# Patient Record
Sex: FEMALE | Race: WHITE | NOT HISPANIC OR LATINO | Employment: FULL TIME | URBAN - METROPOLITAN AREA
[De-identification: names, ages, dates, MRNs, and addresses within clinical notes are randomized per-mention and may not be internally consistent; named-entity substitution may affect disease eponyms.]

---

## 2017-10-10 ENCOUNTER — GENERIC CONVERSION - ENCOUNTER (OUTPATIENT)
Dept: OTHER | Facility: OTHER | Age: 37
End: 2017-10-10

## 2017-10-10 DIAGNOSIS — T83.32XA DISPLACEMENT OF INTRAUTERINE CONTRACEPTIVE DEVICE: ICD-10-CM

## 2017-10-10 DIAGNOSIS — E66.9 OBESITY: ICD-10-CM

## 2017-10-10 DIAGNOSIS — G47.00 INSOMNIA: ICD-10-CM

## 2017-10-10 DIAGNOSIS — Z80.3 FAMILY HISTORY OF MALIGNANT NEOPLASM OF BREAST: ICD-10-CM

## 2017-10-10 DIAGNOSIS — L30.9 DERMATITIS: ICD-10-CM

## 2017-10-10 DIAGNOSIS — G40.909 EPILEPSY WITHOUT STATUS EPILEPTICUS, NOT INTRACTABLE (HCC): ICD-10-CM

## 2017-10-18 ENCOUNTER — HOSPITAL ENCOUNTER (OUTPATIENT)
Dept: RADIOLOGY | Age: 37
Discharge: HOME | End: 2017-10-18
Payer: COMMERCIAL

## 2017-10-18 DIAGNOSIS — T83.32XA DISPLACEMENT OF INTRAUTERINE CONTRACEPTIVE DEVICE: ICD-10-CM

## 2017-10-18 DIAGNOSIS — Z80.3 FAMILY HISTORY OF MALIGNANT NEOPLASM OF BREAST: ICD-10-CM

## 2017-10-18 PROCEDURE — G0202 SCR MAMMO BI INCL CAD: HCPCS

## 2017-10-18 PROCEDURE — 76856 US EXAM PELVIC COMPLETE: CPT

## 2017-10-18 PROCEDURE — 76830 TRANSVAGINAL US NON-OB: CPT

## 2017-11-02 ENCOUNTER — ALLSCRIPTS OFFICE VISIT (OUTPATIENT)
Dept: OTHER | Facility: OTHER | Age: 37
End: 2017-11-02

## 2017-11-07 ENCOUNTER — APPOINTMENT (OUTPATIENT)
Dept: LAB | Facility: CLINIC | Age: 37
End: 2017-11-07
Payer: COMMERCIAL

## 2017-11-07 DIAGNOSIS — E66.9 OBESITY: ICD-10-CM

## 2017-11-07 DIAGNOSIS — G47.00 INSOMNIA: ICD-10-CM

## 2017-11-07 DIAGNOSIS — L30.9 DERMATITIS: ICD-10-CM

## 2017-11-07 DIAGNOSIS — G40.909 EPILEPSY WITHOUT STATUS EPILEPTICUS, NOT INTRACTABLE (HCC): ICD-10-CM

## 2017-11-07 LAB
ALBUMIN SERPL BCP-MCNC: 4 G/DL (ref 3.5–5)
ALP SERPL-CCNC: 70 U/L (ref 46–116)
ALT SERPL W P-5'-P-CCNC: 31 U/L (ref 12–78)
ANION GAP SERPL CALCULATED.3IONS-SCNC: 4 MMOL/L (ref 4–13)
AST SERPL W P-5'-P-CCNC: 15 U/L (ref 5–45)
BASOPHILS # BLD AUTO: 0.05 THOUSANDS/ΜL (ref 0–0.1)
BASOPHILS NFR BLD AUTO: 1 % (ref 0–1)
BILIRUB SERPL-MCNC: 0.97 MG/DL (ref 0.2–1)
BUN SERPL-MCNC: 13 MG/DL (ref 5–25)
CALCIUM SERPL-MCNC: 8.7 MG/DL (ref 8.3–10.1)
CHLORIDE SERPL-SCNC: 102 MMOL/L (ref 100–108)
CHOLEST SERPL-MCNC: 209 MG/DL (ref 50–200)
CO2 SERPL-SCNC: 31 MMOL/L (ref 21–32)
CREAT SERPL-MCNC: 0.71 MG/DL (ref 0.6–1.3)
EOSINOPHIL # BLD AUTO: 0.31 THOUSAND/ΜL (ref 0–0.61)
EOSINOPHIL NFR BLD AUTO: 4 % (ref 0–6)
ERYTHROCYTE [DISTWIDTH] IN BLOOD BY AUTOMATED COUNT: 12.9 % (ref 11.6–15.1)
GFR SERPL CREATININE-BSD FRML MDRD: 108 ML/MIN/1.73SQ M
GLUCOSE P FAST SERPL-MCNC: 92 MG/DL (ref 65–99)
HCT VFR BLD AUTO: 44.2 % (ref 34.8–46.1)
HDLC SERPL-MCNC: 33 MG/DL (ref 40–60)
HGB BLD-MCNC: 15 G/DL (ref 11.5–15.4)
LDLC SERPL CALC-MCNC: 112 MG/DL (ref 0–100)
LYMPHOCYTES # BLD AUTO: 1.93 THOUSANDS/ΜL (ref 0.6–4.47)
LYMPHOCYTES NFR BLD AUTO: 26 % (ref 14–44)
MCH RBC QN AUTO: 30.7 PG (ref 26.8–34.3)
MCHC RBC AUTO-ENTMCNC: 33.9 G/DL (ref 31.4–37.4)
MCV RBC AUTO: 91 FL (ref 82–98)
MONOCYTES # BLD AUTO: 0.44 THOUSAND/ΜL (ref 0.17–1.22)
MONOCYTES NFR BLD AUTO: 6 % (ref 4–12)
NEUTROPHILS # BLD AUTO: 4.58 THOUSANDS/ΜL (ref 1.85–7.62)
NEUTS SEG NFR BLD AUTO: 63 % (ref 43–75)
NRBC BLD AUTO-RTO: 0 /100 WBCS
PLATELET # BLD AUTO: 293 THOUSANDS/UL (ref 149–390)
PMV BLD AUTO: 9.8 FL (ref 8.9–12.7)
POTASSIUM SERPL-SCNC: 3.8 MMOL/L (ref 3.5–5.3)
PROT SERPL-MCNC: 7.8 G/DL (ref 6.4–8.2)
RBC # BLD AUTO: 4.88 MILLION/UL (ref 3.81–5.12)
SODIUM SERPL-SCNC: 137 MMOL/L (ref 136–145)
TRIGL SERPL-MCNC: 321 MG/DL
TSH SERPL DL<=0.05 MIU/L-ACNC: 1.39 UIU/ML (ref 0.36–3.74)
WBC # BLD AUTO: 7.33 THOUSAND/UL (ref 4.31–10.16)

## 2017-11-07 PROCEDURE — 85025 COMPLETE CBC W/AUTO DIFF WBC: CPT

## 2017-11-07 PROCEDURE — 80053 COMPREHEN METABOLIC PANEL: CPT

## 2017-11-07 PROCEDURE — 36415 COLL VENOUS BLD VENIPUNCTURE: CPT

## 2017-11-07 PROCEDURE — 84443 ASSAY THYROID STIM HORMONE: CPT

## 2017-11-07 PROCEDURE — 80061 LIPID PANEL: CPT

## 2017-11-08 ENCOUNTER — GENERIC CONVERSION - ENCOUNTER (OUTPATIENT)
Dept: OTHER | Facility: OTHER | Age: 37
End: 2017-11-08

## 2017-11-24 ENCOUNTER — ALLSCRIPTS OFFICE VISIT (OUTPATIENT)
Dept: OTHER | Facility: OTHER | Age: 37
End: 2017-11-24

## 2017-11-30 ENCOUNTER — ALLSCRIPTS OFFICE VISIT (OUTPATIENT)
Dept: OTHER | Facility: OTHER | Age: 37
End: 2017-11-30

## 2017-11-30 LAB — HCG, QUALITATIVE (HISTORICAL): NEGATIVE

## 2017-12-06 ENCOUNTER — HOSPITAL ENCOUNTER (OUTPATIENT)
Facility: HOSPITAL | Age: 37
Setting detail: OUTPATIENT SURGERY
Discharge: HOME | End: 2017-12-06
Attending: OBSTETRICS & GYNECOLOGY | Admitting: OBSTETRICS & GYNECOLOGY
Payer: COMMERCIAL

## 2017-12-06 ENCOUNTER — ANESTHESIA (OUTPATIENT)
Dept: PERIOP | Facility: HOSPITAL | Age: 37
End: 2017-12-06
Payer: COMMERCIAL

## 2017-12-06 ENCOUNTER — ANESTHESIA EVENT (OUTPATIENT)
Dept: PERIOP | Facility: HOSPITAL | Age: 37
End: 2017-12-06
Payer: COMMERCIAL

## 2017-12-06 VITALS
HEIGHT: 67 IN | WEIGHT: 210 LBS | BODY MASS INDEX: 32.96 KG/M2 | RESPIRATION RATE: 17 BRPM | HEART RATE: 69 BPM | SYSTOLIC BLOOD PRESSURE: 131 MMHG | TEMPERATURE: 97.7 F | OXYGEN SATURATION: 100 % | DIASTOLIC BLOOD PRESSURE: 89 MMHG

## 2017-12-06 DIAGNOSIS — Z30.432 ENCOUNTER FOR IUD REMOVAL: ICD-10-CM

## 2017-12-06 LAB — EXT PREGNANCY TEST URINE: NEGATIVE

## 2017-12-06 PROCEDURE — 88305 TISSUE EXAM BY PATHOLOGIST: CPT | Performed by: OBSTETRICS & GYNECOLOGY

## 2017-12-06 PROCEDURE — 81025 URINE PREGNANCY TEST: CPT | Performed by: OBSTETRICS & GYNECOLOGY

## 2017-12-06 RX ORDER — METOCLOPRAMIDE HYDROCHLORIDE 5 MG/ML
INJECTION INTRAMUSCULAR; INTRAVENOUS AS NEEDED
Status: DISCONTINUED | OUTPATIENT
Start: 2017-12-06 | End: 2017-12-06 | Stop reason: SURG

## 2017-12-06 RX ORDER — MULTIVITAMIN
1 TABLET ORAL DAILY
COMMUNITY
End: 2018-05-04 | Stop reason: ALTCHOICE

## 2017-12-06 RX ORDER — ONDANSETRON 2 MG/ML
4 INJECTION INTRAMUSCULAR; INTRAVENOUS ONCE AS NEEDED
Status: DISCONTINUED | OUTPATIENT
Start: 2017-12-06 | End: 2017-12-06 | Stop reason: HOSPADM

## 2017-12-06 RX ORDER — LIDOCAINE HYDROCHLORIDE 10 MG/ML
INJECTION, SOLUTION INFILTRATION; PERINEURAL AS NEEDED
Status: DISCONTINUED | OUTPATIENT
Start: 2017-12-06 | End: 2017-12-06 | Stop reason: SURG

## 2017-12-06 RX ORDER — ONDANSETRON 2 MG/ML
INJECTION INTRAMUSCULAR; INTRAVENOUS AS NEEDED
Status: DISCONTINUED | OUTPATIENT
Start: 2017-12-06 | End: 2017-12-06 | Stop reason: SURG

## 2017-12-06 RX ORDER — PROPOFOL 10 MG/ML
INJECTION, EMULSION INTRAVENOUS AS NEEDED
Status: DISCONTINUED | OUTPATIENT
Start: 2017-12-06 | End: 2017-12-06 | Stop reason: SURG

## 2017-12-06 RX ORDER — PROPOFOL 10 MG/ML
INJECTION, EMULSION INTRAVENOUS CONTINUOUS PRN
Status: DISCONTINUED | OUTPATIENT
Start: 2017-12-06 | End: 2017-12-06 | Stop reason: SURG

## 2017-12-06 RX ORDER — OXYCODONE HYDROCHLORIDE AND ACETAMINOPHEN 5; 325 MG/1; MG/1
1 TABLET ORAL EVERY 4 HOURS PRN
Status: DISCONTINUED | OUTPATIENT
Start: 2017-12-06 | End: 2017-12-06 | Stop reason: HOSPADM

## 2017-12-06 RX ORDER — KETOROLAC TROMETHAMINE 30 MG/ML
INJECTION, SOLUTION INTRAMUSCULAR; INTRAVENOUS AS NEEDED
Status: DISCONTINUED | OUTPATIENT
Start: 2017-12-06 | End: 2017-12-06 | Stop reason: SURG

## 2017-12-06 RX ORDER — OXYCODONE HYDROCHLORIDE 10 MG/1
10 TABLET ORAL EVERY 4 HOURS PRN
Status: DISCONTINUED | OUTPATIENT
Start: 2017-12-06 | End: 2017-12-06 | Stop reason: HOSPADM

## 2017-12-06 RX ORDER — MAGNESIUM HYDROXIDE 1200 MG/15ML
LIQUID ORAL AS NEEDED
Status: DISCONTINUED | OUTPATIENT
Start: 2017-12-06 | End: 2017-12-06 | Stop reason: HOSPADM

## 2017-12-06 RX ORDER — MIDAZOLAM HYDROCHLORIDE 1 MG/ML
INJECTION INTRAMUSCULAR; INTRAVENOUS AS NEEDED
Status: DISCONTINUED | OUTPATIENT
Start: 2017-12-06 | End: 2017-12-06 | Stop reason: SURG

## 2017-12-06 RX ORDER — ONDANSETRON 2 MG/ML
4 INJECTION INTRAMUSCULAR; INTRAVENOUS EVERY 6 HOURS PRN
Status: DISCONTINUED | OUTPATIENT
Start: 2017-12-06 | End: 2017-12-06 | Stop reason: HOSPADM

## 2017-12-06 RX ORDER — SODIUM CHLORIDE, SODIUM LACTATE, POTASSIUM CHLORIDE, CALCIUM CHLORIDE 600; 310; 30; 20 MG/100ML; MG/100ML; MG/100ML; MG/100ML
125 INJECTION, SOLUTION INTRAVENOUS CONTINUOUS
Status: DISCONTINUED | OUTPATIENT
Start: 2017-12-06 | End: 2017-12-06 | Stop reason: HOSPADM

## 2017-12-06 RX ORDER — FENTANYL CITRATE/PF 50 MCG/ML
25 SYRINGE (ML) INJECTION
Status: DISCONTINUED | OUTPATIENT
Start: 2017-12-06 | End: 2017-12-06 | Stop reason: HOSPADM

## 2017-12-06 RX ORDER — IBUPROFEN 600 MG/1
600 TABLET ORAL EVERY 6 HOURS PRN
Status: DISCONTINUED | OUTPATIENT
Start: 2017-12-06 | End: 2017-12-06 | Stop reason: HOSPADM

## 2017-12-06 RX ORDER — SODIUM CHLORIDE, SODIUM LACTATE, POTASSIUM CHLORIDE, CALCIUM CHLORIDE 600; 310; 30; 20 MG/100ML; MG/100ML; MG/100ML; MG/100ML
50 INJECTION, SOLUTION INTRAVENOUS CONTINUOUS
Status: DISCONTINUED | OUTPATIENT
Start: 2017-12-06 | End: 2017-12-06 | Stop reason: HOSPADM

## 2017-12-06 RX ADMIN — ONDANSETRON 4 MG: 2 INJECTION INTRAMUSCULAR; INTRAVENOUS at 11:16

## 2017-12-06 RX ADMIN — SODIUM CHLORIDE, SODIUM LACTATE, POTASSIUM CHLORIDE, AND CALCIUM CHLORIDE: .6; .31; .03; .02 INJECTION, SOLUTION INTRAVENOUS at 09:52

## 2017-12-06 RX ADMIN — METOCLOPRAMIDE HYDROCHLORIDE 5 MG: 5 INJECTION INTRAMUSCULAR; INTRAVENOUS at 11:16

## 2017-12-06 RX ADMIN — CEFAZOLIN SODIUM 2000 MG: 2 SOLUTION INTRAVENOUS at 11:15

## 2017-12-06 RX ADMIN — LIDOCAINE HYDROCHLORIDE 75 MG: 10 INJECTION, SOLUTION INFILTRATION; PERINEURAL at 11:13

## 2017-12-06 RX ADMIN — KETOROLAC TROMETHAMINE 30 MG: 30 INJECTION, SOLUTION INTRAMUSCULAR at 11:19

## 2017-12-06 RX ADMIN — REMIFENTANIL HYDROCHLORIDE 0.2 MCG/KG/MIN: 1 INJECTION, POWDER, LYOPHILIZED, FOR SOLUTION INTRAVENOUS at 11:14

## 2017-12-06 RX ADMIN — PROPOFOL 120 MCG/KG/MIN: 10 INJECTION, EMULSION INTRAVENOUS at 11:14

## 2017-12-06 RX ADMIN — MIDAZOLAM HYDROCHLORIDE 2 MG: 1 INJECTION, SOLUTION INTRAMUSCULAR; INTRAVENOUS at 11:07

## 2017-12-06 RX ADMIN — PROPOFOL 200 MG: 10 INJECTION, EMULSION INTRAVENOUS at 11:13

## 2017-12-06 RX ADMIN — DEXAMETHASONE SODIUM PHOSPHATE 10 MG: 10 INJECTION INTRAMUSCULAR; INTRAVENOUS at 11:16

## 2017-12-06 NOTE — ANESTHESIA PREPROCEDURE EVALUATION
Review of Systems/Medical History  Patient summary reviewed  Chart reviewed  History of anesthetic complications PONV    Cardiovascular  EKG reviewed, No hypertension , No CAD, ,    Pulmonary  No asthma: , No recent URI , Sleep apnea CPAP, ,        GI/Hepatic            Endo/Other     GYN       Hematology   Musculoskeletal       Neurology  Seizures (has had two seizures, most recent one approximately 6-7 years ago; no meds; no follow up with neurologist; per patient, provoked by sleep deprivation ) well controlled,     Psychology         Lab Results   Component Value Date    WBC 7 33 11/07/2017    HGB 15 0 11/07/2017     11/07/2017     Lab Results   Component Value Date     11/07/2017    K 3 8 11/07/2017    BUN 13 11/07/2017    CREATININE 0 71 11/07/2017     No results found for: PTT   No results found for: INR    No results found for: HGBA1C    Physical Exam    Airway    Mallampati score: II  TM Distance: >3 FB       Dental   No notable dental hx     Cardiovascular      Pulmonary      Other Findings        Anesthesia Plan  ASA Score- 2       Anesthesia Type- general with ASA Monitors  Additional Monitors:   Airway Plan: LMA  Comment:  NGUYEN Avendano , have personally seen and evaluated the patient prior to anesthetic care  I have reviewed the pre-anesthetic record, and other medical records if appropriate to the anesthetic care  If a CRNA is involved in the case, I have reviewed the CRNA assessment, if present, and agree  Risks/benefits and alternatives discussed with patient including possible PONV, sore throat, and possibility of rare anesthetic and surgical emergencies          Induction- intravenous  Informed Consent- Anesthetic plan and risks discussed with patient  I personally reviewed this patient with the CRNA  Discussed and agreed on the Anesthesia Plan with the CRNA  Sherre Soulier

## 2017-12-06 NOTE — ANESTHESIA POSTPROCEDURE EVALUATION
Post-Op Assessment Note      CV Status:  Stable    Mental Status:  Alert and awake    Hydration Status:  Euvolemic    PONV Controlled:  Controlled    Airway Patency:  Patent    Post Op Vitals Reviewed: Yes          Staff: CRNA       Comments: vss sv nonobstructed uneventful          BP (P) 118/68 (12/06/17 1146)    Temp (P) 97 7 °F (36 5 °C) (12/06/17 1146)    Pulse (P) 92 (12/06/17 1146)   Resp (P) 18 (12/06/17 1146)    SpO2 (P) 97 % (12/06/17 1146)

## 2017-12-12 NOTE — OP NOTE
OPERATIVE REPORT  PATIENT NAME: Dio Leos    :  1980  MRN: [id number]  Pt Location: AN OR ROOM 02    SURGERY DATE: 2017    Surgeon(s) and Role:     * Wilfredo Ledezma MD - Primary    Preop Diagnosis:  Encounter for IUD removal [Z30 432]    Post-Op Diagnosis Codes:     * Encounter for IUD removal [Z30 432]    Procedure(s) (LRB):  D&C; IUD REMOVAL (N/A)    Specimen(s):  ID Type Source Tests Collected by Time Destination   1 : Endometrial Currettings Tissue Endometrium TISSUE EXAM Wilfredo Ledezma MD 2017 1124        Estimated Blood Loss:   Minimal    Drains:  none       Anesthesia Type:   General    Operative Indications:  Encounter for IUD removal [Z30 432]      Operative Findings:    Exam under anesthesia reveals the uterus to be anterior normal size and mobile, no adnexal masses  Cervix is grossly normal appearance there is no visible IUD strings present  Hysteroscopic evaluation revealed the IUD the top fundus and no other abnormalities    Complications:   None    Procedure and Technique:  The patient was taken to the operating room suite and identified as Dio Leos by name and wristband  She was given GET and placed in the modified dorsal lithotomy position  She was prepped and draped in the usual sterile fashion, a time out was performed  An exam under anesthesia was done and then the weighted speculum was placed in the vaginal vault and the anterior lip of the cervix was grasped and held by an Allis clamp as the uterus was sounded and the cervix gently dilated to 21 [place]  Hysteroscopy was performed and the IUD was noted to be present in the anterior fundal area  Polyp forceps were used to grasp and remove the IUD (Mirena) without difficulty and sharp currettage was performed with all specimen sent to pathology  The IUD was intact and was disposed of in agreement with the patients preop wishes    Hemostasis was excellent and the allis was removed, followed by the weighted speculum and the patient was cleaned and awoken and brought to the recovery room in stable condition         I was present for the entire procedure    Patient Disposition:  PACU     SIGNATURE: Courtney Marsh MD  DATE: December 12, 2017  TIME: 2:22 PM

## 2018-01-05 NOTE — PROCEDURES
Assessment   1  IUD strings lost (996 32) (T83 32XA)    Discussion/Summary   Discussion Summary:    Unable to remove IUD despite attempts with dilation and hook - patient very uncomfortable  proceed with OR removal and would like to use Nexplanon for contraception and to stop menorrhagia  Medication SE Review and Pt Understands Tx: Possible side effects of new medications were reviewed with the patient/guardian today  The treatment plan was reviewed with the patient/guardian  The patient/guardian understands and agrees with the treatment plan      Active Problems    1  Eczema of scalp (692 9) (L30 9)   2  Epilepsy (345 90) (G40 909)   3  Essential hypertriglyceridemia (272 1) (E78 1)   4  Insomnia, unspecified type (780 52) (G47 00)   5  Low back pain (724 2) (M54 5)   6  Obesity (278 00) (E66 9)   7  Obstructive sleep apnea on CPAP (327 23,V46 8) (G47 33,Z99 89)      IUD strings lost (996 32) (T83 32XA)               Current Meds   1  Ibuprofen TABS; Therapy: (Recorded:24Nov2017) to Recorded    Allergies   1  No Known Drug Allergies    Physical Exam        Constitutional      General appearance: No acute distress, well appearing and well nourished  -- Obese white female in no apparent distress  Genitourinary      External genitalia: Normal and no lesions appreciated  Vagina: Normal, no lesions or dryness appreciated  Urethra: Normal        Urethral meatus: Normal        Bladder: Normal, soft, non-tender and no prolapse or masses appreciated  Cervix: Normal, no palpable masses  Uterus: Normal, non-tender, not enlarged, and no palpable masses  Adnexa/parametria: Normal, non-tender and no fullness or masses appreciated  Abdomen      Abdomen: Normal, non-tender, and no organomegaly noted  Psychiatric      Orientation to person, place, and time: Normal        Mood and affect: Normal        Procedure        Procedure: intrauterine device (IUD) placement      Procedure Note:    Post-Procedure:    Procedure: removal of Mirena IUD  Indications for the procedure include  IUD-- and-- menorrhagia  Risks, benefits and alternatives were discussed with the patient  Consent was obtained prior to the procedure and is detailed in the patient's record  Procedure Note:   a speculum was placed in the vagina,-- the IUD strings were not visible-- and-- Attempts to retrieve IUD with hook were unsuccessful and not tolerated well  Post-Procedure: The patient complains of , nausea and vomiting        Future Appointments      Date/Time Provider Specialty Site   2018 10:15 AM Sepideh Wade MD [facility]     Signatures    Electronically signed by : NGUYEN Miller ; 2018  2:04PM EST                       (Author)

## 2018-01-09 NOTE — RESULT NOTES
Verified Results  (1) RAPID INFLUENZA SCREEN with reflex to PCR [id number] 08:18PM Kaleigh Danielson     Test Name Result Flag Reference   INFLUENZA A/MATRIX None Detected  None Detected   INFLUENZA B Detected A None Detected   RESP SYNCYTIAL VIRUS None Detected  None Detected

## 2018-01-10 NOTE — PROGRESS NOTES
Assessment    1  Encounter for preventive health examination (V70 0) (Z00 00)   2  Low back pain (724 2) (M54 5)   3  Obesity (278 00) (E66 9)   4  Epilepsy (345 90) (G40 909)   · well controlled with sleep   5  Obstructive sleep apnea on CPAP (327 23,V46 8) (G47 33,Z99 89)   6  Insomnia, unspecified type (780 52) (G47 00)   7  Eczema of scalp (692 9) (L30 9)    Plan  Eczema of scalp    · Fluocinolone Acetonide Scalp 0 01 % External Oil; apply over scalp leave on over  night or >4 hr before washing  Eczema of scalp, Epilepsy    · (1) CBC/PLT/DIFF; Status:Active; Requested [id number];    · (1) COMPREHENSIVE METABOLIC PANEL; Status:Active; Requested for:02Nov2017;   Eczema of scalp, Epilepsy, Insomnia, unspecified type    · (1) TSH WITH FT4 REFLEX; Status:Active; Requested [id number]; Health Maintenance    · Follow-up visit in 1 year Evaluation and Treatment  Follow-up  Status: Hold For -  Scheduling  Requested for: 56JLL0693   · Begin a limited exercise program ; Status:Complete;   Done: 10XZL4726   · Begin or continue regular aerobic exercise   Gradually work up to at least 3 sessions of 30  minutes of exercise a week ; Status:Complete;   Done: 51APT7623   · Eat a low fat and low cholesterol diet ; Status:Complete;   Done: 42GBP6502   · Eat a normal well-balanced diet ; Status:Complete;   Done: 74UKR6831   · Eat foods that are high in calcium ; Status:Complete;   Done: 22YMB4302   · Some eating tips that can help you lose weight ; Status:Complete;   Done: 42JRS1919   · Use a sun block product with an SPF of 15 or more ; Status:Complete;   Done:  59AQX2528   · Vitamins can help you get daily requirements that your diet may not be giving you ;  Status:Complete;   Done: 55MWV1141   · Call [phone] if: You find a new or different kind of lump in your breast ;  Status:Complete;   Done: 35UPX7422   · Call [phone] if: You have any warning signs of skin cancer ; Status:Complete;    Done: 93ZHL7880  Insomnia, unspecified type    · QUEtiapine Fumarate 25 MG Oral Tablet (SEROquel); TAKE 1 TABLET Bedtime  Low back pain    · Cyclobenzaprine HCl - 10 MG Oral Tablet; TAKE 1 TABLET AT BEDTIME  Obesity    · (1) LIPID PANEL, FASTING; Status:Active; Requested for:02Nov2017;     Discussion/Summary  health maintenance visit Currently, she eats a healthy diet  the risks and benefits of cervical cancer screening were discussed cervical cancer screening is needed every three years Breast cancer screening: the risks and benefits of breast cancer screening were discussed and breast cancer screening is not indicated  Colorectal cancer screening: the risks and benefits of colorectal cancer screening were discussed and colorectal cancer screening is not indicated  Screening lab work includes lipid profile  The patient declines immunizations  She was advised to be evaluated by an ophthalmologist and a dentist  Advice and education were given regarding nutrition, calcium supplements, reproductive health, contraception, self skin examination and helmet use  Epilepsy is well controlled with 7-8 hours of sleep at night      Chief Complaint  Pt here for New PCP and Physical      History of Present Illness  HM, Adult Female: The patient is being seen for a health maintenance evaluation  The last health maintenance visit was few year(s) ago  General Health: The patient's health since the last visit is described as good  She has regular dental visits  She denies vision problems  She denies hearing loss  Immunizations status: not up to date  Lifestyle:  She consumes a diverse and healthy diet  She does not have any weight concerns  She exercises regularly  She does not use tobacco  She denies alcohol use  She denies drug use  Reproductive health:  she reports normal menses  she uses contraception  For contraception, she has an intrauterine device  she is sexually active  pregnancy history: G 5P 2(miscarriages: 3 )  Screening: cancer screening reviewed and updated  metabolic screening reviewed and updated  risk screening reviewed and updated  HPI: c/o feeling like her scalp is burning for the last 3 months   tried different shampoo but nothing works   had sleep deprived seizure when her daughter was 3 months old- seen by a neurologist that time         Review of Systems    Constitutional: No fever, no chills, feels well, no tiredness, no recent weight gain or weight loss  Eyes: No complaints of eye pain, no red eyes, no eyesight problems, no discharge, no dry eyes, no itching of eyes  ENT: no complaints of earache, no loss of hearing, no nose bleeds, no nasal discharge, no sore throat, no hoarseness  Cardiovascular: No complaints of slow heart rate, no fast heart rate, no chest pain, no palpitations, no leg claudication, no lower extremity edema  Respiratory: No complaints of shortness of breath, no wheezing, no cough, no SOB on exertion, no orthopnea, no PND  Gastrointestinal: No complaints of abdominal pain, no constipation, no nausea or vomiting, no diarrhea, no bloody stools  Genitourinary: No complaints of dysuria, no incontinence, no pelvic pain, no dysmenorrhea, no vaginal discharge or bleeding  Musculoskeletal: arthralgias and myalgias  Integumentary: No complaints of skin rash or lesions, no itching, no skin wounds, no breast pain or lump  Neurological: No complaints of headache, no confusion, no convulsions, no numbness, no dizziness or fainting, no tingling, no limb weakness, no difficulty walking  Psychiatric: Not suicidal, no sleep disturbance, no anxiety or depression, no change in personality, no emotional problems  Endocrine: No complaints of proptosis, no hot flashes, no muscle weakness, no deepening of the voice, no feelings of weakness  Hematologic/Lymphatic: No complaints of swollen glands, no swollen glands in the neck, does not bleed easily, does not bruise easily  Active Problems    1  Epilepsy (345 90) (G40 909)   2  IUD strings lost (996 32) (T83 32XA)   3  Obesity (278 00) (E66 9)    Past Medical History    · History of Epilepsy (345 90)   · History of Medial meniscus tear (836 0) (S83 249A)   · History of Previously Pregnant Including 3  Miscarriage(S)    Surgical History    · History of  Section   · History of Corneal LASIK   · History of Hysteroscopy   · History of Laparoscopy (Diagnostic)    Family History  Father    · Family history of Diabetes Mellitus (V18 0)   · Family history of Hyperlipidemia   · Family history of Hypertension (V17 49)  Maternal Grandmother    · Family history of Brain Cancer (V16 8)  Maternal Aunt    · FHx: malignant neoplasm of breast (V16 3) (Z80 3)  Family History    · Family history of Migraine Headache   · Family history of Type 2 Diabetes Mellitus    Social History    · Alcohol Use (History)   · Denied: History of Drug Use   · Denied: History of Exercising Regularly   · Former smoker (V15 82) (Y25 640)   · Marital History - Currently    · Presence of (intrauterine) contraceptive device (V45 51) (Z97 5)    Current Meds   1  Melatonin 5 MG Oral Capsule; Take one tablet at night 45 minutes before going to sleep; Therapy: 38NQZ7851 to (Evaluate:2017); Last Rx:2017 Ordered    Allergies    1  No Known Drug Allergies    Vitals   Recorded: 12JUC5788 01:14PM   Heart Rate 88   Respiration 16   Systolic 638   Diastolic 88   Height 5 ft 7 05 in   Weight 209 lb 4 oz   BMI Calculated 32 72   BSA Calculated 2 06     Physical Exam    Constitutional   General appearance: No acute distress, well appearing and well nourished  Head and Face   Head and face: Normal     Palpation of the face and sinuses: No sinus tenderness  Eyes   Conjunctiva and lids: No swelling, erythema or discharge  Pupils and irises: Equal, round, reactive to light  Ophthalmoscopic examination: Normal fundi and optic discs      Ears, Nose, Mouth, and Throat   External inspection of ears and nose: Normal     Otoscopic examination: Tympanic membranes translucent with normal light reflex  Canals patent without erythema  Hearing: Normal     Nasal mucosa, septum, and turbinates: Normal without edema or erythema  Lips, teeth, and gums: Normal, good dentition  Oropharynx: Normal with no erythema, edema, exudate or lesions  Neck   Neck: Supple, symmetric, trachea midline, no masses  Thyroid: Normal, no thyromegaly  Pulmonary   Respiratory effort: No increased work of breathing or signs of respiratory distress  Auscultation of lungs: Clear to auscultation  Cardiovascular   Palpation of heart: Normal PMI, no thrills  Auscultation of heart: Normal rate and rhythm, normal S1 and S2, no murmurs  Examination of extremities for edema and/or varicosities: Normal     Chest   Chest: Normal     Abdomen   Abdomen: Non-tender, no masses  Liver and spleen: No hepatomegaly or splenomegaly  Lymphatic   Palpation of lymph nodes in neck: No lymphadenopathy  Palpation of lymph nodes in axillae: No lymphadenopathy  Musculoskeletal   Gait and station: Normal     Digits and nails: Normal without clubbing or cyanosis  Joints, bones, and muscles: Normal     Range of motion: Normal     Stability: Normal     Muscle strength/tone: Normal     Skin   Skin and subcutaneous tissue: Abnormal   erythematous scaly rash over scalp  Palpation of skin and subcutaneous tissue: Normal turgor  Neurologic   Cranial nerves: Cranial nerves II-XII intact  Cortical function: Normal mental status  Reflexes: 2+ and symmetric  Sensation: No sensory loss  Coordination: Normal finger to nose and heel to shin  Psychiatric   Judgment and insight: Normal     Orientation to person, place, and time: Normal     Recent and remote memory: Intact      Mood and affect: Normal        Future Appointments    Date/Time Provider Specialty Site   11/24/2017 01:45 PM NGUYEN Brown   Obstetrics/Gynecology [address] OBGYN     Signatures   Electronically signed by : Tracy Salcedo MD; Nov 2 2017  1:56PM EST                       (Author)

## 2018-01-12 VITALS
HEIGHT: 67 IN | DIASTOLIC BLOOD PRESSURE: 84 MMHG | SYSTOLIC BLOOD PRESSURE: 128 MMHG | WEIGHT: 211 LBS | BODY MASS INDEX: 33.12 KG/M2

## 2018-01-13 NOTE — RESULT NOTES
Discussion/Summary     her triglycerides are very high- its 321 ( normal is less than 150)  i want her to start on medication  sent to the pharmacy  normal kidney, blood sugar, liver and kidney function     - Dr Cookie Bosworth     Verified Results  (1) LIPID PANEL, FASTING [id number] 08:20AM [facility] Order Number: [id number]     Test Name Result Flag Reference   CHOLESTEROL 209 mg/dL H    HDL,DIRECT 33 mg/dL L 40-60   Specimen collection should occur prior to Metamizole administration due to the potential for falsley depressed results  LDL CHOLESTEROL CALCULATED 112 mg/dL H 0-100   Triglyceride:        Normal <150 mg/dl   Borderline High 150-199 mg/dl   High 200-499 mg/dl   Very High >499 mg/dl      Cholesterol:       Desirable <200 mg/dl    Borderline High 200-239 mg/dl    High >239 mg/dl      HDL Cholesterol:       High>59 mg/dL    Low <41 mg/dL      This screening LDL is a calculated result  It does not have the accuracy of the Direct Measured LDL in the monitoring of patients with hyperlipidemia and/or statin therapy  Direct Measure LDL (WMI806) must be ordered separately in these patients  TRIGLYCERIDES 321 mg/dL H <=150   Specimen collection should occur prior to N-Acetylcysteine or Metamizole administration due to the potential for falsely depressed results  (1) CBC/PLT/DIFF [id number] 08:20AM [facility] Order Number: [id number]     Test Name Result Flag Reference   WBC COUNT 7 33 Thousand/uL  4 31-10 16   RBC COUNT 4 88 Million/uL  3 81-5 12   HEMOGLOBIN 15 0 g/dL  11 5-15 4   HEMATOCRIT 44 2 %  34 8-46  1   MCV 91 fL  82-98   MCH 30 7 pg  26 8-34 3   MCHC 33 9 g/dL  31 4-37 4   RDW 12 9 %  11 6-15 1   MPV 9 8 fL  8 9-12 7   PLATELET COUNT 822 Thousands/uL  149-390   nRBC AUTOMATED 0 /100 WBCs     NEUTROPHILS RELATIVE PERCENT 63 %  43-75   LYMPHOCYTES RELATIVE PERCENT 26 %  14-44   MONOCYTES RELATIVE PERCENT 6 %  4-12   EOSINOPHILS RELATIVE PERCENT 4 %  0-6 BASOPHILS RELATIVE PERCENT 1 %  0-1   NEUTROPHILS ABSOLUTE COUNT 4 58 Thousands/? ??L  1 85-7 62   LYMPHOCYTES ABSOLUTE COUNT 1 93 Thousands/? ??L  0 60-4 47   MONOCYTES ABSOLUTE COUNT 0 44 Thousand/? ??L  0 17-1 22   EOSINOPHILS ABSOLUTE COUNT 0 31 Thousand/? ??L  0 00-0 61   BASOPHILS ABSOLUTE COUNT 0 05 Thousands/? ??L  0 00-0 10     (1) TSH WITH FT4 REFLEX [id number] 08:20AM Aline Wade [facility] Order Number: [id number]     Test Name Result Flag Reference   TSH 1 390 uIU/mL  0 358-3 740   Patients undergoing fluorescein dye angiography may retain small amounts of fluorescein in the body for 48-72 hours post procedure  Samples containing fluorescein can produce falsely depressed TSH values  If the patient had this procedure,a specimen should be resubmitted post fluorescein clearance  The recommended reference ranges for TSH during pregnancy are as follows:  First trimester 0 1 to 2 5 uIU/mL  Second trimester  0 2 to 3 0 uIU/mL  Third trimester 0 3 to 3 0 uIU/m     (1) COMPREHENSIVE METABOLIC PANEL [id number] 55:05RG Aline Wade [facility] Order Number: [id number]     Test Name Result Flag Reference   SODIUM 137 mmol/L  136-145   POTASSIUM 3 8 mmol/L  3 5-5 3   CHLORIDE 102 mmol/L  100-108   CARBON DIOXIDE 31 mmol/L  21-32   ANION GAP (CALC) 4 mmol/L  4-13   BLOOD UREA NITROGEN 13 mg/dL  5-25   CREATININE 0 71 mg/dL  0 60-1 30   Standardized to IDMS reference method   CALCIUM 8 7 mg/dL  8 3-10 1   BILI, TOTAL 0 97 mg/dL  0 20-1 00   ALK PHOSPHATAS 70 U/L     ALT (SGPT) 31 U/L  12-78   Specimen collection should occur prior to Sulfasalazine and/or Sulfapyridine administration due to the potential for falsely depressed results  AST(SGOT) 15 U/L  5-45   Specimen collection should occur prior to Sulfasalazine administration due to the potential for falsely depressed results     ALBUMIN 4 0 g/dL  3 5-5 0   TOTAL PROTEIN 7 8 g/dL  6 4-8 2   eGFR 108 ml/min/1 73sq m     National Kidney Disease Education Program recommendations are as follows:  GFR calculation is accurate only with a steady state creatinine  Chronic Kidney disease less than 60 ml/min/1 73 sq  meters  Kidney failure less than 15 ml/min/1 73 sq  meters  GLUCOSE FASTING 92 mg/dL  65-99   Specimen collection should occur prior to Sulfasalazine administration due to the potential for falsely depressed results  Specimen collection should occur prior to Sulfapyridine administration due to the potential for falsely elevated results         Plan  Essential hypertriglyceridemia    · Omega-3-acid Ethyl Esters 1 GM Oral Capsule (Lovaza); TAKE 1 CAPSULE BY  MOUTH TWICE DAILY    Signatures   Electronically signed by : Carolyn Gaytan MD; Nov 8 2017  8:00AM EST                       (Author)

## 2018-01-14 VITALS
HEIGHT: 67 IN | BODY MASS INDEX: 32.84 KG/M2 | SYSTOLIC BLOOD PRESSURE: 132 MMHG | HEART RATE: 88 BPM | DIASTOLIC BLOOD PRESSURE: 88 MMHG | RESPIRATION RATE: 16 BRPM | WEIGHT: 209.25 LBS

## 2018-01-14 VITALS
DIASTOLIC BLOOD PRESSURE: 64 MMHG | HEIGHT: 67 IN | WEIGHT: 211 LBS | SYSTOLIC BLOOD PRESSURE: 122 MMHG | BODY MASS INDEX: 33.12 KG/M2

## 2018-01-22 VITALS
BODY MASS INDEX: 33.12 KG/M2 | HEIGHT: 67 IN | WEIGHT: 211 LBS | DIASTOLIC BLOOD PRESSURE: 82 MMHG | SYSTOLIC BLOOD PRESSURE: 120 MMHG

## 2018-04-10 ENCOUNTER — OFFICE VISIT (OUTPATIENT)
Dept: FAMILY MEDICINE CLINIC | Facility: CLINIC | Age: 38
End: 2018-04-10
Payer: COMMERCIAL

## 2018-04-10 VITALS
WEIGHT: 200.8 LBS | BODY MASS INDEX: 31.45 KG/M2 | RESPIRATION RATE: 18 BRPM | HEART RATE: 88 BPM | SYSTOLIC BLOOD PRESSURE: 142 MMHG | DIASTOLIC BLOOD PRESSURE: 82 MMHG

## 2018-04-10 DIAGNOSIS — M25.511 ACUTE PAIN OF RIGHT SHOULDER: Primary | ICD-10-CM

## 2018-04-10 DIAGNOSIS — L20.9 ATOPIC DERMATITIS, UNSPECIFIED TYPE: ICD-10-CM

## 2018-04-10 PROCEDURE — 99214 OFFICE O/P EST MOD 30 MIN: CPT | Performed by: FAMILY MEDICINE

## 2018-04-10 RX ORDER — PREDNISONE 10 MG/1
TABLET ORAL
COMMUNITY
Start: 2018-01-30 | End: 2018-04-10 | Stop reason: ALTCHOICE

## 2018-04-10 RX ORDER — LIDOCAINE 50 MG/G
1 PATCH TOPICAL DAILY
Qty: 30 PATCH | Refills: 0 | Status: SHIPPED | OUTPATIENT
Start: 2018-04-10 | End: 2019-05-08 | Stop reason: ALTCHOICE

## 2018-04-10 RX ORDER — TRAMADOL HYDROCHLORIDE 50 MG/1
50 TABLET ORAL EVERY 6 HOURS PRN
Qty: 30 TABLET | Refills: 0 | Status: SHIPPED | OUTPATIENT
Start: 2018-04-10 | End: 2019-01-24 | Stop reason: ALTCHOICE

## 2018-04-10 NOTE — PROGRESS NOTES
Assessment/Plan:       Diagnoses and all orders for this visit:    Acute pain of right shoulder  -     MRI shoulder right wo contrast; Future  -     Ambulatory referral to Physical Therapy; Future  -     Ambulatory referral to Orthopedic Surgery  -     traMADol (ULTRAM) 50 mg tablet; Take 1 tablet (50 mg total) by mouth every 6 (six) hours as needed for moderate pain  -     lidocaine (LIDODERM) 5 %; Place 1 patch on the skin daily Remove & Discard patch within 12 hours or as directed by MD    Atopic dermatitis, unspecified type  -     CBC and differential  -     Comprehensive metabolic panel  -     Celiac Disease Antibody Profile  -     C-reactive protein  -     TSH, 3rd generation with T4 reflex  -     Sedimentation rate, automated  -     MANE Screen w/ Reflex to Titer/Pattern  -     RF Screen w/ Reflex to Titer  -     Cyclic citrul peptide antibody, IgG  -     Ambulatory referral to Allergy    Other orders  -     Multiple Vitamins-Minerals (MULTIVITAL-M) TABS; Take by mouth  -     Discontinue: predniSONE 10 mg tablet;       she will be going to [facility] for further Evaluation of rash   Will r/o connective tissue disorder   Spent 25 minutes with the patient and more than 50% was spent counseling     Subjective:      Patient ID: Kate Valadez is a 42 y o  female  Seen by Dermatologist Dr Jermaine Barclay for the last few months and tried topical steroids, Injections and forms and oral steroids  Did skin biopsy which r/o psoriasis and Fungal infection  Now its spreading to chest and her ears for the last 3 weeks   Very itchy   Tried OTC counters cream as well  Tea tree oil helps with the itching     Rash   This is a chronic problem  The current episode started more than 1 year ago  The problem has been waxing and waning since onset  The rash is diffuse  The rash is characterized by redness and swelling  She was exposed to nothing  Associated symptoms include fatigue and joint pain   Pertinent negatives include no anorexia, congestion, cough, diarrhea, eye pain, facial edema, fever, nail changes, rhinorrhea, shortness of breath, sore throat or vomiting  Past treatments include antihistamine, antibiotic cream, oral steroids, topical steroids and moisturizer  The treatment provided mild relief  Her past medical history is significant for eczema  There is no history of allergies, asthma or varicella  Shoulder Pain    The pain is present in the right shoulder and neck  This is a recurrent problem  The current episode started 1 to 4 weeks ago  There has been no history of extremity trauma  The problem occurs 2 to 4 times per day  The problem has been waxing and waning  The quality of the pain is described as aching  The pain is mild  Associated symptoms include stiffness  Pertinent negatives include no fever, inability to bear weight, itching, joint locking, joint swelling, limited range of motion, numbness or tingling  The symptoms are aggravated by activity  She has tried NSAIDS and acetaminophen for the symptoms  The treatment provided mild relief  Family history does not include gout or rheumatoid arthritis  There is no history of diabetes, gout, osteoarthritis or rheumatoid arthritis  The following portions of the patient's history were reviewed and updated as appropriate: allergies, current medications, past family history, past medical history, past social history, past surgical history and problem list     Review of Systems   Constitutional: Positive for fatigue  Negative for fever  HENT: Negative for congestion, rhinorrhea and sore throat  Eyes: Negative for pain  Respiratory: Negative for cough and shortness of breath  Gastrointestinal: Negative for anorexia, diarrhea and vomiting  Musculoskeletal: Positive for joint pain and stiffness  Negative for gout  Skin: Positive for rash  Negative for itching and nail changes  Neurological: Negative for tingling and numbness           Past Medical History: Diagnosis Date    Eczema     Epilepsy ([facility] [address] )     Insomnia     Medial meniscus tear 2015    Sleep apnea      Past Surgical History:   Procedure Laterality Date     SECTION      DILATION AND CURETTAGE OF UTERUS WITH HYSTEROSOCPY N/A 2017    Procedure: D&C; IUD REMOVAL;  Surgeon: Mary Edwards MD;  [facility];  Service: Gynecology    HYSTEROSCOPY      LAPAROSCOPY      LAPAROSCOPY      (Diagnostic)    LASIK       Social History     Social History    Marital status: /Civil Union     Spouse name: N/A    Number of children: N/A    Years of education: N/A     Occupational History    Not on file       Social History Main Topics    Smoking status: Never Smoker    Smokeless tobacco: Never Used      Comment: Former smoker per [employer]    Alcohol use Yes      Comment: occasional    Drug use: No    Sexual activity: Yes      Comment: Presence of Intrauterine Contraceptive Device     Other Topics Concern    Not on file     Social History Narrative    Denied:  Exercising Regularly     No Known Allergies      Family History   Problem Relation Age of Onset    Diabetes Father      Diabetes Mellitus    Hyperlipidemia Father     Hypertension Father     Brain cancer Maternal Grandmother     Brain cancer Maternal Aunt     Migraines Family     Diabetes Family      Type 2 Diabetes Mellitus       Current Outpatient Prescriptions:     lidocaine (LIDODERM) 5 %, Place 1 patch on the skin daily Remove & Discard patch within 12 hours or as directed by MD, Disp: 30 patch, Rfl: 0    Multiple Vitamin (MULTIVITAMIN) tablet, Take 1 tablet by mouth daily, Disp: , Rfl:     Multiple Vitamins-Minerals (MULTIVITAL-M) TABS, Take by mouth, Disp: , Rfl:     Omega-3 Fatty Acids (FISH OIL PO), Take 1 tablet by mouth daily, Disp: , Rfl:     traMADol (ULTRAM) 50 mg tablet, Take 1 tablet (50 mg total) by mouth every 6 (six) hours as needed for moderate pain, Disp: 30 tablet, Rfl: 0    Objective:      /82   Pulse 88   Resp 18   Wt 91 1 kg (200 lb 12 8 oz)   BMI 31 45 kg/m²      Physical Exam   Constitutional: She is oriented to person, place, and time  She appears well-developed and well-nourished  HENT:   Head: Normocephalic and atraumatic  Eyes: Pupils are equal, round, and reactive to light  Neck: Normal range of motion  Neck supple  Cardiovascular: Normal rate and regular rhythm  Pulmonary/Chest: Effort normal and breath sounds normal    Musculoskeletal: She exhibits tenderness  She exhibits no deformity  Right shoulder  Reduced ROM all directions  3/5 strength    Neurological: She is alert and oriented to person, place, and time  Skin: Rash noted  There is erythema  Psychiatric: She has a normal mood and affect   Her behavior is normal

## 2018-04-11 ENCOUNTER — APPOINTMENT (OUTPATIENT)
Dept: LAB | Facility: CLINIC | Age: 38
End: 2018-04-11
Payer: COMMERCIAL

## 2018-04-11 LAB
ALBUMIN SERPL BCP-MCNC: 4.5 G/DL (ref 3.5–5)
ALP SERPL-CCNC: 58 U/L (ref 46–116)
ALT SERPL W P-5'-P-CCNC: 28 U/L (ref 12–78)
ANION GAP SERPL CALCULATED.3IONS-SCNC: 6 MMOL/L (ref 4–13)
AST SERPL W P-5'-P-CCNC: 17 U/L (ref 5–45)
BASOPHILS # BLD AUTO: 0.05 THOUSANDS/ΜL (ref 0–0.1)
BASOPHILS NFR BLD AUTO: 1 % (ref 0–1)
BILIRUB SERPL-MCNC: 0.7 MG/DL (ref 0.2–1)
BUN SERPL-MCNC: 19 MG/DL (ref 5–25)
CALCIUM SERPL-MCNC: 9.2 MG/DL
CHLORIDE SERPL-SCNC: 107 MMOL/L (ref 100–108)
CO2 SERPL-SCNC: 26 MMOL/L (ref 21–32)
CREAT SERPL-MCNC: 0.78 MG/DL (ref 0.6–1.3)
CRP SERPL QL: <3 MG/L
EOSINOPHIL # BLD AUTO: 0.15 THOUSAND/ΜL (ref 0–0.61)
EOSINOPHIL NFR BLD AUTO: 3 % (ref 0–6)
ERYTHROCYTE [DISTWIDTH] IN BLOOD BY AUTOMATED COUNT: 13.4 % (ref 11.6–15.1)
ERYTHROCYTE [SEDIMENTATION RATE] IN BLOOD: 7 MM/HOUR (ref 0–20)
GFR SERPL CREATININE-BSD FRML MDRD: 96 ML/MIN/1.73SQ M
GLUCOSE P FAST SERPL-MCNC: 75 MG/DL (ref 65–99)
HCT VFR BLD AUTO: 42.2 % (ref 34.8–46.1)
HGB BLD-MCNC: 13.9 G/DL (ref 11.5–15.4)
LYMPHOCYTES # BLD AUTO: 2.06 THOUSANDS/ΜL (ref 0.6–4.47)
LYMPHOCYTES NFR BLD AUTO: 36 % (ref 14–44)
MCH RBC QN AUTO: 30.3 PG (ref 26.8–34.3)
MCHC RBC AUTO-ENTMCNC: 32.9 G/DL (ref 31.4–37.4)
MCV RBC AUTO: 92 FL (ref 82–98)
MONOCYTES # BLD AUTO: 0.39 THOUSAND/ΜL (ref 0.17–1.22)
MONOCYTES NFR BLD AUTO: 7 % (ref 4–12)
NEUTROPHILS # BLD AUTO: 3.13 THOUSANDS/ΜL (ref 1.85–7.62)
NEUTS SEG NFR BLD AUTO: 53 % (ref 43–75)
NRBC BLD AUTO-RTO: 0 /100 WBCS
PLATELET # BLD AUTO: 308 THOUSANDS/UL (ref 149–390)
PMV BLD AUTO: 10.5 FL (ref 8.9–12.7)
POTASSIUM SERPL-SCNC: 4.1 MMOL/L (ref 3.5–5.3)
PROT SERPL-MCNC: 8 G/DL (ref 6.4–8.2)
RBC # BLD AUTO: 4.59 MILLION/UL (ref 3.81–5.12)
SODIUM SERPL-SCNC: 139 MMOL/L (ref 136–145)
TSH SERPL DL<=0.05 MIU/L-ACNC: 1.65 UIU/ML (ref 0.36–3.74)
WBC # BLD AUTO: 5.79 THOUSAND/UL (ref 4.31–10.16)

## 2018-04-11 PROCEDURE — 85652 RBC SED RATE AUTOMATED: CPT | Performed by: FAMILY MEDICINE

## 2018-04-11 PROCEDURE — 86038 ANTINUCLEAR ANTIBODIES: CPT | Performed by: FAMILY MEDICINE

## 2018-04-11 PROCEDURE — 83516 IMMUNOASSAY NONANTIBODY: CPT | Performed by: FAMILY MEDICINE

## 2018-04-11 PROCEDURE — 80053 COMPREHEN METABOLIC PANEL: CPT | Performed by: FAMILY MEDICINE

## 2018-04-11 PROCEDURE — 86430 RHEUMATOID FACTOR TEST QUAL: CPT | Performed by: FAMILY MEDICINE

## 2018-04-11 PROCEDURE — 85025 COMPLETE CBC W/AUTO DIFF WBC: CPT | Performed by: FAMILY MEDICINE

## 2018-04-11 PROCEDURE — 86255 FLUORESCENT ANTIBODY SCREEN: CPT | Performed by: FAMILY MEDICINE

## 2018-04-11 PROCEDURE — 36415 COLL VENOUS BLD VENIPUNCTURE: CPT | Performed by: FAMILY MEDICINE

## 2018-04-11 PROCEDURE — 84443 ASSAY THYROID STIM HORMONE: CPT | Performed by: FAMILY MEDICINE

## 2018-04-11 PROCEDURE — 82784 ASSAY IGA/IGD/IGG/IGM EACH: CPT | Performed by: FAMILY MEDICINE

## 2018-04-11 PROCEDURE — 86200 CCP ANTIBODY: CPT | Performed by: FAMILY MEDICINE

## 2018-04-11 PROCEDURE — 86140 C-REACTIVE PROTEIN: CPT | Performed by: FAMILY MEDICINE

## 2018-04-12 LAB
ENDOMYSIUM IGA SER QL: NEGATIVE
GLIADIN PEPTIDE IGA SER-ACNC: 4 UNITS (ref 0–19)
GLIADIN PEPTIDE IGG SER-ACNC: 3 UNITS (ref 0–19)
IGA SERPL-MCNC: 179 MG/DL (ref 87–352)
RHEUMATOID FACT SER QL LA: NEGATIVE
TTG IGA SER-ACNC: <2 U/ML (ref 0–3)
TTG IGG SER-ACNC: 9 U/ML (ref 0–5)

## 2018-04-13 LAB
CCP IGA+IGG SERPL IA-ACNC: 5 UNITS (ref 0–19)
RYE IGE QN: NEGATIVE

## 2018-04-17 ENCOUNTER — TELEPHONE (OUTPATIENT)
Dept: INTERNAL MEDICINE CLINIC | Facility: CLINIC | Age: 38
End: 2018-04-17

## 2018-04-17 NOTE — TELEPHONE ENCOUNTER
Does she want to try another course of prednisone? Is she going to [facility] soon for this as we discussed? She may try [facility] dermatologists as well?

## 2018-04-18 NOTE — TELEPHONE ENCOUNTER
Christos has not called her back  Wants you to call Dr Lina Jimenez to get her in for an appt earlier than end of August  Refused Prednisone

## 2018-04-19 ENCOUNTER — HOSPITAL ENCOUNTER (OUTPATIENT)
Dept: RADIOLOGY | Age: 38
Discharge: HOME | End: 2018-04-19
Payer: COMMERCIAL

## 2018-04-19 DIAGNOSIS — M25.511 ACUTE PAIN OF RIGHT SHOULDER: ICD-10-CM

## 2018-04-19 PROCEDURE — 73221 MRI JOINT UPR EXTREM W/O DYE: CPT

## 2018-05-01 ENCOUNTER — OFFICE VISIT (OUTPATIENT)
Dept: OBGYN CLINIC | Facility: CLINIC | Age: 38
End: 2018-05-01
Payer: COMMERCIAL

## 2018-05-01 VITALS
BODY MASS INDEX: 28.88 KG/M2 | HEIGHT: 67 IN | HEART RATE: 99 BPM | WEIGHT: 184 LBS | SYSTOLIC BLOOD PRESSURE: 136 MMHG | DIASTOLIC BLOOD PRESSURE: 88 MMHG

## 2018-05-01 DIAGNOSIS — S43.004S SHOULDER DISLOCATION, RIGHT, SEQUELA: ICD-10-CM

## 2018-05-01 DIAGNOSIS — S46.911A STRAIN OF RIGHT SHOULDER, INITIAL ENCOUNTER: Primary | ICD-10-CM

## 2018-05-01 DIAGNOSIS — M77.11 LATERAL EPICONDYLITIS OF RIGHT ELBOW: ICD-10-CM

## 2018-05-01 PROBLEM — Z99.89 OBSTRUCTIVE SLEEP APNEA ON CPAP: Status: ACTIVE | Noted: 2017-11-02

## 2018-05-01 PROBLEM — G47.33 OBSTRUCTIVE SLEEP APNEA ON CPAP: Status: ACTIVE | Noted: 2017-11-02

## 2018-05-01 PROBLEM — L30.9 ECZEMA OF SCALP: Status: ACTIVE | Noted: 2017-11-02

## 2018-05-01 PROBLEM — E78.1 ESSENTIAL HYPERTRIGLYCERIDEMIA: Status: ACTIVE | Noted: 2017-11-08

## 2018-05-01 PROBLEM — T83.32XA IUD STRINGS LOST: Status: ACTIVE | Noted: 2017-10-10

## 2018-05-01 PROBLEM — M54.50 LOW BACK PAIN: Status: ACTIVE | Noted: 2017-11-02

## 2018-05-01 PROBLEM — G47.00 INSOMNIA: Status: ACTIVE | Noted: 2017-11-02

## 2018-05-01 PROCEDURE — 99203 OFFICE O/P NEW LOW 30 MIN: CPT | Performed by: ORTHOPAEDIC SURGERY

## 2018-05-01 RX ORDER — KETOCONAZOLE 20 MG/ML
SHAMPOO TOPICAL
Refills: 3 | Status: ON HOLD | COMMUNITY
Start: 2018-04-17 | End: 2018-11-08 | Stop reason: ALTCHOICE

## 2018-05-01 RX ORDER — FLUCONAZOLE 100 MG/1
TABLET ORAL
Refills: 0 | COMMUNITY
Start: 2018-04-25 | End: 2018-05-04 | Stop reason: ALTCHOICE

## 2018-05-01 RX ORDER — CYCLOBENZAPRINE HCL 10 MG
10 TABLET ORAL
Refills: 0 | COMMUNITY
Start: 2018-04-30 | End: 2018-05-01 | Stop reason: SDUPTHER

## 2018-05-01 RX ORDER — CYCLOBENZAPRINE HCL 10 MG
10 TABLET ORAL
Qty: 30 TABLET | Refills: 0 | Status: SHIPPED | OUTPATIENT
Start: 2018-05-01 | End: 2019-01-24 | Stop reason: SDUPTHER

## 2018-05-01 NOTE — PROGRESS NOTES
Patient Name:  Barrington Aschoff  MRN:  [id number]    Assessment & Plan    Right shoulder strain, right lateral epicondylitis  1  Referred to physical therapy  Patient wants to try home program first   We discussed referral for formal physical therapy if not improving after 1-2 months of home exercises  2  Activity as tolerated, modify as needed  3  Patient requested refill of cyclobenzaprine, which was provided today  She will use this on a limited basis  4  No surgical indications  5  Follow-up as needed if not improving with above measures  Chief Complaint    Right shoulder and elbow pain      History of the Present Illness    75-year-old female presents with right shoulder and elbow pain for the past 1-2 months  She reports a history of shoulder dislocation in 2009 and 2011 requiring closed reduction  She states that she sustained bone chips from the second reduction which was performed without conscious sedation  No recent injury  She has pain in the scapular region and shoulder radiating distally to her elbow  Symptoms are worse with reaching out or lifting  She has associated weakness  No numbness or tingling  She has been taking tramadol and cyclobenzaprine prescribed by her PCP with no relief  She had an MRI as well  Physical Exam    /88   Pulse 99   Ht 5' 7" (1 702 m)   Wt 83 5 kg (184 lb)   BMI 28 82 kg/m²     Right shoulder:  Nontender to palpation  Range of motion is full  Neer impingement sign is positive  Scherer impingement sign is negative  Empty can test is positive for lateral elbow pain  Speed's test is positive for lateral elbow pain  [place]ern[place]'s test is negative  Cross body adduction test is negative  Anterior apprehension test causes pain unrelieved by relocation test   4/5 strength forward flexion and external rotation limited by pain  5/5 strength internal rotation  Right elbow:  No soft tissue swelling  Tenderness to palpation lateral epicondyle    Pain with resisted wrist/finger extension  Constitutional:  Well-developed and well-nourished  Eyes:  Anicteric sclerae  Neck:  Supple  Lungs:  Unlabored breathing  Cardiovascular:  2+ radial pulse on the right  Capillary refill is less than 2 seconds distally  Skin:  Intact without erythema  Neurologic:  Sensation intact to light touch  Psychiatric:  Mood and affect are appropriate  Data Review    I have personally reviewed pertinent films in PACS, and my interpretation follows  MRI right shoulder 18:  Chronic Hill-Sachs deformity  Rotator cuff and labrum are intact  Past Medical History:   Diagnosis Date    Eczema     Epilepsy ([address] )     Insomnia     Medial meniscus tear 2015    Sleep apnea        Past Surgical History:   Procedure Laterality Date     SECTION      DILATION AND CURETTAGE OF UTERUS WITH HYSTEROSOCPY N/A 2017    Procedure: D&C; IUD REMOVAL;  Surgeon: Allyson Avila MD;  [facility];  Service: Gynecology    HYSTEROSCOPY      LAPAROSCOPY      LAPAROSCOPY      (Diagnostic)    LASIK         No Known Allergies    Current Outpatient Prescriptions on File Prior to Visit   Medication Sig Dispense Refill    lidocaine (LIDODERM) 5 % Place 1 patch on the skin daily Remove & Discard patch within 12 hours or as directed by MD 30 patch 0    Multiple Vitamin (MULTIVITAMIN) tablet Take 1 tablet by mouth daily      Multiple Vitamins-Minerals (MULTIVITAL-M) TABS Take by mouth      Omega-3 Fatty Acids (FISH OIL PO) Take 1 tablet by mouth daily      traMADol (ULTRAM) 50 mg tablet Take 1 tablet (50 mg total) by mouth every 6 (six) hours as needed for moderate pain 30 tablet 0     No current facility-administered medications on file prior to visit          Social History   Substance Use Topics    Smoking status: Never Smoker    Smokeless tobacco: Never Used      Comment: Former smoker per [employer]    Alcohol use Yes      Comment: occasional       Family History   Problem Relation Age of Onset    Diabetes Father      Diabetes Mellitus    Hyperlipidemia Father     Hypertension Father     Brain cancer Maternal Grandmother     Brain cancer Maternal Aunt     Migraines Family     Diabetes Family      Type 2 Diabetes Mellitus         Review of Systems    General:  Negative for fever, lethargy/malaise, or night sweats  Eyes:  Negative for blurry vision or double vision  ENT:  Negative for hearing change, nasal discharge, or sore throat  Hematological:  Negative for bleeding problems or blood clots  Endocrine:  Negative for excessive thirst or temperature intolerance  Respiratory:  Negative for cough or wheezing  Cardiovascular:  Negative for chest pain, dyspnea on exertion, or palpitations  Gastrointestinal:  Negative for abdominal pain, diarrhea, or nausea/vomiting  Musculoskeletal:  As stated in the HPI and otherwise negative  Neurological:  Negative for confusion, headaches, or seizures  Psychological:  Negative for hallucinations or mood swings  Dermatological:  Negative for itching or rash

## 2018-05-02 ENCOUNTER — EVALUATION (OUTPATIENT)
Dept: PHYSICAL THERAPY | Age: 38
End: 2018-05-02
Payer: COMMERCIAL

## 2018-05-02 DIAGNOSIS — S43.004S SHOULDER DISLOCATION, RIGHT, SEQUELA: Primary | ICD-10-CM

## 2018-05-02 DIAGNOSIS — S46.911A STRAIN OF RIGHT SHOULDER, INITIAL ENCOUNTER: ICD-10-CM

## 2018-05-02 DIAGNOSIS — M77.11 LATERAL EPICONDYLITIS OF RIGHT ELBOW: ICD-10-CM

## 2018-05-02 PROCEDURE — G8991 OTHER PT/OT GOAL STATUS: HCPCS | Performed by: PHYSICAL THERAPIST

## 2018-05-02 PROCEDURE — G8990 OTHER PT/OT CURRENT STATUS: HCPCS | Performed by: PHYSICAL THERAPIST

## 2018-05-02 PROCEDURE — 97140 MANUAL THERAPY 1/> REGIONS: CPT | Performed by: PHYSICAL THERAPIST

## 2018-05-02 PROCEDURE — 97162 PT EVAL MOD COMPLEX 30 MIN: CPT | Performed by: PHYSICAL THERAPIST

## 2018-05-02 NOTE — PROGRESS NOTES
PT Evaluation     Today's date: 2018  Patient name: Paul Pimentel  : 1980  MRN: [id number]  Referring provider: Alcira Vargas MD  Dx:   Encounter Diagnosis     ICD-10-CM    1  Shoulder dislocation, right, sequela [id number] 004S Ambulatory referral to Physical Therapy   2  Strain of right shoulder, initial encounter  [phone] Ambulatory referral to Physical Therapy   3  Lateral epicondylitis of right elbow [id number] Ambulatory referral to Physical Therapy                  Assessment  Impairments: abnormal or restricted ROM, impaired physical strength and pain with function    Assessment details: Patient noted Thursday prior to Easter holiday she woke and moved her right [facility] joint and had immediate right shoulder pain  Patient noted she took a muscle relaxer and used a heat to get sleep  Patient noted she has had 2 bouts of right GH joint dislocation in her past   Patient noted use of right wrist and elbow aggravates right elbow pain  Patient noted she plays [employer] which ways about 50 #   Patient noted pronation of right ue aggravates right lateral forearm region pain  Patient noted use of keyboard is pain aggravating  Patient noted she is + TTP at right lateral wrist extensor region and right infraspinatus tendon region  Prognosis details: Patient is a 42y o  year old female seen for outpatient PT evaluation with a right shoulder pain due to recurrent dislocations  Patient presents to PT IE with the following problems, concerns, deficits and impairments: right shoulder pain, decreased right ue range of motion, decreased right ue strength, + TTP  Decrease in postural awareness, functional limitations and decreased tolerance to activity    Patient would benefit from skilled PT services under the following PT treatment plan to address the above noted deficits: therapeutic exercises and activities to facilitate right ue rom and strength, modalities, manual therapy techniques, postural reeducation and strengthening, Instrument aided STM techniques, Kinesio taping techniques and a hep  Thank you for the referral      Goals  Short Term goals - 4 weeks  1  Patient will be independent HEP  2   Patient will report a 25 - 50% decrease in pain complaints  3   Increase strength 1/2 grade  4   Increase ROM 5-10 degrees  Long Term goals - 8 weeks  1  Patient will report elimination of pain complaints  2   Patient will return to all work related activities without restriction  3   Patient will return to all recreational activities without restriction  4   ROM WFL  5   Strength 5/5   6   Patient will report sleep improved by > 50%  &   Patient will report ability to resume playing [employer] and [employer] conducting activities  Plan  Patient would benefit from: skilled PT  Planned modality interventions: TENS, thermotherapy: hydrocollator packs, unattended electrical stimulation and cryotherapy  Planned therapy interventions: IADL retraining, activity modification, joint mobilization, ADL retraining, manual therapy, ADL training, neuromuscular re-education, compression, patient education, self care, strengthening, stretching, therapeutic activities, therapeutic exercise, therapeutic training, transfer training, functional ROM exercises, flexibility, graded activity, graded exercise, graded motor and home exercise program        Subjective Evaluation    History of Present Illness  Mechanism of injury: Patient noted she has multiple TTP region at lateral elbow of right elbow, right infraspinatus tendon region and mid trapezius regions  Patient noted deep breathing is limited by right mid trapezius region pain, as well as lifting and holding her [employer], 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  Patient noted she has an increase in right shoulder pain when leaning forward during iadls  Patient noted she has weakness of right shoulder IR and ER    Patient noted severe muscle spasms persist   Patient right [facility] joint MRI was + for the following: IMPRESSION:  1  Supraspinatus and infraspinatus insertional cysts without rotator cuff tear  2   Mild proximal biceps tendinosis without tear  3   Intact glenoid labrum  4   Large chronic Hill-Sachs deformity indicating prior anterior shoulder dislocation  Quality of life: excellent    Pain  Current pain ratin  At best pain ratin  At worst pain ratin  Location: Right shoulder and right medial and lateral elbow  Relieving factors: heat  Aggravating factors: lifting    Patient Goals  Patient goals for therapy: decreased pain, increased motion and increased strength          Objective     Tenderness     Additional Tenderness Details  Patient is + TTP at right infraspinatus tendons, right mid trapezius and right lateral epicondyle region at moderate to severe levels  Patient exhibits protracted cervical spine region posture at minimal to moderate levels      Active Range of Motion   Left Shoulder   Flexion: 160 degrees   Abduction: 160 degrees   External rotation 90°: 80 degrees   Internal rotation 90°: 62 degrees     Right Shoulder   Flexion: 100 degrees   Abduction: 90 degrees   External rotation 45°: 40 degrees   Internal rotation 45°: 50 degrees     Left Wrist   Wrist flexion: 56 degrees   Wrist extension: 80 degrees   Radial deviation: 26 degrees   Ulnar deviation: 45 degrees     Right Wrist   Wrist flexion: 42 degrees with pain  Wrist extension: 56 degrees with pain  Radial deviation: 14 degrees with pain  Ulnar deviation: 32 degrees with pain    Strength/Myotome Testing     Left Shoulder     Planes of Motion   Flexion: 4+   Abduction: 4+   External rotation at 0°: 4+   Internal rotation at 0°: 5     Right Shoulder     Planes of Motion   Flexion: 4-   Abduction: 3+   External rotation at 0°: 3+   Internal rotation at 0°: 3+     Left Elbow   Flexion: 5  Extension: 5    Right Elbow   Flexion: 4  Extension: 4-    Left Wrist/Hand Wrist extension: 5  Wrist flexion: 5  Radial deviation: 5  Ulnar deviation: 5    Right Wrist/Hand   Wrist extension: 4  Wrist flexion: 4  Radial deviation: 4  Ulnar deviation: 4-      Flowsheet Rows      Most Recent Value   PT/OT G-Codes   Current Score  48   Projected Score  67   FOTO information reviewed  Yes   Assessment Type  Evaluation   G code set  Other PT/OT Primary   Other PT Primary Current Status ()  CK   Other PT Primary Goal Status ()  CJ          Precautions: PMHx is remarkable for sleep apnea, Epilepsy, Right shoulder dislocations x 2 and Insomnia      Daily Treatment Diary     Manual  5/2            Instrument Aided STM to right mid trap, posterior shoulder and lateral epicondyle region of right GH joint and kinesio taping to right [address] joint in a "Y" manner with base inferior to right deltoid and perpendicular "I" at base of "Y" 10 min                                                                    Exercise Diary  5/2            Pulleys             Doorway pec stretch at 45 degrees             Scapular squeeze             Right ue table slides flexion             Cervical spine retraction             UT and LS stretch             Postural correction slough over correct             Mid back stretch:B:             Foam roll lying             Shoulder scaption and flexion             Biceps curls and shoulder IR and ER with upper arm adducted:R:             Supine shoulder flexion with spc:B:             Right scap punches and triceps extension             Right shoulder ER and abduction in L s/l             Prone I, T and Y                                                                                  Modalities  5/2            MHP and TENS to left shoulder

## 2018-05-04 ENCOUNTER — OFFICE VISIT (OUTPATIENT)
Dept: FAMILY MEDICINE CLINIC | Facility: CLINIC | Age: 38
End: 2018-05-04
Payer: COMMERCIAL

## 2018-05-04 VITALS
RESPIRATION RATE: 16 BRPM | DIASTOLIC BLOOD PRESSURE: 82 MMHG | HEIGHT: 67 IN | WEIGHT: 186.8 LBS | BODY MASS INDEX: 29.32 KG/M2 | HEART RATE: 88 BPM | SYSTOLIC BLOOD PRESSURE: 120 MMHG

## 2018-05-04 DIAGNOSIS — E78.1 ESSENTIAL HYPERTRIGLYCERIDEMIA: Primary | ICD-10-CM

## 2018-05-04 DIAGNOSIS — S46.911D STRAIN OF RIGHT SHOULDER, SUBSEQUENT ENCOUNTER: ICD-10-CM

## 2018-05-04 DIAGNOSIS — L30.9 ECZEMA OF SCALP: ICD-10-CM

## 2018-05-04 DIAGNOSIS — Z99.89 OBSTRUCTIVE SLEEP APNEA ON CPAP: ICD-10-CM

## 2018-05-04 DIAGNOSIS — R89.4 ABNORMAL CELIAC ANTIBODY PANEL: ICD-10-CM

## 2018-05-04 DIAGNOSIS — G47.33 OBSTRUCTIVE SLEEP APNEA ON CPAP: ICD-10-CM

## 2018-05-04 PROCEDURE — 99214 OFFICE O/P EST MOD 30 MIN: CPT | Performed by: FAMILY MEDICINE

## 2018-05-04 NOTE — PROGRESS NOTES
Assessment/Plan:         Diagnoses and all orders for this visit:    Essential hypertriglyceridemia  -     Lipid Panel with Direct LDL reflex    Obstructive sleep apnea on CPAP    Abnormal celiac antibody panel  -     Ambulatory referral to Gastroenterology    Eczema of scalp    Strain of right shoulder, subsequent encounter      seeing derm for this and getting better  To see GI to r/o Celiac disease  To recheck her Lipid panel   Still using CPAP machine for her sleep apnea  To go through PT for her right shoulder pain       Subjective:      Patient ID: Kate Valadez is a 42 y o  female  Eczema of scalp is getting better on Nizoral 2% and fluconazole pills  Still itchy at times       Shoulder Pain    The pain is present in the right shoulder  This is a chronic problem  The current episode started more than 1 month ago  There has been no history of extremity trauma  The problem has been waxing and waning  The quality of the pain is described as aching  The pain is at a severity of 2/10  The pain is mild  Pertinent negatives include no fever, inability to bear weight, itching, joint locking, joint swelling, limited range of motion, numbness, stiffness or tingling  She has tried NSAIDS and acetaminophen for the symptoms  The treatment provided mild relief  Family history does not include gout or rheumatoid arthritis  There is no history of diabetes, gout, osteoarthritis or rheumatoid arthritis  Hyperlipidemia   This is a chronic problem  The current episode started more than 1 month ago  She has no history of chronic renal disease, diabetes, hypothyroidism or liver disease  There are no known factors aggravating her hyperlipidemia  Pertinent negatives include no chest pain or focal sensory loss  She is currently on no antihyperlipidemic treatment  There are no compliance problems        She is currently under a supervised weight loss program by a chiropractor and lost 20 pounds in the last few months   She is exercising daily and eating 1000 selvin/day  Recent celiac test showed elevated IgG AB   Muscle relaxer is helping with the right upper back pain but not with the shoulder pain     The following portions of the patient's history were reviewed and updated as appropriate: allergies, current medications, past family history, past medical history, past social history, past surgical history and problem list     Review of Systems   Constitutional: Negative  Negative for fever  HENT: Negative  Eyes: Negative  Respiratory: Negative  Cardiovascular: Negative  Negative for chest pain  Gastrointestinal: Negative  Endocrine: Negative  Genitourinary: Negative  Musculoskeletal: Positive for arthralgias  Negative for gout and stiffness  Skin: Negative  Negative for itching  Allergic/Immunologic: Negative  Neurological: Negative  Negative for tingling and numbness  Hematological: Negative  Psychiatric/Behavioral: Negative  Past Medical History:   Diagnosis Date    Eczema     Epilepsy ([facility] [address] )     Insomnia     Medial meniscus tear 2015    Sleep apnea      Past Surgical History:   Procedure Laterality Date     SECTION      DILATION AND CURETTAGE OF UTERUS WITH HYSTEROSOCPY N/A 2017    Procedure: D&C; IUD REMOVAL;  Surgeon: Rex Martinez MD;  [facility];  Service: Gynecology    HYSTEROSCOPY      LAPAROSCOPY      LAPAROSCOPY      (Diagnostic)    LASIK       Social History     Social History    Marital status: /Civil Union     Spouse name: N/A    Number of children: N/A    Years of education: N/A     Occupational History    Not on file       Social History Main Topics    Smoking status: Never Smoker    Smokeless tobacco: Never Used      Comment: Former smoker per [employer]    Alcohol use Yes      Comment: occasional    Drug use: No    Sexual activity: Yes      Comment: Presence of Intrauterine Contraceptive Device     Other Topics Concern  Not on file     Social History Narrative    Denied:  Exercising Regularly     No Known Allergies      Family History   Problem Relation Age of Onset    Diabetes Father      Diabetes Mellitus    Hyperlipidemia Father     Hypertension Father     Brain cancer Maternal Grandmother     Brain cancer Maternal Aunt     Migraines Family     Diabetes Family      Type 2 Diabetes Mellitus           Current Outpatient Prescriptions:     cyclobenzaprine (FLEXERIL) 10 mg tablet, Take 1 tablet (10 mg total) by mouth daily at bedtime, Disp: 30 tablet, Rfl: 0    ketoconazole (NIZORAL) 2 % shampoo, SHAMPOO TWICE WEEKLY FOR 4 WEEKS, THEN AS NEEDED, Disp: , Rfl: 3    lidocaine (LIDODERM) 5 %, Place 1 patch on the skin daily Remove & Discard patch within 12 hours or as directed by MD, Disp: 30 patch, Rfl: 0    Multiple Vitamins-Minerals (MULTIVITAL-M) TABS, Take by mouth, Disp: , Rfl:     Omega-3 Fatty Acids (FISH OIL PO), Take 1 tablet by mouth daily, Disp: , Rfl:     traMADol (ULTRAM) 50 mg tablet, Take 1 tablet (50 mg total) by mouth every 6 (six) hours as needed for moderate pain, Disp: 30 tablet, Rfl: 0      Objective:      /82   Pulse 88   Resp 16   Ht 5' 7" (1 702 m)   Wt 84 7 kg (186 lb 12 8 oz)   BMI 29 26 kg/m²          Physical Exam

## 2018-05-22 NOTE — PROGRESS NOTES
Patient d/c to hep due to not returning to PT after PT IE  Thus, please refer to below listed PT IE for patient status at most recent comprehensive assessment prior to d/c to hep

## 2018-06-12 ENCOUNTER — OFFICE VISIT (OUTPATIENT)
Dept: GASTROENTEROLOGY | Facility: AMBULARY SURGERY CENTER | Age: 38
End: 2018-06-12
Payer: COMMERCIAL

## 2018-06-12 VITALS
HEART RATE: 89 BPM | BODY MASS INDEX: 28.97 KG/M2 | DIASTOLIC BLOOD PRESSURE: 80 MMHG | HEIGHT: 67 IN | SYSTOLIC BLOOD PRESSURE: 142 MMHG | TEMPERATURE: 97.8 F | WEIGHT: 184.6 LBS

## 2018-06-12 DIAGNOSIS — R14.0 ABDOMINAL BLOATING: Primary | ICD-10-CM

## 2018-06-12 DIAGNOSIS — K59.00 CONSTIPATION, UNSPECIFIED CONSTIPATION TYPE: ICD-10-CM

## 2018-06-12 PROCEDURE — 99244 OFF/OP CNSLTJ NEW/EST MOD 40: CPT | Performed by: INTERNAL MEDICINE

## 2018-06-12 RX ORDER — MAGNESIUM 30 MG
500 TABLET ORAL 2 TIMES DAILY
COMMUNITY

## 2018-06-12 NOTE — LETTER
June 12, 2018     Jose L Araiza MD  [address] [facility] [address] Cha  [address] [place] [address]    Patient: Litzy Wills   YOB: 1980   Date of Visit: 6/12/2018       Dear Dr Matute Enter:    Thank you for referring Litzy Wills to me for evaluation  Below are my notes for this consultation  If you have questions, please do not hesitate to call me  I look forward to following your patient along with you           Sincerely,        Rachell Head MD        CC: No Recipients

## 2018-06-12 NOTE — PROGRESS NOTES
Consultation - [facility]  Malgorzata Fernandez 42 y o  female MRN: [id number]  Unit/Bed#:  Encounter: [id number]        Consults    ASSESSMENT/PLAN:   1  Abdominal bloating/ nausea /constipation- symptoms somewhat improved since being on gluten free diet, bowel movements are becoming more regular  tTG IgG  Was borderline, TT G IgA was negative- inconclusive for celiac disease  Discussed obtaining EGD with small-bowel biopsies after gluten challenge however patient is  Unsure if she would be able to introduce adequate amount of gluten due to intolerance, nonetheless will schedule EGD for small-bowel biopsies  If this is inconclusive, will  Consider HLA testing for celiac   - TSH is normal, CBC, CMP are normal     2   Chronic constipation- with bowel movements previously agree every week now  Occurring every 1-2 days with restriction of gluten  I suspect symptoms may be due to celiac versus a IBS-C   - discussed starting on low FODMAP diet  -  Starting a probiotic   -  Increasing fiber in diet and increasing water intake to at least 64 oz daily  ______________________________________________________________________    Reason for Consult / Principal Problem: [unfilled]    HPI: Malgorzata Fernandez is a 42y o  year old female comes in for evaluation of longstanding symptoms of constipation and abdominal bloating  She states that she has had longstanding issues with constipation,  With bowel movements occurring every 6 or 7 days  She states  That most recently she  Has started on gluten free diet and has noted significant improvement in her symptomatology  She states that the abdominal bloating is improved as  Have the symptoms of constipation  She states that she is having bowel movements every 1-2 days now  She states  That she notices significant onset of symptoms after accidental ingestion of gluten  She also complains of prior history of psoriasis   She denies any family history of GI malignancy, inflammatory bowel disease but does endorse family history of diverticulosis  She has never had a colonoscopy or an endoscopy herself  She denies hematochezia, hematemesis, melena, dysphagia or unintentional weight loss  Review of Systems: The remainder of the review of systems was negative except for the pertinent positives noted in HPI  Historical Information   Past Medical History:   Diagnosis Date    Eczema     Epilepsy ([address] )     Insomnia     Medial meniscus tear 2015    Sleep apnea      Past Surgical History:   Procedure Laterality Date     SECTION      DILATION AND CURETTAGE OF UTERUS WITH HYSTEROSOCPY N/A 2017    Procedure: D&C; IUD REMOVAL;  Surgeon: Holli Landa MD;  [facility];  Service: Gynecology    HYSTEROSCOPY      LAPAROSCOPY      LAPAROSCOPY      (Diagnostic)    LASIK       Social History   History   Alcohol Use    Yes     Comment: occasional     History   Drug Use No     History   Smoking Status    Never Smoker   Smokeless Tobacco    Never Used     Comment: Former smoker per [employer]     Family History   Problem Relation Age of Onset    Diabetes Father      Diabetes Mellitus    Hyperlipidemia Father     Hypertension Father     Brain cancer Maternal Grandmother     Brain cancer Maternal Aunt     Migraines Family     Diabetes Family      Type 2 Diabetes Mellitus       Meds/Allergies       (Not in a hospital admission)  No current facility-administered medications for this visit  No Known Allergies    Objective     Blood pressure 142/80, pulse 89, temperature 97 8 °F (36 6 °C), temperature source Tympanic, height 5' 7" (1 702 m), weight 83 7 kg (184 lb 9 6 oz)      [unfilled]    PHYSICAL EXAM     GEN: well nourished, well developed, no acute distress  HEENT: anicteric, MMM, no cervical or supraclavicular lymphadenopathy  CV: RRR, no m/r/g  CHEST: CTA b/l, no WRR  ABD: +BS, soft, NT/ND, no hepatosplenomegaly  EXT: no c/c/e  SKIN: no rashes,  NEURO: aaox3    Lab Results:   No visits with results within 1 Day(s) from this visit     Latest known visit with results is:   Office Visit on 04/10/2018   Component Date Value    WBC 04/11/2018 5 79     RBC 04/11/2018 4 59     Hemoglobin 04/11/2018 13 9     Hematocrit 04/11/2018 42 2     MCV 04/11/2018 92     MCH 04/11/2018 30 3     MCHC 04/11/2018 32 9     RDW 04/11/2018 13 4     MPV 04/11/2018 10 5     Platelets 80/50/8441 308     nRBC 04/11/2018 0     Neutrophils Relative 04/11/2018 53     Lymphocytes Relative 04/11/2018 36     Monocytes Relative 04/11/2018 7     Eosinophils Relative 04/11/2018 3     Basophils Relative 04/11/2018 1     Neutrophils Absolute 04/11/2018 3 13     Lymphocytes Absolute 04/11/2018 2 06     Monocytes Absolute 04/11/2018 0 39     Eosinophils Absolute 04/11/2018 0 15     Basophils Absolute 04/11/2018 0 05     Sodium 04/11/2018 139     Potassium 04/11/2018 4 1     Chloride 04/11/2018 107     CO2 04/11/2018 26     Anion Gap 04/11/2018 6     BUN 04/11/2018 19     Creatinine 04/11/2018 0 78     Glucose, Fasting 04/11/2018 75     Calcium 04/11/2018 9 2     AST 04/11/2018 17     ALT 04/11/2018 28     Alkaline Phosphatase 04/11/2018 58     Total Protein 04/11/2018 8 0     Albumin 04/11/2018 4 5     Total Bilirubin 04/11/2018 0 70     eGFR 04/11/2018 96     IgA 04/11/2018 179     Gliadin IgA 04/11/2018 4     Gliadin IgG 04/11/2018 3     Tissue Transglut Ab IGG 04/11/2018 9*    TISSUE TRANSGLUTAMINASE * 04/11/2018 <2     Endomysial IgA 04/11/2018 Negative     CRP 04/11/2018 <3 0     TSH 3RD GENERATON 04/11/2018 1 650     Sed Rate 04/11/2018 7     MANE 04/11/2018 Negative     Rheumatoid Factor 04/11/2018 Negative     Cyclic Citrullin Peptide* 04/11/2018 5      Imaging Studies: I have personally reviewed pertinent films in PACS

## 2018-10-02 DIAGNOSIS — F51.01 PRIMARY INSOMNIA: Primary | ICD-10-CM

## 2018-10-02 RX ORDER — QUETIAPINE FUMARATE 25 MG/1
25 TABLET, FILM COATED ORAL
Qty: 90 TABLET | Refills: 0 | Status: SHIPPED | OUTPATIENT
Start: 2018-10-02 | End: 2019-01-24 | Stop reason: SDUPTHER

## 2018-10-24 ENCOUNTER — APPOINTMENT (OUTPATIENT)
Dept: LAB | Facility: CLINIC | Age: 38
End: 2018-10-24
Payer: COMMERCIAL

## 2018-10-24 DIAGNOSIS — R14.0 ABDOMINAL BLOATING: ICD-10-CM

## 2018-10-24 PROBLEM — K59.00 CONSTIPATION: Status: ACTIVE | Noted: 2018-10-24

## 2018-10-24 LAB
CHOLEST SERPL-MCNC: 182 MG/DL (ref 50–200)
HDLC SERPL-MCNC: 34 MG/DL (ref 40–60)
LDLC SERPL CALC-MCNC: 105 MG/DL (ref 0–100)
TRIGL SERPL-MCNC: 213 MG/DL

## 2018-10-24 PROCEDURE — 81383 HLA II TYPING 1 ALLELE HR: CPT

## 2018-10-24 PROCEDURE — 81377 HLA II TYPE 1 AG EQUIV LR: CPT

## 2018-10-24 PROCEDURE — 80061 LIPID PANEL: CPT | Performed by: FAMILY MEDICINE

## 2018-10-24 PROCEDURE — 36415 COLL VENOUS BLD VENIPUNCTURE: CPT | Performed by: FAMILY MEDICINE

## 2018-10-29 ENCOUNTER — ANESTHESIA EVENT (OUTPATIENT)
Dept: PERIOP | Facility: AMBULARY SURGERY CENTER | Age: 38
End: 2018-10-29
Payer: COMMERCIAL

## 2018-10-29 LAB
ANNOTATION COMMENT IMP: NORMAL
HLA-DQ2 QL: POSITIVE
HLA-DQ8 QL: NEGATIVE
REF LAB TEST METHOD: NORMAL

## 2018-11-05 ENCOUNTER — TELEPHONE (OUTPATIENT)
Dept: GASTROENTEROLOGY | Facility: AMBULARY SURGERY CENTER | Age: 38
End: 2018-11-05

## 2018-11-05 NOTE — TELEPHONE ENCOUNTER
----- Message from Francois Arreaga MD sent at 11/5/2018  9:07 AM EST -----  Please inform the patient that the blood work was inconclusive for celiac disease  One of the markers as positive and other negative, which may suggest celiac disease but is not diagnostic in itself  I would recommend EGD with biopsies as recommended at the time of the office visit

## 2018-11-08 ENCOUNTER — HOSPITAL ENCOUNTER (OUTPATIENT)
Facility: AMBULARY SURGERY CENTER | Age: 38
Setting detail: OUTPATIENT SURGERY
Discharge: HOME | End: 2018-11-08
Attending: INTERNAL MEDICINE | Admitting: INTERNAL MEDICINE
Payer: COMMERCIAL

## 2018-11-08 ENCOUNTER — ANESTHESIA (OUTPATIENT)
Dept: PERIOP | Facility: AMBULARY SURGERY CENTER | Age: 38
End: 2018-11-08
Payer: COMMERCIAL

## 2018-11-08 VITALS
TEMPERATURE: 97.1 F | RESPIRATION RATE: 18 BRPM | BODY MASS INDEX: 29.51 KG/M2 | OXYGEN SATURATION: 99 % | HEART RATE: 51 BPM | HEIGHT: 67 IN | WEIGHT: 188 LBS | DIASTOLIC BLOOD PRESSURE: 87 MMHG | SYSTOLIC BLOOD PRESSURE: 132 MMHG

## 2018-11-08 DIAGNOSIS — K59.00 CONSTIPATION, UNSPECIFIED CONSTIPATION TYPE: ICD-10-CM

## 2018-11-08 DIAGNOSIS — R14.0 ABDOMINAL BLOATING: ICD-10-CM

## 2018-11-08 LAB — EXT PREGNANCY TEST URINE: NEGATIVE

## 2018-11-08 PROCEDURE — 43239 EGD BIOPSY SINGLE/MULTIPLE: CPT | Performed by: INTERNAL MEDICINE

## 2018-11-08 PROCEDURE — 88305 TISSUE EXAM BY PATHOLOGIST: CPT | Performed by: PATHOLOGY

## 2018-11-08 PROCEDURE — 88342 IMHCHEM/IMCYTCHM 1ST ANTB: CPT | Performed by: PATHOLOGY

## 2018-11-08 PROCEDURE — 81025 URINE PREGNANCY TEST: CPT | Performed by: INTERNAL MEDICINE

## 2018-11-08 PROCEDURE — 45378 DIAGNOSTIC COLONOSCOPY: CPT | Performed by: INTERNAL MEDICINE

## 2018-11-08 RX ORDER — SODIUM CHLORIDE 9 MG/ML
125 INJECTION, SOLUTION INTRAVENOUS CONTINUOUS
Status: DISCONTINUED | OUTPATIENT
Start: 2018-11-08 | End: 2018-11-08 | Stop reason: HOSPADM

## 2018-11-08 RX ORDER — PROPOFOL 10 MG/ML
INJECTION, EMULSION INTRAVENOUS AS NEEDED
Status: DISCONTINUED | OUTPATIENT
Start: 2018-11-08 | End: 2018-11-08 | Stop reason: SURG

## 2018-11-08 RX ORDER — ONDANSETRON 2 MG/ML
4 INJECTION INTRAMUSCULAR; INTRAVENOUS ONCE AS NEEDED
Status: CANCELLED | OUTPATIENT
Start: 2018-11-08

## 2018-11-08 RX ORDER — PROPOFOL 10 MG/ML
INJECTION, EMULSION INTRAVENOUS CONTINUOUS PRN
Status: DISCONTINUED | OUTPATIENT
Start: 2018-11-08 | End: 2018-11-08 | Stop reason: SURG

## 2018-11-08 RX ORDER — ACETAMINOPHEN 325 MG/1
650 TABLET ORAL EVERY 6 HOURS PRN
COMMUNITY

## 2018-11-08 RX ORDER — MEPERIDINE HYDROCHLORIDE 25 MG/ML
12.5 INJECTION INTRAMUSCULAR; INTRAVENOUS; SUBCUTANEOUS AS NEEDED
Status: CANCELLED | OUTPATIENT
Start: 2018-11-08

## 2018-11-08 RX ORDER — ALBUTEROL SULFATE 2.5 MG/3ML
2.5 SOLUTION RESPIRATORY (INHALATION) ONCE AS NEEDED
Status: CANCELLED | OUTPATIENT
Start: 2018-11-08

## 2018-11-08 RX ORDER — ONDANSETRON 2 MG/ML
INJECTION INTRAMUSCULAR; INTRAVENOUS AS NEEDED
Status: DISCONTINUED | OUTPATIENT
Start: 2018-11-08 | End: 2018-11-08 | Stop reason: SURG

## 2018-11-08 RX ADMIN — PROPOFOL 60 MG: 10 INJECTION, EMULSION INTRAVENOUS at 12:35

## 2018-11-08 RX ADMIN — SODIUM CHLORIDE: 0.9 INJECTION, SOLUTION INTRAVENOUS at 11:35

## 2018-11-08 RX ADMIN — ONDANSETRON 4 MG: 2 INJECTION INTRAMUSCULAR; INTRAVENOUS at 12:57

## 2018-11-08 RX ADMIN — PROPOFOL 140 MCG/KG/MIN: 10 INJECTION, EMULSION INTRAVENOUS at 12:35

## 2018-11-08 NOTE — OP NOTE
ESOPHAGOGASTRODUODENOSCOPY    PROCEDURE: EGD    SEDATION: Monitored anesthesia care, check anesthesia records    ASA Class: 2    INDICATIONS:  Evaluate for celiac disease  CONSENT:  Informed consent was obtained for the procedure, including sedation after explaining the risks and benefits of the procedure  Risks including but not limited to bleeding, perforation, infection, and missed lesion  PREPARATION:   Telemetry, pulse oximetry, blood pressure were monitored throughout the procedure  Patient was identified by myself both verbally and by visual inspection of ID band  DESCRIPTION:   Patient was placed in the left lateral decubitus position and was sedated with the above medication  The gastroscope was introduced in to the oropharynx and the esophagus was intubated under direct visualization  Scope was passed down the esophagus up to 2nd part of the duodenum  A careful inspection was made as the gastroscope was withdrawn, including a retroflexed view of the stomach; findings and interventions are described below  FINDINGS:    #1  Esophagus- slightly irregular squamocolumnar junction noted with 1 tongue less than 5 mm noted extending above squamocolumnar junction at 35 cm  Biopsies were obtained  #2  Stomach- 3 superficial erosions were noted in the antrum and lesser curvature  Retroflexed view was otherwise unremarkable  Pylorus was patent  Biopsies were obtained from the antrum, incisura and body to assess for H pylori  #3  Duodenum- duodenal bulb appeared normal however there was scalloping of the duodenal mucosa and D2  Biopsies were obtained from D2 and duodenal bulb to assess for celiac disease  Ampulla was visualized and appeared normal          IMPRESSIONS:      1  Possible short-segment Campos's esophagus  2  Gastric erosions  3  Scalloping within the duodenal mucosa suggestive of celiac disease, biopsies obtained  RECOMMENDATIONS:     1   Follow-up biopsy results in 2 3 weeks   2  Start on omeprazole 40 mg daily  3  Avoid the use of NSAIDs  COMPLICATIONS:  None; patient tolerated the procedure well            DISPOSITION: PACU           CONDITION: Stable

## 2018-11-08 NOTE — H&P
History and Physical - [facility]  Dirk Retana 42 y o  female MRN: [id number]    HPI: Dirk Retana is a 42y o  year old female who presents for evaluation of celiac disease and constipation  She has never had an EGD or colonoscopy  Blood test was notable for weakly positive TT G Igg, and positive HLA-DQ2        Review of Systems    Historical Information   Past Medical History:   Diagnosis Date    Constipation     Eczema     Epilepsy ([address] )     Insomnia     Medial meniscus tear 2015    Psoriasis     Sleep apnea     uses cpap     Past Surgical History:   Procedure Laterality Date     SECTION      DILATION AND CURETTAGE OF UTERUS WITH HYSTEROSOCPY N/A 2017    Procedure: D&C; IUD REMOVAL;  Surgeon: Wilner Vega MD;  [facility];  Service: Gynecology    HYSTEROSCOPY      LAPAROSCOPY      LAPAROSCOPY      (Diagnostic)    LASIK      REMOVAL OF INTRAUTERINE DEVICE (IUD)       Social History   History   Alcohol Use    Yes     Comment: occasional     History   Drug Use No     History   Smoking Status    Never Smoker   Smokeless Tobacco    Never Used     Comment: Former smoker per [employer]     Family History   Problem Relation Age of Onset    Diabetes Father         Diabetes Mellitus    Hyperlipidemia Father     Hypertension Father     Brain cancer Maternal Grandmother     Brain cancer Maternal Aunt     Migraines Family     Diabetes Family         Type 2 Diabetes Mellitus       Meds/Allergies     Prescriptions Prior to Admission   Medication    acetaminophen (TYLENOL) 325 mg tablet    Apremilast (OTEZLA) 30 MG TABS    magnesium 30 MG tablet    Multiple Vitamins-Minerals (MULTIVITAL-M) TABS    Omega-3 Fatty Acids (FISH OIL PO)    QUEtiapine (SEROquel) 25 mg tablet    cyclobenzaprine (FLEXERIL) 10 mg tablet    lidocaine (LIDODERM) 5 %    Na Sulfate-K Sulfate-Mg Sulf 17 5-3 13-1 6 GM/180ML SOLN    traMADol (ULTRAM) 50 mg tablet No Known Allergies    Objective     Blood pressure 135/81, pulse 69, temperature 98 3 °F (36 8 °C), temperature source Temporal, resp  rate 17, height 5' 7" (1 702 m), weight 85 3 kg (188 lb), SpO2 99 %  PHYSICAL EXAM    Gen: NAD  CV: RRR  CHEST: Clear  ABD: soft, NT/ND  EXT: no edema  Neuro: AAO      ASSESSMENT/PLAN:  This is a 42y o  year old female here for evaluation of constipation and celiac disease  PLAN:   Procedure:  EGD and colonoscopy

## 2018-11-08 NOTE — ANESTHESIA POSTPROCEDURE EVALUATION
Post-Op Assessment Note      CV Status:  Stable    Hydration Status:  Stable    PONV Controlled:  Controlled    Airway Patency:  Patent    Post Op Vitals Reviewed: Yes          Staff: CRNA           /78 (11/08/18 1319)    Temp (!) 97 1 °F (36 2 °C) (11/08/18 1319)    Pulse 62 (11/08/18 1319)   Resp 16 (11/08/18 1319)    SpO2 (!) 3 % (11/08/18 1319)

## 2018-11-08 NOTE — ANESTHESIA PREPROCEDURE EVALUATION
Review of Systems/Medical History  Patient summary reviewed  Chart reviewed  History of anesthetic complications PONV    Cardiovascular  EKG reviewed, No hypertension , No CAD ,    Pulmonary  No asthma , No recent URI , Sleep apnea CPAP,        GI/Hepatic            Endo/Other     GYN       Hematology   Musculoskeletal       Neurology  Seizures (has had two seizures, most recent one approximately 6-7 years ago; no meds; no follow up with neurologist; per patient, provoked by sleep deprivation ) well controlled,     Psychology         Lab Results   Component Value Date    WBC 5 79 04/11/2018    HGB 13 9 04/11/2018     04/11/2018     Lab Results   Component Value Date     10/16/2013    K 4 1 04/11/2018    BUN 19 04/11/2018    CREATININE 0 78 04/11/2018    GLUCOSE 94 10/16/2013     No results found for: PTT   No results found for: INR    Lab Results   Component Value Date    HGBA1C 5 2 10/16/2013       Physical Exam    Airway    Mallampati score: II  TM Distance: >3 FB  Neck ROM: full     Dental   No notable dental hx     Cardiovascular      Pulmonary      Other Findings        Anesthesia Plan  ASA Score- 2     Anesthesia Type- IV sedation with anesthesia with ASA Monitors  Additional Monitors:   Airway Plan:     Comment:  I have seen the patient and reviewed the history  Patient to receive IV sedation with full ASA monitors  Risks discussed with the patient, consent signed        Plan Factors-    Induction- intravenous  Postoperative Plan-     Informed Consent- Anesthetic plan and risks discussed with patient  I personally reviewed this patient with the CRNA  Discussed and agreed on the Anesthesia Plan with the CRNA  Barrington Palomino

## 2018-11-08 NOTE — OP NOTE
Colonoscopy Procedure Note    Procedure: Colonoscopy    Sedation: Monitored anesthesia care, check anesthesia records      ASA Class: 2    INDICATIONS:  Constipation  POST-OP DIAGNOSIS: See the impression below    Procedure Details     Prior colonoscopy: No prior colonoscopy  Informed consent was obtained for the procedure, including sedation  Risks of perforation, hemorrhage, adverse drug reaction and aspiration were discussed  The patient was placed in the left lateral decubitus position  Based on the pre-procedure assessment, including review of the patient's medical history, medications, allergies, and review of systems, she had been deemed to be an appropriate candidate for conscious sedation; she was therefore sedated with the medications listed below  The patient was monitored continuously with telemetry, pulse oximetry, blood pressure monitoring, and direct observations  A rectal examination was performed  The variable-stiffness pediatric colonoscope was inserted into the rectum and advanced under direct vision to the terminal ileum  The quality of the colonic preparation was good  A careful inspection was made as the colonoscope was withdrawn, including a retroflexed view of the rectum; findings and interventions are described below  Findings:  1  Severe melanosis coli noted throughout  2  Retroflexed view was notable for small internal hemorrhoids  Complications: None; patient tolerated the procedure well  Impression:    1  Small internal hemorrhoids  2  Melanosis coli  Recommendations:  Repeat colonoscopy when age-appropriate screening is due  High-fiber diet

## 2018-11-08 NOTE — DISCHARGE INSTR - AVS FIRST PAGE
ESOPHAGOGASTRODUODENOSCOPY    PROCEDURE: EGD    SEDATION: Monitored anesthesia care, check anesthesia records    ASA Class: 2    INDICATIONS:  Evaluate for celiac disease  CONSENT:  Informed consent was obtained for the procedure, including sedation after explaining the risks and benefits of the procedure  Risks including but not limited to bleeding, perforation, infection, and missed lesion  PREPARATION:   Telemetry, pulse oximetry, blood pressure were monitored throughout the procedure  Patient was identified by myself both verbally and by visual inspection of ID band  DESCRIPTION:   Patient was placed in the left lateral decubitus position and was sedated with the above medication  The gastroscope was introduced in to the oropharynx and the esophagus was intubated under direct visualization  Scope was passed down the esophagus up to 2nd part of the duodenum  A careful inspection was made as the gastroscope was withdrawn, including a retroflexed view of the stomach; findings and interventions are described below  FINDINGS:    #1  Esophagus- slightly irregular squamocolumnar junction noted with 1 tongue less than 5 mm noted extending above squamocolumnar junction at 35 cm  Biopsies were obtained  #2  Stomach- 3 superficial erosions were noted in the antrum and lesser curvature  Retroflexed view was otherwise unremarkable  Pylorus was patent  Biopsies were obtained from the antrum, incisura and body to assess for H pylori  #3  Duodenum- duodenal bulb appeared normal however there was scalloping of the duodenal mucosa and D2  Biopsies were obtained from D2 and duodenal bulb to assess for celiac disease  Ampulla was visualized and appeared normal          IMPRESSIONS:      1  Possible short-segment Campos's esophagus  2  Gastric erosions  3  Scalloping within the duodenal mucosa suggestive of celiac disease, biopsies obtained  RECOMMENDATIONS:     1   Follow-up biopsy results in 2 3 weeks   2  Start on omeprazole 40 mg daily  3  Avoid the use of NSAIDs  COMPLICATIONS:  None; patient tolerated the procedure well  DISPOSITION: PACU           CONDITION: Stable        Colonoscopy Procedure Note    Procedure: Colonoscopy    Sedation: Monitored anesthesia care, check anesthesia records      ASA Class: 2    INDICATIONS:  Constipation  POST-OP DIAGNOSIS: See the impression below    Procedure Details     Prior colonoscopy: No prior colonoscopy  Informed consent was obtained for the procedure, including sedation  Risks of perforation, hemorrhage, adverse drug reaction and aspiration were discussed  The patient was placed in the left lateral decubitus position  Based on the pre-procedure assessment, including review of the patient's medical history, medications, allergies, and review of systems, she had been deemed to be an appropriate candidate for conscious sedation; she was therefore sedated with the medications listed below  The patient was monitored continuously with telemetry, pulse oximetry, blood pressure monitoring, and direct observations  A rectal examination was performed  The variable-stiffness pediatric colonoscope was inserted into the rectum and advanced under direct vision to the terminal ileum  The quality of the colonic preparation was good  A careful inspection was made as the colonoscope was withdrawn, including a retroflexed view of the rectum; findings and interventions are described below  Findings:  1  Severe melanosis coli noted throughout  2  Retroflexed view was notable for small internal hemorrhoids  Complications: None; patient tolerated the procedure well  Impression:    1  Small internal hemorrhoids  2  Melanosis coli  Recommendations:  Repeat colonoscopy when age-appropriate screening is due  High-fiber diet

## 2018-11-20 NOTE — TELEPHONE ENCOUNTER
Spoke to the patient and gave results of the tissue exam  Patient would like to know if nexium is ok to take  She knows that you stated prilosec but she has nexium?  Please advise

## 2018-11-20 NOTE — TELEPHONE ENCOUNTER
As long as she is taking 40 mg daily, it doesn't matter which one she is taking  If she needs a refill, let me know

## 2018-11-21 DIAGNOSIS — K29.50 CHRONIC GASTRITIS WITHOUT BLEEDING, UNSPECIFIED GASTRITIS TYPE: Primary | ICD-10-CM

## 2018-11-21 RX ORDER — OMEPRAZOLE 40 MG/1
40 CAPSULE, DELAYED RELEASE ORAL DAILY
Qty: 30 CAPSULE | Refills: 2 | Status: SHIPPED | OUTPATIENT
Start: 2018-11-21 | End: 2019-05-08 | Stop reason: SDUPTHER

## 2019-01-24 ENCOUNTER — OFFICE VISIT (OUTPATIENT)
Dept: FAMILY MEDICINE CLINIC | Facility: CLINIC | Age: 39
End: 2019-01-24
Payer: COMMERCIAL

## 2019-01-24 VITALS
HEIGHT: 67 IN | WEIGHT: 204 LBS | DIASTOLIC BLOOD PRESSURE: 68 MMHG | SYSTOLIC BLOOD PRESSURE: 106 MMHG | RESPIRATION RATE: 16 BRPM | HEART RATE: 72 BPM | TEMPERATURE: 99.4 F | OXYGEN SATURATION: 98 % | BODY MASS INDEX: 32.02 KG/M2

## 2019-01-24 DIAGNOSIS — Z99.89 OBSTRUCTIVE SLEEP APNEA ON CPAP: ICD-10-CM

## 2019-01-24 DIAGNOSIS — J01.00 ACUTE NON-RECURRENT MAXILLARY SINUSITIS: Primary | ICD-10-CM

## 2019-01-24 DIAGNOSIS — S46.911A STRAIN OF RIGHT SHOULDER, INITIAL ENCOUNTER: ICD-10-CM

## 2019-01-24 DIAGNOSIS — F51.01 PRIMARY INSOMNIA: ICD-10-CM

## 2019-01-24 DIAGNOSIS — G47.33 OBSTRUCTIVE SLEEP APNEA ON CPAP: ICD-10-CM

## 2019-01-24 DIAGNOSIS — E78.1 ESSENTIAL HYPERTRIGLYCERIDEMIA: ICD-10-CM

## 2019-01-24 PROBLEM — T83.32XA IUD STRINGS LOST: Status: RESOLVED | Noted: 2017-10-10 | Resolved: 2019-01-24

## 2019-01-24 PROBLEM — L30.9 ECZEMA OF SCALP: Status: RESOLVED | Noted: 2017-11-02 | Resolved: 2019-01-24

## 2019-01-24 PROBLEM — R14.0 ABDOMINAL BLOATING: Status: RESOLVED | Noted: 2018-10-24 | Resolved: 2019-01-24

## 2019-01-24 PROCEDURE — 99214 OFFICE O/P EST MOD 30 MIN: CPT | Performed by: FAMILY MEDICINE

## 2019-01-24 RX ORDER — AMOXICILLIN AND CLAVULANATE POTASSIUM 875; 125 MG/1; MG/1
1 TABLET, FILM COATED ORAL EVERY 12 HOURS SCHEDULED
Qty: 14 TABLET | Refills: 0 | Status: SHIPPED | OUTPATIENT
Start: 2019-01-24 | End: 2019-01-31

## 2019-01-24 RX ORDER — CYCLOBENZAPRINE HCL 10 MG
10 TABLET ORAL
Qty: 30 TABLET | Refills: 0 | Status: SHIPPED | OUTPATIENT
Start: 2019-01-24 | End: 2019-05-08 | Stop reason: SDUPTHER

## 2019-01-24 RX ORDER — QUETIAPINE FUMARATE 25 MG/1
25 TABLET, FILM COATED ORAL
Qty: 90 TABLET | Refills: 0 | Status: SHIPPED | OUTPATIENT
Start: 2019-01-24 | End: 2019-05-08 | Stop reason: SDUPTHER

## 2019-01-24 NOTE — PROGRESS NOTES
Assessment/Plan:         Diagnoses and all orders for this visit:    Acute non-recurrent maxillary sinusitis  -     amoxicillin-clavulanate (AUGMENTIN) 875-125 mg per tablet; Take 1 tablet by mouth every 12 (twelve) hours for 7 days    Essential hypertriglyceridemia  -     Lipid Panel with Direct LDL reflex; Future    Primary insomnia  -     QUEtiapine (SEROquel) 25 mg tablet; Take 1 tablet (25 mg total) by mouth daily at bedtime    Obstructive sleep apnea on CPAP          Subjective:      Patient ID: Jv Cain is a 34 y o  female  Subjective    Jv Cain is a 34 y o  female who complains of insomnia  Onset was several years ago  Patient describes symptoms as difficulty falling asleep  Patient has found moderate relief with prescription sleep aid, seroquel  Associated symptoms include: none  Patient denies anxiety, daytime somnolence and depression  Symptoms have been well-controlled  URI    This is a new problem  The current episode started in the past 7 days  The problem has been rapidly worsening  Associated symptoms include congestion, coughing, rhinorrhea and sinus pain  Pertinent negatives include no abdominal pain, chest pain, diarrhea, dysuria, ear pain, headaches, joint pain, joint swelling, nausea, neck pain, plugged ear sensation, rash, sneezing, sore throat, swollen glands, vomiting or wheezing  She has tried antihistamine for the symptoms  The treatment provided mild relief  Hyperlipidemia   This is a chronic problem  The current episode started more than 1 year ago  The problem is controlled  Recent lipid tests were reviewed and are variable  She has no history of chronic renal disease, diabetes, hypothyroidism, liver disease, obesity or nephrotic syndrome  Pertinent negatives include no chest pain or focal sensory loss  Current antihyperlipidemic treatment includes diet change         The following portions of the patient's history were reviewed and updated as appropriate: allergies, current medications, past family history, past medical history, past social history, past surgical history and problem list     Review of Systems   HENT: Positive for congestion, rhinorrhea and sinus pain  Negative for ear pain, sneezing and sore throat  Respiratory: Positive for cough  Negative for wheezing  Cardiovascular: Negative for chest pain  Gastrointestinal: Negative for abdominal pain, diarrhea, nausea and vomiting  Genitourinary: Negative for dysuria  Musculoskeletal: Negative for joint pain and neck pain  Skin: Negative for rash  Neurological: Negative for headaches  Objective:      /68 (BP Location: Left arm, Patient Position: Sitting, Cuff Size: Standard)   Pulse 72   Temp 99 4 °F (37 4 °C)   Resp 16   Ht 5' 7" (1 702 m)   Wt 92 5 kg (204 lb)   SpO2 98%   BMI 31 95 kg/m²          Physical Exam   Constitutional: She appears well-developed and well-nourished  HENT:   Head: Normocephalic and atraumatic  Nose: Rhinorrhea present  No mucosal edema  No foreign bodies  Right sinus exhibits maxillary sinus tenderness  Left sinus exhibits maxillary sinus tenderness  Mouth/Throat: No oropharyngeal exudate  Eyes: Pupils are equal, round, and reactive to light  EOM are normal    Neck: No tracheal deviation present  Cardiovascular: Normal rate and regular rhythm      Pulmonary/Chest: Effort normal and breath sounds normal    Abdominal: Bowel sounds are normal

## 2019-02-27 ENCOUNTER — TELEPHONE (OUTPATIENT)
Dept: FAMILY MEDICINE CLINIC | Facility: CLINIC | Age: 39
End: 2019-02-27

## 2019-02-27 ENCOUNTER — OFFICE VISIT (OUTPATIENT)
Dept: FAMILY MEDICINE CLINIC | Facility: CLINIC | Age: 39
End: 2019-02-27
Payer: COMMERCIAL

## 2019-02-27 VITALS
HEART RATE: 68 BPM | OXYGEN SATURATION: 97 % | DIASTOLIC BLOOD PRESSURE: 70 MMHG | SYSTOLIC BLOOD PRESSURE: 110 MMHG | RESPIRATION RATE: 16 BRPM | HEIGHT: 67 IN | BODY MASS INDEX: 32.33 KG/M2 | WEIGHT: 206 LBS | TEMPERATURE: 98.4 F

## 2019-02-27 DIAGNOSIS — Z12.39 SCREENING FOR BREAST CANCER: Primary | ICD-10-CM

## 2019-02-27 DIAGNOSIS — H00.014 HORDEOLUM EXTERNUM LEFT UPPER EYELID: ICD-10-CM

## 2019-02-27 PROCEDURE — 3008F BODY MASS INDEX DOCD: CPT | Performed by: FAMILY MEDICINE

## 2019-02-27 PROCEDURE — 1036F TOBACCO NON-USER: CPT | Performed by: FAMILY MEDICINE

## 2019-02-27 PROCEDURE — 99213 OFFICE O/P EST LOW 20 MIN: CPT | Performed by: FAMILY MEDICINE

## 2019-02-27 RX ORDER — CEPHALEXIN 500 MG/1
500 CAPSULE ORAL EVERY 8 HOURS SCHEDULED
Qty: 21 CAPSULE | Refills: 0 | Status: SHIPPED | OUTPATIENT
Start: 2019-02-27 | End: 2019-03-06

## 2019-02-27 RX ORDER — PREDNISONE 10 MG/1
TABLET ORAL
Qty: 21 TABLET | Refills: 0 | Status: SHIPPED | OUTPATIENT
Start: 2019-02-27 | End: 2019-05-08

## 2019-02-27 NOTE — PROGRESS NOTES
Assessment/Plan:    No problem-specific Assessment & Plan notes found for this encounter  Diagnoses and all orders for this visit:    Screening for breast cancer  -     Mammo screening bilateral w cad; Future    Hordeolum externum left upper eyelid  -     cephalexin (KEFLEX) 500 mg capsule; Take 1 capsule (500 mg total) by mouth every 8 (eight) hours for 7 days  -     predniSONE 10 mg tablet; 4 tabs x 2 days, 3 tabs x 2 days, 2 tabs x 2 days, 1 tab x 2 days      warm compresses and Benadryl PRN     Subjective:      Patient ID: Jv Cain is a 34 y o  female  Rash   This is a new problem  The current episode started yesterday  The problem has been rapidly worsening since onset  Location: left eye lid  The rash is characterized by blistering, swelling and pain  She was exposed to nothing  Pertinent negatives include no anorexia, congestion, cough, diarrhea, facial edema, fatigue, fever, joint pain, nail changes, rhinorrhea, shortness of breath, sore throat or vomiting  Past treatments include antihistamine  The treatment provided mild relief  There is no history of allergies, asthma, eczema or varicella  The following portions of the patient's history were reviewed and updated as appropriate: allergies, current medications, past family history, past medical history, past social history, past surgical history and problem list     Review of Systems   Constitutional: Negative for fatigue and fever  HENT: Negative for congestion, rhinorrhea and sore throat  Respiratory: Negative for cough and shortness of breath  Gastrointestinal: Negative for anorexia, diarrhea and vomiting  Musculoskeletal: Negative for joint pain  Skin: Positive for rash  Negative for nail changes           Objective:      /70 (BP Location: Left arm, Patient Position: Sitting, Cuff Size: Standard)   Pulse 68   Temp 98 4 °F (36 9 °C)   Resp 16   Ht 5' 7" (1 702 m)   Wt 93 4 kg (206 lb)   SpO2 97%   BMI 32 26 kg/m²          Physical Exam   Constitutional: She appears well-developed and well-nourished  HENT:   Head: Normocephalic  Mouth/Throat: No oropharyngeal exudate  Eyes: Pupils are equal, round, and reactive to light  Left eye exhibits hordeolum  Left eye exhibits no discharge  Right conjunctiva is not injected  Left conjunctiva is not injected  Right eye exhibits normal extraocular motion  Left eye exhibits normal extraocular motion  Right pupil is reactive  Left pupil is reactive  Cardiovascular: Normal rate and regular rhythm  Exam reveals no friction rub  No murmur heard    Pulmonary/Chest: Effort normal and breath sounds normal

## 2019-02-27 NOTE — TELEPHONE ENCOUNTER
OLIVER WOULD LIKE TO SEE YOU TODAY ANYTIME WILL WORK FOR HER   AROUND 12 IF POSSIBLE  EYELID AND NOSE AND ABOVE HER BROW IS SWOLLEN AND PAINFUL  THINKS SHE MIGHT HAVE GOTTEN BITE BY SOMETHING      PLEASE ADVISE

## 2019-05-08 ENCOUNTER — OFFICE VISIT (OUTPATIENT)
Dept: FAMILY MEDICINE CLINIC | Facility: CLINIC | Age: 39
End: 2019-05-08
Payer: COMMERCIAL

## 2019-05-08 VITALS
HEIGHT: 67 IN | SYSTOLIC BLOOD PRESSURE: 152 MMHG | OXYGEN SATURATION: 98 % | DIASTOLIC BLOOD PRESSURE: 80 MMHG | BODY MASS INDEX: 32.83 KG/M2 | HEART RATE: 96 BPM | WEIGHT: 209.2 LBS | TEMPERATURE: 99.2 F | RESPIRATION RATE: 18 BRPM

## 2019-05-08 DIAGNOSIS — G89.29 CHRONIC BILATERAL LOW BACK PAIN WITH SCIATICA, SCIATICA LATERALITY UNSPECIFIED: ICD-10-CM

## 2019-05-08 DIAGNOSIS — M54.40 CHRONIC BILATERAL LOW BACK PAIN WITH SCIATICA, SCIATICA LATERALITY UNSPECIFIED: ICD-10-CM

## 2019-05-08 DIAGNOSIS — K29.50 CHRONIC GASTRITIS WITHOUT BLEEDING, UNSPECIFIED GASTRITIS TYPE: ICD-10-CM

## 2019-05-08 DIAGNOSIS — F51.01 PRIMARY INSOMNIA: ICD-10-CM

## 2019-05-08 DIAGNOSIS — Z00.00 ANNUAL PHYSICAL EXAM: Primary | ICD-10-CM

## 2019-05-08 PROBLEM — S43.004S SHOULDER DISLOCATION, RIGHT, SEQUELA: Status: RESOLVED | Noted: 2018-05-01 | Resolved: 2019-05-08

## 2019-05-08 PROCEDURE — 99396 PREV VISIT EST AGE 40-64: CPT | Performed by: FAMILY MEDICINE

## 2019-05-08 RX ORDER — QUETIAPINE FUMARATE 25 MG/1
25 TABLET, FILM COATED ORAL
Qty: 90 TABLET | Refills: 0 | Status: SHIPPED | OUTPATIENT
Start: 2019-05-08 | End: 2019-09-07 | Stop reason: SDUPTHER

## 2019-05-08 RX ORDER — OMEPRAZOLE 40 MG/1
40 CAPSULE, DELAYED RELEASE ORAL DAILY
Qty: 30 CAPSULE | Refills: 2 | Status: SHIPPED | OUTPATIENT
Start: 2019-05-08 | End: 2019-08-07 | Stop reason: SDUPTHER

## 2019-05-08 RX ORDER — ASCORBIC ACID 1000 MG
TABLET ORAL
COMMUNITY
End: 2022-01-05

## 2019-05-08 RX ORDER — CYCLOBENZAPRINE HCL 10 MG
10 TABLET ORAL
Qty: 30 TABLET | Refills: 0 | Status: SHIPPED | OUTPATIENT
Start: 2019-05-08 | End: 2020-03-06 | Stop reason: SDUPTHER

## 2019-06-18 ENCOUNTER — OFFICE VISIT (OUTPATIENT)
Dept: OBGYN CLINIC | Facility: CLINIC | Age: 39
End: 2019-06-18
Payer: COMMERCIAL

## 2019-06-18 VITALS
HEIGHT: 67 IN | DIASTOLIC BLOOD PRESSURE: 82 MMHG | SYSTOLIC BLOOD PRESSURE: 128 MMHG | WEIGHT: 208 LBS | BODY MASS INDEX: 32.65 KG/M2

## 2019-06-18 DIAGNOSIS — N94.10 DYSPAREUNIA IN FEMALE: Primary | ICD-10-CM

## 2019-06-18 PROCEDURE — 99214 OFFICE O/P EST MOD 30 MIN: CPT | Performed by: PHYSICIAN ASSISTANT

## 2019-07-01 PROBLEM — Z01.419 WELL WOMAN EXAM: Status: ACTIVE | Noted: 2019-07-01

## 2019-07-01 NOTE — PROGRESS NOTES
Assessment/Plan   Diagnoses and all orders for this visit:    Well woman exam  -     GP Liquid-Based Pap + HPV Plus    Breast cancer screening  -     Mammo screening bilateral w 3d & cad; Future        Discussion    All questions have been answered to her satisfaction  RTO for APE or sooner if needed      Subjective     Pt for annual GYN exam   Pt with continued complaints of dyspareunia  I spoke w pt at length about her marriage and she was very open about their sex life  A lot of her issues to me sound emotional  She feels like her  blames her for not being emotionally interested in IC  She feels like she is not satisfied by him sexually and this leads her to feelings of depression and lack of self worth  Pt states pain with IC is worst w insertion and withdrawal, but more tolerable once they are in the midst of IC, but is not still not satisfied by him  Pt does not get any periods on her NExplanon  Juan Antonio Steward is a 34 y o  female who presents for annual well woman exam    LMP -10/2012    No vulvar itch/burn; No vaginal itch/burn; No abn discharge or odor; No urinary sx - burning/pain/frequency/hematuria  (+) SBEs - no breast masses, asymmetry, nipple discharge or bleeding, changes in skin of breast, or breast tenderness bilaterally  No abd/pelvic pain or HAs; No menopausal symptoms: No hot flashes/night sweats, problems with intercourse, vaginal dryness; sleeping well  Pt is sexually active in a mutually monog/ sexual relationship; She declines std/hiv/hep testing; Feels safe at home  Contraception: NExplanon (inserted 12/4/17)  (+) PCP for routine Bw/care; Last Pap - 3/14 WNL HPV not able to be done  History of abnormal Pap smear:   Last mammo - 10/18/17-NEgative  History of abnormal mammogram: none  Last colonoscopy - 12/18   Last Dexa scan - none yet    Review of Systems   Constitutional: Negative  Respiratory: Negative  Gastrointestinal: Positive for constipation  Endocrine: Negative  Genitourinary: Positive for dyspareunia  The following portions of the patient's history were reviewed and updated as appropriate: current medications, past family history, past medical history, past social history, past surgical history and problem list          OB History        5    Para   2    Term                AB   3    Living           SAB   3    TAB        Ectopic        Multiple        Live Births   2           Obstetric Comments   : SABs x 3;  C/S M;  C/S F             Past Medical History:   Diagnosis Date    Constipation     Depression     Eczema     Epilepsy ([facility] [address] )     Insomnia     Medial meniscus tear 2015    Psoriasis     Shoulder dislocation, right, sequela 2018    Sleep apnea     uses cpap       Past Surgical History:   Procedure Laterality Date     SECTION   and     DILATION AND CURETTAGE OF UTERUS WITH HYSTEROSOCPY N/A 2017    Procedure: D&C; IUD REMOVAL;  Surgeon: Vernell Win MD;  [facility];  Service: Gynecology    HYSTEROSCOPY      LAPAROSCOPY      LAPAROSCOPY      (Diagnostic)    LASIK      [place] COLONOSCOPY FLX DX W/COLLJ [place] PFRMD N/A 2018    Procedure: EGD AND COLONOSCOPY;  Surgeon: Rachell Head MD;  [facility];   Service: Gastroenterology    REMOVAL OF INTRAUTERINE DEVICE (IUD)         Family History   Problem Relation Age of Onset    Diabetes Father         Diabetes Mellitus    Hyperlipidemia Father     Hypertension Father     Brain cancer Maternal Grandmother     Migraines Family     Diabetes Family         Type 2 Diabetes Mellitus    Ovarian cancer Paternal Grandmother        Social History     Socioeconomic History    Marital status: /Civil Union     Spouse name: Not on file    Number of children: Not on file    Years of education: Not on file    Highest education level: Not on file   Occupational History    Not on file   Social Needs    Financial resource strain: Not on file    Food insecurity:     Worry: Not on file     Inability: Not on file    Transportation needs:     Medical: Not on file     Non-medical: Not on file   Tobacco Use    Smoking status: Never Smoker    Smokeless tobacco: Never Used    Tobacco comment: Former smoker per [employer]   Substance and Sexual Activity    Alcohol use: Yes     Comment: occasional    Drug use: No    Sexual activity: Yes     Partners: Male     Birth control/protection: Implant     Comment: 11/30/17 - Nexplanon inserted   Lifestyle    Physical activity:     Days per week: Not on file     Minutes per session: Not on file    Stress: Not on file   Relationships    Social connections:     Talks on phone: Not on file     Gets together: Not on file     Attends Jewish service: Not on file     Active member of club or organization: Not on file     Attends meetings of clubs or organizations: Not on file     Relationship status: Not on file    Intimate partner violence:     Fear of current or ex partner: Not on file     Emotionally abused: Not on file     Physically abused: Not on file     Forced sexual activity: Not on file   Other Topics Concern    Not on file   Social History Narrative    Denied:  Exercising Regularly         Current Outpatient Medications:     acetaminophen (TYLENOL) 325 mg tablet, Take 650 mg by mouth every 6 (six) hours as needed for mild pain, Disp: , Rfl:     Apremilast (OTEZLA) 30 MG TABS, Take by mouth, Disp: , Rfl:     cyclobenzaprine (FLEXERIL) 10 mg tablet, Take 1 tablet (10 mg total) by mouth daily at bedtime, Disp: 30 tablet, Rfl: 0    Ginkgo Biloba 40 MG TABS, Take by mouth, Disp: , Rfl:     magnesium 30 MG tablet, Take 30 mg by mouth 2 (two) times a day, Disp: , Rfl:     Multiple Vitamins-Minerals (MULTIVITAL-M) TABS, Take by mouth, Disp: , Rfl:     Omega-3 Fatty Acids (FISH OIL PO), Take 1 tablet by mouth daily, Disp: , Rfl:     omeprazole (PriLOSEC) 40 MG capsule, Take 1 capsule (40 mg total) by mouth daily, Disp: 30 capsule, Rfl: 2    QUEtiapine (SEROquel) 25 mg tablet, Take 1 tablet (25 mg total) by mouth daily at bedtime, Disp: 90 tablet, Rfl: 0    No Known Allergies    Objective   Vitals:    07/02/19 1049   BP: 120/88   BP Location: Left arm   Patient Position: Sitting   Cuff Size: Standard   Weight: 94 7 kg (208 lb 12 8 oz)   Height: 5' 7" (1 702 m)     Physical Exam   Constitutional: She is oriented to person, place, and time  She appears well-developed and well-nourished  HENT:   Head: Normocephalic and atraumatic  Cardiovascular: Normal rate and regular rhythm  Pulmonary/Chest: Effort normal and breath sounds normal  Right breast exhibits no inverted nipple, no mass, no nipple discharge, no skin change and no tenderness  Left breast exhibits no inverted nipple, no mass, no nipple discharge, no skin change and no tenderness  Breasts are symmetrical    Abdominal: Soft  She exhibits no distension and no mass  There is no rebound and no guarding  Genitourinary: Vagina normal and uterus normal  No vaginal discharge found  Neurological: She is alert and oriented to person, place, and time  Skin: Skin is warm and dry  Psychiatric: She has a normal mood and affect  Patient Instructions   Advised pt to try to discuss her feelings with her partner  Also advised trial of seeing a sex therapist to discuss her sx as well

## 2019-07-02 ENCOUNTER — ANNUAL EXAM (OUTPATIENT)
Dept: OBGYN CLINIC | Facility: CLINIC | Age: 39
End: 2019-07-02
Payer: COMMERCIAL

## 2019-07-02 VITALS
SYSTOLIC BLOOD PRESSURE: 120 MMHG | WEIGHT: 208.8 LBS | HEIGHT: 67 IN | BODY MASS INDEX: 32.77 KG/M2 | DIASTOLIC BLOOD PRESSURE: 88 MMHG

## 2019-07-02 DIAGNOSIS — Z01.419 WELL WOMAN EXAM: Primary | ICD-10-CM

## 2019-07-02 DIAGNOSIS — Z12.39 BREAST CANCER SCREENING: ICD-10-CM

## 2019-07-02 PROCEDURE — S0612 ANNUAL GYNECOLOGICAL EXAMINA: HCPCS | Performed by: PHYSICIAN ASSISTANT

## 2019-07-02 NOTE — PATIENT INSTRUCTIONS
Advised pt to try to discuss her feelings with her partner  Also advised trial of seeing a sex therapist to discuss her sx as well     Info given [employer] [employer] to see as a therapist

## 2019-07-11 LAB
HPV HR 12 DNA CVX QL NAA+PROBE: NOT DETECTED
HPV16 DNA SPEC QL NAA+PROBE: NOT DETECTED
HPV18 DNA SPEC QL NAA+PROBE: NOT DETECTED
THIN PREP CVX: NORMAL

## 2019-07-22 ENCOUNTER — APPOINTMENT (OUTPATIENT)
Dept: LAB | Facility: CLINIC | Age: 39
End: 2019-07-22
Payer: COMMERCIAL

## 2019-07-22 ENCOUNTER — TRANSCRIBE ORDERS (OUTPATIENT)
Dept: LAB | Facility: CLINIC | Age: 39
End: 2019-07-22

## 2019-07-22 DIAGNOSIS — E78.1 ESSENTIAL HYPERTRIGLYCERIDEMIA: ICD-10-CM

## 2019-07-22 DIAGNOSIS — Z79.899 ENCOUNTER FOR LONG-TERM (CURRENT) USE OF OTHER MEDICATIONS: ICD-10-CM

## 2019-07-22 DIAGNOSIS — L40.0 PSORIASIS VULGARIS: ICD-10-CM

## 2019-07-22 DIAGNOSIS — Z79.899 ENCOUNTER FOR LONG-TERM (CURRENT) USE OF OTHER MEDICATIONS: Primary | ICD-10-CM

## 2019-07-22 LAB
ALBUMIN SERPL BCP-MCNC: 4.1 G/DL (ref 3.5–5)
ALP SERPL-CCNC: 70 U/L (ref 46–116)
ALT SERPL W P-5'-P-CCNC: 29 U/L (ref 12–78)
ANION GAP SERPL CALCULATED.3IONS-SCNC: 8 MMOL/L (ref 4–13)
AST SERPL W P-5'-P-CCNC: 17 U/L (ref 5–45)
BASOPHILS # BLD AUTO: 0.09 THOUSANDS/ΜL (ref 0–0.1)
BASOPHILS NFR BLD AUTO: 1 % (ref 0–1)
BILIRUB SERPL-MCNC: 0.68 MG/DL (ref 0.2–1)
BUN SERPL-MCNC: 9 MG/DL (ref 5–25)
CALCIUM SERPL-MCNC: 8.9 MG/DL (ref 8.3–10.1)
CHLORIDE SERPL-SCNC: 105 MMOL/L (ref 100–108)
CHOLEST SERPL-MCNC: 203 MG/DL (ref 50–200)
CO2 SERPL-SCNC: 28 MMOL/L (ref 21–32)
CREAT SERPL-MCNC: 0.85 MG/DL (ref 0.6–1.3)
EOSINOPHIL # BLD AUTO: 0.22 THOUSAND/ΜL (ref 0–0.61)
EOSINOPHIL NFR BLD AUTO: 3 % (ref 0–6)
ERYTHROCYTE [DISTWIDTH] IN BLOOD BY AUTOMATED COUNT: 13.2 % (ref 11.6–15.1)
GFR SERPL CREATININE-BSD FRML MDRD: 86 ML/MIN/1.73SQ M
GLUCOSE P FAST SERPL-MCNC: 100 MG/DL (ref 65–99)
HCT VFR BLD AUTO: 45.4 % (ref 34.8–46.1)
HDLC SERPL-MCNC: 33 MG/DL (ref 40–60)
HGB BLD-MCNC: 14.7 G/DL (ref 11.5–15.4)
IMM GRANULOCYTES # BLD AUTO: 0.03 THOUSAND/UL (ref 0–0.2)
IMM GRANULOCYTES NFR BLD AUTO: 0 % (ref 0–2)
LDLC SERPL CALC-MCNC: 107 MG/DL (ref 0–100)
LYMPHOCYTES # BLD AUTO: 2.1 THOUSANDS/ΜL (ref 0.6–4.47)
LYMPHOCYTES NFR BLD AUTO: 26 % (ref 14–44)
MCH RBC QN AUTO: 30.4 PG (ref 26.8–34.3)
MCHC RBC AUTO-ENTMCNC: 32.4 G/DL (ref 31.4–37.4)
MCV RBC AUTO: 94 FL (ref 82–98)
MONOCYTES # BLD AUTO: 0.59 THOUSAND/ΜL (ref 0.17–1.22)
MONOCYTES NFR BLD AUTO: 7 % (ref 4–12)
NEUTROPHILS # BLD AUTO: 5.02 THOUSANDS/ΜL (ref 1.85–7.62)
NEUTS SEG NFR BLD AUTO: 63 % (ref 43–75)
NRBC BLD AUTO-RTO: 0 /100 WBCS
PLATELET # BLD AUTO: 302 THOUSANDS/UL (ref 149–390)
PMV BLD AUTO: 9.8 FL (ref 8.9–12.7)
POTASSIUM SERPL-SCNC: 3.6 MMOL/L (ref 3.5–5.3)
PROT SERPL-MCNC: 7.7 G/DL (ref 6.4–8.2)
RBC # BLD AUTO: 4.84 MILLION/UL (ref 3.81–5.12)
SODIUM SERPL-SCNC: 141 MMOL/L (ref 136–145)
TRIGL SERPL-MCNC: 317 MG/DL
WBC # BLD AUTO: 8.05 THOUSAND/UL (ref 4.31–10.16)

## 2019-07-22 PROCEDURE — 86480 TB TEST CELL IMMUN MEASURE: CPT

## 2019-07-22 PROCEDURE — 80061 LIPID PANEL: CPT

## 2019-07-22 PROCEDURE — 80074 ACUTE HEPATITIS PANEL: CPT

## 2019-07-22 PROCEDURE — 80053 COMPREHEN METABOLIC PANEL: CPT

## 2019-07-22 PROCEDURE — 36415 COLL VENOUS BLD VENIPUNCTURE: CPT

## 2019-07-22 PROCEDURE — 85025 COMPLETE CBC W/AUTO DIFF WBC: CPT

## 2019-07-23 LAB
HAV IGM SER QL: NORMAL
HBV CORE IGM SER QL: NORMAL
HBV SURFACE AG SER QL: NORMAL
HCV AB SER QL: NORMAL

## 2019-07-24 LAB
GAMMA INTERFERON BACKGROUND BLD IA-ACNC: 0.02 IU/ML
M TB IFN-G BLD-IMP: NEGATIVE
M TB IFN-G CD4+ BCKGRND COR BLD-ACNC: -0.01 IU/ML
M TB IFN-G CD4+ BCKGRND COR BLD-ACNC: 0 IU/ML
MITOGEN IGNF BCKGRD COR BLD-ACNC: >10 IU/ML

## 2019-08-07 DIAGNOSIS — K29.50 CHRONIC GASTRITIS WITHOUT BLEEDING, UNSPECIFIED GASTRITIS TYPE: ICD-10-CM

## 2019-08-07 RX ORDER — OMEPRAZOLE 40 MG/1
CAPSULE, DELAYED RELEASE ORAL
Qty: 30 CAPSULE | Refills: 2 | Status: SHIPPED | OUTPATIENT
Start: 2019-08-07 | End: 2019-09-10 | Stop reason: SDUPTHER

## 2019-09-07 DIAGNOSIS — F51.01 PRIMARY INSOMNIA: ICD-10-CM

## 2019-09-09 RX ORDER — QUETIAPINE FUMARATE 25 MG/1
TABLET, FILM COATED ORAL
Qty: 90 TABLET | Refills: 0 | Status: SHIPPED | OUTPATIENT
Start: 2019-09-09 | End: 2020-03-06 | Stop reason: SDUPTHER

## 2019-09-10 DIAGNOSIS — K29.50 CHRONIC GASTRITIS WITHOUT BLEEDING, UNSPECIFIED GASTRITIS TYPE: ICD-10-CM

## 2019-09-10 RX ORDER — OMEPRAZOLE 40 MG/1
40 CAPSULE, DELAYED RELEASE ORAL DAILY
Qty: 30 CAPSULE | Refills: 5 | Status: SHIPPED | OUTPATIENT
Start: 2019-09-10 | End: 2020-03-06 | Stop reason: SDUPTHER

## 2019-11-11 ENCOUNTER — OFFICE VISIT (OUTPATIENT)
Dept: FAMILY MEDICINE CLINIC | Facility: CLINIC | Age: 39
End: 2019-11-11
Payer: COMMERCIAL

## 2019-11-11 VITALS
HEART RATE: 90 BPM | WEIGHT: 209.4 LBS | SYSTOLIC BLOOD PRESSURE: 150 MMHG | OXYGEN SATURATION: 97 % | BODY MASS INDEX: 32.87 KG/M2 | TEMPERATURE: 99.7 F | HEIGHT: 67 IN | RESPIRATION RATE: 18 BRPM | DIASTOLIC BLOOD PRESSURE: 90 MMHG

## 2019-11-11 DIAGNOSIS — I10 ESSENTIAL HYPERTENSION: ICD-10-CM

## 2019-11-11 DIAGNOSIS — R20.0 RIGHT ARM NUMBNESS: ICD-10-CM

## 2019-11-11 DIAGNOSIS — R29.810 FACIAL WEAKNESS: Primary | ICD-10-CM

## 2019-11-11 PROCEDURE — 99214 OFFICE O/P EST MOD 30 MIN: CPT | Performed by: FAMILY MEDICINE

## 2019-11-11 RX ORDER — LOSARTAN POTASSIUM AND HYDROCHLOROTHIAZIDE 25; 100 MG/1; MG/1
1 TABLET ORAL DAILY
Qty: 90 TABLET | Refills: 3 | Status: SHIPPED | OUTPATIENT
Start: 2019-11-11 | End: 2019-12-08 | Stop reason: SDUPTHER

## 2019-11-11 NOTE — PROGRESS NOTES
Assessment/Plan:    No problem-specific Assessment & Plan notes found for this encounter  Diagnoses and all orders for this visit:    Facial weakness  -     Comprehensive metabolic panel    Right arm numbness  -     MRI brain w wo contrast; Future  -     Comprehensive metabolic panel  -     CBC and differential  -     Vitamin B12; Future  -     Troponin I; Future    Essential hypertension  -     losartan-hydrochlorothiazide (HYZAAR) 100-25 MG per tablet; Take 1 tablet by mouth daily      normal exam today   To get labs  To get MRI   To go to ER if the symptoms get worse    Subjective:      Patient ID: Bertin Londono is a 34 y o  female  Here for concerning with high blood pressure   Last Wednesday at the meeting, she had trouble finding words and left side of the face twitches lasted few seconds and went back to normal after   Feels pressure and stiffness on her hands and fingers "feel like wearing wet gloves" for the last few days   Feels numbness on her right arm after she bends her elbow for few minutes - pins and needles  Blood pressure being elevated in 150-190/ at home   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 but she doesn't feel "stressed"     Hypertension   This is a new problem  The current episode started 1 to 4 weeks ago  The problem has been gradually worsening since onset  The problem is uncontrolled  Pertinent negatives include no anxiety, blurred vision, chest pain, headaches, malaise/fatigue, neck pain, orthopnea, palpitations, peripheral edema, PND, shortness of breath or sweats  Risk factors for coronary artery disease include obesity and family history  The current treatment provides mild improvement  There is no history of angina, kidney disease, CVA, PVD or retinopathy  There is no history of chronic renal disease, coarctation of the aorta or a hypertension causing med  Anxiety   Presents for follow-up visit   Symptoms include excessive worry, TAP [phone], back specialist   nervous/anxious behavior and panic  Patient reports no chest pain, compulsions, confusion, decreased concentration, depressed mood, dizziness, dry mouth, feeling of choking, hyperventilation, impotence, insomnia, irritability, muscle tension, nausea, palpitations, restlessness, shortness of breath or suicidal ideas  Symptoms occur constantly  The quality of sleep is fair  Nighttime awakenings: none  The following portions of the patient's history were reviewed and updated as appropriate: allergies, current medications, past family history, past medical history, past social history, past surgical history and problem list     Review of Systems   Constitutional: Negative for irritability and malaise/fatigue  Eyes: Negative for blurred vision  Respiratory: Negative for shortness of breath  Cardiovascular: Negative for chest pain, palpitations, orthopnea and PND  Gastrointestinal: Negative for nausea  Genitourinary: Negative for impotence  Musculoskeletal: Negative for neck pain  Neurological: Negative for dizziness and headaches  Psychiatric/Behavioral: Negative for confusion, decreased concentration and suicidal ideas  The patient is nervous/anxious  The patient does not have insomnia  Objective:      /90 (BP Location: Right arm, Patient Position: Sitting, Cuff Size: Large)   Pulse 90   Temp 99 7 °F (37 6 °C) (Tympanic)   Resp 18   Ht 5' 7" (1 702 m)   Wt 95 kg (209 lb 6 4 oz)   SpO2 97%   BMI 32 80 kg/m²          Physical Exam   Constitutional: She is oriented to person, place, and time  She appears well-developed and well-nourished  Neck: No tracheal deviation present  No thyromegaly present  Cardiovascular: Exam reveals no friction rub  No murmur heard  Pulmonary/Chest: No stridor  No respiratory distress  Abdominal: She exhibits no distension  There is no tenderness  Neurological: She is alert and oriented to person, place, and time   No cranial nerve deficit   Coordination normal

## 2019-11-12 ENCOUNTER — APPOINTMENT (OUTPATIENT)
Dept: LAB | Facility: CLINIC | Age: 39
End: 2019-11-12
Payer: COMMERCIAL

## 2019-11-12 DIAGNOSIS — R20.0 RIGHT ARM NUMBNESS: ICD-10-CM

## 2019-11-12 LAB
ALBUMIN SERPL BCP-MCNC: 3.9 G/DL (ref 3.5–5)
ALP SERPL-CCNC: 69 U/L (ref 46–116)
ALT SERPL W P-5'-P-CCNC: 25 U/L (ref 12–78)
ANION GAP SERPL CALCULATED.3IONS-SCNC: 8 MMOL/L (ref 4–13)
AST SERPL W P-5'-P-CCNC: 11 U/L (ref 5–45)
BASOPHILS # BLD AUTO: 0.09 THOUSANDS/ΜL (ref 0–0.1)
BASOPHILS NFR BLD AUTO: 1 % (ref 0–1)
BILIRUB SERPL-MCNC: 0.54 MG/DL (ref 0.2–1)
BUN SERPL-MCNC: 12 MG/DL (ref 5–25)
CALCIUM SERPL-MCNC: 8.5 MG/DL (ref 8.3–10.1)
CHLORIDE SERPL-SCNC: 107 MMOL/L (ref 100–108)
CO2 SERPL-SCNC: 23 MMOL/L (ref 21–32)
CREAT SERPL-MCNC: 0.78 MG/DL (ref 0.6–1.3)
EOSINOPHIL # BLD AUTO: 0.21 THOUSAND/ΜL (ref 0–0.61)
EOSINOPHIL NFR BLD AUTO: 2 % (ref 0–6)
ERYTHROCYTE [DISTWIDTH] IN BLOOD BY AUTOMATED COUNT: 13.1 % (ref 11.6–15.1)
GFR SERPL CREATININE-BSD FRML MDRD: 95 ML/MIN/1.73SQ M
GLUCOSE P FAST SERPL-MCNC: 120 MG/DL (ref 65–99)
HCT VFR BLD AUTO: 43.6 % (ref 34.8–46.1)
HGB BLD-MCNC: 14.5 G/DL (ref 11.5–15.4)
IMM GRANULOCYTES # BLD AUTO: 0.02 THOUSAND/UL (ref 0–0.2)
IMM GRANULOCYTES NFR BLD AUTO: 0 % (ref 0–2)
LYMPHOCYTES # BLD AUTO: 1.91 THOUSANDS/ΜL (ref 0.6–4.47)
LYMPHOCYTES NFR BLD AUTO: 22 % (ref 14–44)
MCH RBC QN AUTO: 30.2 PG (ref 26.8–34.3)
MCHC RBC AUTO-ENTMCNC: 33.3 G/DL (ref 31.4–37.4)
MCV RBC AUTO: 91 FL (ref 82–98)
MONOCYTES # BLD AUTO: 0.6 THOUSAND/ΜL (ref 0.17–1.22)
MONOCYTES NFR BLD AUTO: 7 % (ref 4–12)
NEUTROPHILS # BLD AUTO: 5.78 THOUSANDS/ΜL (ref 1.85–7.62)
NEUTS SEG NFR BLD AUTO: 68 % (ref 43–75)
NRBC BLD AUTO-RTO: 0 /100 WBCS
PLATELET # BLD AUTO: 323 THOUSANDS/UL (ref 149–390)
PMV BLD AUTO: 9.9 FL (ref 8.9–12.7)
POTASSIUM SERPL-SCNC: 3.5 MMOL/L (ref 3.5–5.3)
PROT SERPL-MCNC: 7.6 G/DL (ref 6.4–8.2)
RBC # BLD AUTO: 4.8 MILLION/UL (ref 3.81–5.12)
SODIUM SERPL-SCNC: 138 MMOL/L (ref 136–145)
VIT B12 SERPL-MCNC: 842 PG/ML (ref 100–900)
WBC # BLD AUTO: 8.61 THOUSAND/UL (ref 4.31–10.16)

## 2019-11-12 PROCEDURE — 36415 COLL VENOUS BLD VENIPUNCTURE: CPT | Performed by: FAMILY MEDICINE

## 2019-11-12 PROCEDURE — 85025 COMPLETE CBC W/AUTO DIFF WBC: CPT | Performed by: FAMILY MEDICINE

## 2019-11-12 PROCEDURE — 82607 VITAMIN B-12: CPT

## 2019-11-12 PROCEDURE — 80053 COMPREHEN METABOLIC PANEL: CPT | Performed by: FAMILY MEDICINE

## 2019-11-14 ENCOUNTER — TRANSCRIBE ORDERS (OUTPATIENT)
Dept: LAB | Facility: CLINIC | Age: 39
End: 2019-11-14

## 2019-11-15 ENCOUNTER — HOSPITAL ENCOUNTER (OUTPATIENT)
Dept: MRI IMAGING | Facility: HOSPITAL | Age: 39
Discharge: HOME | End: 2019-11-15
Payer: COMMERCIAL

## 2019-11-15 DIAGNOSIS — R20.0 RIGHT ARM NUMBNESS: ICD-10-CM

## 2019-11-15 PROCEDURE — 70553 MRI BRAIN STEM W/O & W/DYE: CPT

## 2019-11-15 PROCEDURE — A9585 GADOBUTROL INJECTION: HCPCS | Performed by: FAMILY MEDICINE

## 2019-11-15 RX ADMIN — GADOBUTROL 9 ML: 604.72 INJECTION INTRAVENOUS at 08:52

## 2019-12-08 DIAGNOSIS — I10 ESSENTIAL HYPERTENSION: ICD-10-CM

## 2019-12-09 DIAGNOSIS — I10 ESSENTIAL HYPERTENSION: Primary | ICD-10-CM

## 2019-12-09 RX ORDER — LOSARTAN POTASSIUM AND HYDROCHLOROTHIAZIDE 25; 100 MG/1; MG/1
TABLET ORAL
Qty: 90 TABLET | Refills: 3 | Status: SHIPPED | OUTPATIENT
Start: 2019-12-09 | End: 2020-10-15 | Stop reason: SDUPTHER

## 2019-12-09 RX ORDER — LOSARTAN POTASSIUM 100 MG/1
100 TABLET ORAL DAILY
Qty: 90 TABLET | Refills: 2 | Status: SHIPPED | OUTPATIENT
Start: 2019-12-09 | End: 2020-04-13

## 2019-12-09 RX ORDER — HYDROCHLOROTHIAZIDE 25 MG/1
25 TABLET ORAL DAILY
Qty: 90 TABLET | Refills: 2 | Status: SHIPPED | OUTPATIENT
Start: 2019-12-09 | End: 2020-04-13

## 2019-12-31 ENCOUNTER — HOSPITAL ENCOUNTER (OUTPATIENT)
Dept: CT IMAGING | Facility: HOSPITAL | Age: 39
Discharge: HOME | End: 2019-12-31
Attending: OTOLARYNGOLOGY
Payer: COMMERCIAL

## 2019-12-31 DIAGNOSIS — J32.1 CHRONIC FRONTAL SINUSITIS: ICD-10-CM

## 2019-12-31 DIAGNOSIS — J32.3 CHRONIC SPHENOIDAL SINUSITIS: ICD-10-CM

## 2019-12-31 DIAGNOSIS — G50.1 ATYPICAL FACIAL PAIN: ICD-10-CM

## 2019-12-31 DIAGNOSIS — R09.81 NASAL CONGESTION: ICD-10-CM

## 2019-12-31 DIAGNOSIS — J32.0 CHRONIC MAXILLARY SINUSITIS: ICD-10-CM

## 2019-12-31 DIAGNOSIS — G43.009 MIGRAINE WITHOUT AURA AND WITHOUT STATUS MIGRAINOSUS, NOT INTRACTABLE: ICD-10-CM

## 2019-12-31 DIAGNOSIS — J34.2 DEVIATED NASAL SEPTUM: ICD-10-CM

## 2019-12-31 DIAGNOSIS — J32.2 CHRONIC ETHMOIDAL SINUSITIS: ICD-10-CM

## 2019-12-31 PROCEDURE — 70486 CT MAXILLOFACIAL W/O DYE: CPT

## 2020-03-06 DIAGNOSIS — F51.01 PRIMARY INSOMNIA: ICD-10-CM

## 2020-03-06 DIAGNOSIS — K29.50 CHRONIC GASTRITIS WITHOUT BLEEDING, UNSPECIFIED GASTRITIS TYPE: ICD-10-CM

## 2020-03-06 DIAGNOSIS — G89.29 CHRONIC BILATERAL LOW BACK PAIN WITH SCIATICA, SCIATICA LATERALITY UNSPECIFIED: ICD-10-CM

## 2020-03-06 DIAGNOSIS — M54.40 CHRONIC BILATERAL LOW BACK PAIN WITH SCIATICA, SCIATICA LATERALITY UNSPECIFIED: ICD-10-CM

## 2020-03-06 RX ORDER — QUETIAPINE FUMARATE 25 MG/1
TABLET, FILM COATED ORAL
Qty: 90 TABLET | Refills: 0 | OUTPATIENT
Start: 2020-03-06

## 2020-03-06 RX ORDER — OMEPRAZOLE 40 MG/1
40 CAPSULE, DELAYED RELEASE ORAL DAILY
Qty: 30 CAPSULE | Refills: 0 | Status: SHIPPED | OUTPATIENT
Start: 2020-03-06 | End: 2020-12-31

## 2020-03-06 NOTE — TELEPHONE ENCOUNTER
Requested medication(s) are due for refill today: Yes  Patient has already received a courtesy refill: No  Other reason request has been forwarded to provider:    PER PROTOCOL    PATIENT NOT SEEN SINCE 11/2019

## 2020-03-06 NOTE — TELEPHONE ENCOUNTER
Requested medication(s) are due for refill today: Yes  Patient has already received a courtesy refill: No  Other reason request has been forwarded to provider:      PER PROTOCOL    PATIENT HAS NOT [id number] 11/2019

## 2020-03-07 RX ORDER — QUETIAPINE FUMARATE 25 MG/1
25 TABLET, FILM COATED ORAL
Qty: 90 TABLET | Refills: 0 | Status: SHIPPED | OUTPATIENT
Start: 2020-03-07 | End: 2020-06-10

## 2020-03-07 RX ORDER — CYCLOBENZAPRINE HCL 10 MG
10 TABLET ORAL
Qty: 30 TABLET | Refills: 0 | Status: SHIPPED | OUTPATIENT
Start: 2020-03-07 | End: 2020-04-13 | Stop reason: SDUPTHER

## 2020-04-13 ENCOUNTER — TELEMEDICINE (OUTPATIENT)
Dept: FAMILY MEDICINE CLINIC | Facility: CLINIC | Age: 40
End: 2020-04-13
Payer: COMMERCIAL

## 2020-04-13 VITALS — TEMPERATURE: 98.4 F | BODY MASS INDEX: 32.26 KG/M2 | WEIGHT: 206 LBS

## 2020-04-13 DIAGNOSIS — G89.29 CHRONIC BILATERAL LOW BACK PAIN WITH SCIATICA, SCIATICA LATERALITY UNSPECIFIED: ICD-10-CM

## 2020-04-13 DIAGNOSIS — M54.40 CHRONIC BILATERAL LOW BACK PAIN WITH SCIATICA, SCIATICA LATERALITY UNSPECIFIED: ICD-10-CM

## 2020-04-13 DIAGNOSIS — L23.7 POISON IVY DERMATITIS: Primary | ICD-10-CM

## 2020-04-13 PROCEDURE — 99214 OFFICE O/P EST MOD 30 MIN: CPT | Performed by: NURSE PRACTITIONER

## 2020-04-13 RX ORDER — PREDNISONE 10 MG/1
TABLET ORAL
Qty: 18 TABLET | Refills: 0 | Status: SHIPPED | OUTPATIENT
Start: 2020-04-13 | End: 2020-10-15 | Stop reason: ALTCHOICE

## 2020-04-13 RX ORDER — RIBOFLAVIN (VITAMIN B2) 100 MG
100 TABLET ORAL DAILY
COMMUNITY

## 2020-04-13 RX ORDER — CYCLOBENZAPRINE HCL 10 MG
10 TABLET ORAL
Qty: 30 TABLET | Refills: 0 | Status: SHIPPED | OUTPATIENT
Start: 2020-04-13 | End: 2020-10-15

## 2020-04-13 RX ORDER — TURMERIC 400 MG
CAPSULE ORAL
COMMUNITY
End: 2022-01-05

## 2020-04-13 RX ORDER — HYDROCHLOROTHIAZIDE 25 MG/1
25 TABLET ORAL DAILY
COMMUNITY
End: 2020-12-01 | Stop reason: SDUPTHER

## 2020-04-13 RX ORDER — LOSARTAN POTASSIUM 100 MG/1
100 TABLET ORAL DAILY
COMMUNITY
End: 2020-12-01 | Stop reason: SDUPTHER

## 2020-04-13 RX ORDER — MELATONIN
5000 DAILY
COMMUNITY

## 2020-06-10 DIAGNOSIS — F51.01 PRIMARY INSOMNIA: ICD-10-CM

## 2020-06-10 RX ORDER — QUETIAPINE FUMARATE 25 MG/1
TABLET, FILM COATED ORAL
Qty: 90 TABLET | Refills: 0 | Status: SHIPPED | OUTPATIENT
Start: 2020-06-10 | End: 2020-09-17

## 2020-09-16 DIAGNOSIS — F51.01 PRIMARY INSOMNIA: ICD-10-CM

## 2020-09-17 RX ORDER — QUETIAPINE FUMARATE 25 MG/1
TABLET, FILM COATED ORAL
Qty: 90 TABLET | Refills: 0 | Status: SHIPPED | OUTPATIENT
Start: 2020-09-17 | End: 2020-12-01 | Stop reason: SDUPTHER

## 2020-10-15 ENCOUNTER — OFFICE VISIT (OUTPATIENT)
Dept: FAMILY MEDICINE CLINIC | Facility: CLINIC | Age: 40
End: 2020-10-15
Payer: COMMERCIAL

## 2020-10-15 VITALS
TEMPERATURE: 99 F | HEIGHT: 67 IN | RESPIRATION RATE: 18 BRPM | DIASTOLIC BLOOD PRESSURE: 84 MMHG | HEART RATE: 101 BPM | BODY MASS INDEX: 34.94 KG/M2 | SYSTOLIC BLOOD PRESSURE: 120 MMHG | OXYGEN SATURATION: 97 % | WEIGHT: 222.6 LBS

## 2020-10-15 DIAGNOSIS — I10 ESSENTIAL HYPERTENSION: ICD-10-CM

## 2020-10-15 DIAGNOSIS — M25.511 CHRONIC RIGHT SHOULDER PAIN: ICD-10-CM

## 2020-10-15 DIAGNOSIS — Z99.89 OBSTRUCTIVE SLEEP APNEA ON CPAP: ICD-10-CM

## 2020-10-15 DIAGNOSIS — G89.29 CHRONIC RIGHT SHOULDER PAIN: ICD-10-CM

## 2020-10-15 DIAGNOSIS — M12.811 ROTATOR CUFF ARTHROPATHY OF RIGHT SHOULDER: Primary | ICD-10-CM

## 2020-10-15 DIAGNOSIS — F51.01 PRIMARY INSOMNIA: ICD-10-CM

## 2020-10-15 DIAGNOSIS — Z12.31 VISIT FOR SCREENING MAMMOGRAM: ICD-10-CM

## 2020-10-15 DIAGNOSIS — G47.33 OBSTRUCTIVE SLEEP APNEA ON CPAP: ICD-10-CM

## 2020-10-15 PROCEDURE — 3079F DIAST BP 80-89 MM HG: CPT | Performed by: FAMILY MEDICINE

## 2020-10-15 PROCEDURE — 99214 OFFICE O/P EST MOD 30 MIN: CPT | Performed by: FAMILY MEDICINE

## 2020-10-15 PROCEDURE — 1036F TOBACCO NON-USER: CPT | Performed by: FAMILY MEDICINE

## 2020-10-15 PROCEDURE — 3074F SYST BP LT 130 MM HG: CPT | Performed by: FAMILY MEDICINE

## 2020-10-15 PROCEDURE — 3725F SCREEN DEPRESSION PERFORMED: CPT | Performed by: FAMILY MEDICINE

## 2020-10-15 RX ORDER — METHYLPREDNISOLONE 4 MG/1
TABLET ORAL
Qty: 21 EACH | Refills: 0 | Status: SHIPPED | OUTPATIENT
Start: 2020-10-15 | End: 2020-12-01 | Stop reason: ALTCHOICE

## 2020-10-15 RX ORDER — CYCLOBENZAPRINE HCL 10 MG
10 TABLET ORAL
Qty: 30 TABLET | Refills: 0 | Status: SHIPPED | OUTPATIENT
Start: 2020-10-15 | End: 2020-12-01 | Stop reason: SDUPTHER

## 2020-10-23 ENCOUNTER — HOSPITAL ENCOUNTER (OUTPATIENT)
Dept: MAMMOGRAPHY | Facility: HOSPITAL | Age: 40
Discharge: HOME | End: 2020-10-23
Payer: COMMERCIAL

## 2020-10-23 VITALS — BODY MASS INDEX: 34.84 KG/M2 | HEIGHT: 67 IN | WEIGHT: 222 LBS

## 2020-10-23 DIAGNOSIS — Z12.31 VISIT FOR SCREENING MAMMOGRAM: ICD-10-CM

## 2020-10-23 PROCEDURE — 77067 SCR MAMMO BI INCL CAD: CPT

## 2020-10-23 PROCEDURE — 77063 BREAST TOMOSYNTHESIS BI: CPT

## 2020-10-30 ENCOUNTER — OFFICE VISIT (OUTPATIENT)
Dept: OBGYN CLINIC | Facility: CLINIC | Age: 40
End: 2020-10-30
Payer: COMMERCIAL

## 2020-10-30 VITALS
SYSTOLIC BLOOD PRESSURE: 111 MMHG | DIASTOLIC BLOOD PRESSURE: 74 MMHG | HEIGHT: 67 IN | BODY MASS INDEX: 33.59 KG/M2 | WEIGHT: 214 LBS | HEART RATE: 103 BPM | TEMPERATURE: 98.6 F

## 2020-10-30 DIAGNOSIS — M54.12 RADICULOPATHY, CERVICAL REGION: Primary | ICD-10-CM

## 2020-10-30 DIAGNOSIS — M25.511 RIGHT SHOULDER PAIN, UNSPECIFIED CHRONICITY: ICD-10-CM

## 2020-10-30 PROCEDURE — 99203 OFFICE O/P NEW LOW 30 MIN: CPT | Performed by: ORTHOPAEDIC SURGERY

## 2020-10-30 PROCEDURE — 3078F DIAST BP <80 MM HG: CPT | Performed by: ORTHOPAEDIC SURGERY

## 2020-10-30 PROCEDURE — 3074F SYST BP LT 130 MM HG: CPT | Performed by: ORTHOPAEDIC SURGERY

## 2020-10-30 PROCEDURE — 3008F BODY MASS INDEX DOCD: CPT | Performed by: ORTHOPAEDIC SURGERY

## 2020-11-18 ENCOUNTER — HOSPITAL ENCOUNTER (OUTPATIENT)
Dept: RADIOLOGY | Age: 40
Discharge: HOME | End: 2020-11-18
Payer: COMMERCIAL

## 2020-11-18 DIAGNOSIS — M54.12 RADICULOPATHY, CERVICAL REGION: ICD-10-CM

## 2020-11-18 PROCEDURE — 72141 MRI NECK SPINE W/O DYE: CPT

## 2020-11-18 PROCEDURE — G1004 CDSM NDSC: HCPCS

## 2020-12-01 ENCOUNTER — TELEPHONE (OUTPATIENT)
Dept: FAMILY MEDICINE CLINIC | Facility: CLINIC | Age: 40
End: 2020-12-01

## 2020-12-01 ENCOUNTER — OFFICE VISIT (OUTPATIENT)
Dept: FAMILY MEDICINE CLINIC | Facility: CLINIC | Age: 40
End: 2020-12-01
Payer: COMMERCIAL

## 2020-12-01 VITALS
SYSTOLIC BLOOD PRESSURE: 152 MMHG | BODY MASS INDEX: 33.25 KG/M2 | DIASTOLIC BLOOD PRESSURE: 80 MMHG | HEART RATE: 102 BPM | OXYGEN SATURATION: 97 % | HEIGHT: 68 IN | WEIGHT: 219.4 LBS | TEMPERATURE: 99.8 F

## 2020-12-01 DIAGNOSIS — M54.12 CERVICAL RADICULOPATHY: ICD-10-CM

## 2020-12-01 DIAGNOSIS — I10 ESSENTIAL HYPERTENSION: Primary | ICD-10-CM

## 2020-12-01 DIAGNOSIS — B02.9 HERPES ZOSTER WITHOUT COMPLICATION: ICD-10-CM

## 2020-12-01 DIAGNOSIS — F51.01 PRIMARY INSOMNIA: ICD-10-CM

## 2020-12-01 PROCEDURE — 1036F TOBACCO NON-USER: CPT | Performed by: FAMILY MEDICINE

## 2020-12-01 PROCEDURE — 3008F BODY MASS INDEX DOCD: CPT | Performed by: FAMILY MEDICINE

## 2020-12-01 PROCEDURE — 99214 OFFICE O/P EST MOD 30 MIN: CPT | Performed by: FAMILY MEDICINE

## 2020-12-01 PROCEDURE — 3079F DIAST BP 80-89 MM HG: CPT | Performed by: FAMILY MEDICINE

## 2020-12-01 PROCEDURE — 3077F SYST BP >= 140 MM HG: CPT | Performed by: FAMILY MEDICINE

## 2020-12-01 RX ORDER — VALACYCLOVIR HYDROCHLORIDE 1 G/1
1000 TABLET, FILM COATED ORAL 2 TIMES DAILY
Qty: 20 TABLET | Refills: 0 | Status: SHIPPED | OUTPATIENT
Start: 2020-12-01 | End: 2020-12-31

## 2020-12-01 RX ORDER — B-COMPLEX WITH VITAMIN C
TABLET ORAL
COMMUNITY

## 2020-12-01 RX ORDER — CYCLOBENZAPRINE HCL 10 MG
10 TABLET ORAL
Qty: 30 TABLET | Refills: 0 | Status: SHIPPED | OUTPATIENT
Start: 2020-12-01 | End: 2020-12-31

## 2020-12-01 RX ORDER — LOSARTAN POTASSIUM 50 MG/1
50 TABLET ORAL DAILY
Qty: 90 TABLET | Refills: 0 | Status: SHIPPED | OUTPATIENT
Start: 2020-12-01 | End: 2021-03-02

## 2020-12-01 RX ORDER — HYDROCHLOROTHIAZIDE 25 MG/1
25 TABLET ORAL DAILY
Qty: 90 TABLET | Refills: 0 | Status: SHIPPED | OUTPATIENT
Start: 2020-12-01 | End: 2021-04-14 | Stop reason: SDUPTHER

## 2020-12-01 RX ORDER — QUETIAPINE FUMARATE 25 MG/1
25 TABLET, FILM COATED ORAL
Qty: 90 TABLET | Refills: 3 | Status: SHIPPED | OUTPATIENT
Start: 2020-12-01 | End: 2021-11-15 | Stop reason: SDUPTHER

## 2020-12-03 ENCOUNTER — TELEPHONE (OUTPATIENT)
Dept: PAIN MEDICINE | Facility: CLINIC | Age: 40
End: 2020-12-03

## 2020-12-10 ENCOUNTER — TELEPHONE (OUTPATIENT)
Dept: FAMILY MEDICINE CLINIC | Facility: CLINIC | Age: 40
End: 2020-12-10

## 2020-12-10 DIAGNOSIS — M54.12 CERVICAL RADICULOPATHY: Primary | ICD-10-CM

## 2020-12-10 RX ORDER — MELOXICAM 15 MG/1
15 TABLET ORAL DAILY
Qty: 30 TABLET | Refills: 0 | Status: SHIPPED | OUTPATIENT
Start: 2020-12-10 | End: 2020-12-31 | Stop reason: SDUPTHER

## 2020-12-31 ENCOUNTER — CONSULT (OUTPATIENT)
Dept: PAIN MEDICINE | Facility: CLINIC | Age: 40
End: 2020-12-31
Payer: COMMERCIAL

## 2020-12-31 ENCOUNTER — TRANSCRIBE ORDERS (OUTPATIENT)
Dept: PAIN MEDICINE | Facility: CLINIC | Age: 40
End: 2020-12-31

## 2020-12-31 VITALS
DIASTOLIC BLOOD PRESSURE: 81 MMHG | SYSTOLIC BLOOD PRESSURE: 135 MMHG | WEIGHT: 222 LBS | BODY MASS INDEX: 34 KG/M2 | HEART RATE: 108 BPM

## 2020-12-31 DIAGNOSIS — M50.222 HERNIATION OF INTERVERTEBRAL DISC AT C5-C6 LEVEL: ICD-10-CM

## 2020-12-31 DIAGNOSIS — M54.12 RADICULOPATHY, CERVICAL REGION: Primary | ICD-10-CM

## 2020-12-31 DIAGNOSIS — M62.838 MUSCLE SPASM: ICD-10-CM

## 2020-12-31 DIAGNOSIS — M47.812 CERVICAL SPONDYLOSIS: ICD-10-CM

## 2020-12-31 DIAGNOSIS — M54.12 CERVICAL RADICULOPATHY: ICD-10-CM

## 2020-12-31 DIAGNOSIS — M77.11 LATERAL EPICONDYLITIS OF RIGHT ELBOW: ICD-10-CM

## 2020-12-31 PROCEDURE — 99244 OFF/OP CNSLTJ NEW/EST MOD 40: CPT | Performed by: PHYSICAL MEDICINE & REHABILITATION

## 2020-12-31 RX ORDER — MELOXICAM 15 MG/1
15 TABLET ORAL DAILY
Qty: 30 TABLET | Refills: 1 | Status: SHIPPED | OUTPATIENT
Start: 2020-12-31 | End: 2021-02-04 | Stop reason: HOSPADM

## 2020-12-31 RX ORDER — METHOCARBAMOL 500 MG/1
500 TABLET, FILM COATED ORAL 2 TIMES DAILY PRN
Qty: 60 TABLET | Refills: 1 | Status: SHIPPED | OUTPATIENT
Start: 2020-12-31 | End: 2021-02-04 | Stop reason: HOSPADM

## 2021-01-07 ENCOUNTER — CONSULT (OUTPATIENT)
Dept: NEUROSURGERY | Facility: CLINIC | Age: 41
End: 2021-01-07
Payer: COMMERCIAL

## 2021-01-07 ENCOUNTER — HOSPITAL ENCOUNTER (OUTPATIENT)
Dept: RADIOLOGY | Facility: HOSPITAL | Age: 41
Discharge: HOME | End: 2021-01-07
Attending: NEUROLOGICAL SURGERY
Payer: COMMERCIAL

## 2021-01-07 VITALS
WEIGHT: 222 LBS | SYSTOLIC BLOOD PRESSURE: 126 MMHG | RESPIRATION RATE: 16 BRPM | TEMPERATURE: 99.1 F | DIASTOLIC BLOOD PRESSURE: 80 MMHG | BODY MASS INDEX: 33.65 KG/M2 | HEART RATE: 90 BPM | HEIGHT: 68 IN

## 2021-01-07 DIAGNOSIS — M47.12 SPONDYLOGENIC COMPRESSION OF CERVICAL SPINAL CORD: ICD-10-CM

## 2021-01-07 DIAGNOSIS — M48.02 DEGENERATIVE CERVICAL SPINAL STENOSIS: ICD-10-CM

## 2021-01-07 DIAGNOSIS — M50.222 HERNIATION OF INTERVERTEBRAL DISC AT C5-C6 LEVEL: ICD-10-CM

## 2021-01-07 DIAGNOSIS — M54.12 RADICULOPATHY, CERVICAL REGION: ICD-10-CM

## 2021-01-07 DIAGNOSIS — M47.812 CERVICAL SPONDYLOSIS: ICD-10-CM

## 2021-01-07 DIAGNOSIS — M48.02 DEGENERATIVE CERVICAL SPINAL STENOSIS: Primary | ICD-10-CM

## 2021-01-07 PROCEDURE — 72052 X-RAY EXAM NECK SPINE 6/>VWS: CPT

## 2021-01-07 PROCEDURE — 99244 OFF/OP CNSLTJ NEW/EST MOD 40: CPT | Performed by: NEUROLOGICAL SURGERY

## 2021-01-07 NOTE — PROGRESS NOTES
Assessment/Plan:    No problem-specific Assessment & Plan notes found for this encounter  Problem List Items Addressed This Visit     None      Visit Diagnoses     Degenerative cervical spinal stenosis    -  Primary    Relevant Orders    XR spine cervical complete 6+ vw flex/ext/obl    Radiculopathy, cervical region        Relevant Orders    XR spine cervical complete 6+ vw flex/ext/obl    Herniation of intervertebral disc at C5-C6 level        Relevant Orders    XR spine cervical complete 6+ vw flex/ext/obl    Cervical spondylosis        Relevant Orders    XR spine cervical complete 6+ vw flex/ext/obl    Spondylogenic compression of cervical spinal cord        Relevant Orders    XR spine cervical complete 6+ vw flex/ext/obl            Subjective:      Patient ID: Alexandria Hughes is a 41 y o  female  HPI    The following portions of the patient's history were reviewed and updated as appropriate:   She  has a past medical history of Constipation, Depression, Eczema, Epilepsy ([address] ), GERD (gastroesophageal reflux disease), Grand mal convulsion ([address] ), Hypertension, Insomnia, Medial meniscus tear (01/12/2015), Migraine, Psoriasis, Shoulder dislocation, right, sequela (5/1/2018), Sinusitis, Sleep apnea, Sleep difficulties, and Tinnitus    She   Patient Active Problem List    Diagnosis Date Noted    Cervical radiculopathy 12/01/2020    Essential hypertension 10/15/2020    Constipation 10/24/2018    Lateral epicondylitis of right elbow 05/01/2018    Essential hypertriglyceridemia 11/08/2017    Insomnia 11/02/2017    Low back pain 11/02/2017    Obstructive sleep apnea on CPAP 11/02/2017    Epilepsy ([address] ) 11/15/2013    Obesity 10/08/2013    Primary generalized epilepsy ([address] ) 12/06/2011     She  has a past surgical history that includes LASIK; Hysteroscopy; LAPAROSCOPY; DILATION AND CURETTAGE OF UTERUS WITH HYSTEROSOCPY (N/A, 12/6/2017); LAPAROSCOPY; REMOVAL OF INTRAUTERINE DEVICE (IUD); pr colonoscopy flx dx w/collj spec when pfrmd (N/A, 2018); and  section ( and )  Her family history includes Brain cancer (age of onset: 64) in her maternal grandmother; Diabetes in her family and father; Hyperlipidemia in her father; Hypertension in her father; Migraines in her family; No Known Problems in her daughter, mother, sister, and son  She  reports that she has never smoked  She has never used smokeless tobacco  She reports current alcohol use  She reports that she does not use drugs  Current Outpatient Medications   Medication Sig Dispense Refill    acetaminophen (TYLENOL) 325 mg tablet Take 650 mg by mouth every 6 (six) hours as needed for mild pain      Ascorbic Acid (VITAMIN C) 100 MG tablet Take 100 mg by mouth daily      cholecalciferol (VITAMIN D3) 1,000 units tablet Take 1,000 Units by mouth daily      hydrochlorothiazide (HYDRODIURIL) 25 mg tablet Take 1 tablet (25 mg total) by mouth daily 90 tablet 0    losartan (COZAAR) 50 mg tablet Take 1 tablet (50 mg total) by mouth daily 90 tablet 0    magnesium 30 MG tablet Take 30 mg by mouth 2 (two) times a day      meloxicam (MOBIC) 15 mg tablet Take 1 tablet (15 mg total) by mouth daily 30 tablet 1    methocarbamol (ROBAXIN) 500 mg tablet Take 1 tablet (500 mg total) by mouth 2 (two) times a day as needed for muscle spasms 60 tablet 1    Multiple Vitamins-Minerals (MULTIVITAL-M) TABS Take by mouth      QUEtiapine (SEROquel) 25 mg tablet Take 1 tablet (25 mg total) by mouth daily at bedtime 90 tablet 3    Turmeric 400 MG CAPS Take by mouth      Zinc 100 MG TABS Take by mouth      Apremilast (OTEZLA) 30 MG TABS Take by mouth      Ginkgo Biloba 40 MG TABS Take by mouth      Omega-3 Fatty Acids (FISH OIL PO) Take 1 tablet by mouth daily       No current facility-administered medications for this visit        Current Outpatient Medications on File Prior to Visit   Medication Sig    acetaminophen (TYLENOL) 325 mg tablet Take 650 mg by mouth every 6 (six) hours as needed for mild pain    Ascorbic Acid (VITAMIN C) 100 MG tablet Take 100 mg by mouth daily    cholecalciferol (VITAMIN D3) 1,000 units tablet Take 1,000 Units by mouth daily    hydrochlorothiazide (HYDRODIURIL) 25 mg tablet Take 1 tablet (25 mg total) by mouth daily    losartan (COZAAR) 50 mg tablet Take 1 tablet (50 mg total) by mouth daily    magnesium 30 MG tablet Take 30 mg by mouth 2 (two) times a day    meloxicam (MOBIC) 15 mg tablet Take 1 tablet (15 mg total) by mouth daily    methocarbamol (ROBAXIN) 500 mg tablet Take 1 tablet (500 mg total) by mouth 2 (two) times a day as needed for muscle spasms    Multiple Vitamins-Minerals (MULTIVITAL-M) TABS Take by mouth    QUEtiapine (SEROquel) 25 mg tablet Take 1 tablet (25 mg total) by mouth daily at bedtime    Turmeric 400 MG CAPS Take by mouth    Zinc 100 MG TABS Take by mouth    Apremilast (OTEZLA) 30 MG TABS Take by mouth    Ginkgo Biloba 40 MG TABS Take by mouth    Omega-3 Fatty Acids (FISH OIL PO) Take 1 tablet by mouth daily     No current facility-administered medications on file prior to visit  She is allergic to gluten meal and lactose       Review of Systems   Constitutional: Positive for activity change (due to pain and weakness in R>L arms)  HENT: Negative  Eyes: Negative  Respiratory: Negative  Cardiovascular: Negative  Gastrointestinal: Negative  Endocrine: Negative  Genitourinary: Negative  Musculoskeletal: Positive for arthralgias (multiple joint pain throught out body), myalgias and neck pain (R>L b/l arms for about 1 year and symptoms in arms are progressively worsening)  Negative for gait problem  Currently seeing sports med for right arm     Saw Dr Vinicius Mullins PM on 12/31/2020 who recommended Neurosx    HX: of Right shoulder dislocation    Takes tylenol and robaxin   Skin: Negative  Allergic/Immunologic: Negative      Neurological: Positive for weakness (R>L b/l arms), numbness (R>L b/l arms) and headaches (ocassional )  Negative for dizziness  Hematological: Negative  Psychiatric/Behavioral: Negative  Objective: There were no vitals taken for this visit  Physical Exam      I have personally obtain history and examined patient  I have personally reviewed case including all pertinent investigations/studies  Time spent 60 minutes  More than 50% of total time spent on counseling and coordination of care as described above including patient education, discussion of risks and rationale of conservative vs surgical treatment options  HPI    Chronic > 1 yr hx of neck pain and stiffness associated with R>L arm pain radiating to hand with associated numbness and weakness, positional and related to activity level  PT and Pain management with modest benefit  Describes some loss of fine motor and coordination, no longer able to play musical instruments       Exam    Severe restriction in cervical ROM  Marked paravertebral spasm  Pain limited exam with positive R-SLR  Grade 4/5 right , hand intrinsic and bicep  Reduced sensation in C6/7 distribution  Subtle reduction in fine motor and coordination  Normal gait  Able to negotiate tandem with mild unsteadiness                    Neck:   Supple, symmetrical, trachea midline, no adenopathy;        thyroid:  No enlargement/tenderness/nodules; no carotid    bruit or JVD   Back:     Symmetric, no curvature, ROM normal, no CVA tenderness       Chest wall:    No tenderness or deformity                   Extremities:   Extremities normal, atraumatic, no cyanosis or edema       Skin:   Skin color, texture, turgor normal, no rashes or lesions     Radiology    MRI    Pan cervical spinal degeneration with severe C4-7 disc collapse/herniation, marked central canal stenosis with cord compression/impending cord compression with significant displacement of thecal sac, C5-7 R>>L lateral recess and foraminal involvement, loss of cervical lordosis    Summary and Plan    Ms Bobby Newton has chronic neck pain and cervical radiculopathy as well as subtle myelopathy in the setting of severe multilevel cervical spondylosis with cord compression/distortion  We reviewed the conservative options for neck pain including PT, KIESHA and medications  We reviewed the natural history of her condition including the risk of progression vs catastrophic spinal cord injury in the setting of trauma  I asked her to avoid chiropractic manipulation, vigorous head and neck mov't and axial loading  She will return next week after she has considered her options

## 2021-01-12 ENCOUNTER — OFFICE VISIT (OUTPATIENT)
Dept: NEUROSURGERY | Facility: CLINIC | Age: 41
End: 2021-01-12
Payer: COMMERCIAL

## 2021-01-12 VITALS
HEART RATE: 92 BPM | BODY MASS INDEX: 33.65 KG/M2 | DIASTOLIC BLOOD PRESSURE: 77 MMHG | RESPIRATION RATE: 16 BRPM | HEIGHT: 68 IN | WEIGHT: 222 LBS | SYSTOLIC BLOOD PRESSURE: 133 MMHG | TEMPERATURE: 97.8 F

## 2021-01-12 DIAGNOSIS — M47.12 OTHER SPONDYLOSIS WITH MYELOPATHY, CERVICAL REGION: ICD-10-CM

## 2021-01-12 DIAGNOSIS — M47.12 SPONDYLOGENIC COMPRESSION OF CERVICAL SPINAL CORD: ICD-10-CM

## 2021-01-12 DIAGNOSIS — M48.02 DEGENERATIVE CERVICAL SPINAL STENOSIS: ICD-10-CM

## 2021-01-12 DIAGNOSIS — M54.12 RADICULOPATHY, CERVICAL REGION: Primary | ICD-10-CM

## 2021-01-12 PROCEDURE — 99215 OFFICE O/P EST HI 40 MIN: CPT | Performed by: NEUROLOGICAL SURGERY

## 2021-01-12 RX ORDER — GABAPENTIN 300 MG/1
300 CAPSULE ORAL 3 TIMES DAILY
Qty: 90 CAPSULE | Refills: 1 | Status: SHIPPED | OUTPATIENT
Start: 2021-01-12 | End: 2021-04-07 | Stop reason: SDUPTHER

## 2021-01-12 RX ORDER — CHLORHEXIDINE GLUCONATE 0.12 MG/ML
15 RINSE ORAL ONCE
Status: CANCELLED | OUTPATIENT
Start: 2021-01-12 | End: 2021-01-12

## 2021-01-12 NOTE — PROGRESS NOTES
Assessment/Plan:    No problem-specific Assessment & Plan notes found for this encounter  Problem List Items Addressed This Visit     None      Visit Diagnoses     Radiculopathy, cervical region    -  Primary    Relevant Medications    gabapentin (NEURONTIN) 300 mg capsule    Other Relevant Orders    Case request operating room: CORPECTOMY SPINE CERVICAL ANTERIOR: C4-7 ACDF with C5/6 corpectomy/hemicorpectomy, C4-7 instrumentation and fusion (Completed)    Ambulatory referral to Internal Medicine    Spondylogenic compression of cervical spinal cord        Relevant Medications    gabapentin (NEURONTIN) 300 mg capsule    Other Relevant Orders    Case request operating room: CORPECTOMY SPINE CERVICAL ANTERIOR: C4-7 ACDF with C5/6 corpectomy/hemicorpectomy, C4-7 instrumentation and fusion (Completed)    Ambulatory referral to Internal Medicine    Degenerative cervical spinal stenosis        Relevant Medications    gabapentin (NEURONTIN) 300 mg capsule    Other Relevant Orders    Case request operating room: CORPECTOMY SPINE CERVICAL ANTERIOR: C4-7 ACDF with C5/6 corpectomy/hemicorpectomy, C4-7 instrumentation and fusion (Completed)    Ambulatory referral to Internal Medicine            Subjective:      Patient ID: Hiral Caba is a 41 y o  female  HPI    The following portions of the patient's history were reviewed and updated as appropriate:   She  has a past medical history of Constipation, Depression, Eczema, Epilepsy ([address] ), GERD (gastroesophageal reflux disease), Grand mal convulsion ([address] ), Hypertension, Insomnia, Medial meniscus tear (01/12/2015), Migraine, Psoriasis, Shoulder dislocation, right, sequela (5/1/2018), Sinusitis, Sleep apnea, Sleep difficulties, and Tinnitus    She   Patient Active Problem List    Diagnosis Date Noted    Cervical radiculopathy 12/01/2020    Essential hypertension 10/15/2020    Constipation 10/24/2018    Lateral epicondylitis of right elbow 05/01/2018    Essential hypertriglyceridemia 2017    Insomnia 2017    Low back pain 2017    Obstructive sleep apnea on CPAP 2017    Epilepsy ([facility] ) 11/15/2013    Obesity 10/08/2013    Primary generalized epilepsy ([facility] ) 2011     She  has a past surgical history that includes LASIK; Hysteroscopy; LAPAROSCOPY; DILATION AND CURETTAGE OF UTERUS WITH HYSTEROSOCPY (N/A, 2017); LAPAROSCOPY; REMOVAL OF INTRAUTERINE DEVICE (IUD); pr colonoscopy flx dx w/collj spec when pfrmd (N/A, 2018); and  section ( and )  Her family history includes Brain cancer (age of onset: 64) in her maternal grandmother; Diabetes in her family and father; Hyperlipidemia in her father; Hypertension in her father; Migraines in her family; No Known Problems in her daughter, mother, sister, and son  She  reports that she has never smoked  She has never used smokeless tobacco  She reports current alcohol use  She reports that she does not use drugs    Current Outpatient Medications   Medication Sig Dispense Refill    acetaminophen (TYLENOL) 325 mg tablet Take 650 mg by mouth every 6 (six) hours as needed for mild pain      Apremilast (OTEZLA) 30 MG TABS Take by mouth      Ascorbic Acid (VITAMIN C) 100 MG tablet Take 100 mg by mouth daily      cholecalciferol (VITAMIN D3) 1,000 units tablet Take 1,000 Units by mouth daily      Ginkgo Biloba 40 MG TABS Take by mouth      hydrochlorothiazide (HYDRODIURIL) 25 mg tablet Take 1 tablet (25 mg total) by mouth daily 90 tablet 0    losartan (COZAAR) 50 mg tablet Take 1 tablet (50 mg total) by mouth daily 90 tablet 0    magnesium 30 MG tablet Take 30 mg by mouth 2 (two) times a day      meloxicam (MOBIC) 15 mg tablet Take 1 tablet (15 mg total) by mouth daily 30 tablet 1    methocarbamol (ROBAXIN) 500 mg tablet Take 1 tablet (500 mg total) by mouth 2 (two) times a day as needed for muscle spasms 60 tablet 1    Multiple Vitamins-Minerals (MULTIVITAL-M) TABS Take by mouth      Omega-3 Fatty Acids (FISH OIL PO) Take 1 tablet by mouth daily      QUEtiapine (SEROquel) 25 mg tablet Take 1 tablet (25 mg total) by mouth daily at bedtime 90 tablet 3    Turmeric 400 MG CAPS Take by mouth      Zinc 100 MG TABS Take by mouth      gabapentin (NEURONTIN) 300 mg capsule Take 1 capsule (300 mg total) by mouth 3 (three) times a day 90 capsule 1     No current facility-administered medications for this visit  Current Outpatient Medications on File Prior to Visit   Medication Sig    acetaminophen (TYLENOL) 325 mg tablet Take 650 mg by mouth every 6 (six) hours as needed for mild pain    Apremilast (OTEZLA) 30 MG TABS Take by mouth    Ascorbic Acid (VITAMIN C) 100 MG tablet Take 100 mg by mouth daily    cholecalciferol (VITAMIN D3) 1,000 units tablet Take 1,000 Units by mouth daily    Ginkgo Biloba 40 MG TABS Take by mouth    hydrochlorothiazide (HYDRODIURIL) 25 mg tablet Take 1 tablet (25 mg total) by mouth daily    losartan (COZAAR) 50 mg tablet Take 1 tablet (50 mg total) by mouth daily    magnesium 30 MG tablet Take 30 mg by mouth 2 (two) times a day    meloxicam (MOBIC) 15 mg tablet Take 1 tablet (15 mg total) by mouth daily    methocarbamol (ROBAXIN) 500 mg tablet Take 1 tablet (500 mg total) by mouth 2 (two) times a day as needed for muscle spasms    Multiple Vitamins-Minerals (MULTIVITAL-M) TABS Take by mouth    Omega-3 Fatty Acids (FISH OIL PO) Take 1 tablet by mouth daily    QUEtiapine (SEROquel) 25 mg tablet Take 1 tablet (25 mg total) by mouth daily at bedtime    Turmeric 400 MG CAPS Take by mouth    Zinc 100 MG TABS Take by mouth     No current facility-administered medications on file prior to visit  She is allergic to gluten meal and lactose       Review of Systems   Constitutional: Positive for activity change (due to pain and weakness in R>L arms)  HENT: Negative  Eyes: Negative  Respiratory: Negative  Cardiovascular: Negative  Gastrointestinal: Negative  Endocrine: Negative  Genitourinary: Negative  Musculoskeletal: Positive for arthralgias (multiple joint pain throught out body), myalgias and neck pain (R>L b/l arms for about 1 year and symptoms in arms are progressively worsening)  Negative for gait problem  Currently seeing sports med for right arm     Saw Dr Kobe Gloria PM on 12/31/2020 who recommended Neurosx    HX: of Right shoulder dislocation    Takes tylenol and robaxin   Skin: Negative  Allergic/Immunologic: Negative  Neurological: Positive for weakness (R>L b/l arms), numbness (R>L b/l arms) and headaches (ocassional )  Negative for dizziness  Hematological: Negative  Psychiatric/Behavioral: Negative  Objective:      /77 (BP Location: Left arm, Patient Position: Sitting, Cuff Size: Standard)   Pulse 92   Temp 97 8 °F (36 6 °C) (Tympanic)   Resp 16   Ht 5' 7 76" (1 721 m)   Wt 101 kg (222 lb)   BMI 33 99 kg/m²          Physical Exam      I have personally obtain history and examined patient  I have personally reviewed case including all pertinent investigations/studies       Time spent 50 minutes  More than 50% of total time spent on counseling and coordination of care as described above including patient education, discussion of risks and rationale of conservative vs surgical treatment options       HPI     Chronic > 1 yr hx of neck pain and stiffness associated with R>L arm pain radiating to hand with associated numbness and weakness, positional and related to activity level  PT and Pain management with modest benefit  Describes some loss of fine motor and coordination, no longer able to play musical instruments   Patient returns with family to discuss surgery      Exam     Severe restriction in cervical ROM  Marked paravertebral spasm  Pain limited exam with positive R-SLR  Grade 4/5 right , hand intrinsic and bicep  Reduced sensation in C6/7 distribution  Subtle reduction in fine motor and coordination  Normal gait  Able to negotiate tandem with mild unsteadiness                             Neck:   Supple, symmetrical, trachea midline, no adenopathy;        thyroid:  No enlargement/tenderness/nodules; no carotid    bruit or JVD   Back:     Symmetric, no curvature, ROM normal, no CVA tenderness         Chest wall:    No tenderness or deformity                           Extremities:   Extremities normal, atraumatic, no cyanosis or edema         Skin:   Skin color, texture, turgor normal, no rashes or lesions      Radiology     MRI     Pan cervical spinal degeneration with severe C4-7 disc collapse/herniation, marked central canal stenosis with cord compression/impending cord compression with significant displacement of thecal sac, C5-7 R>>L lateral recess and foraminal involvement, loss of cervical lordosis     Summary and Plan     Ms Claudette Dotson has chronic neck pain and cervical radiculopathy as well as subtle myelopathy in the setting of severe multilevel cervical spondylosis with cord compression/distortion  We reviewed the conservative options for neck pain including PT, KIESHA and medications  We reviewed the natural history of her condition including the risk of progression vs catastrophic spinal cord injury in the setting of trauma  I asked her to avoid chiropractic manipulation, vigorous head and neck mov't and axial loading  The risk of a multilevel anterior cervical decompression and fusion includes but is not limited to neurological deficit, csf leak, infection, bleeding, ongoing pain and disability, failure of fusion, loss of ROM, dysphagia and dysponia  Ms Claudette Dotson understands and has provided informed consent for surgery  All of her question were answered to her satisfaction  She requires medical clearance

## 2021-01-13 ENCOUNTER — LAB (OUTPATIENT)
Dept: LAB | Facility: CLINIC | Age: 41
End: 2021-01-13
Payer: COMMERCIAL

## 2021-01-13 ENCOUNTER — TRANSCRIBE ORDERS (OUTPATIENT)
Dept: LAB | Facility: CLINIC | Age: 41
End: 2021-01-13

## 2021-01-13 ENCOUNTER — APPOINTMENT (OUTPATIENT)
Dept: LAB | Facility: CLINIC | Age: 41
End: 2021-01-13
Payer: COMMERCIAL

## 2021-01-13 DIAGNOSIS — M48.02 SPINAL STENOSIS IN CERVICAL REGION: ICD-10-CM

## 2021-01-13 DIAGNOSIS — M54.12 BRACHIAL NEURITIS: ICD-10-CM

## 2021-01-13 DIAGNOSIS — Z01.818 OTHER SPECIFIED PRE-OPERATIVE EXAMINATION: ICD-10-CM

## 2021-01-13 DIAGNOSIS — M47.12 CERVICAL SPONDYLOSIS WITH MYELOPATHY: Primary | ICD-10-CM

## 2021-01-13 DIAGNOSIS — M48.02 DEGENERATIVE CERVICAL SPINAL STENOSIS: ICD-10-CM

## 2021-01-13 DIAGNOSIS — M54.12 RADICULOPATHY, CERVICAL REGION: ICD-10-CM

## 2021-01-13 DIAGNOSIS — Z01.818 PRE-PROCEDURAL EXAMINATION: ICD-10-CM

## 2021-01-13 DIAGNOSIS — M47.12 SPONDYLOGENIC COMPRESSION OF CERVICAL SPINAL CORD: ICD-10-CM

## 2021-01-13 DIAGNOSIS — M47.12 CERVICAL SPONDYLOSIS WITH MYELOPATHY: ICD-10-CM

## 2021-01-13 LAB
ABO GROUP BLD: NORMAL
ALBUMIN SERPL BCP-MCNC: 3.9 G/DL (ref 3.5–5)
ALP SERPL-CCNC: 50 U/L (ref 46–116)
ALT SERPL W P-5'-P-CCNC: 37 U/L (ref 12–78)
ANION GAP SERPL CALCULATED.3IONS-SCNC: 9 MMOL/L (ref 4–13)
APTT PPP: 32 SECONDS (ref 23–37)
AST SERPL W P-5'-P-CCNC: 15 U/L (ref 5–45)
ATRIAL RATE: 88 BPM
B-HCG SERPL-ACNC: <2 MIU/ML
BASOPHILS # BLD AUTO: 0.09 THOUSANDS/ΜL (ref 0–0.1)
BASOPHILS NFR BLD AUTO: 1 % (ref 0–1)
BILIRUB SERPL-MCNC: 0.57 MG/DL (ref 0.2–1)
BLD GP AB SCN SERPL QL: NEGATIVE
BUN SERPL-MCNC: 14 MG/DL (ref 5–25)
CALCIUM SERPL-MCNC: 9 MG/DL (ref 8.3–10.1)
CHLORIDE SERPL-SCNC: 102 MMOL/L (ref 100–108)
CO2 SERPL-SCNC: 27 MMOL/L (ref 21–32)
CREAT SERPL-MCNC: 0.79 MG/DL (ref 0.6–1.3)
EOSINOPHIL # BLD AUTO: 0.26 THOUSAND/ΜL (ref 0–0.61)
EOSINOPHIL NFR BLD AUTO: 4 % (ref 0–6)
ERYTHROCYTE [DISTWIDTH] IN BLOOD BY AUTOMATED COUNT: 12.9 % (ref 11.6–15.1)
EST. AVERAGE GLUCOSE BLD GHB EST-MCNC: 108 MG/DL
GFR SERPL CREATININE-BSD FRML MDRD: 93 ML/MIN/1.73SQ M
GLUCOSE P FAST SERPL-MCNC: 121 MG/DL (ref 65–99)
HBA1C MFR BLD: 5.4 %
HCT VFR BLD AUTO: 40.9 % (ref 34.8–46.1)
HGB BLD-MCNC: 13.4 G/DL (ref 11.5–15.4)
IMM GRANULOCYTES # BLD AUTO: 0.03 THOUSAND/UL (ref 0–0.2)
IMM GRANULOCYTES NFR BLD AUTO: 0 % (ref 0–2)
INR PPP: 1.01 (ref 0.84–1.19)
LYMPHOCYTES # BLD AUTO: 1.76 THOUSANDS/ΜL (ref 0.6–4.47)
LYMPHOCYTES NFR BLD AUTO: 24 % (ref 14–44)
MCH RBC QN AUTO: 31.3 PG (ref 26.8–34.3)
MCHC RBC AUTO-ENTMCNC: 32.8 G/DL (ref 31.4–37.4)
MCV RBC AUTO: 96 FL (ref 82–98)
MONOCYTES # BLD AUTO: 0.39 THOUSAND/ΜL (ref 0.17–1.22)
MONOCYTES NFR BLD AUTO: 5 % (ref 4–12)
NEUTROPHILS # BLD AUTO: 4.81 THOUSANDS/ΜL (ref 1.85–7.62)
NEUTS SEG NFR BLD AUTO: 66 % (ref 43–75)
NRBC BLD AUTO-RTO: 0 /100 WBCS
P AXIS: 44 DEGREES
PLATELET # BLD AUTO: 296 THOUSANDS/UL (ref 149–390)
PMV BLD AUTO: 9.6 FL (ref 8.9–12.7)
POTASSIUM SERPL-SCNC: 3.6 MMOL/L (ref 3.5–5.3)
PR INTERVAL: 160 MS
PROT SERPL-MCNC: 7.5 G/DL (ref 6.4–8.2)
PROTHROMBIN TIME: 13.4 SECONDS (ref 11.6–14.5)
QRS AXIS: 61 DEGREES
QRSD INTERVAL: 72 MS
QT INTERVAL: 366 MS
QTC INTERVAL: 442 MS
RBC # BLD AUTO: 4.28 MILLION/UL (ref 3.81–5.12)
RH BLD: POSITIVE
SODIUM SERPL-SCNC: 138 MMOL/L (ref 136–145)
SPECIMEN EXPIRATION DATE: NORMAL
T WAVE AXIS: 43 DEGREES
VENTRICULAR RATE: 88 BPM
WBC # BLD AUTO: 7.34 THOUSAND/UL (ref 4.31–10.16)

## 2021-01-13 PROCEDURE — 85730 THROMBOPLASTIN TIME PARTIAL: CPT

## 2021-01-13 PROCEDURE — 93010 ELECTROCARDIOGRAM REPORT: CPT | Performed by: INTERNAL MEDICINE

## 2021-01-13 PROCEDURE — 36415 COLL VENOUS BLD VENIPUNCTURE: CPT

## 2021-01-13 PROCEDURE — 87081 CULTURE SCREEN ONLY: CPT

## 2021-01-13 PROCEDURE — 80053 COMPREHEN METABOLIC PANEL: CPT | Performed by: FAMILY MEDICINE

## 2021-01-13 PROCEDURE — 85025 COMPLETE CBC W/AUTO DIFF WBC: CPT

## 2021-01-13 PROCEDURE — 86900 BLOOD TYPING SEROLOGIC ABO: CPT

## 2021-01-13 PROCEDURE — 86850 RBC ANTIBODY SCREEN: CPT

## 2021-01-13 PROCEDURE — 84702 CHORIONIC GONADOTROPIN TEST: CPT

## 2021-01-13 PROCEDURE — 83036 HEMOGLOBIN GLYCOSYLATED A1C: CPT

## 2021-01-13 PROCEDURE — 86901 BLOOD TYPING SEROLOGIC RH(D): CPT

## 2021-01-13 PROCEDURE — 93005 ELECTROCARDIOGRAM TRACING: CPT

## 2021-01-13 PROCEDURE — 85610 PROTHROMBIN TIME: CPT

## 2021-01-14 LAB — MRSA NOSE QL CULT: NORMAL

## 2021-01-21 ENCOUNTER — OFFICE VISIT (OUTPATIENT)
Dept: FAMILY MEDICINE CLINIC | Facility: CLINIC | Age: 41
End: 2021-01-21
Payer: COMMERCIAL

## 2021-01-21 VITALS
BODY MASS INDEX: 34.04 KG/M2 | OXYGEN SATURATION: 99 % | DIASTOLIC BLOOD PRESSURE: 80 MMHG | RESPIRATION RATE: 18 BRPM | TEMPERATURE: 99.6 F | WEIGHT: 224.6 LBS | HEIGHT: 68 IN | SYSTOLIC BLOOD PRESSURE: 124 MMHG | HEART RATE: 98 BPM

## 2021-01-21 DIAGNOSIS — G47.33 OBSTRUCTIVE SLEEP APNEA ON CPAP: ICD-10-CM

## 2021-01-21 DIAGNOSIS — E66.09 CLASS 1 OBESITY DUE TO EXCESS CALORIES WITH BODY MASS INDEX (BMI) OF 34.0 TO 34.9 IN ADULT, UNSPECIFIED WHETHER SERIOUS COMORBIDITY PRESENT: ICD-10-CM

## 2021-01-21 DIAGNOSIS — M47.12 SPONDYLOGENIC COMPRESSION OF CERVICAL SPINAL CORD: ICD-10-CM

## 2021-01-21 DIAGNOSIS — Z99.89 OBSTRUCTIVE SLEEP APNEA ON CPAP: ICD-10-CM

## 2021-01-21 DIAGNOSIS — I10 ESSENTIAL HYPERTENSION: ICD-10-CM

## 2021-01-21 DIAGNOSIS — M54.12 RADICULOPATHY, CERVICAL REGION: ICD-10-CM

## 2021-01-21 DIAGNOSIS — G40.309 NONINTRACTABLE GENERALIZED IDIOPATHIC EPILEPSY WITHOUT STATUS EPILEPTICUS (HCC): ICD-10-CM

## 2021-01-21 DIAGNOSIS — Z01.818 PREOP EXAMINATION: Primary | ICD-10-CM

## 2021-01-21 DIAGNOSIS — M48.02 DEGENERATIVE CERVICAL SPINAL STENOSIS: ICD-10-CM

## 2021-01-21 DIAGNOSIS — F51.01 PRIMARY INSOMNIA: ICD-10-CM

## 2021-01-21 PROCEDURE — 99215 OFFICE O/P EST HI 40 MIN: CPT | Performed by: NURSE PRACTITIONER

## 2021-01-21 PROCEDURE — 3079F DIAST BP 80-89 MM HG: CPT | Performed by: NURSE PRACTITIONER

## 2021-01-21 PROCEDURE — 1036F TOBACCO NON-USER: CPT | Performed by: NURSE PRACTITIONER

## 2021-01-21 PROCEDURE — 3008F BODY MASS INDEX DOCD: CPT | Performed by: NURSE PRACTITIONER

## 2021-01-21 PROCEDURE — 3074F SYST BP LT 130 MM HG: CPT | Performed by: NURSE PRACTITIONER

## 2021-01-21 NOTE — PROGRESS NOTES
Subjective:     Lily Rivero is a 41 y o  female who presents to the office today for a preoperative consultation at the request of surgeon Dr Patricia Gonzales who plans on performing cervical spinal surgery on February 2  This consultation is requested for the specific conditions prompting preoperative evaluation (i e  because of potential affect on operative risk): Sharon العراقي Planned anesthesia: general  The patient has the following known anesthesia issues: none  Patients bleeding risk: no recent abnormal bleeding  Patient does not have objections to receiving blood products if needed      The following portions of the patient's history were reviewed and updated as appropriate: allergies, current medications, past family history, past medical history, past social history, past surgical history and problem list     Review of Systems  Constitutional: negative  Eyes: negative  Ears, nose, mouth, throat, and face: negative  Respiratory: negative  Cardiovascular: negative  Gastrointestinal: negative  Genitourinary:negative  Integument/breast: negative  Hematologic/lymphatic: negative  Musculoskeletal:positive for neck pain  Neurological: negative  Behavioral/Psych: negative  Endocrine: negative  Allergic/Immunologic: negative     Objective:     /80 (BP Location: Left arm, Patient Position: Sitting, Cuff Size: Large)   Pulse 98   Temp 99 6 °F (37 6 °C) (Tympanic)   Resp 18   Ht 5' 7 76" (1 721 m)   Wt 102 kg (224 lb 9 6 oz)   SpO2 99%   BMI 34 39 kg/m²     General Appearance:    Alert, cooperative, no distress, appears stated age   Head:    Normocephalic, without obvious abnormality, atraumatic   Eyes:    PERRL, conjunctiva/corneas clear, EOM's intact, fundi     benign, both eyes   Ears:    Normal TM's and external ear canals, both ears   Nose:   Nares normal, septum midline, mucosa normal, no drainage    or sinus tenderness   Throat:   Lips, mucosa, and tongue normal; teeth and gums normal   Neck:   Supple, symmetrical, trachea midline, no adenopathy;     thyroid:  no enlargement/tenderness/nodules; no carotid    bruit or JVD   Back:     Symmetric, no curvature, ROM normal, no CVA tenderness   Lungs:     Clear to auscultation bilaterally, respirations unlabored   Chest Wall:    No tenderness or deformity    Heart:    Regular rate and rhythm, S1 and S2 normal, no murmur, rub   or gallop   Breast Exam:    No tenderness, masses, or nipple abnormality   Abdomen:     Soft, non-tender, bowel sounds active all four quadrants,     no masses, no organomegaly   Genitalia:    Normal female without lesion, discharge or tenderness   Rectal:    Normal tone, no masses or tenderness; guaiac negative stool   Extremities:   Extremities normal, atraumatic, no cyanosis or edema   Pulses:   2+ and symmetric all extremities   Skin:   Skin color, texture, turgor normal, no rashes or lesions   Lymph nodes:   Cervical, supraclavicular, and axillary nodes normal   Neurologic:   CNII-XII intact, normal strength, sensation and reflexes     throughout       Predictors of intubation difficulty:   Morbid obesity? no   Anatomically abnormal facies? no   Prominent incisors? no   Receding mandible? no   Short, thick neck? no   Neck range of motion: abnormal   Mallampati score: I (soft palate, uvula, fauces, and tonsillar pillars visible)   Thyromental distance: < 6cm   Mouth openincm   Dentition: No chipped, loose, or missing teeth      Cardiographics  ECG: normal sinus rhythm, no blocks or conduction defects, no ischemic changes  Echocardiogram: not done    Imaging  Chest x-ray: not done     Lab Review   Lab on 2021   Component Date Value    WBC 2021 7 34     RBC 2021 4 28     Hemoglobin 2021 13 4     Hematocrit 2021 40 9     MCV 2021 96     MCH 2021 31 3     MCHC 2021 32 8     RDW 2021 12 9     MPV 2021 9 6     Platelets  296     nRBC 2021 0     Neutrophils Relative 01/13/2021 66     Immat GRANS % 01/13/2021 0     Lymphocytes Relative 01/13/2021 24     Monocytes Relative 01/13/2021 5     Eosinophils Relative 01/13/2021 4     Basophils Relative 01/13/2021 1     Neutrophils Absolute 01/13/2021 4 81     Immature Grans Absolute 01/13/2021 0 03     Lymphocytes Absolute 01/13/2021 1 76     Monocytes Absolute 01/13/2021 0 39     Eosinophils Absolute 01/13/2021 0 26     Basophils Absolute 01/13/2021 0 09     PTT 01/13/2021 32     Protime 01/13/2021 13 4     INR 01/13/2021 1 01     HCG, Quant 01/13/2021 <2     Hemoglobin A1C 01/13/2021 5 4     EAG 01/13/2021 108     MRSA Culture Only 01/13/2021 No Methicillin Resistant Staphlyococcus aureus (MRSA) isolated     ABO Grouping 01/13/2021 O     Rh Factor 01/13/2021 Positive     Antibody Screen 01/13/2021 Negative     Specimen Expiration Date 01/13/2021 [id number]     Ventricular Rate 01/13/2021 88     Atrial Rate 01/13/2021 88     AK Interval 01/13/2021 160     QRSD Interval 01/13/2021 72     QT Interval 01/13/2021 366     QTC Interval 01/13/2021 442     P Axis 01/13/2021 44     QRS Axis 01/13/2021 61     T Wave Axis 01/13/2021 43    Prep for Procedure on 01/12/2021   Component Date Value    Color, UA 01/13/2021 Light Yellow     Clarity, UA 01/13/2021 Slightly Cloudy     Specific Gravity, UA 01/13/2021 1 020     pH, UA 01/13/2021 7 0     Leukocytes, UA 01/13/2021 Negative     Nitrite, UA 01/13/2021 Negative     Protein, UA 01/13/2021 Negative     Glucose, UA 01/13/2021 Negative     Ketones, UA 01/13/2021 Negative     Urobilinogen, UA 01/13/2021 0 2     Bilirubin, UA 01/13/2021 Negative     Blood, UA 01/13/2021 Negative         Assessment:     41 y o  female with planned surgery as above  Known risk factors for perioperative complications: None    Difficulty with intubation is not anticipated      Cardiac Risk Estimation: low    Current medications which may produce withdrawal symptoms if withheld perioperatively: christopher Joienr was seen today for pre-op clearance  Diagnoses and all orders for this visit:    Preop examination    Radiculopathy, cervical region  -     Ambulatory referral to Internal Medicine    Spondylogenic compression of cervical spinal cord  -     Ambulatory referral to Internal Medicine    Degenerative cervical spinal stenosis  -     Ambulatory referral to Internal Medicine    Primary insomnia    Class 1 obesity due to excess calories with body mass index (BMI) of 34 0 to 34 9 in adult, unspecified whether serious comorbidity present    Nonintractable generalized idiopathic epilepsy without status [facility]pticus [facility] )    Essential hypertension    Obstructive sleep apnea on CPAP      Plan:     1  Preoperative workup as follows ECG, hemoglobin, hematocrit, electrolytes, creatinine, glucose, liver function studies, coagulation studies, urinalysis (urinary tract instrumentation planned)  2  Change in medication regimen before surgery: per neurosurgery  3  Prophylaxis for cardiac events with perioperative beta-blockers: not indicated  4  Invasive hemodynamic monitoring perioperatively: not indicated    5  Deep vein thrombosis prophylaxis postoperatively:regimen to be chosen by surgical team   6  Surveillance for postoperative MI with ECG immediately postoperatively and on postoperative days 1 and 2 AND troponin levels 24 hours postoperatively and on day 4 or hospital discharge (whichever comes first): at the discretion of anesthesiologist   7  Other measures: none

## 2021-01-27 DIAGNOSIS — M54.12 RADICULOPATHY, CERVICAL REGION: ICD-10-CM

## 2021-01-27 DIAGNOSIS — Z01.818 PRE-PROCEDURAL EXAMINATION: ICD-10-CM

## 2021-01-27 DIAGNOSIS — M48.02 DEGENERATIVE CERVICAL SPINAL STENOSIS: ICD-10-CM

## 2021-01-27 DIAGNOSIS — M47.12 SPONDYLOGENIC COMPRESSION OF CERVICAL SPINAL CORD: ICD-10-CM

## 2021-01-27 PROCEDURE — U0005 INFEC AGEN DETEC AMPLI PROBE: HCPCS

## 2021-01-27 PROCEDURE — U0003 INFECTIOUS AGENT DETECTION BY NUCLEIC ACID (DNA OR RNA); SEVERE ACUTE RESPIRATORY SYNDROME CORONAVIRUS 2 (SARS-COV-2) (CORONAVIRUS DISEASE [COVID-19]), AMPLIFIED PROBE TECHNIQUE, MAKING USE OF HIGH THROUGHPUT TECHNOLOGIES AS DESCRIBED BY CMS-2020-01-R: HCPCS

## 2021-01-28 LAB — SARS-COV-2 RNA RESP QL NAA+PROBE: NEGATIVE

## 2021-01-29 NOTE — PRE-PROCEDURE INSTRUCTIONS
Pre-Surgery Instructions:   Medication Instructions    acetaminophen (TYLENOL) 325 mg tablet Instructed patient per Anesthesia Guidelines   Ascorbic Acid (VITAMIN C) 100 MG tablet Instructed patient per Anesthesia Guidelines   cholecalciferol (VITAMIN D3) 1,000 units tablet Instructed patient per Anesthesia Guidelines   Etonogestrel (NEXPLANON SC) Internal    gabapentin (NEURONTIN) 300 mg capsule Instructed to take per normal schedule including DOS with sips water    Ginkgo Biloba 40 MG TABS Instructed patient per Anesthesia Guidelines   hydrochlorothiazide (HYDRODIURIL) 25 mg tablet Instructed to take per normal schedule    losartan (COZAAR) 50 mg tablet Instructed to take per normal schedule    magnesium 30 MG tablet Instructed to take till day before surgery due to Muslce cramp issues    meloxicam (MOBIC) 15 mg tablet Instructed patient per Anesthesia Guidelines   methocarbamol (ROBAXIN) 500 mg tablet Instructed to take per normal schedule including DOS with sips water    Multiple Vitamins-Minerals (MULTIVITAL-M) TABS Instructed patient per Anesthesia Guidelines   Omega-3 Fatty Acids (FISH OIL PO) Instructed patient per Anesthesia Guidelines   QUEtiapine (SEROquel) 25 mg tablet Instructed to take per normal schedule    Turmeric 400 MG CAPS Instructed patient per Anesthesia Guidelines   Zinc 100 MG TABS Instructed patient per Anesthesia Guidelines  Pre op instructions per [facility] protocol,medications per anesthesia guidelines and showering instructions per Dr Reagan Taylor reviewed-Patient has hibiclens soap and wipes  Pt  Verbalized understanding of current visitor restrictions  Instructed to avoid all ASA/NSAIDs and OTC Vit/Supp from now until after surgery  Tylenol ok prn  Pt  Verbalized an understanding of all instructions reviewed and offers no concerns at this time

## 2021-01-31 ENCOUNTER — DOCUMENTATION (OUTPATIENT)
Dept: NEUROSURGERY | Facility: CLINIC | Age: 41
End: 2021-01-31

## 2021-01-31 NOTE — PROGRESS NOTES
[place] and [place]  websites were searched for patient that is scheduled for surgery Tuesday  No current record exists

## 2021-02-02 ENCOUNTER — APPOINTMENT (OUTPATIENT)
Dept: RADIOLOGY | Facility: HOSPITAL | Age: 41
DRG: 472 | End: 2021-02-02
Payer: COMMERCIAL

## 2021-02-02 ENCOUNTER — ANESTHESIA (OUTPATIENT)
Dept: PERIOP | Facility: HOSPITAL | Age: 41
DRG: 472 | End: 2021-02-02
Payer: COMMERCIAL

## 2021-02-02 ENCOUNTER — HOSPITAL ENCOUNTER (INPATIENT)
Facility: HOSPITAL | Age: 41
LOS: 2 days | Discharge: HOME | DRG: 472 | End: 2021-02-04
Attending: NEUROLOGICAL SURGERY | Admitting: NEUROLOGICAL SURGERY
Payer: COMMERCIAL

## 2021-02-02 ENCOUNTER — ANESTHESIA EVENT (OUTPATIENT)
Dept: PERIOP | Facility: HOSPITAL | Age: 41
DRG: 472 | End: 2021-02-02
Payer: COMMERCIAL

## 2021-02-02 VITALS — HEART RATE: 108 BPM

## 2021-02-02 DIAGNOSIS — M77.11 LATERAL EPICONDYLITIS OF RIGHT ELBOW: ICD-10-CM

## 2021-02-02 DIAGNOSIS — M50.222 HERNIATION OF INTERVERTEBRAL DISC AT C5-C6 LEVEL: ICD-10-CM

## 2021-02-02 DIAGNOSIS — M54.12 RADICULOPATHY, CERVICAL REGION: ICD-10-CM

## 2021-02-02 DIAGNOSIS — M62.838 MUSCLE SPASM: ICD-10-CM

## 2021-02-02 DIAGNOSIS — Z98.1 STATUS POST CERVICAL SPINAL FUSION: Primary | ICD-10-CM

## 2021-02-02 DIAGNOSIS — M47.812 CERVICAL SPONDYLOSIS: ICD-10-CM

## 2021-02-02 PROBLEM — M48.02 CERVICAL SPINAL STENOSIS: Status: ACTIVE | Noted: 2021-02-02

## 2021-02-02 LAB
ABO GROUP BLD: NORMAL
EXT PREGNANCY TEST URINE: NEGATIVE
EXT. CONTROL: NORMAL
GLUCOSE SERPL-MCNC: 109 MG/DL (ref 65–140)
RH BLD: POSITIVE

## 2021-02-02 PROCEDURE — 0RB33ZZ EXCISION OF CERVICAL VERTEBRAL DISC, PERCUTANEOUS APPROACH: ICD-10-PCS | Performed by: NEUROLOGICAL SURGERY

## 2021-02-02 PROCEDURE — 22853 INSJ BIOMECHANICAL DEVICE: CPT | Performed by: NEUROLOGICAL SURGERY

## 2021-02-02 PROCEDURE — 81025 URINE PREGNANCY TEST: CPT | Performed by: NEUROLOGICAL SURGERY

## 2021-02-02 PROCEDURE — 22554 ARTHRD ANT NTRBD MIN DSC CRV: CPT | Performed by: NEUROLOGICAL SURGERY

## 2021-02-02 PROCEDURE — 0RG23A0 FUSION OF 2 OR MORE CERVICAL VERTEBRAL JOINTS WITH INTERBODY FUSION DEVICE, ANTERIOR APPROACH, ANTERIOR COLUMN, PERCUTANEOUS APPROACH: ICD-10-PCS | Performed by: NEUROLOGICAL SURGERY

## 2021-02-02 PROCEDURE — 63081 REMOVE VERT BODY DCMPRN CRVL: CPT | Performed by: NEUROLOGICAL SURGERY

## 2021-02-02 PROCEDURE — 0RT30ZZ RESECTION OF CERVICAL VERTEBRAL DISC, OPEN APPROACH: ICD-10-PCS | Performed by: NEUROLOGICAL SURGERY

## 2021-02-02 PROCEDURE — C1713 ANCHOR/SCREW BN/BN,TIS/BN: HCPCS | Performed by: NEUROLOGICAL SURGERY

## 2021-02-02 PROCEDURE — 22854 INSJ BIOMECHANICAL DEVICE: CPT | Performed by: NEUROLOGICAL SURGERY

## 2021-02-02 PROCEDURE — 20930 SP BONE ALGRFT MORSEL ADD-ON: CPT | Performed by: NEUROLOGICAL SURGERY

## 2021-02-02 PROCEDURE — 82948 REAGENT STRIP/BLOOD GLUCOSE: CPT

## 2021-02-02 PROCEDURE — 20936 SP BONE AGRFT LOCAL ADD-ON: CPT | Performed by: NEUROLOGICAL SURGERY

## 2021-02-02 PROCEDURE — 22552 ARTHRD ANT NTRBD CERVICAL EA: CPT | Performed by: NEUROLOGICAL SURGERY

## 2021-02-02 PROCEDURE — 72040 X-RAY EXAM NECK SPINE 2-3 VW: CPT

## 2021-02-02 PROCEDURE — 22846 INSERT SPINE FIXATION DEVICE: CPT | Performed by: NEUROLOGICAL SURGERY

## 2021-02-02 PROCEDURE — 22551 ARTHRD ANT NTRBDY CERVICAL: CPT | Performed by: NEUROLOGICAL SURGERY

## 2021-02-02 DEVICE — IMPLANT 6240641 ANATOMIC 14X11X6MM
Type: IMPLANTABLE DEVICE | Site: SPINE CERVICAL | Status: FUNCTIONAL
Brand: VERTE-STACK® SPINAL SYSTEM

## 2021-02-02 DEVICE — SCREW 7713515 ZEVO VAR SD 3.5MM X 15MM
Type: IMPLANTABLE DEVICE | Site: SPINE CERVICAL | Status: FUNCTIONAL
Brand: ZEVO™ ANTERIOR CERVICAL PLATE SYSTEM

## 2021-02-02 DEVICE — BONE GRAFT SUBSTITUTE, FOAM PACK, BETA-TRICALCIUM PHOSPHATE AND TYPE I BOVINE COLLAGEN
Type: IMPLANTABLE DEVICE | Site: SPINE CERVICAL | Status: FUNCTIONAL
Brand: VITOSS

## 2021-02-02 RX ORDER — DIAZEPAM 5 MG/ML
2.5 INJECTION, SOLUTION INTRAMUSCULAR; INTRAVENOUS ONCE
Status: COMPLETED | OUTPATIENT
Start: 2021-02-02 | End: 2021-02-02

## 2021-02-02 RX ORDER — MAGNESIUM HYDROXIDE/ALUMINUM HYDROXICE/SIMETHICONE 120; 1200; 1200 MG/30ML; MG/30ML; MG/30ML
30 SUSPENSION ORAL EVERY 6 HOURS PRN
Status: DISCONTINUED | OUTPATIENT
Start: 2021-02-02 | End: 2021-02-04 | Stop reason: HOSPADM

## 2021-02-02 RX ORDER — CLINDAMYCIN PHOSPHATE 600 MG/50ML
600 INJECTION INTRAVENOUS EVERY 8 HOURS SCHEDULED
Status: COMPLETED | OUTPATIENT
Start: 2021-02-02 | End: 2021-02-03

## 2021-02-02 RX ORDER — LIDOCAINE 50 MG/G
2 PATCH TOPICAL DAILY
Status: DISCONTINUED | OUTPATIENT
Start: 2021-02-02 | End: 2021-02-04 | Stop reason: HOSPADM

## 2021-02-02 RX ORDER — HYDROMORPHONE HCL/PF 1 MG/ML
0.5 SYRINGE (ML) INJECTION
Status: DISCONTINUED | OUTPATIENT
Start: 2021-02-02 | End: 2021-02-02 | Stop reason: HOSPADM

## 2021-02-02 RX ORDER — PROPOFOL 10 MG/ML
INJECTION, EMULSION INTRAVENOUS AS NEEDED
Status: DISCONTINUED | OUTPATIENT
Start: 2021-02-02 | End: 2021-02-02

## 2021-02-02 RX ORDER — ONDANSETRON 2 MG/ML
4 INJECTION INTRAMUSCULAR; INTRAVENOUS ONCE AS NEEDED
Status: DISCONTINUED | OUTPATIENT
Start: 2021-02-02 | End: 2021-02-02 | Stop reason: HOSPADM

## 2021-02-02 RX ORDER — GABAPENTIN 300 MG/1
300 CAPSULE ORAL 3 TIMES DAILY
Status: DISCONTINUED | OUTPATIENT
Start: 2021-02-02 | End: 2021-02-04 | Stop reason: HOSPADM

## 2021-02-02 RX ORDER — SENNOSIDES 8.6 MG
1 TABLET ORAL DAILY
Status: DISCONTINUED | OUTPATIENT
Start: 2021-02-03 | End: 2021-02-04 | Stop reason: HOSPADM

## 2021-02-02 RX ORDER — DIPHENHYDRAMINE HYDROCHLORIDE 50 MG/ML
12.5 INJECTION INTRAMUSCULAR; INTRAVENOUS ONCE AS NEEDED
Status: DISCONTINUED | OUTPATIENT
Start: 2021-02-02 | End: 2021-02-02 | Stop reason: HOSPADM

## 2021-02-02 RX ORDER — DEXAMETHASONE SODIUM PHOSPHATE 4 MG/ML
4 INJECTION, SOLUTION INTRA-ARTICULAR; INTRALESIONAL; INTRAMUSCULAR; INTRAVENOUS; SOFT TISSUE EVERY 8 HOURS SCHEDULED
Status: COMPLETED | OUTPATIENT
Start: 2021-02-02 | End: 2021-02-03

## 2021-02-02 RX ORDER — QUETIAPINE FUMARATE 25 MG/1
25 TABLET, FILM COATED ORAL
Status: DISCONTINUED | OUTPATIENT
Start: 2021-02-02 | End: 2021-02-04 | Stop reason: HOSPADM

## 2021-02-02 RX ORDER — FENTANYL CITRATE/PF 50 MCG/ML
50 SYRINGE (ML) INJECTION
Status: COMPLETED | OUTPATIENT
Start: 2021-02-02 | End: 2021-02-02

## 2021-02-02 RX ORDER — MIDAZOLAM HYDROCHLORIDE 2 MG/2ML
INJECTION, SOLUTION INTRAMUSCULAR; INTRAVENOUS AS NEEDED
Status: DISCONTINUED | OUTPATIENT
Start: 2021-02-02 | End: 2021-02-02

## 2021-02-02 RX ORDER — HYDROMORPHONE HCL/PF 1 MG/ML
SYRINGE (ML) INJECTION AS NEEDED
Status: DISCONTINUED | OUTPATIENT
Start: 2021-02-02 | End: 2021-02-02

## 2021-02-02 RX ORDER — VANCOMYCIN HYDROCHLORIDE 1 G/20ML
INJECTION, POWDER, LYOPHILIZED, FOR SOLUTION INTRAVENOUS AS NEEDED
Status: DISCONTINUED | OUTPATIENT
Start: 2021-02-02 | End: 2021-02-02

## 2021-02-02 RX ORDER — ONDANSETRON 2 MG/ML
INJECTION INTRAMUSCULAR; INTRAVENOUS AS NEEDED
Status: DISCONTINUED | OUTPATIENT
Start: 2021-02-02 | End: 2021-02-02

## 2021-02-02 RX ORDER — SUCCINYLCHOLINE/SOD CL,ISO/PF 100 MG/5ML
SYRINGE (ML) INTRAVENOUS AS NEEDED
Status: DISCONTINUED | OUTPATIENT
Start: 2021-02-02 | End: 2021-02-02

## 2021-02-02 RX ORDER — OXYCODONE HYDROCHLORIDE 10 MG/1
10 TABLET ORAL EVERY 4 HOURS PRN
Status: DISCONTINUED | OUTPATIENT
Start: 2021-02-02 | End: 2021-02-04 | Stop reason: HOSPADM

## 2021-02-02 RX ORDER — LIDOCAINE 50 MG/G
2 PATCH TOPICAL DAILY
Status: DISCONTINUED | OUTPATIENT
Start: 2021-02-03 | End: 2021-02-02

## 2021-02-02 RX ORDER — OXYCODONE HYDROCHLORIDE 5 MG/1
5 TABLET ORAL EVERY 4 HOURS PRN
Status: DISCONTINUED | OUTPATIENT
Start: 2021-02-02 | End: 2021-02-04 | Stop reason: HOSPADM

## 2021-02-02 RX ORDER — SCOLOPAMINE TRANSDERMAL SYSTEM 1 MG/1
1 PATCH, EXTENDED RELEASE TRANSDERMAL
Status: DISCONTINUED | OUTPATIENT
Start: 2021-02-02 | End: 2021-02-02 | Stop reason: HOSPADM

## 2021-02-02 RX ORDER — SODIUM CHLORIDE 9 MG/ML
125 INJECTION, SOLUTION INTRAVENOUS CONTINUOUS
Status: DISCONTINUED | OUTPATIENT
Start: 2021-02-02 | End: 2021-02-02

## 2021-02-02 RX ORDER — SODIUM CHLORIDE 9 MG/ML
INJECTION, SOLUTION INTRAVENOUS CONTINUOUS PRN
Status: DISCONTINUED | OUTPATIENT
Start: 2021-02-02 | End: 2021-02-02

## 2021-02-02 RX ORDER — GINSENG 100 MG
CAPSULE ORAL AS NEEDED
Status: DISCONTINUED | OUTPATIENT
Start: 2021-02-02 | End: 2021-02-02 | Stop reason: HOSPADM

## 2021-02-02 RX ORDER — HYDROMORPHONE HCL/PF 1 MG/ML
0.2 SYRINGE (ML) INJECTION
Status: DISCONTINUED | OUTPATIENT
Start: 2021-02-02 | End: 2021-02-02 | Stop reason: HOSPADM

## 2021-02-02 RX ORDER — LOSARTAN POTASSIUM 50 MG/1
50 TABLET ORAL DAILY
Status: DISCONTINUED | OUTPATIENT
Start: 2021-02-03 | End: 2021-02-04 | Stop reason: HOSPADM

## 2021-02-02 RX ORDER — CHLORHEXIDINE GLUCONATE 0.12 MG/ML
15 RINSE ORAL ONCE
Status: COMPLETED | OUTPATIENT
Start: 2021-02-02 | End: 2021-02-02

## 2021-02-02 RX ORDER — METOCLOPRAMIDE HYDROCHLORIDE 5 MG/ML
10 INJECTION INTRAMUSCULAR; INTRAVENOUS ONCE AS NEEDED
Status: DISCONTINUED | OUTPATIENT
Start: 2021-02-02 | End: 2021-02-02 | Stop reason: HOSPADM

## 2021-02-02 RX ORDER — PROPOFOL 10 MG/ML
INJECTION, EMULSION INTRAVENOUS CONTINUOUS PRN
Status: DISCONTINUED | OUTPATIENT
Start: 2021-02-02 | End: 2021-02-02

## 2021-02-02 RX ORDER — VANCOMYCIN HYDROCHLORIDE 1 G/20ML
INJECTION, POWDER, LYOPHILIZED, FOR SOLUTION INTRAVENOUS AS NEEDED
Status: DISCONTINUED | OUTPATIENT
Start: 2021-02-02 | End: 2021-02-02 | Stop reason: HOSPADM

## 2021-02-02 RX ORDER — METHOCARBAMOL 750 MG/1
750 TABLET, FILM COATED ORAL EVERY 6 HOURS SCHEDULED
Status: DISCONTINUED | OUTPATIENT
Start: 2021-02-02 | End: 2021-02-04 | Stop reason: HOSPADM

## 2021-02-02 RX ORDER — DOCUSATE SODIUM 100 MG/1
100 CAPSULE, LIQUID FILLED ORAL 2 TIMES DAILY
Status: DISCONTINUED | OUTPATIENT
Start: 2021-02-02 | End: 2021-02-04 | Stop reason: HOSPADM

## 2021-02-02 RX ORDER — FENTANYL CITRATE 50 UG/ML
INJECTION, SOLUTION INTRAMUSCULAR; INTRAVENOUS AS NEEDED
Status: DISCONTINUED | OUTPATIENT
Start: 2021-02-02 | End: 2021-02-02

## 2021-02-02 RX ORDER — SODIUM CHLORIDE, SODIUM LACTATE, POTASSIUM CHLORIDE, CALCIUM CHLORIDE 600; 310; 30; 20 MG/100ML; MG/100ML; MG/100ML; MG/100ML
75 INJECTION, SOLUTION INTRAVENOUS CONTINUOUS
Status: DISCONTINUED | OUTPATIENT
Start: 2021-02-02 | End: 2021-02-03

## 2021-02-02 RX ORDER — SODIUM CHLORIDE, SODIUM LACTATE, POTASSIUM CHLORIDE, CALCIUM CHLORIDE 600; 310; 30; 20 MG/100ML; MG/100ML; MG/100ML; MG/100ML
INJECTION, SOLUTION INTRAVENOUS CONTINUOUS PRN
Status: DISCONTINUED | OUTPATIENT
Start: 2021-02-02 | End: 2021-02-02

## 2021-02-02 RX ORDER — LIDOCAINE HYDROCHLORIDE 10 MG/ML
INJECTION, SOLUTION EPIDURAL; INFILTRATION; INTRACAUDAL; PERINEURAL AS NEEDED
Status: DISCONTINUED | OUTPATIENT
Start: 2021-02-02 | End: 2021-02-02

## 2021-02-02 RX ORDER — HYDROCHLOROTHIAZIDE 25 MG/1
25 TABLET ORAL DAILY
Status: DISCONTINUED | OUTPATIENT
Start: 2021-02-03 | End: 2021-02-04 | Stop reason: HOSPADM

## 2021-02-02 RX ORDER — ONDANSETRON 2 MG/ML
4 INJECTION INTRAMUSCULAR; INTRAVENOUS EVERY 4 HOURS PRN
Status: DISCONTINUED | OUTPATIENT
Start: 2021-02-02 | End: 2021-02-04 | Stop reason: HOSPADM

## 2021-02-02 RX ORDER — ACETAMINOPHEN 325 MG/1
975 TABLET ORAL EVERY 8 HOURS
Status: DISCONTINUED | OUTPATIENT
Start: 2021-02-02 | End: 2021-02-04 | Stop reason: HOSPADM

## 2021-02-02 RX ORDER — DEXAMETHASONE SODIUM PHOSPHATE 10 MG/ML
INJECTION, SOLUTION INTRAMUSCULAR; INTRAVENOUS AS NEEDED
Status: DISCONTINUED | OUTPATIENT
Start: 2021-02-02 | End: 2021-02-02

## 2021-02-02 RX ADMIN — SODIUM CHLORIDE, SODIUM LACTATE, POTASSIUM CHLORIDE, AND CALCIUM CHLORIDE: .6; .31; .03; .02 INJECTION, SOLUTION INTRAVENOUS at 09:11

## 2021-02-02 RX ADMIN — FENTANYL CITRATE 75 MCG: 50 INJECTION, SOLUTION INTRAMUSCULAR; INTRAVENOUS at 09:27

## 2021-02-02 RX ADMIN — PROPOFOL 200 MG: 10 INJECTION, EMULSION INTRAVENOUS at 09:27

## 2021-02-02 RX ADMIN — HYDROMORPHONE HYDROCHLORIDE 0.2 MG: 1 INJECTION, SOLUTION INTRAMUSCULAR; INTRAVENOUS; SUBCUTANEOUS at 15:13

## 2021-02-02 RX ADMIN — LIDOCAINE HYDROCHLORIDE 50 MG: 10 INJECTION, SOLUTION EPIDURAL; INFILTRATION; INTRACAUDAL at 09:27

## 2021-02-02 RX ADMIN — REMIFENTANIL HYDROCHLORIDE 0.2 MCG/KG/MIN: 1 INJECTION, POWDER, LYOPHILIZED, FOR SOLUTION INTRAVENOUS at 09:29

## 2021-02-02 RX ADMIN — SODIUM CHLORIDE, SODIUM LACTATE, POTASSIUM CHLORIDE, AND CALCIUM CHLORIDE 75 ML/HR: .6; .31; .03; .02 INJECTION, SOLUTION INTRAVENOUS at 16:41

## 2021-02-02 RX ADMIN — DEXAMETHASONE SODIUM PHOSPHATE 4 MG: 10 INJECTION, SOLUTION INTRAMUSCULAR; INTRAVENOUS at 09:40

## 2021-02-02 RX ADMIN — ONDANSETRON 4 MG: 2 INJECTION INTRAMUSCULAR; INTRAVENOUS at 09:40

## 2021-02-02 RX ADMIN — VANCOMYCIN HYDROCHLORIDE 2 G: 1 INJECTION, POWDER, LYOPHILIZED, FOR SOLUTION INTRAVENOUS at 10:00

## 2021-02-02 RX ADMIN — QUETIAPINE 25 MG: 25 TABLET, FILM COATED ORAL at 22:42

## 2021-02-02 RX ADMIN — ONDANSETRON 4 MG: 2 INJECTION INTRAMUSCULAR; INTRAVENOUS at 19:26

## 2021-02-02 RX ADMIN — MORPHINE SULFATE 2 MG: 2 INJECTION, SOLUTION INTRAMUSCULAR; INTRAVENOUS at 17:52

## 2021-02-02 RX ADMIN — FENTANYL CITRATE 50 MCG: 50 INJECTION INTRAMUSCULAR; INTRAVENOUS at 15:01

## 2021-02-02 RX ADMIN — FENTANYL CITRATE 50 MCG: 50 INJECTION INTRAMUSCULAR; INTRAVENOUS at 15:06

## 2021-02-02 RX ADMIN — GABAPENTIN 300 MG: 300 CAPSULE ORAL at 22:42

## 2021-02-02 RX ADMIN — OXYCODONE HYDROCHLORIDE 10 MG: 10 TABLET ORAL at 16:46

## 2021-02-02 RX ADMIN — HYDROMORPHONE HYDROCHLORIDE 1 MG: 1 INJECTION, SOLUTION INTRAMUSCULAR; INTRAVENOUS; SUBCUTANEOUS at 12:32

## 2021-02-02 RX ADMIN — DIAZEPAM 2.5 MG: 10 INJECTION, SOLUTION INTRAMUSCULAR; INTRAVENOUS at 15:31

## 2021-02-02 RX ADMIN — HYDROMORPHONE HYDROCHLORIDE 0.2 MG: 1 INJECTION, SOLUTION INTRAMUSCULAR; INTRAVENOUS; SUBCUTANEOUS at 15:19

## 2021-02-02 RX ADMIN — GABAPENTIN 300 MG: 300 CAPSULE ORAL at 18:09

## 2021-02-02 RX ADMIN — SCOPALAMINE 1 PATCH: 1 PATCH, EXTENDED RELEASE TRANSDERMAL at 08:36

## 2021-02-02 RX ADMIN — HYDROMORPHONE HYDROCHLORIDE 0.5 MG: 1 INJECTION, SOLUTION INTRAMUSCULAR; INTRAVENOUS; SUBCUTANEOUS at 15:56

## 2021-02-02 RX ADMIN — PROPOFOL 100 MCG/KG/MIN: 10 INJECTION, EMULSION INTRAVENOUS at 09:29

## 2021-02-02 RX ADMIN — Medication 100 MG: at 09:27

## 2021-02-02 RX ADMIN — FENTANYL CITRATE 25 MCG: 50 INJECTION, SOLUTION INTRAMUSCULAR; INTRAVENOUS at 09:37

## 2021-02-02 RX ADMIN — CHLORHEXIDINE GLUCONATE 0.12% ORAL RINSE 15 ML: 1.2 LIQUID ORAL at 08:36

## 2021-02-02 RX ADMIN — ACETAMINOPHEN 975 MG: 325 TABLET, FILM COATED ORAL at 18:10

## 2021-02-02 RX ADMIN — CLINDAMYCIN IN 5 PERCENT DEXTROSE 600 MG: 12 INJECTION, SOLUTION INTRAVENOUS at 18:03

## 2021-02-02 RX ADMIN — FENTANYL CITRATE 50 MCG: 50 INJECTION INTRAMUSCULAR; INTRAVENOUS at 14:53

## 2021-02-02 RX ADMIN — MIDAZOLAM HYDROCHLORIDE 2 MG: 1 INJECTION, SOLUTION INTRAMUSCULAR; INTRAVENOUS at 09:06

## 2021-02-02 RX ADMIN — LIDOCAINE 5% 2 PATCH: 700 PATCH TOPICAL at 22:43

## 2021-02-02 RX ADMIN — DEXAMETHASONE SODIUM PHOSPHATE 4 MG: 4 INJECTION, SOLUTION INTRA-ARTICULAR; INTRALESIONAL; INTRAMUSCULAR; INTRAVENOUS; SOFT TISSUE at 18:01

## 2021-02-02 RX ADMIN — SODIUM CHLORIDE: 0.9 INJECTION, SOLUTION INTRAVENOUS at 09:33

## 2021-02-02 RX ADMIN — DOCUSATE SODIUM 100 MG: 100 CAPSULE, LIQUID FILLED ORAL at 18:12

## 2021-02-02 RX ADMIN — HYDROMORPHONE HYDROCHLORIDE 0.5 MG: 1 INJECTION, SOLUTION INTRAMUSCULAR; INTRAVENOUS; SUBCUTANEOUS at 11:25

## 2021-02-02 RX ADMIN — METHOCARBAMOL TABLETS 750 MG: 750 TABLET, COATED ORAL at 18:07

## 2021-02-02 NOTE — H&P
H and P reviewed in chart, ongoing cervical radiculopathy and myelopathy   Ht 5' 7 75" (1 721 m)   Wt 98 9 kg (218 lb)   BMI 33 39 kg/m²   Anterior cervical decompression and fusion as scheduled today

## 2021-02-02 NOTE — OP NOTE
OPERATIVE REPORT  PATIENT NAME: Gale Arredondo    :  1980  MRN: [id number]  Pt Location: AN OR ROOM 04    SURGERY DATE: 2021    Surgeon(s) and Role:     Ana Trejo MD - Primary    Preop Diagnosis:  Radiculopathy, cervical region [M54 12]  Spondylogenic compression of cervical spinal cord [M47 12]  Degenerative cervical spinal stenosis [M48 02]    Post-Op Diagnosis Codes:     * Radiculopathy, cervical region [M54 12]     * Spondylogenic compression of cervical spinal cord [M47 12]     * Degenerative cervical spinal stenosis [M48 02]    Procedure(s) (LRB):  CORPECTOMY SPINE CERVICAL ANTERIOR: C4-7 ACDF with C5/6 corpectomy/hemicorpectomy, C4-7 instrumentation and fusion (neuromonitoring) (Bilateral)    Specimen(s):  * No specimens in log *    Estimated Blood Loss:   Minimal    Drains:  * No LDAs found *    Anesthesia Type:   General    Operative Indications:  Radiculopathy, cervical region [M54 12]  Spondylogenic compression of cervical spinal cord [M47 12]  Degenerative cervical spinal stenosis [M48 02]      Operative Findings:    Severe multilevel cervical spinal stenosis with cord involvement and advance R>L foraminal stenosis U1/1    Complications:   None    Procedure and Technique:    INDICATION The patient is an pleasant 41-year-old with a history of neck stiffness naf R>L arm pain, with progressive weakness, loss of fine motor control and gait disturbance  Preoperative imaging demonstrates severe multilevel cervical spinal stenosis with spinal cord compression/involvement C4-7 level secondary to large caudally extruded disc w associated cord signal change  Canal diameter reduced to < 6 mm   The patient elected to proceed with a multilevel anterior cervical decompression and fusion          OPERATIVE TECHNIQUE Under general anesthetic, the patient was intubated in the usual fashion and positioned supine on the operating table with her head and neck held in neutral alignment, placing a small towel roll underneath the base of her neck  We checked an AP, as well as lateral fluoroscopic shoot through to confirm good anatomic position  Once this done, the entire neck and the upper chest was sterilely prepped and draped in the usual fashion  The proposed incision site in the medial border of the left sternocleidomastoid muscle was outlined with a sterile marker  We infiltrated it with approximately 7 mL of lidocaine with epinephrine  The skin was sharply incised with a 15 skin knife  Meticulous hemostasis was maintained using bipolar cautery  We undercut the skin to expose the platysma muscle underneath  Once we transected the platysma muscle in an avascular fashion using Metzenbaum scissors, we undercut the platysmal layer to identify the medial border of the sternocleidomastoid muscle  Using blunt finger dissection, we were able to develop an avascular plane with the tracheoesophageal bundle medial and the jugular carotid complex lateral As we continued to extend the dissection, we reached the prevertebral fascia in atraumatic manner  Next, we proceeded to undercut the longus colli insertion using bipolar cautery  A lateral fluoroscopic shoot through with radiopaque marker confirmed the levels  Following radiographic confirmation, we picked 2 retractor blades from the [employer] [address] set of an appropriate length to facilitate with the exposure  The remainder of surgery, including all portions pertaining to corpectomy and microdissection, was carried out with the assistance of a surgical microscope  Initially, we carried out the anterior cervical discectomy at the C4-C5 level  This was done initially using a 15 blade in conjunction with variably angled micro curettes  Once we reached the level of the posterior annulus/posterior longitudinal ligament, we used a sharp hook to breach this level  The remainder of the decompression was carried out using a combination of 1 and 2 mm Kerrison punch   We were able to remove significant bone spur above and below, as well as clear the foramen using this technique  Following meticulous hemostasis, we prepared the endplates for the cage by burring it with the [address] drill  I found that a separate and standalone 6 mm x 12 mm lordotic cage fit nicely, in the setting of severe disc space collapse  The center of the hollow cage was packed with Vitoss bone filler and locally harvest bone  This was tamped gently in place under live fluoroscopic guidance  Once we were satisfied, we focused our attention on the 6 lisa-corpectomy involving the cephaled portion of the vertebral body  This was necessary to the caudal extension of the large C5/6 disc extrusion to sit behind the C6 vertebral body   In total we estimated that > 55% of C6 vertebral body (the cephaled most portion was resected)  Once again after undercutting the insertion of the longus colli muscle, we were able to carry out the decompression at this level under [place] pin distraction using 12 mm pins  Initially, we carried out the gross discectomy at C5/6 using a combination of a 15 blade, as well as variably angled sharp curettes  The remainder of the discectomy was then completed using a combination of 1 and 2 mm Kerrison punch  It should be noted that at both levels, we encountered marked stenosis/foraminal narrowing R>L  The hemicorpectomy was then completed using a combination of double-action rongeur, high-speed drill, 1 mm and 2 mm Kerrison punches  All bone harvested locally was saved to be used for the interbody fusion at the end of the case  Once we reached the level of the posterior longitudinal ligament, we breached it using a blunt hook  The remainder of the decompression was then completed using a combination of 1 mm and 2 mm Kerrison punches   Once we were satisfied with decompression, we carried out meticulous hemostasis using bipolar cautery and FloSeal foam  We burred off the endplates of C5, and picked a separate and standalone 7 mm lordotic PEEK cage packed with a combination of bone dust and vitoss  The cage was then tamped gently in place under live fluoroscopic guidance  Once we were satisfied with the cage position, we focused our attention of the adjacent C6/7 anterior cervical discectomy  This was done initially using a 15 blade in conjunction with variably angled micro curettes  Once we reached the level of the posterior annulus/posterior longitudinal ligament, we used a sharp hook to breach this level  The remainder of the decompression was carried out using a combination of 1 and 2 mm Kerrison punch  We were able to remove significant bone spur above and below, as well as clear the foramen using this technique  Severe  Following meticulous hemostasis, we prepared the endplates for the cage by burring it with the [address] [place] drill  I found that a separate and standalone 6 mm x 12 mm lordotic cage fit nicely, in the setting of severe disc space collapse  The center of the hollow cage was packed with Vitoss bone filler and locally harvest bone  This was tamped gently in place under live fluoroscopic guidance  Next we picked a lordotic titanium plate to span the C3 down to the C6 levels  The plate was secured in place using a total of eight 15-mm self drilling cortical cancellous screws  Final AP, as well as lateral fluoroscopic shoot through demonstrated good decompression, preservation of disc space interbody height, overall anatomic alignment, as well as a satisfactory instrument placement, including the cage and the plates  Following copious irrigation with antibiotic infiltrated normal saline, we finally tightened/locked down the screws and ensured meticulous hemostasis with bipolar cautery and FloSeal foam  We placed 1 gram of dry vancomycin powder into the defect and tunneled a 7 Dutch flat Jean Claude-Smith drain that was not stitched to the skin   The overlying platysma layer was then reapproximated using interrupted 3-0 Vicryl sutures  Final skin layer involved 4-0 running Vicryl suture in a continuous subcuticular stitch  Final skin dressing involved benzoin, Steri-Strip tape, Telfa, 4 x 4 gauze, and Tegaderm  All counts including that of sponge and needles were correct at the end of the case      The estimated blood loss was less than 25 mL  No transfusions were given  Prior to final extubation, a cervical orthosis was placed around the patient's neck for postoperative stabilization  At no point during the case did the electrophysiological monitoring parameters (SSEP, MEP) deviate from baseline   Following extubation, the patient was moving all 4 distal extremities to command well with baseline power      I was present for the entire procedure    Patient Disposition:  extubated and stable    SIGNATURE: Aliza Rocha MD  DATE: February 2, 2021  TIME: 9:18 AM

## 2021-02-02 NOTE — PROGRESS NOTES
Pt is POD 0 s/p CORPECTOMY SPINE CERVICAL ANTERIOR: C4-7 ACDF with C5/6 corpectomy/hemicorpectomy, C4-7 instrumentation and fusion (neuromonitoring) (Bilateral)  Contacted by patient's nurse that patient is having severe neck pain  Also referred the patient is having trouble with swallowing due to the severe pain  Plan  · Pain control with:  · Tylenol scheduled  · Oxycodone 5 in 10 mg p r n  · Morphine q 2 hours p r n  For breakthrough  · Robaxin 750 mg q 6 hours scheduled  · Gabapentin 300 mg t i d  · Decadron 4 mg q  8 hours for 3 doses- recommend consideration for extending as needed if pt continues to have dysphagia or severe neck pain  · Discussed with patient's nurse the above pain regimen  Pt to take oral meds as tolerated  · Changed diet from regular to dysphagia 3 with thin liquids  Formal Speech and swallow eval ordered  · Neurosurgery will continue to follow  Please contact Neurosurgery with any questions or concerns

## 2021-02-02 NOTE — ANESTHESIA POSTPROCEDURE EVALUATION
Post-Op Assessment Note    CV Status:  Stable  Pain Score: 0    Pain management: adequate     Mental Status:  Alert and awake   Hydration Status:  Euvolemic   PONV Controlled:  Controlled   Airway Patency:  Patent      Post Op Vitals Reviewed: Yes      Staff: CRNA   Comments: arousable, following commands and verbally responsive  Denies any discomfort at this time  RRR, Nonlabored, VSS  No complications documented      BP   150/102   Temp   96 6   Pulse  109   Resp   16   SpO2   95

## 2021-02-02 NOTE — ANESTHESIA PREPROCEDURE EVALUATION
Procedure:  CORPECTOMY SPINE CERVICAL ANTERIOR: C4-7 ACDF with C5/6 corpectomy/hemicorpectomy, C4-7 instrumentation and fusion (neuromonitoring) (Bilateral Spine Cervical)    Relevant Problems   CARDIO   (+) Essential hypertension   (+) Essential hypertriglyceridemia      MUSCULOSKELETAL   (+) Lateral epicondylitis of right elbow   (+) Low back pain      NEURO/PSYCH   (+) Epilepsy (HCC)   (+) Primary generalized epilepsy ([address] )      PULMONARY   (+) Obstructive sleep apnea on CPAP        Physical Exam    Airway    Mallampati score: II  TM Distance: >3 FB  Neck ROM: limited     Dental       Cardiovascular  Rhythm: regular, Rate: normal, Cardiovascular exam normal    Pulmonary  Breath sounds clear to auscultation,     Other Findings        Anesthesia Plan  ASA Score- 3     Anesthesia Type- general with ASA Monitors  Additional Monitors:   Airway Plan: ETT  Plan Factors-Exercise tolerance (METS): >4 METS  Chart reviewed  Existing labs reviewed  Induction- intravenous  Postoperative Plan-     Informed Consent- Anesthetic plan and risks discussed with patient  I personally reviewed this patient with the CRNA  Discussed and agreed on the Anesthesia Plan with the CRNA  Harleen Garnica

## 2021-02-03 ENCOUNTER — APPOINTMENT (INPATIENT)
Dept: RADIOLOGY | Facility: HOSPITAL | Age: 41
DRG: 472 | End: 2021-02-03
Payer: COMMERCIAL

## 2021-02-03 LAB
ANION GAP SERPL CALCULATED.3IONS-SCNC: 10 MMOL/L (ref 4–13)
BUN SERPL-MCNC: 11 MG/DL (ref 5–25)
CALCIUM SERPL-MCNC: 8.7 MG/DL (ref 8.3–10.1)
CHLORIDE SERPL-SCNC: 105 MMOL/L (ref 100–108)
CO2 SERPL-SCNC: 25 MMOL/L (ref 21–32)
CREAT SERPL-MCNC: 0.72 MG/DL (ref 0.6–1.3)
GFR SERPL CREATININE-BSD FRML MDRD: 104 ML/MIN/1.73SQ M
GLUCOSE SERPL-MCNC: 126 MG/DL (ref 65–140)
POTASSIUM SERPL-SCNC: 3.8 MMOL/L (ref 3.5–5.3)
SODIUM SERPL-SCNC: 140 MMOL/L (ref 136–145)

## 2021-02-03 PROCEDURE — 72040 X-RAY EXAM NECK SPINE 2-3 VW: CPT

## 2021-02-03 PROCEDURE — 99024 POSTOP FOLLOW-UP VISIT: CPT | Performed by: PHYSICIAN ASSISTANT

## 2021-02-03 PROCEDURE — 80048 BASIC METABOLIC PNL TOTAL CA: CPT | Performed by: NEUROLOGICAL SURGERY

## 2021-02-03 PROCEDURE — 97163 PT EVAL HIGH COMPLEX 45 MIN: CPT

## 2021-02-03 PROCEDURE — 97167 OT EVAL HIGH COMPLEX 60 MIN: CPT

## 2021-02-03 PROCEDURE — 92610 EVALUATE SWALLOWING FUNCTION: CPT

## 2021-02-03 RX ORDER — QUETIAPINE FUMARATE 25 MG/1
25 TABLET, FILM COATED ORAL
Status: DISCONTINUED | OUTPATIENT
Start: 2021-02-03 | End: 2021-02-03

## 2021-02-03 RX ADMIN — DOCUSATE SODIUM 100 MG: 100 CAPSULE, LIQUID FILLED ORAL at 08:19

## 2021-02-03 RX ADMIN — SENNOSIDES 8.6 MG: 8.6 TABLET ORAL at 08:19

## 2021-02-03 RX ADMIN — GABAPENTIN 300 MG: 300 CAPSULE ORAL at 21:34

## 2021-02-03 RX ADMIN — METHOCARBAMOL TABLETS 750 MG: 750 TABLET, COATED ORAL at 16:54

## 2021-02-03 RX ADMIN — GABAPENTIN 300 MG: 300 CAPSULE ORAL at 16:59

## 2021-02-03 RX ADMIN — CLINDAMYCIN IN 5 PERCENT DEXTROSE 600 MG: 12 INJECTION, SOLUTION INTRAVENOUS at 02:06

## 2021-02-03 RX ADMIN — HYDROCHLOROTHIAZIDE 25 MG: 25 TABLET ORAL at 21:35

## 2021-02-03 RX ADMIN — OXYCODONE HYDROCHLORIDE 10 MG: 10 TABLET ORAL at 21:35

## 2021-02-03 RX ADMIN — OXYCODONE HYDROCHLORIDE 5 MG: 5 TABLET ORAL at 11:30

## 2021-02-03 RX ADMIN — METHOCARBAMOL TABLETS 750 MG: 750 TABLET, COATED ORAL at 00:10

## 2021-02-03 RX ADMIN — SODIUM CHLORIDE, SODIUM LACTATE, POTASSIUM CHLORIDE, AND CALCIUM CHLORIDE 75 ML/HR: .6; .31; .03; .02 INJECTION, SOLUTION INTRAVENOUS at 05:29

## 2021-02-03 RX ADMIN — LOSARTAN POTASSIUM 50 MG: 50 TABLET, FILM COATED ORAL at 21:36

## 2021-02-03 RX ADMIN — GABAPENTIN 300 MG: 300 CAPSULE ORAL at 08:19

## 2021-02-03 RX ADMIN — METHOCARBAMOL TABLETS 750 MG: 750 TABLET, COATED ORAL at 11:21

## 2021-02-03 RX ADMIN — ACETAMINOPHEN 975 MG: 325 TABLET, FILM COATED ORAL at 08:19

## 2021-02-03 RX ADMIN — CLINDAMYCIN IN 5 PERCENT DEXTROSE 600 MG: 12 INJECTION, SOLUTION INTRAVENOUS at 11:14

## 2021-02-03 RX ADMIN — ACETAMINOPHEN 975 MG: 325 TABLET, FILM COATED ORAL at 16:55

## 2021-02-03 RX ADMIN — DEXAMETHASONE SODIUM PHOSPHATE 4 MG: 4 INJECTION, SOLUTION INTRA-ARTICULAR; INTRALESIONAL; INTRAMUSCULAR; INTRAVENOUS; SOFT TISSUE at 11:21

## 2021-02-03 RX ADMIN — LIDOCAINE 5% 2 PATCH: 700 PATCH TOPICAL at 21:36

## 2021-02-03 RX ADMIN — DOCUSATE SODIUM 100 MG: 100 CAPSULE, LIQUID FILLED ORAL at 16:55

## 2021-02-03 RX ADMIN — DEXAMETHASONE SODIUM PHOSPHATE 4 MG: 4 INJECTION, SOLUTION INTRA-ARTICULAR; INTRALESIONAL; INTRAMUSCULAR; INTRAVENOUS; SOFT TISSUE at 02:06

## 2021-02-03 RX ADMIN — LIDOCAINE 5% 2 PATCH: 700 PATCH TOPICAL at 22:05

## 2021-02-03 RX ADMIN — METHOCARBAMOL TABLETS 750 MG: 750 TABLET, COATED ORAL at 05:24

## 2021-02-03 RX ADMIN — QUETIAPINE 25 MG: 25 TABLET, FILM COATED ORAL at 21:35

## 2021-02-03 RX ADMIN — ACETAMINOPHEN 975 MG: 325 TABLET, FILM COATED ORAL at 00:10

## 2021-02-03 NOTE — UTILIZATION REVIEW
Initial Clinical Review    Elective inpatient surgical procedure    Age/Sex: 41 y o  female     Surgery Date: 2/2/2021    Procedure: CORPECTOMY SPINE CERVICAL ANTERIOR: C4-7 ACDF with C5/6 corpectomy/hemicorpectomy, C4-7 instrumentation and fusion (neuromonitoring) (Bilateral)    Anesthesia: general     Operative Findings: Severe multilevel cervical spinal stenosis with cord involvement and advance R>L foraminal stenosis C6/7    POD#1 Progress Note: 2/3/2021 patient is post operative day 1  C4-7 ACDF with C5/6  Corpec/hemicorpectomy  Has pain left side shoulder blade  Slight residual paresthesia fingertips  On exam decreased right   Anvik collar  NENA drains 57 ml/18H  Pain control continues  With scheduled acetaminophen, gabapentin, oxycodone as needed and morphine breakthrough pain, methocarbamol scheduled muscle spasm  PT consulted, continue NENA  Admission Orders: Date/Time/Statement:   Admission Orders (From admission, onward)     Ordered        02/02/21 1629  INPATIENT ADMISSION  Once                   Orders Placed This Encounter   Procedures    INPATIENT ADMISSION     Standing Status:   Standing     Number of Occurrences:   1     Order Specific Question:   Level of Care     Answer:   Med Surg [16]     Order Specific Question:   Estimated length of stay     Answer:   More than 2 Midnights     Order Specific Question:   Certification     Answer:   I certify that inpatient services are medically necessary for this patient for a duration of greater than two midnights  See H&P and MD Progress Notes for additional information about the patient's course of treatment       Vital Signs: /71 (BP Location: Left arm)   Pulse 88   Temp 98 7 °F (37 1 °C) (Oral)   Resp 18   Ht 5' 7 75" (1 721 m)   Wt 98 9 kg (218 lb)   SpO2 93%   BMI 33 39 kg/m²   02/03/21 0742  98 7 °F (37 1 °C)  88  18  132/71  --  93 %  --  --  --  Nasal cannula  --  Lying   02/03/21 0300  98 1 °F (36 7 °C)  87  17  157/80  -- 92 %  --  --  --  Nasal cannula  --  Lying   02/03/21 0030  98 7 °F (37 1 °C)  89  17  --  --  95 %  30  --  2 5 L/min  Nasal cannula  --  Lying   02/02/21 2125  98 6 °F (37 °C)  82  17  168/83  --  90 %  28  --  2 L/min  Nasal cannula  --  Lying   02/02/21 2038  98 5 °F (36 9 °C)  88  17  171/93Abnormal   --  91 %  28  --  2 L/min  Nasal cannula  --  Lying   02/02/21 1934  97 6 °F (36 4 °C)  82  18  176/87Abnormal   --  98 %  28  --  2 L/min  Nasal cannula         Results from last 7 days   Lab Units 01/27/21  1610   SARS-COV-2  Negative     Results from last 7 days   Lab Units 02/03/21  0523   SODIUM mmol/L 140   POTASSIUM mmol/L 3 8   CHLORIDE mmol/L 105   CO2 mmol/L 25   ANION GAP mmol/L 10   BUN mg/dL 11   CREATININE mg/dL 0 72   EGFR ml/min/1 73sq m 104   CALCIUM mg/dL 8 7         Results from last 7 days   Lab Units 02/02/21  0856   POC GLUCOSE mg/dl 109     Results from last 7 days   Lab Units 02/03/21  0523   GLUCOSE RANDOM mg/dL 126       Diet: dysphagia/modified consistency     Mobility: OOB with brace  Mobilize to chair    PT/OT    DVT Prophylaxis: Bilateral SCDs    Medications/Pain Control:   Scheduled Medications:  acetaminophen, 975 mg, Oral, Q8H  clindamycin, 600 mg, Intravenous, Q8H HIMANSHU  dexamethasone, 4 mg, Intravenous, Q8H HIMANSHU  docusate sodium, 100 mg, Oral, BID  gabapentin, 300 mg, Oral, TID  hydrochlorothiazide, 25 mg, Oral, Daily  lidocaine, 2 patch, Topical, Daily  losartan, 50 mg, Oral, Daily  methocarbamol, 750 mg, Oral, Q6H HIMANSHU  QUEtiapine, 25 mg, Oral, HS  senna, 1 tablet, Oral, Daily      Continuous IV Infusions:lactated ringers infusion   Rate: 75 mL/hr Dose: 75 mL/hr  Freq: Continuous Route: IV  Indications of Use: IV Hydration  Last Dose: 75 mL/hr (02/03/21 0529)  Start: 02/02/21 1630 End: 02/03/21 0926     PRN Meds:  aluminum-magnesium hydroxide-simethicone, 30 mL, Oral, Q6H PRN  magnesium hydroxide, 30 mL, Oral, Daily PRN  morphine injection, 2 mg, Intravenous, Q2H PRN - used x 1 2/2/2021   naloxone, 0 04 mg, Intravenous, Q1MIN PRN  ondansetron, 4 mg, Intravenous, Q4H PRN -  Used x 1 2/2/2021  oxyCODONE, 10 mg, Oral, Q4H PRN - used x 1 2/2/2021  oxyCODONE, 5 mg, Oral, Q4H PRN    neuro checks & neurovascular checks  every 4 hours  hemovac    Network Utilization Review Department  ATTENTION: Please call with any questions or concerns to [phone] and carefully listen to the prompts so that you are directed to the right person  All voicemails are confidential   Claudia Sharpe all requests for admission clinical reviews, approved or denied determinations and any other requests to dedicated fax number below belonging to the campus where the patient is receiving treatment   List of dedicated fax numbers for the Facilities:  [address] DENIALS (Administrative/Medical Necessity) [phone]   [address] (Maternity/NICU/Pediatrics) [phone] [address] Jacquelyn Gregory 9781 (  Nasra Merrill "Pearl" 103) [address] [place] [address] [address] [place], [place] [address] [phone]

## 2021-02-03 NOTE — SPEECH THERAPY NOTE
Speech-Language Pathology Bedside Swallow Evaluation        Patient Name: Luisa DONOHUE Date: 2/3/2021     Problem List  Principal Problem:    Cervical spinal stenosis         Past Medical History  Past Medical History:   Diagnosis Date    Constipation     Depression     Eczema     GERD (gastroesophageal reflux disease)     Grand mal convulsion ([address] )     X 2 last episode 2009-Sleep deprivation-Resolved with Cpap    Hypertension     Insomnia     Medial meniscus tear 2015    Migraine     PONV (postoperative nausea and vomiting)     Psoriasis     Seizures (HCC)     Shoulder dislocation, right, sequela 2018    Sinusitis     Sleep apnea     uses cpap    Tinnitus        Past Surgical History  Past Surgical History:   Procedure Laterality Date     SECTION   and     COLONOSCOPY      DILATION AND CURETTAGE OF UTERUS      DILATION AND CURETTAGE OF UTERUS WITH HYSTEROSOCPY N/A 2017    Procedure: D&C; IUD REMOVAL;  Surgeon: Elena Fisher MD;  [facility];  Service: Gynecology    EGD      HYSTEROSCOPY      LAPAROSCOPY      (Diagnostic)    LASIK      CO COLONOSCOPY FLX DX W/COLLJ SPEC WHEN PFRMD N/A 2018    Procedure: EGD AND COLONOSCOPY;  Surgeon: Susie Cade MD;  Location[facility]; Service: Gastroenterology     [facility] BODY,CERV,ONE SGMT Bilateral 2021    Procedure: CORPECTOMY SPINE CERVICAL ANTERIOR: C4-7 ACDF with C6 /hemicorpectomy, C4-7 instrumentation and fusion (neuromonitoring); Surgeon: Salima Simmons MD;  [facility];  Service: Neurosurgery    REMOVAL OF INTRAUTERINE DEVICE (IUD)         Summary    Pt presents with odynophagia secondary to recent spine surgery  Otherwise, functional oral and pharyngeal stages of swallowing during evaluation  Pt can likely tolerate regular diet with careful selection of soft foods and liquids to reduce pain when swallowing  Will follow up at a meal as needed  Recommendations:   Diet: regular diet and thin liquids   Meds: as tolerated   Frequent Oral care: 2x/day  Aspiration precautions and compensatory swallowing strategies: none  Other Recommendations/ considerations: Will follow up at a meal       Current Medical Status  Pt is a 41 y o  female who presented to [address] [place] [address]  with cervical spinal stenosis  Pt to OR for corpectomy spine cervical anterior C4-7 2/2/2021  Pt reported pain when swallowing and PA changed diet to Dysphagia 3 and thin liquids  Past medical history:   Please see H&P for details    Special Studies:  N/A    Social/Education/Vocational Hx:  Pt lives at home    Swallow Information   Current Risks for Dysphagia & Aspiration: recent spine surgery  Current Symptoms/Concerns: pain and difficulty while swallowing  Current Diet: soft/level 3 diet and thin liquids   Baseline Diet: regular diet and thin liquids  Takes pills- whole w/ water    Baseline Assessment   Behavior/Cognition: alert  Speech/Language Status: able to participate in conversation and able to follow commands  Patient Positioning: upright in bed     Swallow Mechanism Exam   Facial: symmetrical  Labial: WFL  Lingual: WFL  Velum: unable to visualize  Mandible: adequate ROM  Dentition: adequate  Vocal quality:aphonic but able to produce normal phonation on request   Volitional Cough: strong/productive   Respiratory: RA    Consistencies Assessed and Performance   Consistencies Administered: thin liquids and hard solids- pt seen at lunch- rice, chopped broccoli, lemon ice, thins    Oral Stage: Pt able to feed self  Adequate bolus retrieval of thins from spoon and straw, solids from fork  Pt able to suck from straw- straw preferred bc less painful  Prolonged mastication of solids- pt trying make pieces as small and easy to swallow as possible  Adequate manipulation and transfer of all consistencies  Pharyngeal Stage: Swallows were timely and complete   Pt reported swallowing anything as "uncomfortable," and rice and broccoli were "too hard" and "felt like rocks going down" causing her pain  Less pain with liquids  No coughing or s/s aspiration with any consistencies          Esophageal Concerns: none reported      Results Reviewed with: patient, MD and dietician   Dysphagia Goals: Pt will tolerate regular diet w/ careful selection of softer foods and thin liquids w/ less pain swallowing x72 hours

## 2021-02-03 NOTE — PHYSICAL THERAPY NOTE
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 12]  Degenerative cervical spinal stenosis [M48 02]    PMH:   Past Medical History:   Diagnosis Date    Constipation     Depression     Eczema     GERD (gastroesophageal reflux disease)     Grand mal convulsion ([address] )     X 2 last episode 2009-Sleep deprivation-Resolved with Cpap    Hypertension     Insomnia     Medial meniscus tear 01/12/2015    Migraine     PONV (postoperative nausea and vomiting)     Psoriasis     Seizures (HCC)     Shoulder dislocation, right, sequela 5/1/2018    Sinusitis     Sleep apnea     uses cpap    Tinnitus      LENGTH OF STAY: 1       02/03/21 0900   PT Last Visit   PT Visit Date 02/03/21   Note Type   Note type Evaluation   Pain Assessment   Pain Assessment Tool 0-10   Pain Score 6   Pain Location/Orientation Location: [facility]   Hospital Pain Intervention(s) Ambulation/increased activity;Repositioned   Home Living   Type of [address] [place] [address] Two level;Stairs to enter with rails; Able to live on main level with bedroom/bathroom  (1 vs  2 JUDAH)   Bathroom Shower/Tub Walk-in shower  (handicapped accessible bathroom on 1st floor)   Bathroom Toilet Raised   Bathroom Equipment Grab bars in shower; Shower chair   Additional Comments Ambulates independently without AD at baseline  Prior Function   Level of [place] Independent with ADLs and functional mobility   Lives With Spouse; Family  (2 children)   Receives Help From Family  (aunt will be staying with her to assist as needed)   ADL Assistance Independent   IADLs Independent   Falls in the last 6 months 0   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)   Comments Pt reports she is able to stay in guest bedroom on 1st floor if needed     Restrictions/Precautions   Weight Bearing Precautions Per Order No   Braces or Orthoses C/S Collar   Other Precautions Spinal precautions;Pain;Bed Alarm; Fall Risk   General   Additional Pertinent History s/p C4-7 ACDF with C5/6 Corpec/hemicorpectomy   Family/Caregiver Present No   Cognition   Overall Cognitive Status WFL   Arousal/Participation Cooperative   Attention Within functional limits   Orientation Level Oriented X4   Memory Within functional limits   Following Commands Follows all commands and directions without difficulty   Comments Pt identified by name and   RLE Assessment   RLE Assessment X   Strength RLE   RLE Overall Strength 4-/5  (functionally)   LLE Assessment   LLE Assessment X   Strength LLE   LLE Overall Strength 4-/5  (functionally)   Coordination   Movements are Fluid and Coordinated 0   Coordination and Movement Description on initial stand, (+) tremors in LEs; quickly resolved    Bed Mobility   Supine to Sit 5  Supervision   Additional items HOB elevated; Increased time required;Verbal cues   Sit to Supine 5  Supervision   Additional items HOB elevated; Bedrails; Increased time required;Verbal cues;LE management   Additional Comments verbal cues for spinal precautions   Transfers   Sit to Stand 4  Minimal assistance   Additional items Assist x 1; Increased time required;Verbal cues  (progressed to close supervision)   Stand to Sit 4  Minimal assistance   Additional items Assist x 1; Increased time required;Verbal cues   Additional Comments verbal cues for hand placement and use of RW   Ambulation/Elevation   Gait pattern Decreased foot clearance; Short stride; Excessively slow   Gait Assistance 4  Minimal assist  (progressed to close supervision with increased distance)   Additional items Assist x 1;Verbal cues   Assistive Device Rolling walker   Distance 70` x1   Balance   Static Sitting Fair +   Static Standing Fair -   Dynamic Standing Fair -   Ambulatory Fair -   Endurance Deficit   Endurance Deficit Yes   Endurance Deficit Description limited ambulation distance, fatigue   Activity Tolerance   Activity Tolerance Patient limited by fatigue;Patient limited by pain   Nurse Made Aware Per RN, pt appropriate to evaluate   Assessment   Prognosis Fair   Problem List Decreased strength;Decreased endurance; Impaired balance;Decreased mobility; Decreased safety awareness;Orthopedic restrictions;Pain   Goals   Patient Goals to return home   STG Expiration Date 02/12/21   Short Term Goal #1 Pt will be able to: (1) perform bed mobility with supervision to promote OOB activity (2) perform sit to stand with supervision to decrease burden of care (3) ambulate at least 200` with supervision and least restrictive AD to increase activity tolerance (4) increase standing balance by 1 grade to decrease risk of falls (5) negotiate at least 1 stair with supervision to allow safe access into home   PT Treatment Day 0   Plan   Treatment/Interventions Functional transfer training;LE strengthening/ROM; Therapeutic exercise; Endurance training;Patient/family training;Equipment eval/education; Bed mobility;Gait training;Elevations   PT Frequency 5x/wk   Recommendation   PT Discharge Recommendation Home with skilled therapy  (HHPT and family support)   Equipment Recommended Walker  (RW)   AM-PAC Basic Mobility Inpatient   Turning in Bed Without Bedrails 3   Lying on Back to Sitting on Edge of Flat Bed 3   Moving Bed to Chair 3   Standing Up From Chair 3   Walk in Room 3   Climb 3-5 Stairs 3   Basic Mobility Inpatient Raw Score 18   Basic Mobility Standardized Score 41 05   Barthel Index   Feeding 10   Bathing 0   Grooming Score 0   Dressing Score 5   Bladder Score 10   Bowels Score 10   Toilet Use Score 5   Transfers (Bed/Chair) Score 10   Mobility (Level Surface) Score 10   Stairs Score 0   Barthel Index Score 60       Assessment: Pt is a 41 y o  female seen for PT evaluation s/p admit to [facility] on 2/2/2021 w/ Cervical spinal stenosis  Order placed for PT   Comorbidities affecting pt's physical performance at time of assessment listed above  Personal factors affecting pt at time of IE include: steps to enter environment, multi-level environment, inability to perform IADLs and inability to perform ADLs  Prior to admission, pt was was independent w/ all functional mobility w/ out AD, lived in multi-level home, had 1 v 2 JUDAH (+) railing and lived with , 2 children  Aunt will be staying to assist as needed  Upon evaluation: Pt requires supervision for bed mobility, min A for sit to stand, and min A to close supervision for ambulation with RW   Pt's mobility progresses with increasing distance  (Please find full objective findings from PT assessment regarding body systems outlined above)  Impairments and limitations also listed above, especially due to  weakness, impaired balance, decreased endurance, gait deviations, pain, decreased activity tolerance, decreased safety awareness and fall risk  The following objective measures performed on IE also reveal limitations: Barthel Index 60/100  Pt's clinical presentation is currently unstable/unpredictable seen in pt's presentation of decreased safety awareness, fall risk, significant decline in functional mobility compared to baseline, and pain with mobility  Pt to benefit from continued skilled PT tx while in hospital and upon DC to address deficits as defined above and maximize level of functional mobility  From PT/mobility standpoint, recommendation at time of d/c would be Home PT, home with family support and with rolling walker  Recommend progression of ambulation and initiation of HEP as appropriate        Raya Mary, PT,DPT

## 2021-02-03 NOTE — PLAN OF CARE
Problem: PHYSICAL THERAPY ADULT  Goal: Performs mobility at highest level of function for planned discharge setting  See evaluation for individualized goals  Description: Treatment/Interventions: Functional transfer training, LE strengthening/ROM, Therapeutic exercise, Endurance training, Patient/family training, Equipment eval/education, Bed mobility, Gait training, Elevations  Equipment Recommended: Walker(RW)       See flowsheet documentation for full assessment, interventions and recommendations  Note: Prognosis: Fair  Problem List: Decreased strength, Decreased endurance, Impaired balance, Decreased mobility, Decreased safety awareness, Orthopedic restrictions, Pain  Assessment: Pt is a 41 y o  female seen for PT evaluation s/p admit to [facility] on 2/2/2021 w/ Cervical spinal stenosis  Order placed for PT  Comorbidities affecting pt's physical performance at time of assessment listed above  Personal factors affecting pt at time of IE include: steps to enter environment, multi-level environment, inability to perform IADLs and inability to perform ADLs  Prior to admission, pt was was independent w/ all functional mobility w/ out AD, lived in multi-level home, had 1 v 2 JUDAH (+) railing and lived with , 2 children  Aunt will be staying to assist as needed  Upon evaluation: Pt requires supervision for bed mobility, min A for sit to stand, and min A to close supervision for ambulation with RW   Pt's mobility progresses with increasing distance  (Please find full objective findings from PT assessment regarding body systems outlined above)  Impairments and limitations also listed above, especially due to  weakness, impaired balance, decreased endurance, gait deviations, pain, decreased activity tolerance, decreased safety awareness and fall risk  The following objective measures performed on IE also reveal limitations: Barthel Index 60/100   Pt's clinical presentation is currently unstable/unpredictable seen in pt's presentation of decreased safety awareness, fall risk, significant decline in functional mobility compared to baseline, and pain with mobility  Pt to benefit from continued skilled PT tx while in hospital and upon DC to address deficits as defined above and maximize level of functional mobility  From PT/mobility standpoint, recommendation at time of d/c would be Home PT, home with family support and with rolling walker  Recommend progression of ambulation and initiation of HEP as appropriate  PT Discharge Recommendation: Home with skilled therapy(HHPT and family support)          See flowsheet documentation for full assessment

## 2021-02-03 NOTE — OCCUPATIONAL THERAPY NOTE
Occupational Therapy Evaluation     Patient Name: Abril Johnson  JUSLR'P Date: 2/3/2021  Problem List  Principal Problem:    Cervical spinal stenosis    Past Medical History  Past Medical History:   Diagnosis Date    Constipation     Depression     Eczema     GERD (gastroesophageal reflux disease)     Grand mal convulsion ([address] )     X 2 last episode -Sleep deprivation-Resolved with Cpap    Hypertension     Insomnia     Medial meniscus tear 2015    Migraine     PONV (postoperative nausea and vomiting)     Psoriasis     Seizures (HCC)     Shoulder dislocation, right, sequela 2018    Sinusitis     Sleep apnea     uses cpap    Tinnitus      Past Surgical History  Past Surgical History:   Procedure Laterality Date     SECTION   and     COLONOSCOPY      DILATION AND CURETTAGE OF UTERUS      DILATION AND CURETTAGE OF UTERUS WITH HYSTEROSOCPY N/A 2017    Procedure: D&C; IUD REMOVAL;  Surgeon: Jenny Salinas MD;  [facility]: [facility];  Service: Gynecology    EGD      HYSTEROSCOPY      LAPAROSCOPY      (Diagnostic)    LASIK      [place] COLONOSCOPY FLX DX W/COLLJ SPEC WHEN PFRMD N/A 2018    Procedure: EGD AND COLONOSCOPY;  Surgeon: Estevan Mcdonald MD;  [facility]: [facility]; Service: Gastroenterology    REMOVAL OF INTRAUTERINE DEVICE (IUD)           21 0838   OT Last Visit   OT Visit Date 21   Note Type   Note type Evaluation   Restrictions/Precautions   Weight Bearing Precautions Per Order No   Braces or Orthoses C/S Collar   Other Precautions Spinal precautions; Bed Alarm; Fall Risk;Pain;Multiple lines   Pain Assessment   Pain Assessment Tool 0-10   Pain Score 6   Pain Location/Orientation Location: [facility]   Hospital Pain Intervention(s) Ambulation/increased activity;Repositioned   Home Living   Type of [address] [place] [address] Two level;Stairs to enter with rails  (1-2 JUDAH)   Bathroom Shower/Tub Walk-in shower  (handicap accessible bathroom on first floor tub shower)   Bathroom Toilet Raised   Bathroom Equipment Grab bars in shower; Shower chair;Grab bars around toilet   [address] Other (Comment)  (no AD for functional mobility used at baseline)   Prior Function   Level of [place] Independent with ADLs and functional mobility   Lives With Spouse; Family  (15 and 6 y o )   Receives Help From Family   ADL Assistance Independent   IADLs Independent   Falls in the last 6 months 0   Vocational Full time employment  (owns [employer])   [address] [facility] staying over to help her s/p surgery  Lives with 2 kids and   Pt reports previously independent with all ADLs/IADLs   Lifestyle   Autonomy Pt reports INDEP with ADLs, IADLs, and functional mobiliy   Reciprocal Relationships Pt reports having a supportive family   Service to Others 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 - hasn't been cleaning recently due to neck/back issues   Psychosocial   Psychosocial (WDL) WDL   Subjective   Subjective Pt is pleasant and cooperative   ADL   Eating Assistance 6  Modified independent   Grooming Assistance 5  Supervision/Setup   UB Bathing Assistance 4  Minimal Assistance   LB Bathing Assistance 4  Minimal Assistance   UB Dressing Assistance 4  Minimal Assistance   LB Dressing Assistance 4  [address]  4  Minimal Assistance   Additional Comments Assist levels as outlined above are based on functional assessment of performance skills and deficits  Bed Mobility   Supine to Sit 5  Supervision   Additional items HOB elevated; Increased time required;Verbal cues  (VCs for spinal precautions)   Sit to Supine 5  Supervision   Additional items HOB elevated; Increased time required;Verbal cues   Additional Comments Pt seated upright in bed with HOB elevated and vista collar secured at start of session   Pt requested to return to bed at end of session, all needs met, call bell within reach  Transfers   Sit to Stand 4  Minimal assistance   Additional items Assist x 1; Increased time required;Verbal cues  (progressed to SBA at end of session)   Stand to Sit 4  Minimal assistance   Additional items Assist x 1;Verbal cues; Increased time required  (to control descent; progressed to SBA by end of session)   Toilet transfer 4  Minimal assistance   Additional items Assist x 1; Increased time required;Verbal cues;Standard toilet  (VCs for safe hand placement and use of grab bars)   Additional Comments Pt used RW for UE support and required VCs for safe hand placement during transfers   Functional Mobility   Functional Mobility 4  Minimal assistance   Additional Comments Min A x1 using RW for initial walk to bathroom  Pt then progressed to SBA using RW in hallway, required 1 standing rest break due to fatigue  Educated pt on importance of being aware of when body needs a rest break to prevent falls  Pt verbalized understanding  Balance   Static Sitting Fair   Static Standing Fair -   Ambulatory Poor +   Activity Tolerance   Activity Tolerance Patient limited by pain   Medical Staff Made Aware care coordination with PT Ambrocio Cai with LINNETTE Marques Jeff Assessment   RUE Assessment WFL  (grossly AROM WFL)   LUE Assessment   LUE Assessment WFL  (grossly AROM WFL)   Hand Function   Gross Motor Coordination Functional   Fine Motor Coordination Functional   Sensation   Light Touch No apparent deficits   Additional Comments denies numbness/tingling   Vision-Basic Assessment   Current Vision Wears glasses for distance only   Patient Visual Report   (denies acute visual changes)   Vision - Complex Assessment   Acuity Able to read clock/calendar on wall without difficulty; Able to read employee name badge without difficulty   Cognition   Overall Cognitive Status [facility]   Arousal/Participation Alert; Cooperative   Attention Within functional limits   Orientation Level Oriented X4   Memory Within functional limits   Following Commands Follows all commands and directions without difficulty   Comments Pt identified by full name and birthdate  Pt able to state 3/3 spinal precautions at end of the session  Assessment   Limitation Decreased ADL status; Decreased UE strength;Decreased endurance;Decreased self-care trans;Decreased high-level ADLs   Assessment Pt is a 41 y o  female seen for OT evaluation (time 3516-6402) s/p [facility]2021 w/ Cervical spinal stenosis  Comorbidities affecting pt's functional performance at time of assessment include: Degenerative cervical spinal stenosis, Radiculopathy, cervical region, Herniation of intervertebral disc at C5-C6 level, Cervical spondylosis,  Spondylogenic compression of cervical spinal cord, insomnia, obesity, primary generalized epilepsy, HTN, KHADAR  Evaluation required a review of medical and/ or therapy records and extensive additional review of physical, cognitive, or psychosocial history related to current functional performance    Personal factors affecting pt at time of IE include:steps to enter environment, difficulty performing ADLS, difficulty performing IADLS  and health management   Prior to admission, Pt reports INDEP with ADLs, IADLs, and functional mobiliy  Upon evaluation: Based on functional assessment, pt requires Min A for UB and LB ADLs, supervision for grooming, Min A for toileting, supervision for supine<>sit transfer (for spinal precaution education), Min A x 1 progressing to SBA using RW for STS transfers, and Min A x 1 progressing to SBA using RW for functional mobility household distances 2* the following deficits impacting occupational performance: weakness, decreased strength, decreased balance, decreased tolerance, decreased safety awareness, increased pain, orthopedic restrictions and decreased coping skills  Cognition appears to be [facility] and vision is [facility] - please refer to flowsheet above for details   Pt to benefit from continued skilled OT tx while in the hospital to address deficits as defined above and maximize level of functional independence w ADL's and functional mobility  Occupational Performance areas to address include: grooming, bathing/shower, toilet hygiene, dressing, medication management, socialization, health maintenance, functional mobility, cleaning, household maintenance, job performance/volunteering and social participation  From OT standpoint, recommendation at time of d/c would be home with family support, home OT and supervision for showers while seated on shower chair  Goals   Patient Goals to return home   Plan   Treatment Interventions ADL retraining;Functional transfer training;UE strengthening/ROM; Endurance training;Patient/family training;Equipment evaluation/education; Compensatory technique education;Continued evaluation; Energy conservation; Activityengagement   Goal Expiration Date 02/13/21   OT Frequency 2-3x/wk   Recommendation   OT Discharge Recommendation Home with skilled therapy  (home OT )   OT - OK to Discharge   (once medically cleared)   Additional Comments  Pt required assistance for shower transfers, + assist for donning/doffing c-spine collars   Barthel Index   Feeding 10   Bathing 0   Grooming Score 0   Dressing Score 5   Bladder Score 10   Bowels Score 10   Toilet Use Score 5   Transfers (Bed/Chair) Score 10     Goals to be met within 10 days:    Pt will complete grooming/oral hygiene tasks Mod I while standing at sink    Pt will complete UB bathing/dressing with supervision after set-up while seated    Pt will complete LB bathing/dressing with supervision after set-up while seated     Pt will improve functional activity tolerance while standing at sink to 10+ minutes in order to increase safety and independence during functional transfers and ADL tasks      Pt will improve dynamic sitting/standing balance to good to increase safety and independence during functional transfers and ADL and decrease risk for falls  Pt will increase independence during STS transfers with least restrictive AD Mod I     Pt will complete transfer to raised toilet with grab bars Mod I     Pt will complete toileting tasks (clothing management up/down, hygiene) Mod I     Pt will identify and implement at least 3 healthy coping strategies to increase participation in meaningful activities and decrease risk for readmission  Pt will independently zac carlos while demonstrating 100% compliance of 3/3 spinal precautions      Violeta Hernandez, OTR/L

## 2021-02-03 NOTE — PROGRESS NOTES
Progress Note - Neurosurgery   Alexandria Blocker 41 y o  female MRN: [id number]  Unit/Bed#: S -01 Encounter: [id number]                Assessment:  1  S/P#1 day from  C4-7 ACDF with C5/6  Corpec/hemicorpectomy  2   history of myelopathy  3   spondylitic compression of cervical spinal cord  4   depression  5   hypertension        Plan:   · Exam: Patient Appears in pain on her left side shoulder blade, stabbing, otherwise A&OX3, EOMI 3 mm conjugate bilateral  Strength 5/5 bilaterally, slightly decreased right , sensation to LT intact bilat  DTR 2+ without clonus bilaterally  [place] collar on   NENA drains 57 ml/18H  Postop cervical spine x-rays pending  ·  imagings reviewed personally and by attendings as follows:  · Post op upright cervical spine x rays in brace ordered -pending  · Pain control:  1   acetaminophen 975 mg oral Q 8 H scheduled pain  2  Gabapentin 300 mg oral Q 8 H neuropathy pain  3   oxycodone 10 mg oral q 4h p r n  severe pain  4   oxycodone 5 mg oral q 4h p r n  moderate pain  5   morphine 2 mg IV q 2h p r n  breakthrough pain  6   methocarbamol 750 mg oral q 6h scheduled muscle spasm  · DVT ppx: SCDs bilateral legs  Pharm DVT ppx- pending NENA drain removed  · Activity: as tolerated  · PT/OT evaluation & treatment  · Brace:  Wear S[place] cervical collar all the time  · Whit collar for shower  · Medical Mx:  1   hypertension on hydrochlorothiazide 50 mg oral per day  2   constipation on magnesium hydroxide, Colace  3   depression- on Seroquel  · Neurocheck: routine  ·  overall, patient reports improvement with her right arm radiculopathy, except  Slight residual paresthesia in her fingertips  Complains of left shoulder blade pain  She was able to swallow without issues  No bowel movement but voided urine and no dysuria  neurological nonfocal  Was out of bed to the bathroom, subjective weakness in the legs  Postop x-rays pending   Continue monitoring,  Encourage out of bed to the recliner, PT/OT and pain control  Consider discharging tomorrow once her pain is controlled and NENA drains minimal out  Call 4 question or concern        Subjective/Objective   Chief Complaint: "  left Shoulder blade pain" /1st pop  ACDF Progress note    Subjective:  Patient reports moderate to severe left shoulder blade pain,  Improved right upper extremity radicular symptoms with the exception the exceptionslight residual paresthesia and finger tips  She was out of bed to the bathroom reports slight shaky and weakness in the leg extremities  She ate  her dinner last night and denies any dysphagia/ odynophagia  No change of voice  Denies any drainage or discharge from the incision site  Denies fever, chills, rigors, cough or chest pain  She is wearing [place] cervical collar  NENA drain in place  Objective: patient probe supine propped up in bed supine,  Appears in pain,  Otherwise communicates well  Wearing [place] vista cervical collar    I/O       02/01 0701 - 02/02 0700 02/02 0701 - 02/03 0700 02/03 0701 - 02/04 0700    P  O   240     I V  (mL/kg)  3210 (32 5)     Total Intake(mL/kg)  3450 (34 9)     Urine (mL/kg/hr)  850     Drains  32     Blood  200     Total Output  1082     Net  +2368            Unmeasured Urine Occurrence  3 x           Invasive Devices     Peripheral Intravenous Line            Peripheral IV 02/02/21 Left Hand less than 1 day    Peripheral IV 02/02/21 Right Hand less than 1 day          Drain            Closed/Suction Drain Medial Neck less than 1 day                Physical Exam:  Vitals: Blood pressure 157/80, pulse 87, temperature 98 1 °F (36 7 °C), temperature source Oral, resp  rate 17, height 5' 7 75" (1 721 m), weight 98 9 kg (218 lb), SpO2 92 %  ,Body mass index is 33 39 kg/m²        General appearance: alert, appears stated age, cooperative and no distress  Head: Normocephalic, without obvious abnormality, atraumatic  Eyes: EOMI, PERRL  Neck:  Left anterior cervical incision dressing clean and dry without drainage  [place] cervical collar on NENA drain in place output 57 mL/18 H  Lungs: non labored breathing  Heart: regular heart rate  Neurologic:   Mental status: Alert, oriented x3, thought content appropriate  Cranial nerves: grossly intact (Cranial nerves II-XII)  Sensory: normal to light touch throughout  Motor: moving all extremities without focal weakness, strength is 5/5 bilaterally  Reflexes: 2+ and symmetric  No clonus symmetric  Coordination: finger to nose normal bilaterally, no drift bilaterally      Lab Results:        Invalid input(s):  EOSPCT  Results from last 7 days   Lab Units 02/03/21  0523   POTASSIUM mmol/L 3 8   CHLORIDE mmol/L 105   CO2 mmol/L 25   BUN mg/dL 11   CREATININE mg/dL 0 72   CALCIUM mg/dL 8 7                 No results found for: TROPONINT  ABG:No results found for: PHART, CQX2TGM, PO2ART, AFG7MGO, T7ZNXXCI, BEART, SOURCE    Imaging Studies: I have personally reviewed pertinent reports  and I have personally reviewed pertinent films in PACS    EKG, Pathology, and Other Studies: I have personally reviewed pertinent reports        VTE Pharmacologic Prophylaxis:  Pending NENA drain removal    VTE Mechanical Prophylaxis: sequential compression device

## 2021-02-04 ENCOUNTER — TELEPHONE (OUTPATIENT)
Dept: NEUROSURGERY | Facility: CLINIC | Age: 41
End: 2021-02-04

## 2021-02-04 VITALS
DIASTOLIC BLOOD PRESSURE: 75 MMHG | TEMPERATURE: 98.5 F | BODY MASS INDEX: 33.04 KG/M2 | SYSTOLIC BLOOD PRESSURE: 139 MMHG | RESPIRATION RATE: 16 BRPM | OXYGEN SATURATION: 95 % | HEART RATE: 69 BPM | HEIGHT: 68 IN | WEIGHT: 218 LBS

## 2021-02-04 LAB
ANION GAP SERPL CALCULATED.3IONS-SCNC: 8 MMOL/L (ref 4–13)
BUN SERPL-MCNC: 11 MG/DL (ref 5–25)
CALCIUM SERPL-MCNC: 8.6 MG/DL (ref 8.3–10.1)
CHLORIDE SERPL-SCNC: 105 MMOL/L (ref 100–108)
CO2 SERPL-SCNC: 30 MMOL/L (ref 21–32)
CREAT SERPL-MCNC: 0.72 MG/DL (ref 0.6–1.3)
GFR SERPL CREATININE-BSD FRML MDRD: 104 ML/MIN/1.73SQ M
GLUCOSE SERPL-MCNC: 98 MG/DL (ref 65–140)
POTASSIUM SERPL-SCNC: 3.3 MMOL/L (ref 3.5–5.3)
SODIUM SERPL-SCNC: 143 MMOL/L (ref 136–145)

## 2021-02-04 PROCEDURE — 99024 POSTOP FOLLOW-UP VISIT: CPT | Performed by: PHYSICIAN ASSISTANT

## 2021-02-04 PROCEDURE — 80048 BASIC METABOLIC PNL TOTAL CA: CPT | Performed by: NEUROLOGICAL SURGERY

## 2021-02-04 PROCEDURE — NC001 PR NO CHARGE: Performed by: PHYSICIAN ASSISTANT

## 2021-02-04 RX ORDER — OXYCODONE HYDROCHLORIDE 10 MG/1
10 TABLET ORAL EVERY 4 HOURS PRN
Qty: 30 TABLET | Refills: 0 | Status: SHIPPED | OUTPATIENT
Start: 2021-02-04 | End: 2021-02-12 | Stop reason: SDUPTHER

## 2021-02-04 RX ORDER — SENNOSIDES 8.6 MG
8.6 TABLET ORAL DAILY
Qty: 10 TABLET | Refills: 0 | Status: SHIPPED | OUTPATIENT
Start: 2021-02-05

## 2021-02-04 RX ORDER — CLINDAMYCIN HYDROCHLORIDE 300 MG/1
300 CAPSULE ORAL 3 TIMES DAILY
Qty: 39 CAPSULE | Refills: 0 | Status: SHIPPED | OUTPATIENT
Start: 2021-02-04 | End: 2021-02-17

## 2021-02-04 RX ORDER — DOCUSATE SODIUM 100 MG/1
100 CAPSULE, LIQUID FILLED ORAL 2 TIMES DAILY
Qty: 20 CAPSULE | Refills: 0 | Status: SHIPPED | OUTPATIENT
Start: 2021-02-04

## 2021-02-04 RX ORDER — ONDANSETRON 4 MG/1
4 TABLET, FILM COATED ORAL EVERY 8 HOURS PRN
Qty: 20 TABLET | Refills: 0 | Status: SHIPPED | OUTPATIENT
Start: 2021-02-04 | End: 2021-02-08 | Stop reason: SDUPTHER

## 2021-02-04 RX ADMIN — ACETAMINOPHEN 975 MG: 325 TABLET, FILM COATED ORAL at 08:13

## 2021-02-04 RX ADMIN — OXYCODONE HYDROCHLORIDE 10 MG: 10 TABLET ORAL at 11:18

## 2021-02-04 RX ADMIN — DOCUSATE SODIUM 100 MG: 100 CAPSULE, LIQUID FILLED ORAL at 08:14

## 2021-02-04 RX ADMIN — GABAPENTIN 300 MG: 300 CAPSULE ORAL at 08:13

## 2021-02-04 RX ADMIN — METHOCARBAMOL TABLETS 750 MG: 750 TABLET, COATED ORAL at 11:18

## 2021-02-04 RX ADMIN — SENNOSIDES 8.6 MG: 8.6 TABLET ORAL at 08:14

## 2021-02-04 RX ADMIN — ACETAMINOPHEN 975 MG: 325 TABLET, FILM COATED ORAL at 01:06

## 2021-02-04 RX ADMIN — METHOCARBAMOL TABLETS 750 MG: 750 TABLET, COATED ORAL at 06:12

## 2021-02-04 RX ADMIN — METHOCARBAMOL TABLETS 750 MG: 750 TABLET, COATED ORAL at 01:00

## 2021-02-04 RX ADMIN — OXYCODONE HYDROCHLORIDE 10 MG: 10 TABLET ORAL at 06:11

## 2021-02-04 NOTE — DISCHARGE SUMMARY
Discharge Summary - NeuroSurgery   Lonzell Pallas 41 y o  female MRN: [id number]  Unit/Bed#: S -01 Encounter: [id number]    Admission Date: 2/2/2021  Admission Orders (From admission, onward)     Ordered        02/02/21 1629  INPATIENT ADMISSION  Once                      Discharge Date: 2/4/2021    Admitting Diagnosis: Radiculopathy, cervical region [M54 12]  Spondylogenic compression of cervical spinal cord [M47 12]  Degenerative cervical spinal stenosis [M48 02]    Discharge Diagnosis: same    Resolved Problems  Date Reviewed: 2/4/2021    None          Attending: Iza Ryan Physician(s): None    Procedures Performed:  C4-7 ACDF with C5-6 corpectomy/lisa corpectomy    Pathology: None    Hospital Course:  Patient is a 41years old  Pleasant woman with history of neck stiffness,  Radicular symptoms in the upper extremities, right side more than left side associated with paresthesia, numbness and weakness in   On the right  Had also loss of fine motor control and gait disturbance  preop MRI of cervical spine demonstrates severe multilevel cervical spinal stenosis with spinal cord compression/involvement of C4-7 level secondary to large caudally extruded disc with associated cord signal change  Canal diameter reduced to less than 6 mm  Patient tried conservative treatment but her symptoms progressed and opted to have decompressive procedure with fusion  on 02/02/2021,  A patient underwent bilateral C4-7 ACDF with C5-6 corpectomy/lisa corpectomy,  NENA drain placed and patient put on [address] cervical collar  After the procedure she was monitored in PACU  Following recovery patient was transferred to the floor  During the 2 days post op hospital stay, her cervical incisional and left shoulder blade pain was controlled with different pain medications including IV morphine for breakthrough pain   Her pain level 6/10  NENA drain monitored output was 25 is mL/ 24 H and removed, site secured with suture and dressing was clean and dry  Patient was out of bed and walking around doing physical therapy wearing [place] collar all the time  Patient noticed improvement with her radicular symptoms of right arm and also  strength remarkably improved  Postop x-rays demonstrates stable hardware fusion from C4-7 and anatomical cervical spine  PT recommends home with ambulatory skilled outpatient physical therapy  order placed  Pain medication and clindamycin for 14 days prophylaxis were sent to her pharmacy  Overall, patient is stable and discharged home  Condition at Discharge: stable     Discharge instructions/Information to patient and family:   See after visit summary for information provided to patient and family  Provisions for Follow-Up Care:  See after visit summary for information related to follow-up care and any pertinent home health orders  Disposition: Home          Planned Readmission:No    Discharge Statement   I spent 30  minutes discharging the patient  This time was spent on the day of discharge  I had direct contact with the patient on the day of discharge  Additional documentation is required if more than 30 minutes were spent on discharge  Discharge Medications:  See after visit summary for reconciled discharge medications provided to patient and family

## 2021-02-04 NOTE — PLAN OF CARE
Problem: Prexisting or High Potential for Compromised Skin Integrity  Goal: Skin integrity is maintained or improved  Description: INTERVENTIONS:  - Identify patients at risk for skin breakdown  - Assess and monitor skin integrity  - Assess and monitor nutrition and hydration status  - Monitor labs   - Assess for incontinence   - Turn and reposition patient  - Assist with mobility/ambulation  - Relieve pressure over bony prominences  - Avoid friction and shearing  - Provide appropriate hygiene as needed including keeping skin clean and dry  - Evaluate need for skin moisturizer/barrier cream  - Collaborate with interdisciplinary team   - Patient/family teaching  - Consider wound care consult   Outcome: Progressing     Problem: Nutrition/Hydration-ADULT  Goal: Nutrient/Hydration intake appropriate for improving, restoring or maintaining nutritional needs  Description: Monitor and assess patient's nutrition/hydration status for malnutrition  Collaborate with interdisciplinary team and initiate plan and interventions as ordered  Monitor patient's weight and dietary intake as ordered or per policy  Utilize nutrition screening tool and intervene as necessary  Determine patient's food preferences and provide high-protein, high-caloric foods as appropriate       INTERVENTIONS:  - Monitor oral intake, urinary output, labs, and treatment plans  - Assess nutrition and hydration status and recommend course of action  - Evaluate amount of meals eaten  - Assist patient with eating if necessary   - Allow adequate time for meals  - Recommend/ encourage appropriate diets, oral nutritional supplements, and vitamin/mineral supplements  - Order, calculate, and assess calorie counts as needed  - Recommend, monitor, and adjust tube feedings and TPN/PPN based on assessed needs  - Assess need for intravenous fluids  - Provide specific nutrition/hydration education as appropriate  - Include patient/family/caregiver in decisions related to nutrition  Outcome: Progressing     Problem: PAIN - ADULT  Goal: Verbalizes/displays adequate comfort level or baseline comfort level  Description: Interventions:  - Encourage patient to monitor pain and request assistance  - Assess pain using appropriate pain scale  - Administer analgesics based on type and severity of pain and evaluate response  - Implement non-pharmacological measures as appropriate and evaluate response  - Consider cultural and social influences on pain and pain management  - Notify physician/advanced practitioner if interventions unsuccessful or patient reports new pain  Outcome: Progressing     Problem: INFECTION - ADULT  Goal: Absence or prevention of progression during hospitalization  Description: INTERVENTIONS:  - Assess and monitor for signs and symptoms of infection  - Monitor lab/diagnostic results  - Monitor all insertion sites, i e  indwelling lines, tubes, and drains  - Monitor endotracheal if appropriate and nasal secretions for changes in amount and color  - [place] appropriate cooling/warming therapies per order  - Administer medications as ordered  - Instruct and encourage patient and family to use good hand hygiene technique  - Identify and instruct in appropriate isolation precautions for identified infection/condition  Outcome: Progressing

## 2021-02-04 NOTE — TELEPHONE ENCOUNTER
2/5/21: PATIENT DISCHARGED HOME    2/4/21: [address]    TODAYS DATE: 2/4/21  EMAIL FROM PA: DAYNE  DATE OF EMAIL: 2/3/21    POV  NURSE - 2 WEEK  2/17/21 / 11:30 / Lydia Prost - 6 WEEK  3/17/21 / 11:30 / Gem Congress    IMAGING: XRAY C-SPINE

## 2021-02-04 NOTE — PROGRESS NOTES
Progress Note - Neurosurgery   Deedee Lund 41 y o  female MRN: [id number]  Unit/Bed#: S -01 Encounter: [id number]          Assessment:  1  S/P#2 day-C4-7 ACDF with C5/6 Corpec/hemicorpectomy  2   history of myelopathy  3   spondylitic compression of cervical spinal cord  4   depression  5   hypertension           Plan:   § Exam: A&OX3, EOMI 3 mm conj bilat, Laura, finger to nose tests normala nd without drift bilat, strength 5/5, sensation to LT intact bilat, DTR 2+ no clonus bilat  Dressing clean and dry  [place] collar on  NENA OP=25ml/24h  Consider removing  today  §  imagings reviewed personally and by attendings as follows:  Malika Reusing op upright cervical spine X-rays in brace demonstrate  Expected postop changes and stable hardware  With anatomical alignment of cervical spine  ? Pain control:  1   acetaminophen 975 mg oral Q 8 H scheduled pain  2  Gabapentin 300 mg oral Q 8 H neuropathy pain  3   oxycodone 10 mg oral q 4h p r n  severe pain  4   oxycodone 5 mg oral q 4h p r n  moderate pain  5   morphine 2 mg IV q 2h p r n  breakthrough pain  6   methocarbamol 750 mg oral q 6h scheduled muscle spasm  ? DVT ppx: SCDs bilateral legs  Pharm DVT ppx- pending NENA drain removed  ? Activity: as tolerated  ? PT/OT evaluation & treatment  ? Brace:  Wear [place] cervical collar all the time  ? [place] collar for shower  ? Medical Mx:  1   hypertension on hydrochlorothiazide 50 mg oral per day  2   constipation on magnesium hydroxide, Colace  3   depression- on Seroquel  ? Neurocheck: routine  ? NENA drains output 25 mL/24 H- consider removing  ? Patient is doing fine, C/O moderate incisional and shoulder pain, throat dryness  Noticed remarkable Improvement with her right arm radicular symptoms  Out of bed doing physical therapy denies any gait instability or tendency to fall  She does not require walker/ cane for ambulation  Informed Solange Cooley RN to give her lozenges    PT recommends home with skilled on physical therapy and family support  Consider discharging in the afternoon  Subjective/Objective   Chief Complaint: " Incisonal and throat pain and dryness"/2nd postop day  Progress note    Subjective:  Patient reports incisional, shoulder and sore throat with dryness  Estimated pain level 6/10  She was out of bed and walk around doing physical therapy without any gait instability or tendency to fall  Does not require walker or cane for ambulation  She noticed remarkable improvement with her right upper extremity radicular symptoms,  She states her  strength came back  Objective:  Patient  Supine in bed  wearing [place] collar  No pain distress  I/O       02/02 0701 - 02/03 0700 02/03 0701 - 02/04 0700 02/04 0701 - 02/05 0700    P  O  240 240     I V  (mL/kg) 3210 (32 5)      Total Intake(mL/kg) 3450 (34 9) 240 (2 4)     Urine (mL/kg/hr) 850      Drains 32 105     Blood 200      Total Output 1082 105     Net +2368 +135            Unmeasured Urine Occurrence 3 x            Invasive Devices     Peripheral Intravenous Line            Peripheral IV 02/02/21 Left Hand 1 day          Drain            Closed/Suction Drain Medial Neck 1 day                Physical Exam:  Vitals: Blood pressure 121/59, pulse 74, temperature 98 3 °F (36 8 °C), temperature source Oral, resp  rate 18, height 5' 7 75" (1 721 m), weight 98 9 kg (218 lb), SpO2 94 %  ,Body mass index is 33 39 kg/m²  General appearance: alert, appears stated age, cooperative and no distress  Head: Normocephalic, without obvious abnormality, atraumatic  Eyes: EOMI, 3 mm conjugate bilaterally    Neck:  Dressing clean and dry;  NENA drain in place 25 mL/24 H, [place] collar on  Lungs: non labored breathing  Heart: regular heart rate  Neurologic:   Mental status: Alert, oriented x3, thought content appropriate  Cranial nerves: grossly intact (Cranial nerves II-XII)  Sensory: normal to  Light touch throughout  Motor: moving all extremities without focal weakness,  Strength is 5/5 bilaterally  Reflexes: 2+ and symmetric  No clonus bilaterally  Coordination: finger to nose normal bilaterally, no drift bilaterally      Lab Results:        Invalid input(s):  EOSPCT  Results from last 7 days   Lab Units 02/04/21  0601 02/03/21  0523   POTASSIUM mmol/L 3 3* 3 8   CHLORIDE mmol/L 105 105   CO2 mmol/L 30 25   BUN mg/dL 11 11   CREATININE mg/dL 0 72 0 72   CALCIUM mg/dL 8 6 8 7                 No results found for: TROPONINT  ABG:No results found for: PHART, KJJ5EOX, PO2ART, YDR7FEY, L2OXUOGP, BEART, SOURCE    Imaging Studies: I have personally reviewed pertinent reports  and I have personally reviewed pertinent films in PACS    EKG, Pathology, and Other Studies: I have personally reviewed pertinent reports        VTE Pharmacologic Prophylaxis:  Pending NENA drain removal    VTE Mechanical Prophylaxis: sequential compression device

## 2021-02-04 NOTE — DISCHARGE INSTRUCTIONS
Discharge instructions  Anterior cervical decompression and fixation/fusion      Surgical incisional care:   Keep incision clean and dry  Avoid applying creams, lotion or antiseptic to incision area   Allow steri-strips to fall off  If still in place at two week postoperative visit, we will remove them   Check the wound daily  If the incision becomes red, swollen, tender, warm, or has increased drainage please notify physician immediately   May shower 3 days after surgery, but do not soak in a tub and no swimming  o Use mild antimicrobial soap and water with a clean washcloth  Pat incision dry after showering and a clean towel daily   Cervical [place] collar to be worn at all times except for showering  Change from [place] (grey) collar to [place] collar prior to showering  Incision may be cleaned with water and a mild antimicrobial soap using a clean washcloth  Incision is to be gently patted dry with a clean towel  Once dry, collar should be changed back to a [place] (grey) collar with clean pads in place   Wash collar pads with mild soap and water  They are to be laid flat to dry on a clean towel  Recommend changing every 1-2 days   Please refer to VISTA collar instructions for further details  Activity Restrictions:   No heavy lifting greater than 5 - 10lbs  No strenuous activities   May walk as tolerated  Encourage at least 4 short walks per day   No driving while requiring cervical collar, anticipated six weeks   No significant neck movement   Diet: consider soft minced food with gravy  Recommend small bites with sips of water between  Postoperative medication:   Take pain medications to relieve incision pain, and muscle relaxants to prevent spasms as directed  Please see after visit summary (AVS) for details   Take over the counter stool softeners such as colace or senna-s to avoid constipation while on narcotics  Intake water and fiber intake      Do not take ibuprofen, Naproxen/Aleve or any NSAID until cleared by surgeon  May take Tylenol instead   If taking Coumadin, Aspirin, or Plavix, you may resume these medications when cleared by Neurosurgery  Follow-up as scheduled for a 2 week incision check  Follow-up 6 weeks after surgery with a repeat cervical spine upright x-rays to be completed prior to visit  **Please notify MD immediately if you experience a fever of 101F, have increased neck or arm pain, new numbness and/or weakness in your arms/hands, difficulty swallowing or breathing especially while lying down, numbness or weakness in your legs  **

## 2021-02-04 NOTE — CASE MANAGEMENT
Cm met with pt to review DCP and offer VNA as the care team was recommending this for her  Pt states she does not feel as though she needs VNA and reports she has a lot of assistance at home with her friends and family  Pt has no further cm needs at this time

## 2021-02-05 ENCOUNTER — TELEPHONE (OUTPATIENT)
Dept: FAMILY MEDICINE CLINIC | Facility: CLINIC | Age: 41
End: 2021-02-05

## 2021-02-05 ENCOUNTER — TRANSITIONAL CARE MANAGEMENT (OUTPATIENT)
Dept: FAMILY MEDICINE CLINIC | Facility: CLINIC | Age: 41
End: 2021-02-05

## 2021-02-05 DIAGNOSIS — M25.511 ACUTE PAIN OF RIGHT SHOULDER: ICD-10-CM

## 2021-02-05 RX ORDER — LIDOCAINE 50 MG/G
1 PATCH TOPICAL DAILY
Qty: 30 PATCH | Refills: 0 | Status: SHIPPED | OUTPATIENT
Start: 2021-02-05 | End: 2021-04-12

## 2021-02-05 NOTE — TELEPHONE ENCOUNTER
Spoke with patient to see how she is doing after surgery  Patient reports she is doing well overall and denies any incisional issues or fevers  Patient is able to ambulate around the house and complete ADLs  Educated the patient about the importance of preventing blood clots and provided measures how to prevent them  Patient has a raspy voice over the phone and does report having some throat soreness  She is able to drink liquids and eat soft foods without difficult-will continue to monitor but advised to call our office/report to ED with any worsening symptoms  She said that she already reached out to PCP for lidocaine patches-informed patient if they cannot fill I could s/w provider at our office regarding this for her  Patient has moved her bowels since the surgery  Encouraged patient to take an over the counter stool softener, if she is taking narcotic pain medication  Encouraged fiber intake and fluids  Patient reports that she drank prune juice and was able to have BM  Reviewed incision care with the patient  Advised that after three days she may take a shower and gently wash the surgical site with soap and water  Use clean wash cloth, towels, and clothing  Do not submerge in water until cleared by the surgeon  Do not apply any creams, ointments, or lotions to the site  Patient is aware to call the office if any redness, swelling, drainage, dehiscence of incision, or fever >100 F occurs  Patient is aware to call the office if any concerns or questions may arise  Reminded patient of her upcoming appointments with the date/time/location  Patient was appreciative for the call

## 2021-02-05 NOTE — TELEPHONE ENCOUNTER
Pt called she said she had used lidocaine patches before and they would help her right now  I dont see them on her list  Pt uses [facility] in [place]  pls advise if ok

## 2021-02-08 ENCOUNTER — OFFICE VISIT (OUTPATIENT)
Dept: FAMILY MEDICINE CLINIC | Facility: CLINIC | Age: 41
End: 2021-02-08
Payer: COMMERCIAL

## 2021-02-08 VITALS — SYSTOLIC BLOOD PRESSURE: 121 MMHG | WEIGHT: 217 LBS | BODY MASS INDEX: 33.24 KG/M2 | DIASTOLIC BLOOD PRESSURE: 65 MMHG

## 2021-02-08 DIAGNOSIS — F51.01 PRIMARY INSOMNIA: ICD-10-CM

## 2021-02-08 DIAGNOSIS — K21.9 GASTROESOPHAGEAL REFLUX DISEASE WITHOUT ESOPHAGITIS: ICD-10-CM

## 2021-02-08 DIAGNOSIS — I10 ESSENTIAL HYPERTENSION: ICD-10-CM

## 2021-02-08 DIAGNOSIS — M54.12 CERVICAL RADICULOPATHY: ICD-10-CM

## 2021-02-08 DIAGNOSIS — Z98.1 STATUS POST CERVICAL SPINAL FUSION: ICD-10-CM

## 2021-02-08 DIAGNOSIS — Z98.890 S/P CERVICAL DISCECTOMY: Primary | ICD-10-CM

## 2021-02-08 PROCEDURE — 99496 TRANSJ CARE MGMT HIGH F2F 7D: CPT | Performed by: FAMILY MEDICINE

## 2021-02-08 PROCEDURE — 1111F DSCHRG MED/CURRENT MED MERGE: CPT | Performed by: FAMILY MEDICINE

## 2021-02-08 RX ORDER — OMEPRAZOLE 20 MG/1
20 CAPSULE, DELAYED RELEASE ORAL DAILY
Qty: 30 CAPSULE | Refills: 0 | Status: SHIPPED | OUTPATIENT
Start: 2021-02-08 | End: 2021-04-14 | Stop reason: SDUPTHER

## 2021-02-08 RX ORDER — TIZANIDINE HYDROCHLORIDE 4 MG/1
4 CAPSULE, GELATIN COATED ORAL 3 TIMES DAILY
Qty: 90 CAPSULE | Refills: 0 | Status: SHIPPED | OUTPATIENT
Start: 2021-02-08 | End: 2021-02-17 | Stop reason: SDUPTHER

## 2021-02-08 RX ORDER — ONDANSETRON 4 MG/1
4 TABLET, FILM COATED ORAL EVERY 8 HOURS PRN
Qty: 20 TABLET | Refills: 0 | Status: SHIPPED | OUTPATIENT
Start: 2021-02-08 | End: 2021-04-12

## 2021-02-08 NOTE — PROGRESS NOTES
Assessment/Plan:             Dominga Alvarez was seen today for virtual tcm and virtual tcm  Diagnoses and all orders for this visit:    S/P cervical discectomy  Comments:  care per neurosurgery  her symptoms are improving    Cervical radiculopathy  -     TiZANidine (ZANAFLEX) 4 MG capsule; Take 1 capsule (4 mg total) by mouth 3 (three) times a day    Gastroesophageal reflux disease without esophagitis  -     omeprazole (PriLOSEC) 20 mg delayed release capsule; Take 1 capsule (20 mg total) by mouth daily    Essential hypertension  Comments:  stable on current meds    Primary insomnia  Comments:  doing well on seroquel    Status post cervical spinal fusion  -     ondansetron (ZOFRAN) 4 mg tablet; Take 1 tablet (4 mg total) by mouth every 8 (eight) hours as needed for nausea or vomiting      Reason for visit is for hospital follow up    Encounter provider Priya Guillaume MD       Provider located at [address] [id number]      Recent Visits  Date Type Provider Dept   02/05/21 Telephone [address] recent visits within past 7 days and meeting all other requirements     Today's Visits  Date Type Provider Dept   02/08/21 Office Visit Priya Guillaume MD [address] [facility] today's visits and meeting all other requirements     Future Appointments  No visits were found meeting these conditions  Showing future appointments within next 150 days and meeting all other requirements        After connecting through [employer], the patient was identified by name and date of birth  Gale Arcosis was informed that this is a telemedicine visit and that the visit is being conducted through [facility] and patient was informed that this is a secure, HIPAA-compliant platform  She agrees to proceed     My office door was closed  No one else was in the room    She acknowledged consent and understanding of privacy and security of the video platform  The patient has agreed to participate and understands they can discontinue the visit at any time  Patient is aware this is a billable service  Subjective:     Patient ID: Abril Johnson is a 41 y o  female  HPI  Here for hospital follow up  S/p C-7 ACDF with C5-6 corpectomy/lisa corpectomy  Some neck spasm and stiffness since the surgery more than the pain  Pain is 8/10 today due to muscle spasm  Oxycodone is taking the edge off   Taking muscle relaxer called methocarbamol   No pins and needles on arms and finger anymore along with  and range of motion are improving   + hoarseness of her voice since the surgery but improving per patient     Review of Systems   Constitutional: Negative  HENT: Negative  Respiratory: Negative  Cardiovascular: Negative  Gastrointestinal: Negative  Musculoskeletal: Positive for neck pain and neck stiffness  Neurological: Negative  Hematological: Negative  Objective:    Vitals:    02/08/21 1134   BP: 121/65   Weight: 98 4 kg (217 lb)       Physical Exam  Constitutional:       Appearance: Normal appearance  Pulmonary:      Effort: Pulmonary effort is normal  No respiratory distress  Musculoskeletal:      Comments: Restricted ROM cervical spine    Neurological:      Mental Status: She is alert and oriented to person, place, and time  Psychiatric:         Mood and Affect: Mood normal          Behavior: Behavior normal              Transitional Care Management Review:  Abril Johnson is a 41 y o  female here for TCM follow up       During the TCM phone call patient stated:    TCM Call (since 1/8/2021)     Date and time call was made  2/5/2021  2:45 PM    Patient was hospitialized at  [place] [id number] - 02/02 TO 02/04 VINAYAK DX; CERVICAL SPINAL STENOSIS  LD: 02/14/2021    Date of Admission  02/02/21    Date of discharge  02/04/21    Diagnosis  TCM - 02/02 TO 02/04 VINAYAK DX; CERVICAL SPINAL STENOSIS LD: 02/14/2021    Disposition  Home    Were the patients medications reviewed and updated  No    Current Symptoms  None      TCM Call (since 1/8/2021)     Post hospital issues  None    Should patient be enrolled in anticoag monitoring? No    Scheduled for follow up? Yes    Did you obtain your prescribed medications  Yes    Do you need help managing your prescriptions or medications  No    Is transportation to your appointment needed  No    I have advised the patient to call PCP with any new or worsening symptoms  AZAR HERNANDEZ MA    Living Arrangements  Spouse or Significiant other    Are you recieving any outpatient services  No    Are you recieving home care services  No    Are you using any community resources  No    Current waiver services  No    Interperter language line needed  No    Comments  PT STATED SHE IS FEELING GOOD  I spent 30 minutes with the patient during this visit      Brad Goldberg MD

## 2021-02-11 ENCOUNTER — TELEPHONE (OUTPATIENT)
Dept: NEUROSURGERY | Facility: CLINIC | Age: 41
End: 2021-02-11

## 2021-02-11 NOTE — TELEPHONE ENCOUNTER
Received a call from patient stating that she needs prior authorization for her pain medication  She was only given 15 tablets because that's all that the insurance company will allow in a 30 day period without a prior auth  Calling number from the back of her card [phone]  Patient appreciative  Was on hold for 20 mins with insurance company, filled out electronic request while waiting  Hung-up phone and submitted electronic request along with clinicals supporting [facility]  Awaiting response

## 2021-02-12 ENCOUNTER — DOCUMENTATION (OUTPATIENT)
Dept: NEUROSURGERY | Facility: CLINIC | Age: 41
End: 2021-02-12

## 2021-02-12 DIAGNOSIS — Z98.1 STATUS POST CERVICAL SPINAL FUSION: ICD-10-CM

## 2021-02-12 RX ORDER — OXYCODONE HYDROCHLORIDE 10 MG/1
10 TABLET ORAL 3 TIMES DAILY
Qty: 20 TABLET | Refills: 0 | Status: SHIPPED | OUTPATIENT
Start: 2021-02-12 | End: 2021-02-17 | Stop reason: SDUPTHER

## 2021-02-12 NOTE — TELEPHONE ENCOUNTER
Called patient to make her aware that prior auth for oxycodone came back approved will contact pharmacy to see if they require a new script  Had to leave a VM for [facility] because they are not yet open  Awaiting call back

## 2021-02-12 NOTE — TELEPHONE ENCOUNTER
I called patient's [facility] pharmacy to confirm, patient only picke up 15 of her last script because it required a prior auth and she will need a new script put in place now that the prior auth has been completed  Okay to refill patient's script? She is taking the oxycodone 10mg 1 tablet every 6 hours as needed for severe pain and 1/2 tab every 6 hours as needed for moderate pain? I will attach her PDMP as well

## 2021-02-12 NOTE — TELEPHONE ENCOUNTER
Called patient to let her know that the refill was sent to her pharmacy and that the prior auth was complete  I called [facility] to see if they could process the request but they said that they do not have any insurance on file for her  She will need to bring the card with her when she goes to  the medication  Patient seemed very confused by this and said she will be calling [facility] to get that sorted out

## 2021-02-17 ENCOUNTER — CLINICAL SUPPORT (OUTPATIENT)
Dept: NEUROSURGERY | Facility: CLINIC | Age: 41
End: 2021-02-17

## 2021-02-17 VITALS
WEIGHT: 220 LBS | TEMPERATURE: 99.2 F | BODY MASS INDEX: 33.34 KG/M2 | HEIGHT: 68 IN | DIASTOLIC BLOOD PRESSURE: 62 MMHG | SYSTOLIC BLOOD PRESSURE: 128 MMHG | RESPIRATION RATE: 16 BRPM

## 2021-02-17 DIAGNOSIS — M54.12 CERVICAL RADICULOPATHY: ICD-10-CM

## 2021-02-17 DIAGNOSIS — Z98.1 STATUS POST CERVICAL SPINAL FUSION: ICD-10-CM

## 2021-02-17 DIAGNOSIS — Z98.890 STATUS POST SURGERY: Primary | ICD-10-CM

## 2021-02-17 PROCEDURE — 99024 POSTOP FOLLOW-UP VISIT: CPT

## 2021-02-17 RX ORDER — TIZANIDINE HYDROCHLORIDE 4 MG/1
4 CAPSULE, GELATIN COATED ORAL 3 TIMES DAILY PRN
Qty: 90 CAPSULE | Refills: 0 | Status: SHIPPED | OUTPATIENT
Start: 2021-02-17 | End: 2021-04-07 | Stop reason: SDUPTHER

## 2021-02-17 RX ORDER — OXYCODONE HYDROCHLORIDE 10 MG/1
10 TABLET ORAL 2 TIMES DAILY PRN
Qty: 20 TABLET | Refills: 0 | Status: SHIPPED | OUTPATIENT
Start: 2021-02-19 | End: 2021-04-12

## 2021-02-17 NOTE — TELEPHONE ENCOUNTER
Patient requesting a refill of her pain medication and muscle relaxer  Taking Oxycodone 10mg BID and tizanidine 4mg TID prn  [place] and it's [place]  Okay to refill at this time? She will be due for her refill on Friday and I can put a do not fill date of 2/19 on it

## 2021-02-17 NOTE — PROGRESS NOTES
Post-Op Visit- Neurosurgery    Caroline Fernandez 41 y o  female MRN: [id number]    Chief Complaint:  Patient presents post: CORPECTOMY SPINE CERVICAL ANTERIOR: C4-7 ACDF with C6 /hemicorpectomy, C4-7 instrumentation and fusion (neuromonitoring) - Bilateral    History of Present Illness:  Patient presents for 2 week POV for incision check accompanied by spouse and ambulating without an assistive device  Patient reports she is doing well overall and denies any incisional issues  she denies any new weakness, numbness or tingling since the surgery and has been compliant with wearing the cervical collar and taking the post-operative antibiotic as prescribed  She is happy to report that the pain and weird sensations that she was experiencing down her arms-primarily her right- have gone away and she now only has pain in the back of her neck from the collar and radiating pain into her shoulders  Patient is currently taking oxycodone 10mg BID (mainly at bedtime) and tizandidine 4mg TID with some relief of pain symptoms  She is requesting a refill of these medications at this time          Current Outpatient Medications:     acetaminophen (TYLENOL) 325 mg tablet, Take 650 mg by mouth every 6 (six) hours as needed for mild pain, Disp: , Rfl:     Ascorbic Acid (VITAMIN C) 100 MG tablet, Take 100 mg by mouth daily, Disp: , Rfl:     cholecalciferol (VITAMIN D3) 1,000 units tablet, Take 1,000 Units by mouth daily, Disp: , Rfl:     clindamycin (CLEOCIN) 300 MG capsule, Take 1 capsule (300 mg total) by mouth 3 (three) times a day for 13 days, Disp: 39 capsule, Rfl: 0    Etonogestrel (NEXPLANON SC), Inject under the skin Located Left upper inner arm, Disp: , Rfl:     gabapentin (NEURONTIN) 300 mg capsule, Take 1 capsule (300 mg total) by mouth 3 (three) times a day, Disp: 90 capsule, Rfl: 1    hydrochlorothiazide (HYDRODIURIL) 25 mg tablet, Take 1 tablet (25 mg total) by mouth daily (Patient taking differently: Take 25 mg by mouth daily at bedtime ), Disp: 90 tablet, Rfl: 0    lidocaine (LIDODERM) 5 %, Apply 1 patch topically daily Remove & Discard patch within 12 hours or as directed by MD, Disp: 30 patch, Rfl: 0    losartan (COZAAR) 50 mg tablet, Take 1 tablet (50 mg total) by mouth daily (Patient taking differently: Take 50 mg by mouth daily at bedtime ), Disp: 90 tablet, Rfl: 0    magnesium 30 MG tablet, Take 30 mg by mouth 2 (two) times a day, Disp: , Rfl:     Multiple Vitamins-Minerals (MULTIVITAL-M) TABS, Take by mouth, Disp: , Rfl:     omeprazole (PriLOSEC) 20 mg delayed release capsule, Take 1 capsule (20 mg total) by mouth daily, Disp: 30 capsule, Rfl: 0    QUEtiapine (SEROquel) 25 mg tablet, Take 1 tablet (25 mg total) by mouth daily at bedtime, Disp: 90 tablet, Rfl: 3    Zinc 100 MG TABS, Take by mouth, Disp: , Rfl:     docusate sodium (COLACE) 100 mg capsule, Take 1 capsule (100 mg total) by mouth 2 (two) times a day (Patient not taking: Reported on 2/8/2021), Disp: 20 capsule, Rfl: 0    Ginkgo Biloba 40 MG TABS, Take by mouth, Disp: , Rfl:     magnesium hydroxide (MILK OF MAGNESIA) 400 mg/5 mL oral suspension, Take 30 mL by mouth daily as needed for constipation (Patient not taking: Reported on 2/8/2021), Disp: 118 mL, Rfl: 0    ondansetron (ZOFRAN) 4 mg tablet, Take 1 tablet (4 mg total) by mouth every 8 (eight) hours as needed for nausea or vomiting (Patient not taking: Reported on 2/17/2021), Disp: 20 tablet, Rfl: 0    [START ON 2/19/2021] oxyCODONE (ROXICODONE) 10 MG TABS, Take 1 tablet (10 mg total) by mouth 2 (two) times a day as needed for severe painMax Daily Amount: 20 mg, Disp: 20 tablet, Rfl: 0    senna (SENOKOT) 8 6 mg, Take 1 tablet (8 6 mg total) by mouth daily (Patient not taking: Reported on 2/8/2021), Disp: 10 tablet, Rfl: 0    TiZANidine (ZANAFLEX) 4 MG capsule, Take 1 capsule (4 mg total) by mouth 3 (three) times a day as needed for muscle spasms, Disp: 90 capsule, Rfl: 0    Turmeric 400 MG CAPS, Take by mouth, Disp: , Rfl:      Allergies   Allergen Reactions    Gluten Meal GI Intolerance     " Highly Sensitivity"    Lactose GI Intolerance     " Highly Sensitive"          Assessment:    Vitals:    02/17/21 1140   BP: 128/62   Resp: 16   Temp: 99 2 °F (37 3 °C)       Wound Exam: Incision well approximated  No erythema, edema or drainage present  Location: anterior neck  Procedure:  Staple/suture removal    Procedure Note: 16 staples and 1 suture were removed  Cleansed area with NSS  Patient Status: the patient tolerated the procedure well  DSD & C-Collar in place  Complications: None  Discussion/Summary  Reviewed incision care with patient including daily observation for s/s infection including: increased erythema, edema, drainage, dehiscence of incision or fever >101  Should these be observed, she understands that she is to call and/or return immediately for reassessment  Advised patient to continue cleansing area with mild soap and water and pat dry  Not to apply any lotions, creams, or ointments, & not to submerge in any water for 4  more weeks  She is to maintain activity restrictions and continue wearing cervical collar until cleared by the surgeon  Activity levels were also reviewed with the patient in detail, she is to lift no greater than 10 pounds and ambulation is encouraged as tolerated  Patient is aware that she cannot drive while still in the cervical collar  Verified date/time/location of upcoming POV and reminded her to complete x-rays prior to her visit on 3/17/2021  She is to call the office with any further questions or concerns, or if any incisional issues or fevers would arise  PA Marylene Stains authorized refills of both tizanidine and oxycodone (oxy has DNF date of 2/19)

## 2021-03-02 DIAGNOSIS — I10 ESSENTIAL HYPERTENSION: ICD-10-CM

## 2021-03-02 RX ORDER — LOSARTAN POTASSIUM 50 MG/1
TABLET ORAL
Qty: 90 TABLET | Refills: 0 | Status: SHIPPED | OUTPATIENT
Start: 2021-03-02 | End: 2021-04-14 | Stop reason: SDUPTHER

## 2021-03-16 ENCOUNTER — APPOINTMENT (OUTPATIENT)
Dept: RADIOLOGY | Age: 41
End: 2021-03-16
Payer: COMMERCIAL

## 2021-03-16 DIAGNOSIS — Z98.1 STATUS POST CERVICAL SPINAL FUSION: ICD-10-CM

## 2021-03-16 PROCEDURE — 72040 X-RAY EXAM NECK SPINE 2-3 VW: CPT

## 2021-03-17 ENCOUNTER — OFFICE VISIT (OUTPATIENT)
Dept: NEUROSURGERY | Facility: CLINIC | Age: 41
End: 2021-03-17

## 2021-03-17 VITALS
HEART RATE: 88 BPM | HEIGHT: 68 IN | WEIGHT: 220 LBS | RESPIRATION RATE: 18 BRPM | SYSTOLIC BLOOD PRESSURE: 124 MMHG | TEMPERATURE: 97.5 F | DIASTOLIC BLOOD PRESSURE: 76 MMHG | BODY MASS INDEX: 33.34 KG/M2

## 2021-03-17 DIAGNOSIS — Z98.1 STATUS POST CERVICAL SPINAL FUSION: ICD-10-CM

## 2021-03-17 DIAGNOSIS — Z98.890 STATUS POST SURGERY: Primary | ICD-10-CM

## 2021-03-17 DIAGNOSIS — M54.12 CERVICAL RADICULOPATHY: ICD-10-CM

## 2021-03-17 PROCEDURE — 99024 POSTOP FOLLOW-UP VISIT: CPT | Performed by: NEUROLOGICAL SURGERY

## 2021-03-17 PROCEDURE — 3008F BODY MASS INDEX DOCD: CPT | Performed by: NURSE PRACTITIONER

## 2021-03-17 RX ORDER — TIZANIDINE 4 MG/1
4 TABLET ORAL 3 TIMES DAILY
COMMUNITY
Start: 2021-02-08 | End: 2021-07-07

## 2021-03-17 NOTE — PROGRESS NOTES
Assessment/Plan:    No problem-specific Assessment & Plan notes found for this encounter  Problem List Items Addressed This Visit        Nervous and Auditory    Cervical radiculopathy    Relevant Orders    XR spine cervical 2 or 3 vw injury      Other Visit Diagnoses     Status post surgery    -  Primary    Relevant Orders    XR spine cervical 2 or 3 vw injury    Status post cervical spinal fusion        Relevant Orders    XR spine cervical 2 or 3 vw injury            Subjective:      Patient ID: Celestine Cockayne is a 41 y o  female  HPI    The following portions of the patient's history were reviewed and updated as appropriate:   She  has a past medical history of Constipation, Depression, Eczema, GERD (gastroesophageal reflux disease), Grand mal convulsion ([address] ), Hypertension, Insomnia, Medial meniscus tear (2015), Migraine, PONV (postoperative nausea and vomiting), Psoriasis, Seizures ([address] ), Shoulder dislocation, right, sequela (2018), Sinusitis, Sleep apnea, and Tinnitus  She   Patient Active Problem List    Diagnosis Date Noted    S/P cervical discectomy 2021    Cervical spinal stenosis 2021    Cervical radiculopathy 2020    Essential hypertension 10/15/2020    Constipation 10/24/2018    Lateral epicondylitis of right elbow 2018    Essential hypertriglyceridemia 2017    Insomnia 2017    Low back pain 2017    Obstructive sleep apnea on CPAP 2017    Epilepsy ([address] ) 11/15/2013    Obesity 10/08/2013    Primary generalized epilepsy ([facility] [address] ) 2011     She  has a past surgical history that includes LASIK; Hysteroscopy; DILATION AND CURETTAGE OF UTERUS WITH HYSTEROSOCPY (N/A, 2017); LAPAROSCOPY; REMOVAL OF INTRAUTERINE DEVICE (IUD); pr colonoscopy flx dx w/collj spec when pfrmd (N/A, 2018);  section ( and );  Dilation and curettage of uterus; EGD; Colonoscopy; and pr remv vert body,cerv,one sgmt (Bilateral, 2/2/2021)  Her family history includes Brain cancer (age of onset: 64) in her maternal grandmother; Diabetes in her family and father; Hyperlipidemia in her father; Hypertension in her father; Migraines in her family; No Known Problems in her daughter, mother, sister, and son  She  reports that she has never smoked  She has never used smokeless tobacco  She reports current alcohol use of about 7 0 standard drinks of alcohol per week  She reports that she does not use drugs    Current Outpatient Medications   Medication Sig Dispense Refill    acetaminophen (TYLENOL) 325 mg tablet Take 650 mg by mouth every 6 (six) hours as needed for mild pain      docusate sodium (COLACE) 100 mg capsule Take 1 capsule (100 mg total) by mouth 2 (two) times a day 20 capsule 0    Etonogestrel (NEXPLANON SC) Inject under the skin Located Left upper inner arm      hydrochlorothiazide (HYDRODIURIL) 25 mg tablet Take 1 tablet (25 mg total) by mouth daily (Patient taking differently: Take 25 mg by mouth daily at bedtime ) 90 tablet 0    losartan (COZAAR) 50 mg tablet TAKE 1 TABLET BY MOUTH EVERY DAY 90 tablet 0    magnesium 30 MG tablet Take 30 mg by mouth 2 (two) times a day      magnesium hydroxide (MILK OF MAGNESIA) 400 mg/5 mL oral suspension Take 30 mL by mouth daily as needed for constipation 118 mL 0    Multiple Vitamins-Minerals (MULTIVITAL-M) TABS Take by mouth      omeprazole (PriLOSEC) 20 mg delayed release capsule Take 1 capsule (20 mg total) by mouth daily 30 capsule 0    QUEtiapine (SEROquel) 25 mg tablet Take 1 tablet (25 mg total) by mouth daily at bedtime 90 tablet 3    senna (SENOKOT) 8 6 mg Take 1 tablet (8 6 mg total) by mouth daily 10 tablet 0    TiZANidine (ZANAFLEX) 4 MG capsule Take 1 capsule (4 mg total) by mouth 3 (three) times a day as needed for muscle spasms 90 capsule 0    Ascorbic Acid (VITAMIN C) 100 MG tablet Take 100 mg by mouth daily      cholecalciferol (VITAMIN D3) 1,000 units tablet Take 1,000 Units by mouth daily      gabapentin (NEURONTIN) 300 mg capsule Take 1 capsule (300 mg total) by mouth 3 (three) times a day (Patient not taking: Reported on 3/17/2021) 90 capsule 1    Ginkgo Biloba 40 MG TABS Take by mouth      lidocaine (LIDODERM) 5 % Apply 1 patch topically daily Remove & Discard patch within 12 hours or as directed by MD (Patient not taking: Reported on 3/17/2021) 30 patch 0    ondansetron (ZOFRAN) 4 mg tablet Take 1 tablet (4 mg total) by mouth every 8 (eight) hours as needed for nausea or vomiting (Patient not taking: Reported on 3/17/2021) 20 tablet 0    oxyCODONE (ROXICODONE) 10 MG TABS Take 1 tablet (10 mg total) by mouth 2 (two) times a day as needed for severe painMax Daily Amount: 20 mg (Patient not taking: Reported on 3/17/2021) 20 tablet 0    tiZANidine (ZANAFLEX) 4 mg tablet Take 4 mg by mouth 3 (three) times a day      Turmeric 400 MG CAPS Take by mouth      Zinc 100 MG TABS Take by mouth       No current facility-administered medications for this visit        Current Outpatient Medications on File Prior to Visit   Medication Sig    acetaminophen (TYLENOL) 325 mg tablet Take 650 mg by mouth every 6 (six) hours as needed for mild pain    docusate sodium (COLACE) 100 mg capsule Take 1 capsule (100 mg total) by mouth 2 (two) times a day    Etonogestrel (NEXPLANON SC) Inject under the skin Located Left upper inner arm    hydrochlorothiazide (HYDRODIURIL) 25 mg tablet Take 1 tablet (25 mg total) by mouth daily (Patient taking differently: Take 25 mg by mouth daily at bedtime )    losartan (COZAAR) 50 mg tablet TAKE 1 TABLET BY MOUTH EVERY DAY    magnesium 30 MG tablet Take 30 mg by mouth 2 (two) times a day    magnesium hydroxide (MILK OF MAGNESIA) 400 mg/5 mL oral suspension Take 30 mL by mouth daily as needed for constipation    Multiple Vitamins-Minerals (MULTIVITAL-M) TABS Take by mouth    omeprazole (PriLOSEC) 20 mg delayed release capsule Take 1 capsule (20 mg total) by mouth daily    QUEtiapine (SEROquel) 25 mg tablet Take 1 tablet (25 mg total) by mouth daily at bedtime    senna (SENOKOT) 8 6 mg Take 1 tablet (8 6 mg total) by mouth daily    TiZANidine (ZANAFLEX) 4 MG capsule Take 1 capsule (4 mg total) by mouth 3 (three) times a day as needed for muscle spasms    Ascorbic Acid (VITAMIN C) 100 MG tablet Take 100 mg by mouth daily    cholecalciferol (VITAMIN D3) 1,000 units tablet Take 1,000 Units by mouth daily    gabapentin (NEURONTIN) 300 mg capsule Take 1 capsule (300 mg total) by mouth 3 (three) times a day (Patient not taking: Reported on 3/17/2021)    Ginkgo Biloba 40 MG TABS Take by mouth    lidocaine (LIDODERM) 5 % Apply 1 patch topically daily Remove & Discard patch within 12 hours or as directed by MD (Patient not taking: Reported on 3/17/2021)    ondansetron (ZOFRAN) 4 mg tablet Take 1 tablet (4 mg total) by mouth every 8 (eight) hours as needed for nausea or vomiting (Patient not taking: Reported on 3/17/2021)    oxyCODONE (ROXICODONE) 10 MG TABS Take 1 tablet (10 mg total) by mouth 2 (two) times a day as needed for severe painMax Daily Amount: 20 mg (Patient not taking: Reported on 3/17/2021)    tiZANidine (ZANAFLEX) 4 mg tablet Take 4 mg by mouth 3 (three) times a day    Turmeric 400 MG CAPS Take by mouth    Zinc 100 MG TABS Take by mouth     No current facility-administered medications on file prior to visit  She is allergic to gluten meal and lactose       Review of Systems   Constitutional: Negative  HENT: Negative  Eyes: Negative  Respiratory: Negative  Cardiovascular: Negative  Gastrointestinal: Negative  Endocrine: Negative  Genitourinary: Negative  Musculoskeletal: Negative  Skin: Negative  Allergic/Immunologic: Negative  Neurological: Negative  Hematological: Negative  Psychiatric/Behavioral: Negative  All other systems reviewed and are negative  Objective:       There were no vitals taken for this visit  Physical Exam      I have personally obtain history and examined patient  I have personally reviewed case including all pertinent investigations/studies  Time spent 25 minutes  More than 50% of total time spent on counseling and coordination of care as described above including patient education  HPI     6 weeks post op anterior cervical decompression and fusion for stenosis with deformity  Patient in less pain with improved UE function  Compliant with collar activity restrictions  No analgesic use  Wishes to return to work  Exam     Well healed incision  Moderate restriction in cervical ROM  Full strength bilateral UE and LE  Intact fine motor  Intact coordination  Symmetrically depressed DTR  Normal gait                             Neck:   Supple, symmetrical, trachea midline, no adenopathy;        thyroid:  No enlargement/tenderness/nodules; no carotid    bruit or JVD   Back:     Symmetric, no curvature, ROM normal, no CVA tenderness         Chest wall:    No tenderness or deformity                           Extremities:   Extremities normal, atraumatic, no cyanosis or edema         Skin:   Skin color, texture, turgor normal, no rashes or lesions      Radiology     Xray     Anatomic neck alignment with all hardware and screws in expected position, no cage settling  Restoration of normal cervical lordosis     Summary and Plan     Ms Liliana Nogueira is doing well 6 week post op anterior cervical decompression and fusion for stenosis and deformity  Today we reviewed activity restrictions as well as the need for PT and ROM exercises  I see no further need for the collar  I have provided her with a script for PT will see him in fu in 6 weeks with repeat xray

## 2021-03-19 ENCOUNTER — EVALUATION (OUTPATIENT)
Dept: PHYSICAL THERAPY | Facility: CLINIC | Age: 41
End: 2021-03-19
Payer: COMMERCIAL

## 2021-03-19 DIAGNOSIS — Z98.1 STATUS POST CERVICAL SPINAL FUSION: Primary | ICD-10-CM

## 2021-03-19 PROCEDURE — 97110 THERAPEUTIC EXERCISES: CPT | Performed by: PHYSICAL THERAPIST

## 2021-03-19 PROCEDURE — 97112 NEUROMUSCULAR REEDUCATION: CPT | Performed by: PHYSICAL THERAPIST

## 2021-03-19 PROCEDURE — 97162 PT EVAL MOD COMPLEX 30 MIN: CPT | Performed by: PHYSICAL THERAPIST

## 2021-03-19 NOTE — PROGRESS NOTES
PT EVALUATION    Today's date: 21  Patient name: Amna Brizuela  : 1980  MRN: [id number]  Referring provider: Ro Gavin MD  Dx:   1  Status post cervical spinal fusion        Amna Brizuela is a 41 y o  female who is recovering from ACDF on 21  She has decreased ROM, muscle soreness, and a history of chronic pain in multiple locations  This patient would benefit from skilled physical therapy to address their listed impairments and functional limitations to maximize functional outcome  Impairments:    restricted ROM    decreased strength   pain with function   activity intolerance     Prognosis:  Good  Positive and negative prognostic indicator(s):  pain >3 months    Goals:    STG Patient is independent with HEP   STG Range of motion is improved by 25% in 2 weeks  LTG Range of motion is improved by 50% in 4 weeks  LTG ADL performance improved to prior level of function in 6 weeks    Planned interventions:  home exercise program, patient education, manual therapy, graded activity, flexibility, functional range of motion exercises and modalities prn    Duration in visits:  8  Frequency: 2 visits per week  Duration in weeks:  4-6    History of Current Injury: patient is s/p ACDF C4-7 on 21  She was in a neck brace until a few days ago  She has been starting light stretching and was encouraged to start yoga and pilates  Prior to the surgery she was having severe right upper extremity pain and decreased function  She states she is overall 'insanely inflexible'        Pain location: anterolateral neck, front of the shoulders/chest  Pain descriptors:  Soreness, light throbbing  Pain intensity:  4/10 at rest, 6/10 with movement     Aggravating factors: tilting head sideways, looking up and down  Easing factors: avoiding above activities    Patient goals:  decreased pain, independence with ADLs, increased mobility and increased strength    Objective     Palpation   Left   Hypertonic in the cervical paraspinals, levator scapulae, middle trapezius, scalenes, suboccipitals and upper trapezius  Tenderness of the cervical paraspinals, deltoid, levator scapulae, middle trapezius, scalenes and upper trapezius  Right   Hypertonic in the cervical paraspinals, levator scapulae, middle trapezius, scalenes, suboccipitals and upper trapezius  Tenderness of the cervical paraspinals, deltoid, levator scapulae, middle trapezius, scalenes and upper trapezius       Active Range of Motion   Cervical/Thoracic Spine       Cervical    Flexion: 10 degrees   Extension: 17 degrees      Left lateral flexion: 10 degrees      Right lateral flexion: 8 degrees      Left rotation: 20 degrees  Right rotation: 48 degrees             General Comments:      Cervical/Thoracic Comments  Incision healing well  Myotome testing to be performed at a future visit due to time constraints      Precautions: lifting restriction up to 10 lbs, prior right shoulder dislocation x2      Manuals 3/19            Scapular ROM with STM, rhomboid to UE nv            Cervical STM supine nv                                      Neuro Re-Ed                                                                                                        Ther Ex             AROM rotation :10x3            AROM lateral flexion :10x3            AROM nods supine 5ea            Shoulder flexion supine 10            Chest fly supine, avoid too much horizontal abd, hx right dislocations             Thoracic ext over chair, arms crossed :10x5            Ulnar nerve glides 10ea            Upper body conditioning, bands             UBE             Progressive shoulder strengthening                                       Ther Activity                                       Gait Training                                       Modalities

## 2021-03-22 ENCOUNTER — OFFICE VISIT (OUTPATIENT)
Dept: PHYSICAL THERAPY | Facility: CLINIC | Age: 41
End: 2021-03-22
Payer: COMMERCIAL

## 2021-03-22 DIAGNOSIS — Z98.1 STATUS POST CERVICAL SPINAL FUSION: Primary | ICD-10-CM

## 2021-03-22 PROCEDURE — 97140 MANUAL THERAPY 1/> REGIONS: CPT

## 2021-03-22 PROCEDURE — 97110 THERAPEUTIC EXERCISES: CPT

## 2021-03-22 PROCEDURE — 97112 NEUROMUSCULAR REEDUCATION: CPT

## 2021-03-22 NOTE — PROGRESS NOTES
Daily Note     Today's date: 3/22/2021  Patient name: Amna Brizuela  : 1980  MRN: [id number]  Referring provider: Ro Gavin MD  Dx:   Encounter Diagnosis     ICD-10-CM    1  Status post cervical spinal fusion  Z98 1                   Subjective: Patient reports compliance with HEP, she reports muscular soreness though feels her mobility is improving  Objective: See treatment diary below       Assessment: Tolerated treatment well  Patient exhibited good technique with therapeutic exercises and would benefit from continued PT  Initiated c/s STM and scapular stretching today with increased tenderness to R rhomboid and bilateral UT/LS  Continued neural tension in R forearm with ulnar nerve glides  No increased neck pain reported with exercises performed  Plan: Progress treatment as tolerated          Precautions: lifting restriction up to 10 lbs, prior right shoulder dislocation x2      Manuals 3/19 3/22           Scapular ROM with STM, rhomboid to UE nv MC           Cervical STM supine nv                                      Neuro Re-Ed                                                                                                        Ther Ex             AROM rotation :10x3 HEP           AROM lateral flexion :10x3 HEP           AROM nods supine 5ea 10           Shoulder flexion supine 10            Chest fly supine, avoid too much horizontal abd, hx right dislocations  10           Thoracic ext over chair, arms crossed :10x5 :10x5           Ulnar nerve glides 10ea 10 ea           Upper body conditioning, bands             UBE  4'            Progressive shoulder strengthening                                       Ther Activity                                       Gait Training                                       Modalities

## 2021-03-25 ENCOUNTER — OFFICE VISIT (OUTPATIENT)
Dept: PHYSICAL THERAPY | Facility: CLINIC | Age: 41
End: 2021-03-25
Payer: COMMERCIAL

## 2021-03-25 DIAGNOSIS — Z98.1 STATUS POST CERVICAL SPINAL FUSION: Primary | ICD-10-CM

## 2021-03-25 PROCEDURE — 97110 THERAPEUTIC EXERCISES: CPT | Performed by: PHYSICAL THERAPIST

## 2021-03-25 PROCEDURE — 97140 MANUAL THERAPY 1/> REGIONS: CPT | Performed by: PHYSICAL THERAPIST

## 2021-03-25 NOTE — PROGRESS NOTES
Daily Note     Today's date: 3/25/2021  Patient name: Shaun Bobo  : 1980  MRN: [id number]  Referring provider: Catia Pena MD  Dx:   Encounter Diagnosis     ICD-10-CM    1  Status post cervical spinal fusion  Z98 1                   Subjective: patient states she does not want to do any activity that she can do at home  She feels her mobility is improving but left lateral flexion and right rotation more effort  She has been consistent with her home exercises      Objective: See treatment diary below      Assessment: Tolerated treatment well  Patient exhibited good technique with therapeutic exercises and would benefit from continued PT  Tender to palpation: right, close to lower c  Spine, left UT, right rhomboid and bilateral coracoid process  Performed gentle upper cervical lateral tilt using the occiput  Plan: Progress treatment as tolerated      Update home exercises NV     Precautions: lifting restriction up to 10 lbs, prior right shoulder dislocation x2      Manuals 3/19 3/22 3/25          Scapular ROM with STM, rhomboid to UE nv MC SY          Cervical STM supine nv  SY                                    Neuro Re-Ed                                                                                                        Ther Ex             AROM rotation :10x3 HEP           AROM lateral flexion :10x3 HEP           AROM nods supine 5ea 10           Shoulder flexion supine 10            Chest fly supine, avoid too much horizontal abd, hx right dislocations  10           Thoracic ext over chair, arms crossed :10x5 :10x5           Ulnar nerve glides 10ea 10 ea           Upper body conditioning, bands             UBE  4'            Progressive shoulder strengthening             Rhomboid stretch   :10x5          Shoulder Girdle and Core: #10 lifting restriction*             TB row with ER             TB W's             TB low row             Bracing with kick out and abduction Bracing with full march to kick out             Bracing with progressive heel taps             Multifidus press TB             Single arm row             pball bridge/LTR/leg curl             scap push downs seated                                                    Ther Activity                                       Gait Training                                       Modalities

## 2021-03-29 ENCOUNTER — OFFICE VISIT (OUTPATIENT)
Dept: PHYSICAL THERAPY | Facility: CLINIC | Age: 41
End: 2021-03-29
Payer: COMMERCIAL

## 2021-03-29 DIAGNOSIS — Z98.1 STATUS POST CERVICAL SPINAL FUSION: Primary | ICD-10-CM

## 2021-03-29 PROCEDURE — 97112 NEUROMUSCULAR REEDUCATION: CPT

## 2021-03-29 PROCEDURE — 97140 MANUAL THERAPY 1/> REGIONS: CPT

## 2021-03-29 NOTE — PROGRESS NOTES
Daily Note     Today's date: 3/29/2021  Patient name: Phyllis Sierra  : 1980  MRN: [id number]  Referring provider: Estrellita Bravo MD   Dx:   Encounter Diagnosis     ICD-10-CM    1  Status post cervical spinal fusion  Z98 1                   Subjective: Patient reports continued difficulty with R cervical rotation>side bending though she feels she has full cervical extension ROM at this point  Objective: See treatment diary below      Assessment: Tolerated treatment well  Patient demonstrated fatigue post treatment, exhibited good technique with therapeutic exercises and would benefit from continued PT  Initiated light TB program with patient requiring cueing to avoid UT over activation with fair carry over  Plan: Progress treatment as tolerated         Precautions: lifting restriction up to 10 lbs, prior right shoulder dislocation x2      Manuals 3/19 3/22 3/25 3/29         Scapular ROM with STM, rhomboid to UE nv MC SY MC         Cervical STM supine nv MC SY MC                                   Neuro Re-Ed                                                                                                        Ther Ex             AROM rotation :10x3 HEP           AROM lateral flexion :10x3 HEP           AROM nods supine 5ea 10           Shoulder flexion supine 10            Chest fly supine, avoid too much horizontal abd, hx right dislocations  10           Thoracic ext over chair, arms crossed :10x5 :10x5           Ulnar nerve glides 10ea 10 ea           Upper body conditioning, bands             UBE  4'            Progressive shoulder strengthening             Rhomboid stretch   :10x5          Shoulder Girdle and Core: #10 lifting restriction*             TB row with ER             TB W's             TB low row   ytb 20          TB ext   ytb 20          Bracing with kick out and abduction             Bracing with full march to kick out             Bracing with progressive heel taps Multifidus press TB             Single arm row             pball bridge/LTR/leg curl             scap push downs seated                                                    Ther Activity                                       Gait Training                                       Modalities

## 2021-04-01 ENCOUNTER — OFFICE VISIT (OUTPATIENT)
Dept: PHYSICAL THERAPY | Facility: CLINIC | Age: 41
End: 2021-04-01
Payer: COMMERCIAL

## 2021-04-01 DIAGNOSIS — Z98.1 STATUS POST CERVICAL SPINAL FUSION: Primary | ICD-10-CM

## 2021-04-01 PROCEDURE — 97110 THERAPEUTIC EXERCISES: CPT

## 2021-04-01 PROCEDURE — 97140 MANUAL THERAPY 1/> REGIONS: CPT

## 2021-04-01 NOTE — PROGRESS NOTES
Daily Note     Today's date: 2021  Patient name: Ananth Daniel  : 1980  MRN: [id number]  Referring provider: Edith Anderson MD  Dx:   Encounter Diagnosis     ICD-10-CM    1  Status post cervical spinal fusion  Z98 1                   Subjective: Patient reports she "over did it" with TB exercises and scap retractions and has increased soreness throughout mid back though feels improvement in strength  Objective: See treatment diary below      Assessment: Tolerated treatment well  Patient able to isolate scap retraction without UT activation which is an improvement from last session  Palpable trigger point deep in R UT with tenderness to bilateral periscapulars, suboccipitals, and SCMs (R>L)  Did not progress strengthening due to significant soreness following last session - will progress NV  Patient would benefit from continued PT      Plan: Progress treatment as tolerated         Precautions: lifting restriction up to 10 lbs, prior right shoulder dislocation x2      Manuals 3/19 3/22 3/25 3/29 4/1        Scapular ROM with STM, rhomboid to UE nv MC SY MC MC        Cervical STM supine nv MC SY MC MC                                  Neuro Re-Ed                                                                                                        Ther Ex             AROM rotation :10x3 HEP           AROM lateral flexion :10x3 HEP           AROM nods supine 5ea 10           Shoulder flexion supine 10            Chest fly supine, avoid too much horizontal abd, hx right dislocations  10           Thoracic ext over chair, arms crossed :10x5 :10x5           Ulnar nerve glides 10ea 10 ea           Upper body conditioning, bands             UBE  4'            Progressive shoulder strengthening             Rhomboid stretch   :10x5          Shoulder Girdle and Core: #10 lifting restriction*             TB row with ER             TB W's             TB low row    ytb 20         TB ext    ytb 20 Bracing with kick out and abduction             Bracing with full march to kick out             Bracing with progressive heel taps             Multifidus press TB             Single arm row             pball bridge/LTR/leg curl             scap push downs seated                                                    Ther Activity                                       Gait Training                                       Modalities

## 2021-04-06 ENCOUNTER — OFFICE VISIT (OUTPATIENT)
Dept: PHYSICAL THERAPY | Facility: CLINIC | Age: 41
End: 2021-04-06
Payer: COMMERCIAL

## 2021-04-06 DIAGNOSIS — Z98.1 STATUS POST CERVICAL SPINAL FUSION: Primary | ICD-10-CM

## 2021-04-06 PROCEDURE — 97140 MANUAL THERAPY 1/> REGIONS: CPT | Performed by: PHYSICAL THERAPIST

## 2021-04-06 PROCEDURE — 97112 NEUROMUSCULAR REEDUCATION: CPT | Performed by: PHYSICAL THERAPIST

## 2021-04-06 NOTE — PROGRESS NOTES
Daily Note     Today's date: 2021  Patient name: Yo Walters  : 1980  MRN: [id number]  Referring provider: Amie Diaz MD  Dx:   Encounter Diagnosis     ICD-10-CM    1  Status post cervical spinal fusion  Z98 1               Subjective: patient reports she is feeling much better with improve mobility and decreased pain  She would like to be able to throw a ball and shoot hoops by       Objective: See treatment diary below  Cervical    Flexion: 30  (from 10 degrees)   Extension: 35 (from 7 degrees)  Left lateral flexion: 22 (from 10 degrees)   Right lateral flexion:20 (from 8 degrees)   Left rotation: 50 (from 20 degrees)  Right rotation: 56 (from 48 degrees)     Assessment: Tolerated treatment well  Patient exhibited good technique with therapeutic exercises and would benefit from continued PT   Excellent improvement in cervical ROM  Continues to have trigger point in left rhomboid and right UT  Plan: Progress treatment as tolerated         Precautions: lifting restriction up to 10 lbs, prior right shoulder dislocation x2      Manuals 3/19 3/22 3/25 3/29 4/1 4       Scapular ROM with STM, rhomboid to UE nv  SY [facility] SY       Cervical STM supine nv  SY [facility] SY                                 Neuro Re-Ed                                                                                                        Ther Ex             AROM rotation :10x3 HEP           AROM lateral flexion :10x3 HEP           AROM nods supine 5ea 10           Shoulder flexion supine 10            Chest fly supine, avoid too much horizontal abd, hx right dislocations  10           Thoracic ext over chair, arms crossed :10x5 :10x5           Ulnar nerve glides 10ea 10 ea           Upper body conditioning, bands             UBE  4'            Progressive shoulder strengthening             Rhomboid stretch   :10x5          Shoulder Girdle and Core: #10 lifting restriction*             TB row with ER             TB W's     Y3        TB low row    ytb 20         TB ext    ytb 20         Bracing with kick out and abduction             Bracing with full march to kick out             Bracing with progressive heel taps             Multifidus press TB             Single arm row             pball bridge/LTR/leg curl             scap push downs seated                                                    Ther Activity                                       Gait Training                                       Modalities

## 2021-04-07 ENCOUNTER — TELEPHONE (OUTPATIENT)
Dept: FAMILY MEDICINE CLINIC | Facility: CLINIC | Age: 41
End: 2021-04-07

## 2021-04-07 DIAGNOSIS — M48.02 DEGENERATIVE CERVICAL SPINAL STENOSIS: ICD-10-CM

## 2021-04-07 DIAGNOSIS — M47.12 SPONDYLOGENIC COMPRESSION OF CERVICAL SPINAL CORD: ICD-10-CM

## 2021-04-07 DIAGNOSIS — M54.12 CERVICAL RADICULOPATHY: ICD-10-CM

## 2021-04-07 DIAGNOSIS — M54.12 RADICULOPATHY, CERVICAL REGION: ICD-10-CM

## 2021-04-07 RX ORDER — GABAPENTIN 600 MG/1
600 TABLET ORAL
Qty: 30 TABLET | Refills: 0 | Status: SHIPPED | OUTPATIENT
Start: 2021-04-07 | End: 2021-07-06

## 2021-04-07 RX ORDER — TIZANIDINE HYDROCHLORIDE 4 MG/1
4 CAPSULE, GELATIN COATED ORAL 3 TIMES DAILY PRN
Qty: 90 CAPSULE | Refills: 0 | Status: SHIPPED | OUTPATIENT
Start: 2021-04-07 | End: 2021-11-23

## 2021-04-07 RX ORDER — GABAPENTIN 300 MG/1
300 CAPSULE ORAL 2 TIMES DAILY
Qty: 90 CAPSULE | Refills: 1 | Status: SHIPPED | OUTPATIENT
Start: 2021-04-07 | End: 2021-11-23

## 2021-04-07 NOTE — TELEPHONE ENCOUNTER
Pt called she had neuro surgery and has been doing physical therapy for awhile  Pt is having pain and muscle spasms  She hasnt taken anything so far but it is to that point that she needs just something at night for the pain and she said the tizanidine helps with the muscle spasms  Pt uses [facility] [place]   pls advise

## 2021-04-07 NOTE — TELEPHONE ENCOUNTER
Spoke with pt she dont have no gabapentin left if you can send to pharmacy pls     Also can you order tizandine

## 2021-04-08 ENCOUNTER — APPOINTMENT (OUTPATIENT)
Dept: PHYSICAL THERAPY | Facility: CLINIC | Age: 41
End: 2021-04-08
Payer: COMMERCIAL

## 2021-04-09 ENCOUNTER — OFFICE VISIT (OUTPATIENT)
Dept: PHYSICAL THERAPY | Facility: CLINIC | Age: 41
End: 2021-04-09
Payer: COMMERCIAL

## 2021-04-09 DIAGNOSIS — Z98.1 STATUS POST CERVICAL SPINAL FUSION: Primary | ICD-10-CM

## 2021-04-09 PROCEDURE — 97140 MANUAL THERAPY 1/> REGIONS: CPT

## 2021-04-09 PROCEDURE — 97112 NEUROMUSCULAR REEDUCATION: CPT

## 2021-04-09 NOTE — PROGRESS NOTES
Daily Note     Today's date: 2021  Patient name: Pravin Arita  : 1980  MRN: [id number]  Referring provider: Monica Griffith MD  Dx:   Encounter Diagnosis     ICD-10-CM    1  Status post cervical spinal fusion  Z98 1                   Subjective: Patient reports she felt one day in between sessions was not enough time for muscle recovery and she needed at least two days  She notes increased muscle spasms after RTC strengthening added in last session and she is only able to get up to 3 reps before feeling a spasm coming on  Increased muscle spasms on R side of mid back have been preventing her from sleeping though she did get a refill of gabapentin and muscle relaxer  Objective: See treatment diary below      Assessment: Tolerated treatment well  Added L pec minor pin and stretch with good response  Continued tenderness to R UT which responded well to manual inteventions  Did not increased strengthening this session due to increased soreness and spasms, will progress next visit  Patient would benefit from continued PT      Plan: Progress treatment as tolerated         Precautions: lifting restriction up to 10 lbs, prior right shoulder dislocation x2      Manuals 3/19 3/22 3/25 3/29 4/1 4/6 4/9      Scapular ROM with STM, rhomboid to UE nv MC SY MC MC SY MC      Cervical STM supine nv  SY MC MC SY MC                                Neuro Re-Ed                                                                                                        Ther Ex             AROM rotation :10x3 HEP           AROM lateral flexion :10x3 HEP           AROM nods supine 5ea 10           Shoulder flexion supine 10            Chest fly supine, avoid too much horizontal abd, hx right dislocations  10           Thoracic ext over chair, arms crossed :10x5 :10x5           Ulnar nerve glides 10ea 10 ea           Upper body conditioning, bands             UBE  4'            Progressive shoulder strengthening Rhomboid stretch   :10x5          Shoulder Girdle and Core: #10 lifting restriction*             TB row with ER             TB W's     Y3        TB low row    ytb 20         TB ext    ytb 20         Bracing with kick out and abduction             Bracing with full march to kick out             Bracing with progressive heel taps             Multifidus press TB             Single arm row             pball bridge/LTR/leg curl             scap push downs seated                                                    Ther Activity                                       Gait Training                                       Modalities

## 2021-04-12 ENCOUNTER — APPOINTMENT (OUTPATIENT)
Dept: PHYSICAL THERAPY | Facility: CLINIC | Age: 41
End: 2021-04-12
Payer: COMMERCIAL

## 2021-04-12 ENCOUNTER — TELEMEDICINE (OUTPATIENT)
Dept: FAMILY MEDICINE CLINIC | Facility: CLINIC | Age: 41
End: 2021-04-12
Payer: COMMERCIAL

## 2021-04-12 DIAGNOSIS — L23.7 POISON IVY: Primary | ICD-10-CM

## 2021-04-12 PROCEDURE — 3725F SCREEN DEPRESSION PERFORMED: CPT | Performed by: NURSE PRACTITIONER

## 2021-04-12 PROCEDURE — 99213 OFFICE O/P EST LOW 20 MIN: CPT | Performed by: NURSE PRACTITIONER

## 2021-04-12 PROCEDURE — 1036F TOBACCO NON-USER: CPT | Performed by: NURSE PRACTITIONER

## 2021-04-12 RX ORDER — PREDNISONE 10 MG/1
TABLET ORAL
Qty: 18 TABLET | Refills: 0 | Status: SHIPPED | OUTPATIENT
Start: 2021-04-12 | End: 2021-04-23 | Stop reason: SDUPTHER

## 2021-04-12 NOTE — PROGRESS NOTES
Virtual Regular Visit      Assessment/Plan:    Problem List Items Addressed This Visit     None      Visit Diagnoses     Poison ivy    -  Primary    Relevant Medications    predniSONE 10 mg tablet      claritin 10mg daily      BMI Counseling: There is no height or weight on file to calculate BMI  The BMI is above normal  Nutrition recommendations include decreasing portion sizes, encouraging healthy choices of fruits and vegetables, decreasing fast food intake, consuming healthier snacks, limiting drinks that contain sugar, moderation in carbohydrate intake, increasing intake of lean protein, reducing intake of saturated and trans fat and reducing intake of cholesterol  Exercise recommendations include moderate physical activity 150 minutes/week, exercising 3-5 times per week and strength training exercises  Reason for visit is   Chief Complaint   Patient presents with    Virtual Regular Visit        Encounter provider ADA Puente    Provider located at [place] [id number]      Recent Visits  Date Type Provider Dept   04/07/21 Telephone [address] recent visits within past 7 days and meeting all other requirements     Today's Visits  Date Type Provider Dept   04/12/21 [id number] [facility]   Showing today's visits and meeting all other requirements     Future Appointments  No visits were found meeting these conditions  Showing future appointments within next 150 days and meeting all other requirements        The patient was identified by name and date of birth  Lazaro Espinoza was informed that this is a telemedicine visit and that the visit [place] being [place] [facility] and patient was informed that this is a secure, HIPAA-compliant platform  She agrees to proceed     My office door was closed  No one else was in the room    She acknowledged consent and understanding of privacy and security of the video platform  The patient has agreed to participate and understands they can discontinue the visit at any time  Patient is aware this is a billable service  Subjective  Lazaro Espinoza is a 41 y o  female    Left upper eye lid poison  Started this am   with same  All on left side of face         Past Medical History:   Diagnosis Date    Constipation     Depression     Eczema     GERD (gastroesophageal reflux disease)     Grand mal convulsion ([address] )     X 2 last episode -Sleep deprivation-Resolved with Cpap    Hypertension     Insomnia     Medial meniscus tear 2015    Migraine     PONV (postoperative nausea and vomiting)     Psoriasis     Seizures (HCC)     Shoulder dislocation, right, sequela 2018    Sinusitis     Sleep apnea     uses cpap    Tinnitus        Past Surgical History:   Procedure Laterality Date     SECTION   and     COLONOSCOPY      DILATION AND CURETTAGE OF UTERUS      DILATION AND CURETTAGE OF UTERUS WITH HYSTEROSOCPY N/A 2017    Procedure: D&C; IUD REMOVAL;  Surgeon: Raven Stein MD;  [facility];  Service: Gynecology    EGD      HYSTEROSCOPY      LAPAROSCOPY      (Diagnostic)    LASIK      UT COLONOSCOPY FLX DX W/COLLJ SPEC WHEN PFRMD N/A 2018    Procedure: EGD AND COLONOSCOPY;  Surgeon: Jaimie Patino MD;  Location: [facility]; Service: Gastroenterology     [facility] BODY,CERV,ONE SGMT Bilateral 2021    Procedure: CORPECTOMY SPINE CERVICAL ANTERIOR: C4-7 ACDF with C6 /hemicorpectomy, C4-7 instrumentation and fusion (neuromonitoring);   Surgeon: Nabor Tai MD;  [facility];  Service: Neurosurgery    REMOVAL OF INTRAUTERINE DEVICE (IUD)         Current Outpatient Medications   Medication Sig Dispense Refill    acetaminophen (TYLENOL) 325 mg tablet Take 650 mg by mouth every 6 (six) hours as needed for mild pain      Ascorbic Acid (VITAMIN C) 100 MG tablet Take 100 mg by mouth daily      cholecalciferol (VITAMIN D3) 1,000 units tablet Take 1,000 Units by mouth daily      docusate sodium (COLACE) 100 mg capsule Take 1 capsule (100 mg total) by mouth 2 (two) times a day 20 capsule 0    Etonogestrel (NEXPLANON SC) Inject under the skin Located Left upper inner arm      gabapentin (NEURONTIN) 300 mg capsule Take 1 capsule (300 mg total) by mouth 2 (two) times a day Take 1 capsule by mouth in the morning and 1 capsule at lunch 90 capsule 1    gabapentin (NEURONTIN) 600 MG tablet Take 1 tablet (600 mg total) by mouth daily at bedtime 30 tablet 0    Ginkgo Biloba 40 MG TABS Take by mouth      hydrochlorothiazide (HYDRODIURIL) 25 mg tablet Take 1 tablet (25 mg total) by mouth daily (Patient taking differently: Take 25 mg by mouth daily at bedtime ) 90 tablet 0    losartan (COZAAR) 50 mg tablet TAKE 1 TABLET BY MOUTH EVERY DAY 90 tablet 0    magnesium 30 MG tablet Take 30 mg by mouth 2 (two) times a day      magnesium hydroxide (MILK OF MAGNESIA) 400 mg/5 mL oral suspension Take 30 mL by mouth daily as needed for constipation 118 mL 0    Multiple Vitamins-Minerals (MULTIVITAL-M) TABS Take by mouth      omeprazole (PriLOSEC) 20 mg delayed release capsule Take 1 capsule (20 mg total) by mouth daily 30 capsule 0    predniSONE 10 mg tablet Take 3t daily for 3d then 2t daily for 3d then 1t daily for 3d 18 tablet 0    QUEtiapine (SEROquel) 25 mg tablet Take 1 tablet (25 mg total) by mouth daily at bedtime 90 tablet 3    senna (SENOKOT) 8 6 mg Take 1 tablet (8 6 mg total) by mouth daily 10 tablet 0    TiZANidine (ZANAFLEX) 4 MG capsule Take 1 capsule (4 mg total) by mouth 3 (three) times a day as needed for muscle spasms 90 capsule 0    tiZANidine (ZANAFLEX) 4 mg tablet Take 4 mg by mouth 3 (three) times a day      Turmeric 400 MG CAPS Take by mouth      Zinc 100 MG TABS Take by mouth       No current facility-administered medications for this visit  Allergies   Allergen Reactions    Gluten Meal - Food Allergy GI Intolerance     " Highly Sensitivity"    Lactose - Food Allergy GI Intolerance     " Highly Sensitive"       Review of Systems   Constitutional: Negative for fatigue and fever  Respiratory: Negative for cough and shortness of breath  Cardiovascular: Negative for chest pain and palpitations  Gastrointestinal: Negative for constipation and diarrhea  Skin: Positive for rash  Neurological: Negative for dizziness and headaches  Hematological: Negative for adenopathy  Psychiatric/Behavioral: Negative for dysphoric mood  The patient is not nervous/anxious  Video Exam    There were no vitals filed for this visit  Physical Exam  Constitutional:       Appearance: Normal appearance  HENT:      Head: Normocephalic and atraumatic  Eyes:      Conjunctiva/sclera: Conjunctivae normal    Neck:      Musculoskeletal: Normal range of motion  Pulmonary:      Effort: Pulmonary effort is normal    Musculoskeletal: Normal range of motion  Skin:            Comments: Bullous rash left side of face     Neurological:      Mental Status: She is alert and oriented to person, place, and time  Psychiatric:         Mood and Affect: Mood normal          Behavior: Behavior normal           I spent 15 minutes with patient today in which greater than 50% of the time was spent in counseling/coordination of care regarding poison ivy      VIRTUAL VISIT DISCLAIMER    Ananth Daniel acknowledges that she has consented to an online visit or consultation  She understands that the online visit is based solely on information provided by her, and that, in the absence of a face-to-face physical evaluation by the physician, the diagnosis she receives is both limited and provisional in terms of accuracy and completeness  This is not intended to replace a full medical face-to-face evaluation by the physician   Ananth Daniel understands and accepts these terms

## 2021-04-14 ENCOUNTER — TELEPHONE (OUTPATIENT)
Dept: FAMILY MEDICINE CLINIC | Facility: CLINIC | Age: 41
End: 2021-04-14

## 2021-04-14 DIAGNOSIS — I10 ESSENTIAL HYPERTENSION: ICD-10-CM

## 2021-04-14 DIAGNOSIS — K21.9 GASTROESOPHAGEAL REFLUX DISEASE WITHOUT ESOPHAGITIS: ICD-10-CM

## 2021-04-14 RX ORDER — HYDROCHLOROTHIAZIDE 25 MG/1
25 TABLET ORAL
Qty: 90 TABLET | Refills: 1 | Status: SHIPPED | OUTPATIENT
Start: 2021-04-14 | End: 2021-10-06

## 2021-04-14 RX ORDER — LOSARTAN POTASSIUM 50 MG/1
50 TABLET ORAL
Qty: 90 TABLET | Refills: 1 | Status: SHIPPED | OUTPATIENT
Start: 2021-04-14 | End: 2021-11-09

## 2021-04-14 RX ORDER — OMEPRAZOLE 20 MG/1
20 CAPSULE, DELAYED RELEASE ORAL DAILY
Qty: 90 CAPSULE | Refills: 1 | Status: SHIPPED | OUTPATIENT
Start: 2021-04-14

## 2021-04-14 NOTE — TELEPHONE ENCOUNTER
Patient called in, she had poison sumac on her face  She saw Jose Arm via video visit on Monday and was given Prednisone, however her condition has gotten significantly worse  She states it is now bubbly and spread, whereas before it was only a bit red and puffy  She has had Prednisone in the past for this condition and it usually helps her condition pretty quickly  She is taking 3 ten mg tablets a day for 3 days then 2 a day for 3 days and finally 1 for 3 days  However her condition has not improved at all and she is on her 3rd day of medication  She would like to know if that is normal or if she should have a stronger dose of Prednisone? Please advise

## 2021-04-16 ENCOUNTER — APPOINTMENT (OUTPATIENT)
Dept: PHYSICAL THERAPY | Facility: CLINIC | Age: 41
End: 2021-04-16
Payer: COMMERCIAL

## 2021-04-19 ENCOUNTER — APPOINTMENT (OUTPATIENT)
Dept: PHYSICAL THERAPY | Facility: CLINIC | Age: 41
End: 2021-04-19
Payer: COMMERCIAL

## 2021-04-23 ENCOUNTER — APPOINTMENT (OUTPATIENT)
Dept: PHYSICAL THERAPY | Facility: CLINIC | Age: 41
End: 2021-04-23
Payer: COMMERCIAL

## 2021-04-23 ENCOUNTER — TELEPHONE (OUTPATIENT)
Dept: FAMILY MEDICINE CLINIC | Facility: CLINIC | Age: 41
End: 2021-04-23

## 2021-04-23 DIAGNOSIS — L23.7 POISON IVY: ICD-10-CM

## 2021-04-23 RX ORDER — PREDNISONE 10 MG/1
TABLET ORAL
Qty: 18 TABLET | Refills: 0 | Status: SHIPPED | OUTPATIENT
Start: 2021-04-23 | End: 2022-01-05

## 2021-04-23 NOTE — TELEPHONE ENCOUNTER
Yadi Pretty had seen Marycruz Gr on 04/12 for poison  As of today the poison had spread over her entire body and itching has increased  She had completed the prednisone  Yadi Pretty is asking if something else can be sent to the pharmacy?  [facility] in [place]

## 2021-04-23 NOTE — TELEPHONE ENCOUNTER
Left voice msg to discuss Judy's message w/pt  TEREZA consulted for PIV.  Per bedside RN pt no longer needs it.

## 2021-04-23 NOTE — TELEPHONE ENCOUNTER
Pt called back: she is requesting another round of the Prednisone as, it just started clearing up in some areas but, still very itchy in others  She wants to avoid making an appointment as, she feels it will get worse if the treatment is delayed

## 2021-04-26 ENCOUNTER — APPOINTMENT (OUTPATIENT)
Dept: RADIOLOGY | Age: 41
End: 2021-04-26
Payer: COMMERCIAL

## 2021-04-26 DIAGNOSIS — Z98.890 STATUS POST SURGERY: ICD-10-CM

## 2021-04-26 DIAGNOSIS — M54.12 CERVICAL RADICULOPATHY: ICD-10-CM

## 2021-04-26 DIAGNOSIS — Z98.1 STATUS POST CERVICAL SPINAL FUSION: ICD-10-CM

## 2021-04-26 PROCEDURE — 72040 X-RAY EXAM NECK SPINE 2-3 VW: CPT

## 2021-04-28 ENCOUNTER — OFFICE VISIT (OUTPATIENT)
Dept: NEUROSURGERY | Facility: CLINIC | Age: 41
End: 2021-04-28

## 2021-04-28 VITALS
BODY MASS INDEX: 33.34 KG/M2 | TEMPERATURE: 98.3 F | HEIGHT: 68 IN | RESPIRATION RATE: 16 BRPM | SYSTOLIC BLOOD PRESSURE: 108 MMHG | WEIGHT: 220 LBS | DIASTOLIC BLOOD PRESSURE: 64 MMHG | HEART RATE: 89 BPM

## 2021-04-28 DIAGNOSIS — M54.12 CERVICAL RADICULOPATHY: Primary | ICD-10-CM

## 2021-04-28 DIAGNOSIS — Z98.1 STATUS POST CERVICAL SPINAL FUSION: ICD-10-CM

## 2021-04-28 PROCEDURE — 99024 POSTOP FOLLOW-UP VISIT: CPT | Performed by: NEUROLOGICAL SURGERY

## 2021-04-28 PROCEDURE — 3008F BODY MASS INDEX DOCD: CPT | Performed by: NURSE PRACTITIONER

## 2021-04-28 NOTE — PROGRESS NOTES
Assessment/Plan:    No problem-specific Assessment & Plan notes found for this encounter  Problem List Items Addressed This Visit        Nervous and Auditory    Cervical radiculopathy - Primary    Relevant Orders    XR spine cervical 2 or 3 vw injury      Other Visit Diagnoses     Status post cervical spinal fusion        Relevant Orders    XR spine cervical 2 or 3 vw injury            Subjective:      Patient ID: Radha Velásquez is a 41 y o  female  HPI    The following portions of the patient's history were reviewed and updated as appropriate:   She  has a past medical history of Constipation, Depression, Eczema, GERD (gastroesophageal reflux disease), Grand mal convulsion ([address] ), Hypertension, Insomnia, Medial meniscus tear (2015), Migraine, PONV (postoperative nausea and vomiting), Psoriasis, Seizures ([address] ), Shoulder dislocation, right, sequela (2018), Sinusitis, Sleep apnea, and Tinnitus  She   Patient Active Problem List    Diagnosis Date Noted    S/P cervical discectomy 2021    Cervical spinal stenosis 2021    Cervical radiculopathy 2020    Essential hypertension 10/15/2020    Constipation 10/24/2018    Lateral epicondylitis of right elbow 2018    Essential hypertriglyceridemia 2017    Insomnia 2017    Low back pain 2017    Obstructive sleep apnea on CPAP 2017    Epilepsy ([address] ) 11/15/2013    Obesity 10/08/2013    Primary generalized epilepsy ([address] ) 2011     She  has a past surgical history that includes LASIK; Hysteroscopy; DILATION AND CURETTAGE OF UTERUS WITH HYSTEROSOCPY (N/A, 2017); LAPAROSCOPY; REMOVAL OF INTRAUTERINE DEVICE (IUD); pr colonoscopy flx dx w/collj spec when pfrmd (N/A, 2018);  section ( and ); Dilation and curettage of uterus; EGD; Colonoscopy; and pr remv vert body,cerv,one sgmt (Bilateral, 2021)    Her family history includes Brain cancer (age of onset: 64) in her maternal grandmother; Diabetes in her family and father; Hyperlipidemia in her father; Hypertension in her father; Migraines in her family; No Known Problems in her daughter, mother, sister, and son  She  reports that she has never smoked  She has never used smokeless tobacco  She reports current alcohol use of about 7 0 standard drinks of alcohol per week  She reports that she does not use drugs    Current Outpatient Medications   Medication Sig Dispense Refill    acetaminophen (TYLENOL) 325 mg tablet Take 650 mg by mouth every 6 (six) hours as needed for mild pain      Ascorbic Acid (VITAMIN C) 100 MG tablet Take 100 mg by mouth daily      cholecalciferol (VITAMIN D3) 1,000 units tablet Take 1,000 Units by mouth daily      docusate sodium (COLACE) 100 mg capsule Take 1 capsule (100 mg total) by mouth 2 (two) times a day 20 capsule 0    Etonogestrel (NEXPLANON SC) Inject under the skin Located Left upper inner arm      gabapentin (NEURONTIN) 300 mg capsule Take 1 capsule (300 mg total) by mouth 2 (two) times a day Take 1 capsule by mouth in the morning and 1 capsule at lunch 90 capsule 1    gabapentin (NEURONTIN) 600 MG tablet Take 1 tablet (600 mg total) by mouth daily at bedtime 30 tablet 0    Ginkgo Biloba 40 MG TABS Take by mouth      hydrochlorothiazide (HYDRODIURIL) 25 mg tablet Take 1 tablet (25 mg total) by mouth daily at bedtime 90 tablet 1    losartan (COZAAR) 50 mg tablet Take 1 tablet (50 mg total) by mouth daily at bedtime 90 tablet 1    magnesium 30 MG tablet Take 30 mg by mouth 2 (two) times a day      magnesium hydroxide (MILK OF MAGNESIA) 400 mg/5 mL oral suspension Take 30 mL by mouth daily as needed for constipation 118 mL 0    Multiple Vitamins-Minerals (MULTIVITAL-M) TABS Take by mouth      omeprazole (PriLOSEC) 20 mg delayed release capsule Take 1 capsule (20 mg total) by mouth daily 90 capsule 1    predniSONE 10 mg tablet Take 3t daily for 3d then 2t daily for 3d then 1t daily for 3d 18 tablet 0    QUEtiapine (SEROquel) 25 mg tablet Take 1 tablet (25 mg total) by mouth daily at bedtime 90 tablet 3    senna (SENOKOT) 8 6 mg Take 1 tablet (8 6 mg total) by mouth daily 10 tablet 0    TiZANidine (ZANAFLEX) 4 MG capsule Take 1 capsule (4 mg total) by mouth 3 (three) times a day as needed for muscle spasms 90 capsule 0    tiZANidine (ZANAFLEX) 4 mg tablet Take 4 mg by mouth 3 (three) times a day      Turmeric 400 MG CAPS Take by mouth      Zinc 100 MG TABS Take by mouth       No current facility-administered medications for this visit        Current Outpatient Medications on File Prior to Visit   Medication Sig    acetaminophen (TYLENOL) 325 mg tablet Take 650 mg by mouth every 6 (six) hours as needed for mild pain    Ascorbic Acid (VITAMIN C) 100 MG tablet Take 100 mg by mouth daily    cholecalciferol (VITAMIN D3) 1,000 units tablet Take 1,000 Units by mouth daily    docusate sodium (COLACE) 100 mg capsule Take 1 capsule (100 mg total) by mouth 2 (two) times a day    Etonogestrel (NEXPLANON SC) Inject under the skin Located Left upper inner arm    gabapentin (NEURONTIN) 300 mg capsule Take 1 capsule (300 mg total) by mouth 2 (two) times a day Take 1 capsule by mouth in the morning and 1 capsule at lunch    gabapentin (NEURONTIN) 600 MG tablet Take 1 tablet (600 mg total) by mouth daily at bedtime    Ginkgo Biloba 40 MG TABS Take by mouth    hydrochlorothiazide (HYDRODIURIL) 25 mg tablet Take 1 tablet (25 mg total) by mouth daily at bedtime    losartan (COZAAR) 50 mg tablet Take 1 tablet (50 mg total) by mouth daily at bedtime    magnesium 30 MG tablet Take 30 mg by mouth 2 (two) times a day    magnesium hydroxide (MILK OF MAGNESIA) 400 mg/5 mL oral suspension Take 30 mL by mouth daily as needed for constipation    Multiple Vitamins-Minerals (MULTIVITAL-M) TABS Take by mouth    omeprazole (PriLOSEC) 20 mg delayed release capsule Take 1 capsule (20 mg total) by mouth daily    predniSONE 10 mg tablet Take 3t daily for 3d then 2t daily for 3d then 1t daily for 3d    QUEtiapine (SEROquel) 25 mg tablet Take 1 tablet (25 mg total) by mouth daily at bedtime    senna (SENOKOT) 8 6 mg Take 1 tablet (8 6 mg total) by mouth daily    TiZANidine (ZANAFLEX) 4 MG capsule Take 1 capsule (4 mg total) by mouth 3 (three) times a day as needed for muscle spasms    tiZANidine (ZANAFLEX) 4 mg tablet Take 4 mg by mouth 3 (three) times a day    Turmeric 400 MG CAPS Take by mouth    Zinc 100 MG TABS Take by mouth     No current facility-administered medications on file prior to visit  She is allergic to gluten meal - food allergy and lactose - food allergy       Review of Systems   Constitutional: Negative  HENT: Negative  Eyes: Negative  Respiratory: Negative  Cardiovascular: Negative  Gastrointestinal: Negative  Endocrine: Negative  Genitourinary: Negative  Musculoskeletal: Negative  Skin: Positive for rash ([place])  Allergic/Immunologic: Negative  Neurological: Negative  Hematological: Negative  Psychiatric/Behavioral: Negative  All other systems reviewed and are negative  Objective: There were no vitals taken for this visit  Physical Exam      I have personally obtain history and examined patient  I have personally reviewed case including all pertinent investigations/studies       Time spent 15 minutes  More than 50% of total time spent on counseling and coordination of care as described above including patient education      HPI     3 monthss post op anterior cervical decompression and fusion for stenosis with deformity  Patient denies pain with improved UE function  Compliant with PT and  activity restrictions   No analgesic use      Exam     Well healed incision  Mild restriction in cervical ROM  Full strength bilateral UE and LE  Intact fine motor  Intact coordination  Symmetrically depressed DTR  Normal gait                             Neck:   Supple, symmetrical, trachea midline, no adenopathy;        thyroid:  No enlargement/tenderness/nodules; no carotid    bruit or JVD   Back:     Symmetric, no curvature, ROM normal, no CVA tenderness         Chest wall:    No tenderness or deformity                           Extremities:   Extremities normal, atraumatic, no cyanosis or edema         Skin:   Skin color, texture, turgor normal, no rashes or lesions      Radiology     Xray     Anatomic neck alignment with all hardware and screws in expected position, no cage settling  Restoration of normal cervical lordosis w evidence of bridging interbody fusion mass     Summary and Plan     Ms Melany Feliciano is doing well 3 months post op anterior cervical decompression and fusion for stenosis and deformity  Today we reviewed activity restrictions as well as the need for PT and ROM exercises   I will see him in fu in 3 months with repeat xray

## 2021-04-30 ENCOUNTER — IMMUNIZATIONS (OUTPATIENT)
Dept: FAMILY MEDICINE CLINIC | Facility: HOSPITAL | Age: 41
End: 2021-04-30

## 2021-04-30 DIAGNOSIS — Z23 ENCOUNTER FOR IMMUNIZATION: Primary | ICD-10-CM

## 2021-04-30 PROCEDURE — 0001A SARS-COV-2 / COVID-19 MRNA VACCINE (PFIZER-BIONTECH) 30 MCG: CPT

## 2021-04-30 PROCEDURE — 91300 SARS-COV-2 / COVID-19 MRNA VACCINE (PFIZER-BIONTECH) 30 MCG: CPT

## 2021-05-10 ENCOUNTER — OFFICE VISIT (OUTPATIENT)
Dept: PHYSICAL THERAPY | Facility: CLINIC | Age: 41
End: 2021-05-10
Payer: COMMERCIAL

## 2021-05-10 DIAGNOSIS — Z98.1 STATUS POST CERVICAL SPINAL FUSION: Primary | ICD-10-CM

## 2021-05-10 PROCEDURE — 97140 MANUAL THERAPY 1/> REGIONS: CPT

## 2021-05-10 NOTE — PROGRESS NOTES
Daily Note     Today's date: 5/10/2021  Patient name: Shara Beverly  : 1980  MRN: [id number]  Referring provider: Marcello Hall MD  Dx:   Encounter Diagnosis     ICD-10-CM    1  Status post cervical spinal fusion  Z98 1                   Subjective: Patient reports she has missed last few weeks of PT due to having poison sumac  She followed up with neurosurgeon recently who was satisfied with patients progressed and advised her to continue with PT per patient  Objective: See treatment diary below      Assessment: Tolerated treatment well  Patient would benefit from continued PT  Continued with focus on manual therapy and mobility as indicated  Added pec minor pin and stretch bilaterally with good response  Plan: Progress treatment as tolerated         Precautions: lifting restriction up to 10 lbs, prior right shoulder dislocation x2      Manuals 3/19 3/22 3/25 3/29 4/1 4/6 4/9 5/10     Scapular ROM with STM, rhomboid to UE nv MC SY MC MC SY MC MC     Cervical STM supine nv MC SY MC MC SY MC MC     Pec minor release/MFR/stretching        MC                  Neuro Re-Ed                                                                                                        Ther Ex             AROM rotation :10x3 HEP           AROM lateral flexion :10x3 HEP           AROM nods supine 5ea 10           Shoulder flexion supine 10            Chest fly supine, avoid too much horizontal abd, hx right dislocations  10           Thoracic ext over chair, arms crossed :10x5 :10x5           Ulnar nerve glides 10ea 10 ea           Upper body conditioning, bands             UBE  4'            Progressive shoulder strengthening             Rhomboid stretch   :10x5          Shoulder Girdle and Core: #10 lifting restriction*             TB row with ER             TB W's     Y3        TB low row    ytb 20         TB ext    ytb 20         Bracing with kick out and abduction             Bracing with full march to kick out             Bracing with progressive heel taps             Multifidus press TB             Single arm row             pball bridge/LTR/leg curl             scap push downs seated                                                    Ther Activity                                       Gait Training                                       Modalities

## 2021-05-13 ENCOUNTER — OFFICE VISIT (OUTPATIENT)
Dept: PHYSICAL THERAPY | Facility: CLINIC | Age: 41
End: 2021-05-13
Payer: COMMERCIAL

## 2021-05-13 DIAGNOSIS — Z98.1 STATUS POST CERVICAL SPINAL FUSION: Primary | ICD-10-CM

## 2021-05-13 PROCEDURE — 97140 MANUAL THERAPY 1/> REGIONS: CPT

## 2021-05-13 NOTE — PROGRESS NOTES
Daily Note     Today's date: 2021  Patient name: Tod Martinez  : 1980  MRN: [id number]  Referring provider: Moriah Goldstein MD  Dx:   Encounter Diagnosis     ICD-10-CM    1  Status post cervical spinal fusion  Z98 1                   Subjective: Patient reports that she flinched in the car on Tuesday when something flew into her window and she had immense pain and is more tense than normal today  Objective: See treatment diary below      Assessment: Tolerated treatment well with a reduction in symptoms towards the end of the session  Pt with the most discomfort in R UT as well as R rhomboid but lessened after manual        Plan: Continue per plan of care        Precautions: lifting restriction up to 10 lbs, prior right shoulder dislocation x2      Manuals 3/19 3/22 3/25 3/29 4/1 4/6 4/9 5/10 5/13    Scapular ROM with STM, rhomboid to UE nv  SY [facility] SY [facility]    Cervical STM supine nv [facility] SY [facility] BR[facility]    Pec minor release/MFR/stretching        [facility]                 Neuro Re-Ed                                                                                                        Ther Ex             AROM rotation :10x3 HEP           AROM lateral flexion :10x3 HEP           AROM nods supine 5ea 10           Shoulder flexion supine 10            Chest fly supine, avoid too much horizontal abd, hx right dislocations  10           Thoracic ext over chair, arms crossed :10x5 :10x5           Ulnar nerve glides 10ea 10 ea           Upper body conditioning, bands             UBE  4'            Progressive shoulder strengthening             Rhomboid stretch   :10x5          Shoulder Girdle and Core: #10 lifting restriction*             TB row with ER             TB W's     Y3        TB low row    ytb 20         TB ext    ytb 20         Bracing with kick out and abduction             Bracing with full march to kick out             Bracing with progressive heel taps             Multifidus press TB Single arm row             pball bridge/LTR/leg curl             scap push downs seated                                                    Ther Activity                                       Gait Training                                       Modalities

## 2021-05-21 ENCOUNTER — IMMUNIZATIONS (OUTPATIENT)
Dept: FAMILY MEDICINE CLINIC | Facility: HOSPITAL | Age: 41
End: 2021-05-21

## 2021-05-21 ENCOUNTER — OFFICE VISIT (OUTPATIENT)
Dept: PHYSICAL THERAPY | Facility: CLINIC | Age: 41
End: 2021-05-21
Payer: COMMERCIAL

## 2021-05-21 DIAGNOSIS — Z23 ENCOUNTER FOR IMMUNIZATION: Primary | ICD-10-CM

## 2021-05-21 DIAGNOSIS — Z98.1 STATUS POST CERVICAL SPINAL FUSION: Primary | ICD-10-CM

## 2021-05-21 PROCEDURE — 97110 THERAPEUTIC EXERCISES: CPT

## 2021-05-21 PROCEDURE — 97140 MANUAL THERAPY 1/> REGIONS: CPT

## 2021-05-21 PROCEDURE — 91300 SARS-COV-2 / COVID-19 MRNA VACCINE (PFIZER-BIONTECH) 30 MCG: CPT

## 2021-05-21 PROCEDURE — 0002A SARS-COV-2 / COVID-19 MRNA VACCINE (PFIZER-BIONTECH) 30 MCG: CPT

## 2021-05-21 NOTE — PROGRESS NOTES
Daily Note     Today's date: 2021  Patient name: Wallace Calix  : 1980   MRN: [id number]  Referring provider: Dl Espino MD  Dx:   Encounter Diagnosis     ICD-10-CM    1  Status post cervical spinal fusion  Z98 1                   Subjective: Patient reports she has been waking up with paresthesia radiating from R shoulder into hand and she has also been taking pain killers as night due to pain along R arm and back  Objective: See treatment diary below      Assessment: Tolerated treatment well  Patient would benefit from continued PT  Soft tissue restrictions throughout R upper quarter, UT>pec minor>rhomboid  Reviewed home program and stretching routine  Plan: Progress treatment as tolerated         Precautions: lifting restriction up to 10 lbs, prior right shoulder dislocation x2      Manuals 3/19 3/22 3/25 3/29 4/1 4/6 4/9 5/10 5/13 5/21   Scapular ROM with STM, rhomboid to UE nv [facility]   Cervical STM supine nv [facility] minor release/MFR/stretching        [facility]                Neuro Re-Ed                                                                                                        Ther Ex             AROM rotation :10x3 HEP           AROM lateral flexion :10x3 HEP           AROM nods supine 5ea 10           Shoulder flexion supine 10            Chest fly supine, avoid too much horizontal abd, hx right dislocations  10           Thoracic ext over chair, arms crossed :10x5 :10x5           Ulnar nerve glides 10ea 10 ea           Upper body conditioning, bands             UBE  4'            Progressive shoulder strengthening             Rhomboid stretch   :10x5          Shoulder Girdle and Core: #10 lifting restriction*             TB row with ER             TB W's     Y3        TB low row    ytb 20         TB ext    ytb 20         Bracing with kick out and abduction             Bracing with full march to kick out Bracing with progressive heel taps             Multifidus press TB             Single arm row             pball bridge/LTR/leg curl             scap push downs seated                                                    Ther Activity                                       Gait Training                                       Modalities

## 2021-05-24 ENCOUNTER — EVALUATION (OUTPATIENT)
Dept: PHYSICAL THERAPY | Facility: CLINIC | Age: 41
End: 2021-05-24
Payer: COMMERCIAL

## 2021-05-24 DIAGNOSIS — Z98.1 STATUS POST CERVICAL SPINAL FUSION: Primary | ICD-10-CM

## 2021-05-24 PROCEDURE — 97140 MANUAL THERAPY 1/> REGIONS: CPT | Performed by: PHYSICAL THERAPIST

## 2021-05-24 NOTE — PROGRESS NOTES
PT Re-EVALUATION     Today's date: 21  Patient name: Nahed Richards  : 1980  MRN: [id number]  Referring provider: Cody Askew MD  Dx:   1  Status post cervical spinal fusion          Nahed Richards is a 41 y o  female who is recovering from ACDF on 21  Her ROM has significantly improved and peak pain level has decreased  She continues to have variable trigger points in the upper trapezium and other funmilayo-scapulars  There is moderate tightness with cervical lateral flexion  This patient would benefit from skilled physical therapy to address their listed impairments and functional limitations to maximize functional outcome      Impairments:    restricted ROM    decreased strength   pain with function   activity intolerance      Prognosis:  Good  Positive and negative prognostic indicator(s):  pain >3 months     Goals:    STG Patient is independent with HEP (met)  STG Range of motion is improved by 25% in 2 weeks (met)  LTG Range of motion is improved by 50% in 4 weeks (partially met)  LTG ADL performance improved to prior level of function in 6 weeks     Planned interventions:  home exercise program, patient education, manual therapy, graded activity, flexibility, functional range of motion exercises and modalities prn     Duration in visits:  8  Frequency: 2 visits per week  Duration in weeks:  4-6     History of Current Injury: patient is s/p ACDF C4-7 on 21  She currently reports decreased pain intensity and improved neck mobility    She continues to report trigger points in the funmilayo-scapulars and occasional radiating symptoms down the right arm        Pain location: anterolateral neck, front of the shoulders/chest  Pain descriptors:  Soreness, light throbbing   Pain intensity:  2 sometimes (from 2/10 at rest), 2-3 (from 6/10 with movement)      Aggravating factors: tilting head sideways, looking up and down  Easing factors: avoiding above activities     Patient goals:  decreased pain, independence with ADLs, increased mobility and increased strength     Objective      Palpation   Left   Hypertonic in the cervical paraspinals, levator scapulae, middle trapezius, scalenes, suboccipitals and upper trapezius  Tenderness of the cervical paraspinals, deltoid, levator scapulae, middle trapezius, scalenes and upper trapezius       Right   Hypertonic in the cervical paraspinals, levator scapulae, middle trapezius, scalenes, suboccipitals and upper trapezius     Tenderness of the cervical paraspinals, deltoid, levator scapulae, middle trapezius, scalenes and upper trapezius       Active Range of Motion   Cervical/Thoracic Spine      Cervical  Flexion: 25 (10 degrees)    Extension:  40 (17 degrees)   Left lateral flexion: 17  (from 10 degrees)   Right lateral flexion: 14  (from 8 degrees)  Left rotation: 60 (from 20 degrees)  Right rotation: 66 (from 48 degrees)      Cervical/Thoracic Comments  Incision healing well  Myotome testing to be performed at a future visit due to time constraints      Daily Note     Today's date: 2021  Patient name: Shara Beverly  : 1980  MRN: [id number]  Referring provider: Marcello Hall MD  Dx:   Encounter Diagnosis     ICD-10-CM    1  Status post cervical spinal fusion  Z98 1                    Precautions: lifting restriction up to 10 lbs, prior right shoulder dislocation x2      Manuals    Scapular ROM with STM, rhomboid to UE SY            Cervical STM supine SY            Pec minor release/MFR/stretching SY                         Neuro Re-Ed                                                                                                        Ther Ex             AROM rotation             AROM lateral flexion             AROM nods supine             Shoulder flexion supine             Chest fly supine, avoid too much horizontal abd, hx right dislocations             Thoracic ext over chair, arms crossed             Ulnar nerve glides             Upper body conditioning, bands             UBE             Progressive shoulder strengthening             Rhomboid stretch             Shoulder Girdle and Core: #10 lifting restriction*             TB row with ER             TB W's             TB low row             TB ext             Bracing with kick out and abduction             Bracing with full march to kick out             Bracing with progressive heel taps             Multifidus press TB             Single arm row             pball bridge/LTR/leg curl             scap push downs seated                                                    Ther Activity                                       Gait Training                                       Modalities

## 2021-05-27 ENCOUNTER — OFFICE VISIT (OUTPATIENT)
Dept: PHYSICAL THERAPY | Facility: CLINIC | Age: 41
End: 2021-05-27
Payer: COMMERCIAL

## 2021-05-27 DIAGNOSIS — Z98.1 STATUS POST CERVICAL SPINAL FUSION: Primary | ICD-10-CM

## 2021-05-27 PROCEDURE — 97140 MANUAL THERAPY 1/> REGIONS: CPT | Performed by: PHYSICAL THERAPIST

## 2021-05-27 NOTE — PROGRESS NOTES
Daily Note     Today's date: 2021  Patient name: Shara Beverly  : 1980  MRN: [id number]  Referring provider: Marcello Hall MD  Dx:   Encounter Diagnosis     ICD-10-CM    1  Status post cervical spinal fusion  Z98 1               Subjective: patient reports she has not been having shooting pain since last treatment but had a lot of tightness across the back      Objective: See treatment diary below      Assessment: Tolerated treatment well  Patient exhibited good technique with therapeutic exercises and would benefit from continued PT  Bilateral trigger points to the rhomboid,  Right > left with segmental hypomobility to T4      Plan: Progress treatment as tolerated         Precautions: lifting restriction up to 10 lbs, prior right shoulder dislocation x2      Manuals    Scapular ROM with STM, rhomboid to UE SY SY        MC   Cervical STM supine, SOR SY SY + SOR        MC   Pec minor release/MFR/stretching SY         MC                Neuro Re-Ed                                                                                                        Ther Ex             AROM rotation             AROM lateral flexion             AROM nods supine             Shoulder flexion supine             Chest fly supine, avoid too much horizontal abd, hx right dislocations             Thoracic ext over chair, arms crossed             Ulnar nerve glides             Upper body conditioning, bands             UBE             Progressive shoulder strengthening             Rhomboid stretch             Shoulder Girdle and Core: #10 lifting restriction*             TB row with ER             TB W's             TB low row             TB ext             Bracing with kick out and abduction             Bracing with full march to kick out             Bracing with progressive heel taps             Multifidus press TB             Single arm row             pball bridge/LTR/leg curl             scap push downs seated                                                    Ther Activity                                       Gait Training                                       Modalities

## 2021-06-01 ENCOUNTER — OFFICE VISIT (OUTPATIENT)
Dept: PHYSICAL THERAPY | Facility: CLINIC | Age: 41
End: 2021-06-01
Payer: COMMERCIAL

## 2021-06-01 DIAGNOSIS — Z98.1 STATUS POST CERVICAL SPINAL FUSION: Primary | ICD-10-CM

## 2021-06-01 PROCEDURE — 97140 MANUAL THERAPY 1/> REGIONS: CPT

## 2021-06-01 NOTE — PROGRESS NOTES
Daily Note     Today's date: 2021  Patient name: Luis Enrique Lux  : 1980  MRN: [id number]  Referring provider: Wallace Eric MD  Dx:   Encounter Diagnosis     ICD-10-CM    1  Status post cervical spinal fusion  Z98 1                   Subjective: Patient reports she feels confident in ability to throw a ball with her daughter which was one of her main goals  She slept well after last session  Objective: See treatment diary below      Assessment: Tolerated treatment well  Patient would benefit from continued PT  Plan: Progress treatment as tolerated         Precautions: lifting restriction up to 10 lbs, prior right shoulder dislocation x2      Manuals    Scapular ROM with STM, rhomboid to UE SY SY [facility]   Cervical STM supine, SOR SY SY + SOR [facility]   Pec minor release/MFR/stretching SY                         Neuro Re-Ed                                                                                                        Ther Ex             AROM rotation             AROM lateral flexion             AROM nods supine             Shoulder flexion supine             Chest fly supine, avoid too much horizontal abd, hx right dislocations             Thoracic ext over chair, arms crossed             Ulnar nerve glides             Upper body conditioning, bands             UBE             Progressive shoulder strengthening             Rhomboid stretch             Shoulder Girdle and Core: #10 lifting restriction*             TB row with ER             TB W's             TB low row             TB ext             Bracing with kick out and abduction             Bracing with full march to kick out             Bracing with progressive heel taps             Multifidus press TB             Single arm row             pball bridge/LTR/leg curl             scap push downs seated                                                    Ther Activity Gait Training                                       Modalities

## 2021-06-03 ENCOUNTER — OFFICE VISIT (OUTPATIENT)
Dept: PHYSICAL THERAPY | Facility: CLINIC | Age: 41
End: 2021-06-03
Payer: COMMERCIAL

## 2021-06-03 DIAGNOSIS — Z98.1 STATUS POST CERVICAL SPINAL FUSION: Primary | ICD-10-CM

## 2021-06-03 PROCEDURE — 97110 THERAPEUTIC EXERCISES: CPT

## 2021-06-03 PROCEDURE — 97140 MANUAL THERAPY 1/> REGIONS: CPT

## 2021-06-03 NOTE — PROGRESS NOTES
Daily Note     Today's date: 6/3/2021  Patient name: Laura Burden  : 1980  MRN: [id number]  Referring provider: Lydia Patel MD   Dx:   Encounter Diagnosis     ICD-10-CM    1  Status post cervical spinal fusion  Z98 1                   Subjective: Patient reports mid back pain resolved after last session though she reports increased pain (points to bilateral UTs) when turning his head while driving earlier today  Objective: See treatment diary below      Assessment: Tolerated treatment well  Patient would benefit from continued PT  Tender to bilateral UT and LS (R>L) and to L rhomboid  Trialed rhomboid stretch at doorway and cross body stretching which was tolerated well  Plan: Progress treatment as tolerated         Precautions: lifting restriction up to 10 lbs, prior right shoulder dislocation x2      Manuals 5/24 5/27 6/1 6/3      5/21   Scapular ROM with STM, rhomboid to UE SY SY MC MC      MC   Cervical STM supine, SOR SY SY + SOR MC MC      MC   Pec minor release/MFR/stretching SY         MC                Neuro Re-Ed                                                                                                        Ther Ex             AROM rotation             AROM lateral flexion             AROM nods supine             Shoulder flexion supine             Chest fly supine, avoid too much horizontal abd, hx right dislocations             Thoracic ext over chair, arms crossed             Ulnar nerve glides             Upper body conditioning, bands             UBE             Progressive shoulder strengthening             Rhomboid stretch    :10x5         Shoulder Girdle and Core: #10 lifting restriction*             TB row with ER             TB W's             TB low row             TB ext             Bracing with kick out and abduction             Bracing with full march to kick out             Bracing with progressive heel taps             Multifidus press TB Single arm row             pball bridge/LTR/leg curl             scap push downs seated             Cross body stretching    :10x5                                   Ther Activity                                       Gait Training                                       Modalities

## 2021-06-07 ENCOUNTER — OFFICE VISIT (OUTPATIENT)
Dept: PHYSICAL THERAPY | Facility: CLINIC | Age: 41
End: 2021-06-07
Payer: COMMERCIAL

## 2021-06-07 DIAGNOSIS — Z98.1 STATUS POST CERVICAL SPINAL FUSION: Primary | ICD-10-CM

## 2021-06-07 PROCEDURE — 97140 MANUAL THERAPY 1/> REGIONS: CPT

## 2021-06-07 PROCEDURE — 97112 NEUROMUSCULAR REEDUCATION: CPT

## 2021-06-07 NOTE — PROGRESS NOTES
Daily Note     Today's date: 2021  Patient name: Nakia Isaacs  : 1980  MRN: [id number]  Referring provider: Juani Balderas MD  Dx:   Encounter Diagnosis     ICD-10-CM    1  Status post cervical spinal fusion  Z98 1                   Subjective: Patient reports she felt great following last session though has been waking up in the morning with numbness in R arm into hand, most noticable in ring finger  Objective: See treatment diary below      Assessment: Tolerated treatment well  Patient exhibited good technique with therapeutic exercises and would benefit from continued PT  Tender to suboccipitals R>L though decreased tightness to bilat UT compared to last visit  Significant neural tension noted with trial of median and ulnar ULTTs, R>>L  Added active nerve glides keeping head in neutral position with significant stretch felt, ulnar>median  Plan: Progress treatment as tolerated          Precautions: lifting restriction up to 10 lbs, prior right shoulder dislocation x2      Manuals 5/24 5/27 6/1 6/3 6/7     5/21   Scapular ROM with STM, rhomboid to UE SY SY [facility]   Cervical STM supine, SOR SY SY + SOR [facility]   Pec minor release/MFR/stretching [facility]                Neuro Re-Ed             Nerve glides     median and ulnar 10 ea                                                                                      Ther Ex             AROM rotation             AROM lateral flexion             AROM nods supine             Shoulder flexion supine             Chest fly supine, avoid too much horizontal abd, hx right dislocations             Thoracic ext over chair, arms crossed             Ulnar nerve glides             Upper body conditioning, bands             UBE             Progressive shoulder strengthening             Rhomboid stretch    :10x5         Shoulder Girdle and Core: #10 lifting restriction*             TB row with ER             TB W's             TB low row             TB ext             Bracing with kick out and abduction             Bracing with full march to kick out             Bracing with progressive heel taps             Multifidus press TB             Single arm row             pball bridge/LTR/leg curl             scap push downs seated             Cross body stretching    :10x5                                   Ther Activity                                       Gait Training                                       Modalities

## 2021-06-10 ENCOUNTER — OFFICE VISIT (OUTPATIENT)
Dept: PHYSICAL THERAPY | Facility: CLINIC | Age: 41
End: 2021-06-10
Payer: COMMERCIAL

## 2021-06-10 DIAGNOSIS — Z98.1 STATUS POST CERVICAL SPINAL FUSION: Primary | ICD-10-CM

## 2021-06-10 PROCEDURE — 97112 NEUROMUSCULAR REEDUCATION: CPT

## 2021-06-10 PROCEDURE — 97140 MANUAL THERAPY 1/> REGIONS: CPT

## 2021-06-10 NOTE — PROGRESS NOTES
Daily Note     Today's date: 6/10/2021  Patient name: Ananth Daniel  : 1980  MRN: [id number]  Referring provider: Edith Anderson MD  Dx:   Encounter Diagnosis     ICD-10-CM    1  Status post cervical spinal fusion  Z98 1                   Subjective: Patient reports she has been swimming and stretching in the water which has felt good and overall she is sleeping better at night  She feels signifcant tension with the nerve glides added last session though has been working on them consistently  Objective: See treatment diary below       Assessment: Tolerated treatment well  Patient would benefit from continued PT  Continued adverse neural tension with median>ulnar nerve glides R>>L  Plan: Progress treatment as tolerated         Precautions: lifting restriction up to 10 lbs, prior right shoulder dislocation x2      Manuals 5/24 5/27 6/1 6/3 6/7 6/10    5/21   Scapular ROM with STM, rhomboid to UE SY SY MC [facility]    MC   Cervical STM supine, SOR SY SY + SOR [facility]    MC   Pec minor release/MFR/stretching [facility]                Neuro Re-Ed             Nerve glides     median and ulnar 10 ea 20 ea and manual                                                                                     Ther Ex             AROM rotation             AROM lateral flexion             AROM nods supine             Shoulder flexion supine             Chest fly supine, avoid too much horizontal abd, hx right dislocations             Thoracic ext over chair, arms crossed             Ulnar nerve glides             Upper body conditioning, bands             UBE             Progressive shoulder strengthening             Rhomboid stretch    :10x5         Shoulder Girdle and Core: #10 lifting restriction*             TB row with ER             TB W's             TB low row             TB ext             Bracing with kick out and abduction             Bracing with full march to kick out             Bracing with progressive heel taps             Multifidus press TB             Single arm row             pball bridge/LTR/leg curl             scap push downs seated             Cross body stretching    :10x5                                   Ther Activity                                       Gait Training                                       Modalities

## 2021-06-14 ENCOUNTER — OFFICE VISIT (OUTPATIENT)
Dept: PHYSICAL THERAPY | Facility: CLINIC | Age: 41
End: 2021-06-14
Payer: COMMERCIAL

## 2021-06-14 DIAGNOSIS — Z98.1 STATUS POST CERVICAL SPINAL FUSION: Primary | ICD-10-CM

## 2021-06-14 PROCEDURE — 97140 MANUAL THERAPY 1/> REGIONS: CPT

## 2021-06-14 PROCEDURE — 97110 THERAPEUTIC EXERCISES: CPT
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
## 2021-06-17 ENCOUNTER — OFFICE VISIT (OUTPATIENT)
Dept: PHYSICAL THERAPY | Facility: CLINIC | Age: 41
End: 2021-06-17
Payer: COMMERCIAL

## 2021-06-17 DIAGNOSIS — Z98.1 STATUS POST CERVICAL SPINAL FUSION: Primary | ICD-10-CM

## 2021-06-17 PROCEDURE — 97140 MANUAL THERAPY 1/> REGIONS: CPT

## 2021-06-17 PROCEDURE — 97112 NEUROMUSCULAR REEDUCATION: CPT

## 2021-06-17 NOTE — PROGRESS NOTES
Daily Note     Today's date: 2021  Patient name: Wallace Calix  : 1980  MRN: [id number]  Referring provider: Dl Espino MD  Dx:   Encounter Diagnosis     ICD-10-CM    1  Status post cervical spinal fusion  Z98 1                   Subjective: Patient reports no paresthesia in RUE or hand since last visit  Objective: See treatment diary below      Assessment: Tolerated treatment well  Patient would benefit from continued PT  Continues to have mild adverse neural tension with R median nerve glide though significantly decreased since last week  Plan: Progress treatment as tolerated         Precautions: lifting restriction up to 10 lbs, prior right shoulder dislocation x2      Manuals 5/24 5/27 6/1 6/3 6/7 6/10 6/14  6/17     Scapular ROM with STM, rhomboid to UE SY SY MC [facility]     Cervical STM supine, SOR SY SY + SOR [facility]     Pec minor release/MFR/stretching SY    MC                     Neuro Re-Ed             Nerve glides     median and ulnar 10 ea 20 ea and manual 20 e and manual 20 ea an manual                                                                                   Ther Ex             AROM rotation             AROM lateral flexion             AROM nods supine             Shoulder flexion supine             Chest fly supine, avoid too much horizontal abd, hx right dislocations             Thoracic ext over chair, arms crossed             Ulnar nerve glides             Upper body conditioning, bands             UBE             Progressive shoulder strengthening             Rhomboid stretch    :10x5         Shoulder Girdle and Core: #10 lifting restriction*             TB row with ER             TB W's             TB low row             TB ext             Bracing with kick out and abduction             Bracing with full march to kick out             Bracing with progressive heel taps             Multifidus press TB             Single arm row pball bridge/LTR/leg curl             scap push downs seated             Cross body stretching    :10x5         Pec stretch, corner       :10x5                   Ther Activity                                       Gait Training                                       Modalities

## 2021-06-22 ENCOUNTER — OFFICE VISIT (OUTPATIENT)
Dept: PHYSICAL THERAPY | Facility: CLINIC | Age: 41
End: 2021-06-22
Payer: COMMERCIAL

## 2021-06-22 DIAGNOSIS — Z98.1 STATUS POST CERVICAL SPINAL FUSION: Primary | ICD-10-CM

## 2021-06-22 PROCEDURE — 97110 THERAPEUTIC EXERCISES: CPT

## 2021-06-22 PROCEDURE — 97140 MANUAL THERAPY 1/> REGIONS: CPT

## 2021-06-22 NOTE — PROGRESS NOTES
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  Objective: See treatment diary below      Assessment: Tolerated treatment well  Patient would benefit from continued PT  Reviewed median nerve glides using wall for deeper stretch  Patient continues to feel significant tension with corner pec stretching  Tender to bilateral UT and LS with decreased pain reported post MT  Plan: Progress treatment as tolerated         Precautions: lifting restriction up to 10 lbs, prior right shoulder dislocation x2      Manuals 5/24 5/27 6/1 6/3 6/7 6/10 6/14  6/17 6/22    Scapular ROM with STM, rhomboid to UE SY SY [facility] [address] [facility]    Cervical STM supine, SOR SY SY + SOR [facility] [address] [facility]    Pec minor release/MFR/stretching SY                         Neuro Re-Ed             Nerve glides     median and ulnar 10 ea 20 ea and manual 20 e and manual 20 ea an manual 20 ea and manual                                                                                  Ther Ex             AROM rotation             AROM lateral flexion             AROM nods supine         Prone propped 10    Shoulder flexion supine             Chest fly supine, avoid too much horizontal abd, hx right dislocations             Thoracic ext over chair, arms crossed             Ulnar nerve glides             Upper body conditioning, bands             UBE             Progressive shoulder strengthening             Rhomboid stretch    :10x5         Shoulder Girdle and Core: #10 lifting restriction*             TB row with ER             TB W's             TB low row             TB ext             Bracing with kick out and abduction             Bracing with full march to kick out             Bracing with progressive heel taps             Multifidus press TB             Single arm row             pball bridge/LTR/leg curl             scap push downs seated             Cross body stretching    :10x5         Pec stretch, corner       :10x5  :10x5                 Ther Activity                                       Gait Training                                       Modalities

## 2021-06-28 ENCOUNTER — OFFICE VISIT (OUTPATIENT)
Dept: PHYSICAL THERAPY | Facility: CLINIC | Age: 41
End: 2021-06-28
Payer: COMMERCIAL

## 2021-06-28 DIAGNOSIS — Z98.1 STATUS POST CERVICAL SPINAL FUSION: Primary | ICD-10-CM

## 2021-06-28 PROCEDURE — 97140 MANUAL THERAPY 1/> REGIONS: CPT

## 2021-06-28 NOTE — PROGRESS NOTES
Daily Note     Today's date: 2021  Patient name: Malika Roberson  : 1980  MRN: [id number]  Referring provider: Harpal Barba MD  Dx:   Encounter Diagnosis     ICD-10-CM    1  Status post cervical spinal fusion  Z98 1                   Subjective: Pt reports decreased neck pain since last visit; c/o muscle tightness/tension today in upper and mid back  Pt reports she performed HEP before PT today  Objective: See treatment diary below      Assessment: Tolerated treatment well  Patient would benefit from continued PT   BL pec minor tenderness noted, R>L  Thoracic paraspinal tightness persists  Continues to benefit from nerve glides to help relieve neural tension  Plan: Continue per plan of care        Precautions: lifting restriction up to 10 lbs, prior right shoulder dislocation x2      Manuals 5/24 5/27 6/1 6/3 6/7 6/10 6/14  6/17 6/22 6/28   Scapular ROM with STM, rhomboid to UE SY SY MC [facility] LM   Cervical STM supine, SOR SY SY + SOR [facility] LM   Pec minor release/MFR/stretching SY    MC     LM                 Neuro Re-Ed             Nerve glides     median and ulnar 10 ea 20 ea and manual 20 e and manual 20 ea an manual 20 ea and manual manual only today                                                                                 Ther Ex             AROM rotation             AROM lateral flexion             AROM nods supine         Prone propped 10    Shoulder flexion supine             Chest fly supine, avoid too much horizontal abd, hx right dislocations             Thoracic ext over chair, arms crossed             Ulnar nerve glides             Upper body conditioning, bands             UBE             Progressive shoulder strengthening             Rhomboid stretch    :10x5         Shoulder Girdle and Core: #10 lifting restriction*             TB row with ER             TB W's             TB low row             TB ext             Bracing with kick out and abduction             Bracing with full march to kick out             Bracing with progressive heel taps             Multifidus press TB             Single arm row             pball bridge/LTR/leg curl             scap push downs seated             Cross body stretching    :10x5         Pec stretch, corner       :10x5  :10x5                 Ther Activity                                       Gait Training                                       Modalities

## 2021-07-06 DIAGNOSIS — M47.12 SPONDYLOGENIC COMPRESSION OF CERVICAL SPINAL CORD: ICD-10-CM

## 2021-07-06 DIAGNOSIS — M48.02 DEGENERATIVE CERVICAL SPINAL STENOSIS: ICD-10-CM

## 2021-07-06 DIAGNOSIS — M54.12 RADICULOPATHY, CERVICAL REGION: ICD-10-CM

## 2021-07-06 RX ORDER — GABAPENTIN 600 MG/1
TABLET ORAL
Qty: 30 TABLET | Refills: 0 | Status: SHIPPED | OUTPATIENT
Start: 2021-07-06 | End: 2021-08-25

## 2021-07-07 RX ORDER — TIZANIDINE 4 MG/1
TABLET ORAL
Qty: 90 TABLET | Refills: 0 | Status: SHIPPED | OUTPATIENT
Start: 2021-07-07 | End: 2021-09-23 | Stop reason: SDUPTHER

## 2021-07-08 ENCOUNTER — OFFICE VISIT (OUTPATIENT)
Dept: PHYSICAL THERAPY | Facility: CLINIC | Age: 41
End: 2021-07-08
Payer: COMMERCIAL

## 2021-07-08 DIAGNOSIS — Z98.1 STATUS POST CERVICAL SPINAL FUSION: Primary | ICD-10-CM

## 2021-07-08 PROCEDURE — 97110 THERAPEUTIC EXERCISES: CPT

## 2021-07-08 PROCEDURE — 97140 MANUAL THERAPY 1/> REGIONS: CPT

## 2021-07-08 NOTE — PROGRESS NOTES
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 which included using vacuum and she notes intermittent numbness in R hand during as well as general stiffness and discomfort after  Objective: See treatment diary below      Assessment: Tolerated treatment well  Patient exhibited good technique with therapeutic exercises and would benefit from continued PT  Minimal tension with median and ulnar nerve glides on R>L which is an improvement  General tightness noted throughout cervical musculature and L>R periscapulars  Plan: Progress treatment as tolerated         Precautions: lifting restriction up to 10 lbs, prior right shoulder dislocation x2      Manuals 7/8   6/3 6/7 6/10 6/14  6/17 6/22 6/28   Scapular ROM with STM, rhomboid to  [address] LM   Cervical STM supine, SOR [place]   Pec minor release/MFR/stretching [facility]                 Neuro Re-Ed             Nerve glides 20 ea and manual    median and ulnar 10 ea 20 ea and manual 20 e and manual 20 ea an manual 20 ea and manual manual only today                                                                                 Ther Ex             AROM rotation             AROM lateral flexion             AROM nods supine Prone propped 10        Prone propped 10    Shoulder flexion supine             Chest fly supine, avoid too much horizontal abd, hx right dislocations             Thoracic ext over chair, arms crossed             Ulnar nerve glides             Upper body conditioning, bands             UBE             Progressive shoulder strengthening             Rhomboid stretch    :10x5         Shoulder Girdle and Core: #10 lifting restriction* TB row with ER             TB W's             TB low row             TB ext             Bracing with kick out and abduction             Bracing with full march to kick out             Bracing with progressive heel taps             Multifidus press TB             Single arm row             pball bridge/LTR/leg curl             scap push downs seated             Cross body stretching    :10x5         Pec stretch, corner HEP      :10x5  :10x5                 Ther Activity                                       Gait Training                                       Modalities

## 2021-07-12 ENCOUNTER — OFFICE VISIT (OUTPATIENT)
Dept: PHYSICAL THERAPY | Facility: CLINIC | Age: 41
End: 2021-07-12
Payer: COMMERCIAL

## 2021-07-12 DIAGNOSIS — Z98.1 STATUS POST CERVICAL SPINAL FUSION: Primary | ICD-10-CM

## 2021-07-12 PROCEDURE — 97140 MANUAL THERAPY 1/> REGIONS: CPT

## 2021-07-12 PROCEDURE — 97110 THERAPEUTIC EXERCISES: CPT

## 2021-07-12 NOTE — PROGRESS NOTES
Daily Note     Today's date: 2021  Patient name: Celine Wiseman  : 1980  MRN: [id number]  Referring provider: Tawana Shelton MD  Dx:   Encounter Diagnosis     ICD-10-CM    1  Status post cervical spinal fusion  Z98 1                   Subjective: Patient reports she continues to wake up with R arm numbness extending into 4th finger which is typically transient  She also notes similar symptoms when putting her hair in a pony tail or when she is in a forward flexed position for an extended period of times  She denies neck pain or stiffness since last visit and notes her discomfort is more muscular and due to running out of muscle relaxers  Objective: See treatment diary below      Assessment: Tolerated treatment well  Patient exhibited good technique with therapeutic exercises and would benefit from continued PT  Progressed pec minor stretching on R with good response  Mild adverse neural tension noted with R median nerve glides compared to L  Plan: Progress treatment as tolerated         Precautions: lifting restriction up to 10 lbs, prior right shoulder dislocation x2      Manuals 7/8 7/12  6/3 6/7 6/10 6/14  6/17 6/22 6/28   Scapular ROM with STM, rhomboid to  [facility]   Cervical STM supine, SOR [address] [facility]   Pec minor release/MFR/stretching [address] [facility]                 Neuro Re-Ed             Nerve glides 20 ea and manual 20 ea and manual   median and ulnar 10 ea 20 ea and manual 20 e and manual 20 ea an manual 20 ea and manual manual only today                                                                                 Ther Ex             AROM rotation             AROM lateral flexion             AROM nods supine Prone propped 10 Prone propped 10       Prone propped 10    Shoulder flexion supine             Chest fly supine, avoid too much horizontal abd, hx right dislocations             Thoracic ext over chair, arms crossed Ulnar nerve glides             Upper body conditioning, bands             UBE             Progressive shoulder strengthening             Rhomboid stretch    :10x5         Shoulder Girdle and Core: #10 lifting restriction*             TB row with ER             TB W's             TB low row             TB ext             Bracing with kick out and abduction             Bracing with full march to kick out             Bracing with progressive heel taps             Multifidus press TB             Single arm row             pball bridge/LTR/leg curl             scap push downs seated             Cross body stretching    :10x5         Pec stretch, corner HEP      :10x5  :10x5                 Ther Activity                                       Gait Training                                       Modalities

## 2021-07-15 ENCOUNTER — APPOINTMENT (OUTPATIENT)
Dept: PHYSICAL THERAPY | Facility: CLINIC | Age: 41
End: 2021-07-15
Payer: COMMERCIAL

## 2021-07-19 ENCOUNTER — EVALUATION (OUTPATIENT)
Dept: PHYSICAL THERAPY | Facility: CLINIC | Age: 41
End: 2021-07-19
Payer: COMMERCIAL

## 2021-07-19 DIAGNOSIS — Z98.1 STATUS POST CERVICAL SPINAL FUSION: Primary | ICD-10-CM

## 2021-07-19 PROCEDURE — 97140 MANUAL THERAPY 1/> REGIONS: CPT

## 2021-07-19 PROCEDURE — 97112 NEUROMUSCULAR REEDUCATION: CPT

## 2021-07-19 PROCEDURE — 97110 THERAPEUTIC EXERCISES: CPT

## 2021-07-19 NOTE — PROGRESS NOTES
Daily Note     Today's date: 2021  Patient name: Romelia Ruiz  : 1980  MRN: [id number]  Referring provider: Markos Andino MD  Dx:   Encounter Diagnosis     ICD-10-CM    1  Status post cervical spinal fusion  Z98 1                   Subjective: See RE  Objective: See treatment diary below      Assessment: Tolerated treatment well  Patient would benefit from continued PT  Refer to RE for additional information  Plan: Progress treatment as tolerated         Precautions: lifting restriction up to 10 lbs, prior right shoulder dislocation x2      Manuals    Scapular ROM with STM, rhomboid to UE [facility] [address] [facility]   Cervical STM supine, SOR [facility]   Pec minor release/MFR/stretching [facility]                 Neuro Re-Ed             Nerve glides 20 ea and manual 20 ea and manual      20 ea an manual 20 ea and manual manual only today   Cervical lateral flexion isometrics with TB   Head neutral and slight lateral flexion                                                                            Ther Ex             AROM rotation             AROM lateral flexion             AROM nods supine Prone propped 10 Prone propped 10       Prone propped 10    Shoulder flexion supine             Chest fly supine, avoid too much horizontal abd, hx right dislocations             Thoracic ext over chair, arms crossed             Ulnar nerve glides             Upper body conditioning, bands             UBE             Progressive shoulder strengthening             Rhomboid stretch             Shoulder Girdle and Core: #10 lifting restriction*             TB row with ER             TB W's             TB low row             TB ext             Bracing with kick out and abduction             Bracing with full march to kick out             Bracing with progressive heel taps             Multifidus press TB             Single arm row             pball bridge/LTR/leg curl             scap push downs seated             Cross body stretching             Pec stretch, corner HEP        :10x5                 Ther Activity                                       Gait Training                                       Modalities

## 2021-07-19 NOTE — PROGRESS NOTES
PT Re-EVALUATION     Today's date: 07/19/21  Patient Liza Yañez  ZXH: [id number]/[id number]  QIX: [id number]  Referring Trent Hahn MD  Dx:   1  Status post cervical spinal fusion          Dex Victoria a 41 y  o  female who is recovering well from ACDF on 2/2/21   Her ROM continues to improve and she reports a 70+% improvement overall  She continues to have decreased tolerance to reaching and computer use  Decreased ROM is still present with slow movement returning to and from right lateral flexion and flexion  This patient would benefit from skilled physical therapy to address their listed impairments and functional limitations to maximize functional outcome      Impairments:    restricted ROM    decreased strength   pain with function   activity intolerance      Prognosis:  Good  Positive and negative prognostic indicator(s):  pain >3 months     Goals:    STG Patient is independent with HEP (met)  STG Range of motion is improved by 25% in 2 weeks (met)  LTG Range of motion is improved by 50% in 4 weeks (partially met)  LTG ADL performance improved to prior level of function in 6 weeks     Planned interventions:  home exercise program, patient education, manual therapy, graded activity, flexibility, functional range of motion exercises and modalities prn     Duration in visits:  8  Frequency: 2 visits per week  Duration in weeks:  4-6     History of Current Injury: patient is s/p ACDF C4-7 on 2/2/21        She feels she is about 70% better  She has no pain with end range active mobility of the neck with significantly improved extension  She is no longer having the sharp pain between the shoulder blades  She has to relax and move slowly to get to full cervical flexion and right lateral rotation      Pain location: anterolateral neck, front of the shoulders/chest  Pain descriptors:  Soreness, light throbbing   Pain intensity:  1 (muscle tightness) sometimes (from 2/10 at rest), 4 with reapted forward reaching (from 6/10 with movement)      Aggravating factors: forward head position, computer use (left UE symptoms down to 4th finger)  Easing factors: avoiding above activities     Patient goals:  decreased pain, independence with ADLs, increased mobility and increased strength     Objective      Palpation   Left   Hypertonic in the cervical paraspinals, levator scapulae, middle trapezius, scalenes, suboccipitals and upper trapezius  Tenderness of the cervical paraspinals, deltoid, levator scapulae, middle trapezius, scalenes and upper trapezius       Right   Hypertonic in the cervical paraspinals, levator scapulae, middle trapezius, scalenes, suboccipitals and upper trapezius     Tenderness of the cervical paraspinals, deltoid, levator scapulae, middle trapezius, scalenes and upper trapezius       Active Range of Motion   Cervical/Thoracic Spine      Cervical  Flexion: 27 (form 25 and degrees)    Extension:  50 (from 40 and 17 degrees)   Left lateral flexion: 26 (from 17 and 10 degrees)   Right lateral flexion: 17 (from 14 and 8 degrees)  Left rotation: 64 (from 60 (from 20 degrees)  Right rotation: 67 (from 48 degrees)      Cervical/Thoracic Comments  Incision healing well  Myotome testing to be performed at a future visit due to time constraints

## 2021-07-22 ENCOUNTER — OFFICE VISIT (OUTPATIENT)
Dept: PHYSICAL THERAPY | Facility: CLINIC | Age: 41
End: 2021-07-22
Payer: COMMERCIAL

## 2021-07-22 DIAGNOSIS — Z98.1 STATUS POST CERVICAL SPINAL FUSION: Primary | ICD-10-CM

## 2021-07-22 PROCEDURE — 97140 MANUAL THERAPY 1/> REGIONS: CPT | Performed by: PHYSICAL THERAPIST

## 2021-07-22 NOTE — PROGRESS NOTES
Daily Note     Today's date: 2021  Patient name: Odell Salcedo  : 1980  MRN: [id number]  Referring provider: Isaac Grossman MD  Dx:   Encounter Diagnosis     ICD-10-CM    1  Status post cervical spinal fusion  Z98 1                   Subjective: patient reports right upper quarter aching and tightness with numbness down to the right fingers  She attributes at least some of the tightness to work related stress  Objective: See treatment diary below      Assessment: Tolerated treatment well  Patient exhibited good technique with therapeutic exercises and would benefit from continued PT  Treatment focus on right UT and levator       Plan: Progress treatment as tolerated         Precautions: lifting restriction up to 10 lbs, prior right shoulder dislocation x2      Manuals    Scapular ROM with STM, rhomboid to UE [facility] [employer] SY right levator/UT    MC MC LM   Cervical STM supine, SOR [facility] MC SY    [facility] LM   Pec minor release/MFR/stretching [facility] SY, right      LM                 Neuro Re-Ed             Nerve glides 20 ea and manual 20 ea and manual      20 ea an manual 20 ea and manual manual only today   Cervical lateral flexion isometrics with TB   Head neutral and slight lateral flexion                                                                            Ther Ex             AROM rotation             AROM lateral flexion             AROM nods supine Prone propped 10 Prone propped 10       Prone propped 10    Shoulder flexion supine             Chest fly supine, avoid too much horizontal abd, hx right dislocations             Thoracic ext over chair, arms crossed             Ulnar nerve glides             Upper body conditioning, bands             UBE             Progressive shoulder strengthening             Rhomboid stretch             Shoulder Girdle and Core: #10 lifting restriction*             TB row with ER             TB W's             TB low row TB ext             Bracing with kick out and abduction             Bracing with full march to kick out             Bracing with progressive heel taps             Multifidus press TB             Single arm row             pball bridge/LTR/leg curl             scap push downs seated             Cross body stretching             Pec stretch, corner HEP        :10x5                 Ther Activity                                       Gait Training                                       Modalities

## 2021-07-23 ENCOUNTER — APPOINTMENT (OUTPATIENT)
Dept: RADIOLOGY | Age: 41
End: 2021-07-23
Payer: COMMERCIAL

## 2021-07-23 DIAGNOSIS — Z98.1 STATUS POST CERVICAL SPINAL FUSION: ICD-10-CM

## 2021-07-23 DIAGNOSIS — M54.12 CERVICAL RADICULOPATHY: ICD-10-CM

## 2021-07-23 PROCEDURE — 72040 X-RAY EXAM NECK SPINE 2-3 VW: CPT

## 2021-07-26 ENCOUNTER — OFFICE VISIT (OUTPATIENT)
Dept: PHYSICAL THERAPY | Facility: CLINIC | Age: 41
End: 2021-07-26
Payer: COMMERCIAL

## 2021-07-26 ENCOUNTER — OFFICE VISIT (OUTPATIENT)
Dept: NEUROSURGERY | Facility: CLINIC | Age: 41
End: 2021-07-26
Payer: COMMERCIAL

## 2021-07-26 VITALS
HEART RATE: 85 BPM | DIASTOLIC BLOOD PRESSURE: 77 MMHG | RESPIRATION RATE: 18 BRPM | SYSTOLIC BLOOD PRESSURE: 117 MMHG | BODY MASS INDEX: 33.34 KG/M2 | HEIGHT: 68 IN | WEIGHT: 220 LBS | TEMPERATURE: 97.7 F

## 2021-07-26 DIAGNOSIS — M54.12 CERVICAL RADICULOPATHY: ICD-10-CM

## 2021-07-26 DIAGNOSIS — G62.9 PERIPHERAL NEUROPATHY: ICD-10-CM

## 2021-07-26 DIAGNOSIS — Z98.1 STATUS POST CERVICAL SPINAL FUSION: Primary | ICD-10-CM

## 2021-07-26 DIAGNOSIS — M48.02 DEGENERATIVE CERVICAL SPINAL STENOSIS: ICD-10-CM

## 2021-07-26 PROCEDURE — 3078F DIAST BP <80 MM HG: CPT | Performed by: NEUROLOGICAL SURGERY

## 2021-07-26 PROCEDURE — 3074F SYST BP LT 130 MM HG: CPT | Performed by: NEUROLOGICAL SURGERY

## 2021-07-26 PROCEDURE — 99214 OFFICE O/P EST MOD 30 MIN: CPT | Performed by: NEUROLOGICAL SURGERY

## 2021-07-26 PROCEDURE — 97140 MANUAL THERAPY 1/> REGIONS: CPT

## 2021-07-26 PROCEDURE — 97110 THERAPEUTIC EXERCISES: CPT

## 2021-07-26 PROCEDURE — 1036F TOBACCO NON-USER: CPT | Performed by: NEUROLOGICAL SURGERY

## 2021-07-26 PROCEDURE — 3008F BODY MASS INDEX DOCD: CPT | Performed by: NEUROLOGICAL SURGERY

## 2021-07-26 NOTE — PROGRESS NOTES
Daily Note     Today's date: 2021  Patient name: Moriah Blanco  : 1980  MRN: [id number]  Referring provider: Jacinto Brunner, MD  Dx:   Encounter Diagnosis     ICD-10-CM    1  Status post cervical spinal fusion  Z98 1                   Subjective: Patient repots she had follow up with surgeon today who advised her to continue with PT and is sending her for EMG for RUE symptoms  Objective: See treatment diary below      Assessment: Tolerated treatment well  Patient exhibited good technique with therapeutic exercises and would benefit from continued PT  Plan: Progress treatment as tolerated         Precautions: lifting restriction up to 10 lbs, prior right shoulder dislocation x2      Manuals    Scapular ROM with STM, rhomboid to UE [facility] SY right levator/UT [facility]   Cervical STM supine, SOR [facility] SY [facility]   Pec minor release/MFR/stretching [facility] SY, right      LM                 Neuro Re-Ed             Nerve glides 20 ea and manual 20 ea and manual  20 ea    20 ea an manual 20 ea and manual manual only today   Cervical lateral flexion isometrics with TB   Head neutral and slight lateral flexion                                                                            Ther Ex             AROM rotation             AROM lateral flexion             AROM nods supine Prone propped 10 Prone propped 10  Prone propped 10     Prone propped 10    Shoulder flexion supine             Chest fly supine, avoid too much horizontal abd, hx right dislocations             Thoracic ext over chair, arms crossed             Ulnar nerve glides             Upper body conditioning, bands             UBE             Progressive shoulder strengthening             Rhomboid stretch             Shoulder Girdle and Core: #10 lifting restriction*             TB row with ER             TB W's             TB low row             TB ext             Bracing with kick out and abduction             Bracing with full march to kick out             Bracing with progressive heel taps             Multifidus press TB             Single arm row             pball bridge/LTR/leg curl             scap push downs seated             Cross body stretching             Pec stretch, corner HEP        :10x5                 Ther Activity                                       Gait Training                                       Modalities

## 2021-07-26 NOTE — PROGRESS NOTES
Assessment/Plan:    No problem-specific Assessment & Plan notes found for this encounter  Problem List Items Addressed This Visit        Nervous and Auditory    Cervical radiculopathy    Relevant Orders    EMG 2 Limb Upper Extremity      Other Visit Diagnoses     Status post cervical spinal fusion    -  Primary    Degenerative cervical spinal stenosis        Peripheral neuropathy        Relevant Orders    EMG 2 Limb Upper Extremity            Subjective:      Patient ID: Ronnie Bales is a 41 y o  female  HPI    The following portions of the patient's history were reviewed and updated as appropriate:   She  has a past medical history of Constipation, Depression, Eczema, GERD (gastroesophageal reflux disease), Grand mal convulsion ([address] ), Hypertension, Insomnia, Medial meniscus tear (2015), Migraine, PONV (postoperative nausea and vomiting), Psoriasis, Seizures ([address] ), Shoulder dislocation, right, sequela (2018), Sinusitis, Sleep apnea, and Tinnitus  She   Patient Active Problem List    Diagnosis Date Noted    S/P cervical discectomy 2021    Cervical spinal stenosis 2021    Cervical radiculopathy 2020    Essential hypertension 10/15/2020    Constipation 10/24/2018    Lateral epicondylitis of right elbow 2018    Essential hypertriglyceridemia 2017    Insomnia 2017    Low back pain 2017    Obstructive sleep apnea on CPAP 2017    Epilepsy ([address] ) 11/15/2013    Obesity 10/08/2013    Primary generalized epilepsy ([facility] [address] ) 2011     She  has a past surgical history that includes LASIK; Hysteroscopy; DILATION AND CURETTAGE OF UTERUS WITH HYSTEROSOCPY (N/A, 2017); LAPAROSCOPY; REMOVAL OF INTRAUTERINE DEVICE (IUD); pr colonoscopy flx dx w/collj spec when pfrmd (N/A, 2018);  section ( and ); Dilation and curettage of uterus; EGD; Colonoscopy; and pr remv vert body,cerv,one sgmt (Bilateral, 2021)    Her family history includes Brain cancer (age of onset: 64) in her maternal grandmother; Diabetes in her family and father; Hyperlipidemia in her father; Hypertension in her father; Migraines in her family; No Known Problems in her daughter, mother, sister, and son  She  reports that she has never smoked  She has never used smokeless tobacco  She reports current alcohol use of about 7 0 standard drinks of alcohol per week  She reports that she does not use drugs    Current Outpatient Medications   Medication Sig Dispense Refill    acetaminophen (TYLENOL) 325 mg tablet Take 650 mg by mouth every 6 (six) hours as needed for mild pain      Ascorbic Acid (VITAMIN C) 100 MG tablet Take 100 mg by mouth daily      cholecalciferol (VITAMIN D3) 1,000 units tablet Take 1,000 Units by mouth daily      docusate sodium (COLACE) 100 mg capsule Take 1 capsule (100 mg total) by mouth 2 (two) times a day 20 capsule 0    Etonogestrel (NEXPLANON SC) Inject under the skin Located Left upper inner arm      gabapentin (NEURONTIN) 600 MG tablet TAKE 1 TABLET BY MOUTH EVERY DAY AT BEDTIME 30 tablet 0    hydrochlorothiazide (HYDRODIURIL) 25 mg tablet Take 1 tablet (25 mg total) by mouth daily at bedtime 90 tablet 1    losartan (COZAAR) 50 mg tablet Take 1 tablet (50 mg total) by mouth daily at bedtime 90 tablet 1    magnesium 30 MG tablet Take 30 mg by mouth 2 (two) times a day      Multiple Vitamins-Minerals (MULTIVITAL-M) TABS Take by mouth      omeprazole (PriLOSEC) 20 mg delayed release capsule Take 1 capsule (20 mg total) by mouth daily 90 capsule 1    QUEtiapine (SEROquel) 25 mg tablet Take 1 tablet (25 mg total) by mouth daily at bedtime 90 tablet 3    TiZANidine (ZANAFLEX) 4 MG capsule Take 1 capsule (4 mg total) by mouth 3 (three) times a day as needed for muscle spasms 90 capsule 0    Zinc 100 MG TABS Take by mouth      gabapentin (NEURONTIN) 300 mg capsule Take 1 capsule (300 mg total) by mouth 2 (two) times a day Take 1 capsule by mouth in the morning and 1 capsule at lunch 90 capsule 1    Ginkgo Biloba 40 MG TABS Take by mouth (Patient not taking: Reported on 7/26/2021)      magnesium hydroxide (MILK OF MAGNESIA) 400 mg/5 mL oral suspension Take 30 mL by mouth daily as needed for constipation (Patient not taking: Reported on 7/26/2021) 118 mL 0    predniSONE 10 mg tablet Take 3t daily for 3d then 2t daily for 3d then 1t daily for 3d (Patient not taking: Reported on 7/26/2021) 18 tablet 0    senna (SENOKOT) 8 6 mg Take 1 tablet (8 6 mg total) by mouth daily 10 tablet 0    tiZANidine (ZANAFLEX) 4 mg tablet TAKE 1 TABLET BY MOUTH THREE TIMES DAILY AS NEEDED FOR MUSCLE SPASMS (Patient not taking: Reported on 7/26/2021) 90 tablet 0    Turmeric 400 MG CAPS Take by mouth (Patient not taking: Reported on 7/26/2021)       No current facility-administered medications for this visit       Current Outpatient Medications on File Prior to Visit   Medication Sig    acetaminophen (TYLENOL) 325 mg tablet Take 650 mg by mouth every 6 (six) hours as needed for mild pain    Ascorbic Acid (VITAMIN C) 100 MG tablet Take 100 mg by mouth daily    cholecalciferol (VITAMIN D3) 1,000 units tablet Take 1,000 Units by mouth daily    docusate sodium (COLACE) 100 mg capsule Take 1 capsule (100 mg total) by mouth 2 (two) times a day    Etonogestrel (NEXPLANON SC) Inject under the skin Located Left upper inner arm    gabapentin (NEURONTIN) 600 MG tablet TAKE 1 TABLET BY MOUTH EVERY DAY AT BEDTIME    hydrochlorothiazide (HYDRODIURIL) 25 mg tablet Take 1 tablet (25 mg total) by mouth daily at bedtime    losartan (COZAAR) 50 mg tablet Take 1 tablet (50 mg total) by mouth daily at bedtime    magnesium 30 MG tablet Take 30 mg by mouth 2 (two) times a day    Multiple Vitamins-Minerals (MULTIVITAL-M) TABS Take by mouth    omeprazole (PriLOSEC) 20 mg delayed release capsule Take 1 capsule (20 mg total) by mouth daily    QUEtiapine (SEROquel) 25 mg tablet Take 1 tablet (25 mg total) by mouth daily at bedtime    TiZANidine (ZANAFLEX) 4 MG capsule Take 1 capsule (4 mg total) by mouth 3 (three) times a day as needed for muscle spasms    Zinc 100 MG TABS Take by mouth    gabapentin (NEURONTIN) 300 mg capsule Take 1 capsule (300 mg total) by mouth 2 (two) times a day Take 1 capsule by mouth in the morning and 1 capsule at lunch    Ginkgo Biloba 40 MG TABS Take by mouth (Patient not taking: Reported on 7/26/2021)    magnesium hydroxide (MILK OF MAGNESIA) 400 mg/5 mL oral suspension Take 30 mL by mouth daily as needed for constipation (Patient not taking: Reported on 7/26/2021)    predniSONE 10 mg tablet Take 3t daily for 3d then 2t daily for 3d then 1t daily for 3d (Patient not taking: Reported on 7/26/2021)    senna (SENOKOT) 8 6 mg Take 1 tablet (8 6 mg total) by mouth daily    tiZANidine (ZANAFLEX) 4 mg tablet TAKE 1 TABLET BY MOUTH THREE TIMES DAILY AS NEEDED FOR MUSCLE SPASMS (Patient not taking: Reported on 7/26/2021)    Turmeric 400 MG CAPS Take by mouth (Patient not taking: Reported on 7/26/2021)     No current facility-administered medications on file prior to visit  She is allergic to gluten meal - food allergy and lactose - food allergy       Review of Systems   Constitutional: Negative  HENT: Negative  Eyes: Negative  Respiratory: Negative  Cardiovascular: Negative  Gastrointestinal: Negative  Endocrine: Negative  Genitourinary: Negative  Musculoskeletal: Positive for myalgias (On Tizanidine), neck pain (with physical  activity) and neck stiffness  PT has helped her pain ---70%    S/p 2/2021-  [facility]---CORPECTOMY SPINE CERVICAL ANTERIOR: C4-7 ACDF with C6 /hemicorpectomy, C4-7 instrumentation and fusion -   Skin: Negative  Rash: Poisen [place]  Allergic/Immunologic: Negative  Neurological: Positive for numbness (and tingling at times)  Negative for weakness  Hematological: Negative      Psychiatric/Behavioral: Negative  All other systems reviewed and are negative  Objective: There were no vitals taken for this visit  I have personally obtain history and examined patient  I have personally reviewed case including all pertinent investigations/studies  Time spent 30 minutes  More than 50% of total time spent on counseling and coordination of care as described above including patient education, discussion of risks and rationale of conservative vs surgical treatment options  Physical Exam      6 months post op anterior cervical decompression and fusion for stenosis with deformity  Patient denies neck pain with improved UE function  Episodic bilateral R>L paraesthesia predominantly from elbow to ring finger, exacerbated by activity and lifting  Compliant with PT and  activity restrictions  No analgesic use  Working full time      Exam     Well healed incision  Normal cervical ROM  Full strength bilateral UE and LE  Intact fine motor  Intact coordination  Symmetrically depressed DTR  Normal gait  Positive tinel's and phalen R>L medial elbow                             Neck:   Supple, symmetrical, trachea midline, no adenopathy;        thyroid:  No enlargement/tenderness/nodules; no carotid    bruit or JVD   Back:     Symmetric, no curvature, ROM normal, no CVA tenderness         Chest wall:    No tenderness or deformity                           Extremities:   Extremities normal, atraumatic, no cyanosis or edema         Skin:   Skin color, texture, turgor normal, no rashes or lesions      Radiology     Xray     Anatomic neck alignment with all hardware and screws in expected position, no cage settling  Restoration of normal cervical lordosis w evidence of bridging interbody fusion mass     Summary and Plan     Ms Olivas is doing well 6 months post op anterior cervical decompression and fusion for stenosis and deformity   Today we reviewed activity restrictions as well as the need for ongoing PT and ROM exercises  She presents with signs and symptoms of upper extremity peripheral neuropathy   I have ordered an EMG and will see her in fu in 3 months

## 2021-07-29 ENCOUNTER — APPOINTMENT (OUTPATIENT)
Dept: PHYSICAL THERAPY | Facility: CLINIC | Age: 41
End: 2021-07-29
Payer: COMMERCIAL

## 2021-08-02 ENCOUNTER — APPOINTMENT (OUTPATIENT)
Dept: PHYSICAL THERAPY | Facility: CLINIC | Age: 41
End: 2021-08-02
Payer: COMMERCIAL

## 2021-08-16 ENCOUNTER — OFFICE VISIT (OUTPATIENT)
Dept: PHYSICAL THERAPY | Facility: CLINIC | Age: 41
End: 2021-08-16
Payer: COMMERCIAL

## 2021-08-16 DIAGNOSIS — Z98.1 STATUS POST CERVICAL SPINAL FUSION: Primary | ICD-10-CM

## 2021-08-16 PROCEDURE — 97140 MANUAL THERAPY 1/> REGIONS: CPT | Performed by: PHYSICAL THERAPIST

## 2021-08-16 NOTE — PROGRESS NOTES
Daily Note     Today's date: 2021  Patient name: Hannah Rodriguez  : 1980  MRN: [id number]  Referring provider: Annita Doan MD  Dx:   Encounter Diagnosis     ICD-10-CM    1  Status post cervical spinal fusion  Z98 1                   Subjective: patient states her nerve problems are getting worse  She went kayaking last week for 1 75 miles and she did not have pain in the neck or shoulders  She did lose  strength after that distance which lasted for 2 days  She has been getting cramping in the right forearm wrist flexors not always related to activity  There was twitching in the right medial forearm earlier today  Objective: See treatment diary below      Assessment: Tolerated treatment fair  Patient exhibited good technique with therapeutic exercises and would benefit from continued PT  Added ulnar and median nerve glides to manual treatment, also focused on right T4 region  She felt working to the right of T4-5 increased warmth and  in the right hand  Plan: Progress treatment as tolerated         Precautions: lifting restriction up to 10 lbs, prior right shoulder dislocation x2      Manuals    Scapular ROM with STM, rhomboid to UE [address] right levator/UT [facility] LM   Cervical STM supine, SOR [facility] SY [facility] SY at end  [address] [facility] minor release/MFR/stretching [facility] SY, right [facility] SY    LM    Ulnar/median nerve glides      SY       DTM right T4-5      SY       Neuro Re-Ed             Nerve glides 20 ea and manual 20 ea and manual  20 ea    20 ea an manual 20 ea and manual manual only today   Cervical lateral flexion isometrics with TB   Head neutral and slight lateral flexion                                                                            Ther Ex             AROM rotation             AROM lateral flexion             AROM nods supine Prone propped 10 Prone propped 10  Prone propped 10     Prone propped 10 Shoulder flexion supine             Chest fly supine, avoid too much horizontal abd, hx right dislocations             Thoracic ext over chair, arms crossed             Ulnar nerve glides             Upper body conditioning, bands             UBE             Progressive shoulder strengthening             Rhomboid stretch             Shoulder Girdle and Core: #10 lifting restriction*             TB row with ER             TB W's             TB low row             TB ext             Bracing with kick out and abduction             Bracing with full march to kick out             Bracing with progressive heel taps             Multifidus press TB             Single arm row             pball bridge/LTR/leg curl             scap push downs seated             Cross body stretching             Pec stretch, corner HEP        :10x5                 Ther Activity                                       Gait Training                                       Modalities

## 2021-08-19 ENCOUNTER — OFFICE VISIT (OUTPATIENT)
Dept: PHYSICAL THERAPY | Facility: CLINIC | Age: 41
End: 2021-08-19
Payer: COMMERCIAL

## 2021-08-19 DIAGNOSIS — Z98.1 STATUS POST CERVICAL SPINAL FUSION: Primary | ICD-10-CM

## 2021-08-19 PROCEDURE — 97110 THERAPEUTIC EXERCISES: CPT

## 2021-08-19 PROCEDURE — 97140 MANUAL THERAPY 1/> REGIONS: CPT

## 2021-08-19 NOTE — PROGRESS NOTES
Daily Note     Today's date: 2021  Patient name: Kofi Davis  : 1980  MRN: [id number]  Referring provider: Teri Serrano MD  Dx:   Encounter Diagnosis     ICD-10-CM    1  Status post cervical spinal fusion  Z98 1                   Subjective: Patient reports continued RUE symptoms and she is eager for EMG  Objective: See treatment diary below      Assessment: Tolerated treatment well  Patient would benefit from continued PT  Patient continues to benefit from R pec minor stretching noting decreased pain post   Increased tension noted with median nerve glides on R  Plan: Progress treatment as tolerated         Precautions: lifting restriction up to 10 lbs, prior right shoulder dislocation x2      Manuals       Scapular ROM with STM, rhomboid to UE [facility] SY right levator/UT         Cervical STM supine, SOR [facility] SY  SY at end [address] minor release/MFR/stretching [facility] SY, right [facility]      Ulnar/median nerve glides      [facility]      DTM right T4-5      SY       Neuro Re-Ed             Nerve glides 20 ea and manual 20 ea and manual  20 ea   20 ea      Cervical lateral flexion isometrics with TB   Head neutral and slight lateral flexion                                                                            Ther Ex             AROM rotation             AROM lateral flexion             AROM nods supine Prone propped 10 Prone propped 10  Prone propped 10     Prone propped 10    Shoulder flexion supine             Chest fly supine, avoid too much horizontal abd, hx right dislocations             Thoracic ext over chair, arms crossed             Ulnar nerve glides             Upper body conditioning, bands             UBE             Progressive shoulder strengthening             Rhomboid stretch             Shoulder Girdle and Core: #10 lifting restriction*             TB row with ER             TB W's             TB low row             TB ext             Bracing with kick out and abduction             Bracing with full march to kick out             Bracing with progressive heel taps             Multifidus press TB             Single arm row             pball bridge/LTR/leg curl             scap push downs seated             Cross body stretching      :10x5       Pec stretch, corner HEP        :10x5                 Ther Activity                                       Gait Training                                       Modalities

## 2021-08-23 ENCOUNTER — OFFICE VISIT (OUTPATIENT)
Dept: PHYSICAL THERAPY | Facility: CLINIC | Age: 41
End: 2021-08-23
Payer: COMMERCIAL

## 2021-08-23 DIAGNOSIS — Z98.1 STATUS POST CERVICAL SPINAL FUSION: Primary | ICD-10-CM

## 2021-08-23 PROCEDURE — 97140 MANUAL THERAPY 1/> REGIONS: CPT | Performed by: PHYSICAL THERAPIST

## 2021-08-23 NOTE — PROGRESS NOTES
Daily Note     Today's date: 2021  Patient name: Joaquín Blandon  : 1980  MRN: [id number]  Referring provider: Faraz Leon MD  Dx:   Encounter Diagnosis     ICD-10-CM    1  Status post cervical spinal fusion  Z98 1                 Subjective: patient reports tightness in the bilateral upper traps today possibly due to stress at work today      Objective: See treatment diary below      Assessment: Tolerated treatment well  Patient exhibited good technique with therapeutic exercises and would benefit from continued PT  Trigger points to bilateral UT, mid portion and towards base of neck bilaterally      Plan: Progress treatment as tolerated         Precautions: lifting restriction up to 10 lbs, prior right shoulder dislocation x2      Manuals      Scapular ROM with STM, rhomboid to UE [facility] SY right levator/UT    UTs bilat SY     Cervical STM supine, SOR [facility] SY  SY at end [address] minor release/MFR/stretching [facility] SY, right [facility] SY       Ulnar/median nerve glides      SY       DTM right T4-5      SY       Neuro Re-Ed             Nerve glides 20 ea and manual 20 ea and manual  20 ea   20 ea      Cervical lateral flexion isometrics with TB   Head neutral and slight lateral flexion                                                                            Ther Ex             AROM rotation             AROM lateral flexion             AROM nods supine Prone propped 10 Prone propped 10  Prone propped 10     Prone propped 10    Shoulder flexion supine             Chest fly supine, avoid too much horizontal abd, hx right dislocations             Thoracic ext over chair, arms crossed             Ulnar nerve glides             Upper body conditioning, bands             UBE             Progressive shoulder strengthening             Rhomboid stretch             Shoulder Girdle and Core: #10 lifting restriction*             TB row with ER TB W's             TB low row             TB ext             Bracing with kick out and abduction             Bracing with full march to kick out             Bracing with progressive heel taps             Multifidus press TB             Single arm row             pball bridge/LTR/leg curl             scap push downs seated             Cross body stretching      :10x5       Pec stretch, corner HEP        :10x5                 Ther Activity                                       Gait Training                                       Modalities

## 2021-08-25 DIAGNOSIS — M47.12 SPONDYLOGENIC COMPRESSION OF CERVICAL SPINAL CORD: ICD-10-CM

## 2021-08-25 DIAGNOSIS — M54.12 RADICULOPATHY, CERVICAL REGION: ICD-10-CM

## 2021-08-25 DIAGNOSIS — M48.02 DEGENERATIVE CERVICAL SPINAL STENOSIS: ICD-10-CM

## 2021-08-25 RX ORDER — GABAPENTIN 600 MG/1
TABLET ORAL
Qty: 30 TABLET | Refills: 0 | Status: SHIPPED | OUTPATIENT
Start: 2021-08-25 | End: 2021-09-23 | Stop reason: SDUPTHER

## 2021-08-30 ENCOUNTER — OFFICE VISIT (OUTPATIENT)
Dept: PHYSICAL THERAPY | Facility: CLINIC | Age: 41
End: 2021-08-30
Payer: COMMERCIAL

## 2021-08-30 DIAGNOSIS — Z98.1 STATUS POST CERVICAL SPINAL FUSION: Primary | ICD-10-CM

## 2021-08-30 PROCEDURE — 97140 MANUAL THERAPY 1/> REGIONS: CPT

## 2021-08-30 PROCEDURE — 97110 THERAPEUTIC EXERCISES: CPT

## 2021-08-30 NOTE — PROGRESS NOTES
Daily Note     Today's date: 2021  Patient name: Barrington Aschoff  : 1980  MRN: [id number]  Referring provider: Grecia Raymond MD  Dx:   Encounter Diagnosis     ICD-10-CM    1  Status post cervical spinal fusion  Z98 1                   Subjective: Patient reports increased tightness and RUE symptoms this week which she attributes to increased stress levels  Objective: See treatment diary below      Assessment: Tolerated treatment well  Patient exhibited good technique with therapeutic exercises and would benefit from continued PT  Tight to L>R periscapulars with good response to manual therapy  Resumed doorway stretch due to significant pec minor tightness bilaterally  Plan: Progress treatment as tolerated         Precautions: lifting restriction up to 10 lbs, prior right shoulder dislocation x2      Manuals     Scapular ROM with STM, rhomboid to UE [facility] SY right levator/UT    UTs bilat [facility]    Cervical STM supine, SOR [facility] SY  SY at end  [facility] minor release/MFR/stretching [facility] SY, right [facility] SY [facility]    Ulnar/median nerve glides      SY       DTM right T4-5      SY       Neuro Re-Ed             Nerve glides 20 ea and manual 20 ea and manual  20 ea   20 ea  20 ea    Cervical lateral flexion isometrics with TB   Head neutral and slight lateral flexion                                                                            Ther Ex             AROM rotation             AROM lateral flexion             AROM nods supine Prone propped 10 Prone propped 10  Prone propped 10     Prone propped 10    Shoulder flexion supine             Chest fly supine, avoid too much horizontal abd, hx right dislocations             Thoracic ext over chair, arms crossed             Ulnar nerve glides             Upper body conditioning, bands             UBE             Progressive shoulder strengthening             Rhomboid stretch Shoulder Girdle and Core: #10 lifting restriction*             TB row with ER             TB W's             TB low row             TB ext             Bracing with kick out and abduction             Bracing with full march to kick out             Bracing with progressive heel taps             Multifidus press TB             Single arm row             pball bridge/LTR/leg curl             scap push downs seated             Cross body stretching      :10x5       Pec stretch, corner HEP        :10x5                 Ther Activity                                       Gait Training                                       Modalities

## 2021-09-02 ENCOUNTER — OFFICE VISIT (OUTPATIENT)
Dept: PHYSICAL THERAPY | Facility: CLINIC | Age: 41
End: 2021-09-02
Payer: COMMERCIAL

## 2021-09-02 DIAGNOSIS — Z98.1 STATUS POST CERVICAL SPINAL FUSION: Primary | ICD-10-CM

## 2021-09-02 PROCEDURE — 97140 MANUAL THERAPY 1/> REGIONS: CPT

## 2021-09-02 PROCEDURE — 97110 THERAPEUTIC EXERCISES: CPT

## 2021-09-02 NOTE — PROGRESS NOTES
Daily Note     Today's date: 2021  Patient name: Barrington Aschoff  : 1980  MRN: [id number]  Referring provider: Grecia Raymond MD   Dx:   Encounter Diagnosis     ICD-10-CM    1  Status post cervical spinal fusion  Z98 1                   Subjective: Patient overall improvement in symptoms since last visit and decreased shooting pain in RUE  Objective: See treatment diary below      Assessment: Tolerated treatment well  Patient exhibited good technique with therapeutic exercises and would benefit from continued PT  Patient continues to benefit from manual therapy and notes no RUE symptoms with light cervical distraction  Continued improvement in AROM following manual interventions  Discussed today being potential last visit due to insurance limitation and educated patient on self pay option  Patient is going to [employer] and will be in touch regarding continuation versus DC  Plan: Progress treatment as tolerated         Precautions: lifting restriction up to 10 lbs, prior right shoulder dislocation x2      Manuals    Scapular ROM with STM, rhomboid to UE [facility]  [address] SY right levator/UT MC   UTs bilat SY [facility]   Cervical STM supine, SOR  [address] MC  [address] SY  SY at end [address] minor release/MFR/stretching  [address], right  [address] SY  [facility]   Ulnar/median nerve glides      SY       DTM right T4-5      SY       Neuro Re-Ed             Nerve glides 20 ea and manual 20 ea and manual  20 ea   20 ea  20 ea 20 ea   Cervical lateral flexion isometrics with TB   Head neutral and slight lateral flexion                                                                            Ther Ex             AROM rotation             AROM lateral flexion             AROM nods supine Prone propped 10 Prone propped 10  Prone propped 10     Prone propped 10 Prone propped 10   Shoulder flexion supine             Chest fly supine, avoid too much horizontal abd, hx right dislocations             Thoracic ext over chair, arms crossed             Ulnar nerve glides             Upper body conditioning, bands             UBE             Progressive shoulder strengthening             Rhomboid stretch             Shoulder Girdle and Core: #10 lifting restriction*             TB row with ER             TB W's             TB low row             TB ext             Bracing with kick out and abduction             Bracing with full march to kick out             Bracing with progressive heel taps             Multifidus press TB             Single arm row             pball bridge/LTR/leg curl             scap push downs seated             Cross body stretching      :10x5       Pec stretch, corner HEP        :10x5 :10x5                Ther Activity                                       Gait Training                                       Modalities

## 2021-09-09 ENCOUNTER — APPOINTMENT (OUTPATIENT)
Dept: PHYSICAL THERAPY | Facility: CLINIC | Age: 41
End: 2021-09-09
Payer: COMMERCIAL

## 2021-09-23 DIAGNOSIS — M47.12 SPONDYLOGENIC COMPRESSION OF CERVICAL SPINAL CORD: ICD-10-CM

## 2021-09-23 DIAGNOSIS — M54.12 RADICULOPATHY, CERVICAL REGION: ICD-10-CM

## 2021-09-23 DIAGNOSIS — M48.02 DEGENERATIVE CERVICAL SPINAL STENOSIS: ICD-10-CM

## 2021-09-23 RX ORDER — TIZANIDINE 4 MG/1
4 TABLET ORAL 3 TIMES DAILY PRN
Qty: 90 TABLET | Refills: 0 | Status: SHIPPED | OUTPATIENT
Start: 2021-09-23 | End: 2021-11-23 | Stop reason: SDUPTHER

## 2021-09-23 RX ORDER — GABAPENTIN 600 MG/1
600 TABLET ORAL
Qty: 30 TABLET | Refills: 0 | Status: SHIPPED | OUTPATIENT
Start: 2021-09-23 | End: 2021-10-24

## 2021-09-24 ENCOUNTER — APPOINTMENT (OUTPATIENT)
Dept: LAB | Age: 41
End: 2021-09-24
Payer: COMMERCIAL

## 2021-09-24 DIAGNOSIS — F33.2 SEVERE RECURRENT MAJOR DEPRESSION WITHOUT PSYCHOTIC FEATURES (HCC): ICD-10-CM

## 2021-09-24 DIAGNOSIS — I10 ESSENTIAL HYPERTENSION: ICD-10-CM

## 2021-09-24 LAB
ALBUMIN SERPL BCP-MCNC: 4.1 G/DL (ref 3.5–5)
ALP SERPL-CCNC: 59 U/L (ref 46–116)
ALT SERPL W P-5'-P-CCNC: 37 U/L (ref 12–78)
ANION GAP SERPL CALCULATED.3IONS-SCNC: 6 MMOL/L (ref 4–13)
AST SERPL W P-5'-P-CCNC: 18 U/L (ref 5–45)
BASOPHILS # BLD AUTO: 0.1 THOUSANDS/ΜL (ref 0–0.1)
BASOPHILS NFR BLD AUTO: 1 % (ref 0–1)
BILIRUB SERPL-MCNC: 0.6 MG/DL (ref 0.2–1)
BUN SERPL-MCNC: 13 MG/DL (ref 5–25)
CALCIUM SERPL-MCNC: 8.9 MG/DL (ref 8.3–10.1)
CHLORIDE SERPL-SCNC: 103 MMOL/L (ref 100–108)
CHOLEST SERPL-MCNC: 214 MG/DL (ref 50–200)
CO2 SERPL-SCNC: 28 MMOL/L (ref 21–32)
CREAT SERPL-MCNC: 0.86 MG/DL (ref 0.6–1.3)
EOSINOPHIL # BLD AUTO: 0.24 THOUSAND/ΜL (ref 0–0.61)
EOSINOPHIL NFR BLD AUTO: 3 % (ref 0–6)
ERYTHROCYTE [DISTWIDTH] IN BLOOD BY AUTOMATED COUNT: 12.7 % (ref 11.6–15.1)
EST. AVERAGE GLUCOSE BLD GHB EST-MCNC: 114 MG/DL
GFR SERPL CREATININE-BSD FRML MDRD: 84 ML/MIN/1.73SQ M
GLUCOSE P FAST SERPL-MCNC: 114 MG/DL (ref 65–99)
HBA1C MFR BLD: 5.6 %
HCT VFR BLD AUTO: 41.8 % (ref 34.8–46.1)
HDLC SERPL-MCNC: 26 MG/DL
HGB BLD-MCNC: 13.8 G/DL (ref 11.5–15.4)
IMM GRANULOCYTES # BLD AUTO: 0.03 THOUSAND/UL (ref 0–0.2)
IMM GRANULOCYTES NFR BLD AUTO: 0 % (ref 0–2)
LDLC SERPL DIRECT ASSAY-MCNC: 115 MG/DL (ref 0–100)
LYMPHOCYTES # BLD AUTO: 1.93 THOUSANDS/ΜL (ref 0.6–4.47)
LYMPHOCYTES NFR BLD AUTO: 23 % (ref 14–44)
MCH RBC QN AUTO: 31.2 PG (ref 26.8–34.3)
MCHC RBC AUTO-ENTMCNC: 33 G/DL (ref 31.4–37.4)
MCV RBC AUTO: 95 FL (ref 82–98)
MONOCYTES # BLD AUTO: 0.5 THOUSAND/ΜL (ref 0.17–1.22)
MONOCYTES NFR BLD AUTO: 6 % (ref 4–12)
NEUTROPHILS # BLD AUTO: 5.58 THOUSANDS/ΜL (ref 1.85–7.62)
NEUTS SEG NFR BLD AUTO: 67 % (ref 43–75)
NRBC BLD AUTO-RTO: 0 /100 WBCS
PLATELET # BLD AUTO: 299 THOUSANDS/UL (ref 149–390)
PMV BLD AUTO: 10.2 FL (ref 8.9–12.7)
POTASSIUM SERPL-SCNC: 3.5 MMOL/L (ref 3.5–5.3)
PROT SERPL-MCNC: 7.7 G/DL (ref 6.4–8.2)
RBC # BLD AUTO: 4.42 MILLION/UL (ref 3.81–5.12)
SODIUM SERPL-SCNC: 137 MMOL/L (ref 136–145)
TRIGL SERPL-MCNC: 623 MG/DL
TSH SERPL DL<=0.05 MIU/L-ACNC: 0.6 UIU/ML (ref 0.36–3.74)
WBC # BLD AUTO: 8.38 THOUSAND/UL (ref 4.31–10.16)

## 2021-09-24 PROCEDURE — 85025 COMPLETE CBC W/AUTO DIFF WBC: CPT

## 2021-09-24 PROCEDURE — 80061 LIPID PANEL: CPT

## 2021-09-24 PROCEDURE — 84443 ASSAY THYROID STIM HORMONE: CPT

## 2021-09-24 PROCEDURE — 36415 COLL VENOUS BLD VENIPUNCTURE: CPT

## 2021-09-24 PROCEDURE — 83036 HEMOGLOBIN GLYCOSYLATED A1C: CPT

## 2021-09-24 PROCEDURE — 83721 ASSAY OF BLOOD LIPOPROTEIN: CPT

## 2021-09-24 PROCEDURE — 80053 COMPREHEN METABOLIC PANEL: CPT

## 2021-10-06 DIAGNOSIS — I10 ESSENTIAL HYPERTENSION: ICD-10-CM

## 2021-10-06 RX ORDER — HYDROCHLOROTHIAZIDE 25 MG/1
TABLET ORAL
Qty: 90 TABLET | Refills: 1 | Status: SHIPPED | OUTPATIENT
Start: 2021-10-06

## 2021-10-11 ENCOUNTER — PROCEDURE VISIT (OUTPATIENT)
Dept: NEUROLOGY | Facility: CLINIC | Age: 41
End: 2021-10-11
Payer: COMMERCIAL

## 2021-10-11 DIAGNOSIS — M54.12 CERVICAL RADICULOPATHY: ICD-10-CM

## 2021-10-11 DIAGNOSIS — G62.9 PERIPHERAL NEUROPATHY: ICD-10-CM

## 2021-10-11 PROCEDURE — 95886 MUSC TEST DONE W/N TEST COMP: CPT | Performed by: PHYSICAL MEDICINE & REHABILITATION

## 2021-10-11 PROCEDURE — 95911 NRV CNDJ TEST 9-10 STUDIES: CPT | Performed by: PHYSICAL MEDICINE & REHABILITATION

## 2021-10-15 ENCOUNTER — OFFICE VISIT (OUTPATIENT)
Dept: NEUROSURGERY | Facility: CLINIC | Age: 41
End: 2021-10-15
Payer: COMMERCIAL

## 2021-10-15 DIAGNOSIS — M25.50 JOINT PAIN: ICD-10-CM

## 2021-10-15 DIAGNOSIS — Z98.1 STATUS POST CERVICAL SPINAL FUSION: Primary | ICD-10-CM

## 2021-10-15 DIAGNOSIS — M54.12 CERVICAL RADICULOPATHY: ICD-10-CM

## 2021-10-15 PROCEDURE — 99214 OFFICE O/P EST MOD 30 MIN: CPT | Performed by: NEUROLOGICAL SURGERY

## 2021-10-15 PROCEDURE — 1036F TOBACCO NON-USER: CPT | Performed by: NEUROLOGICAL SURGERY

## 2021-10-24 DIAGNOSIS — M54.12 RADICULOPATHY, CERVICAL REGION: ICD-10-CM

## 2021-10-24 DIAGNOSIS — M48.02 DEGENERATIVE CERVICAL SPINAL STENOSIS: ICD-10-CM

## 2021-10-24 DIAGNOSIS — M47.12 SPONDYLOGENIC COMPRESSION OF CERVICAL SPINAL CORD: ICD-10-CM

## 2021-10-24 RX ORDER — GABAPENTIN 600 MG/1
TABLET ORAL
Qty: 30 TABLET | Refills: 0 | Status: SHIPPED | OUTPATIENT
Start: 2021-10-24 | End: 2021-11-23

## 2021-11-03 ENCOUNTER — RA CDI HCC (OUTPATIENT)
Dept: OTHER | Facility: HOSPITAL | Age: 41
End: 2021-11-03

## 2021-11-09 DIAGNOSIS — I10 ESSENTIAL HYPERTENSION: ICD-10-CM

## 2021-11-09 RX ORDER — LOSARTAN POTASSIUM 50 MG/1
TABLET ORAL
Qty: 90 TABLET | Refills: 1 | Status: SHIPPED | OUTPATIENT
Start: 2021-11-09

## 2021-11-15 ENCOUNTER — TELEPHONE (OUTPATIENT)
Dept: OTHER | Facility: OTHER | Age: 41
End: 2021-11-15

## 2021-11-15 DIAGNOSIS — F51.01 PRIMARY INSOMNIA: ICD-10-CM

## 2021-11-15 RX ORDER — QUETIAPINE FUMARATE 25 MG/1
25 TABLET, FILM COATED ORAL
Qty: 90 TABLET | Refills: 3 | Status: SHIPPED | OUTPATIENT
Start: 2021-11-15 | End: 2022-01-05

## 2021-11-23 ENCOUNTER — OFFICE VISIT (OUTPATIENT)
Dept: PAIN MEDICINE | Facility: CLINIC | Age: 41
End: 2021-11-23
Payer: COMMERCIAL

## 2021-11-23 ENCOUNTER — TELEPHONE (OUTPATIENT)
Dept: PAIN MEDICINE | Facility: CLINIC | Age: 41
End: 2021-11-23

## 2021-11-23 VITALS
WEIGHT: 220 LBS | SYSTOLIC BLOOD PRESSURE: 134 MMHG | DIASTOLIC BLOOD PRESSURE: 86 MMHG | HEART RATE: 96 BPM | BODY MASS INDEX: 33.7 KG/M2

## 2021-11-23 DIAGNOSIS — M47.12 SPONDYLOGENIC COMPRESSION OF CERVICAL SPINAL CORD: ICD-10-CM

## 2021-11-23 DIAGNOSIS — M54.12 RADICULOPATHY, CERVICAL REGION: ICD-10-CM

## 2021-11-23 DIAGNOSIS — M54.12 CERVICAL RADICULOPATHY: ICD-10-CM

## 2021-11-23 DIAGNOSIS — Z98.890 S/P CERVICAL DISCECTOMY: ICD-10-CM

## 2021-11-23 DIAGNOSIS — M48.02 DEGENERATIVE CERVICAL SPINAL STENOSIS: ICD-10-CM

## 2021-11-23 DIAGNOSIS — M54.2 NECK PAIN: Primary | ICD-10-CM

## 2021-11-23 DIAGNOSIS — M77.11 LATERAL EPICONDYLITIS OF RIGHT ELBOW: ICD-10-CM

## 2021-11-23 DIAGNOSIS — M48.02 CERVICAL SPINAL STENOSIS: ICD-10-CM

## 2021-11-23 DIAGNOSIS — M79.18 MYOFASCIAL PAIN SYNDROME: ICD-10-CM

## 2021-11-23 PROCEDURE — 99214 OFFICE O/P EST MOD 30 MIN: CPT | Performed by: PHYSICAL MEDICINE & REHABILITATION

## 2021-11-23 RX ORDER — GABAPENTIN 600 MG/1
600 TABLET ORAL
Qty: 30 TABLET | Refills: 1 | Status: SHIPPED | OUTPATIENT
Start: 2021-11-23 | End: 2022-01-21

## 2021-11-23 RX ORDER — TIZANIDINE 4 MG/1
4 TABLET ORAL 3 TIMES DAILY PRN
Qty: 90 TABLET | Refills: 0 | Status: SHIPPED | OUTPATIENT
Start: 2021-11-23 | End: 2022-01-20

## 2021-11-24 ENCOUNTER — RA CDI HCC (OUTPATIENT)
Dept: OTHER | Facility: HOSPITAL | Age: 41
End: 2021-11-24

## 2021-12-06 ENCOUNTER — OFFICE VISIT (OUTPATIENT)
Dept: URGENT CARE | Age: 41
End: 2021-12-06
Payer: COMMERCIAL

## 2021-12-06 ENCOUNTER — HOSPITAL ENCOUNTER (OUTPATIENT)
Dept: RADIOLOGY | Facility: CLINIC | Age: 41
Discharge: HOME | End: 2021-12-06
Attending: PHYSICAL MEDICINE & REHABILITATION

## 2021-12-06 ENCOUNTER — TELEPHONE (OUTPATIENT)
Dept: FAMILY MEDICINE CLINIC | Facility: CLINIC | Age: 41
End: 2021-12-06

## 2021-12-06 VITALS
RESPIRATION RATE: 20 BRPM | HEART RATE: 91 BPM | TEMPERATURE: 97.9 F | SYSTOLIC BLOOD PRESSURE: 122 MMHG | OXYGEN SATURATION: 99 % | DIASTOLIC BLOOD PRESSURE: 57 MMHG

## 2021-12-06 DIAGNOSIS — M77.11 LATERAL EPICONDYLITIS OF RIGHT ELBOW: ICD-10-CM

## 2021-12-06 DIAGNOSIS — H66.91 RIGHT OTITIS MEDIA, UNSPECIFIED OTITIS MEDIA TYPE: Primary | ICD-10-CM

## 2021-12-06 DIAGNOSIS — M54.12 CERVICAL RADICULOPATHY: ICD-10-CM

## 2021-12-06 DIAGNOSIS — M54.2 NECK PAIN: ICD-10-CM

## 2021-12-06 DIAGNOSIS — Z98.890 S/P CERVICAL DISCECTOMY: ICD-10-CM

## 2021-12-06 DIAGNOSIS — M48.02 CERVICAL SPINAL STENOSIS: ICD-10-CM

## 2021-12-06 PROCEDURE — G0382 LEV 3 HOSP TYPE B ED VISIT: HCPCS | Performed by: NURSE PRACTITIONER

## 2021-12-06 PROCEDURE — S9083 URGENT CARE CENTER GLOBAL: HCPCS | Performed by: NURSE PRACTITIONER

## 2021-12-06 PROCEDURE — NC001 PR NO CHARGE: Performed by: PHYSICAL MEDICINE & REHABILITATION

## 2021-12-06 RX ORDER — AZITHROMYCIN 250 MG/1
TABLET, FILM COATED ORAL
Qty: 6 TABLET | Refills: 0 | Status: SHIPPED | OUTPATIENT
Start: 2021-12-06 | End: 2021-12-10

## 2021-12-06 RX ORDER — METHYLPREDNISOLONE ACETATE 80 MG/ML
80 INJECTION, SUSPENSION INTRA-ARTICULAR; INTRALESIONAL; INTRAMUSCULAR; PARENTERAL; SOFT TISSUE ONCE
Status: DISCONTINUED | OUTPATIENT
Start: 2021-12-06 | End: 2021-12-10 | Stop reason: HOSPADM

## 2021-12-07 ENCOUNTER — HOSPITAL ENCOUNTER (EMERGENCY)
Facility: HOSPITAL | Age: 41
Discharge: HOME | End: 2021-12-08
Attending: EMERGENCY MEDICINE
Payer: COMMERCIAL

## 2021-12-07 ENCOUNTER — APPOINTMENT (EMERGENCY)
Dept: CT IMAGING | Facility: HOSPITAL | Age: 41
End: 2021-12-07
Payer: COMMERCIAL

## 2021-12-07 ENCOUNTER — APPOINTMENT (EMERGENCY)
Dept: RADIOLOGY | Facility: HOSPITAL | Age: 41
End: 2021-12-07
Payer: COMMERCIAL

## 2021-12-07 DIAGNOSIS — R00.2 PALPITATIONS: ICD-10-CM

## 2021-12-07 DIAGNOSIS — R06.02 SHORTNESS OF BREATH: Primary | ICD-10-CM

## 2021-12-07 LAB
2HR DELTA HS TROPONIN: 0 NG/L
4HR DELTA HS TROPONIN: 0 NG/L
ALBUMIN SERPL BCP-MCNC: 4.4 G/DL (ref 3.5–5)
ALP SERPL-CCNC: 58 U/L (ref 46–116)
ALT SERPL W P-5'-P-CCNC: 40 U/L (ref 12–78)
ANION GAP SERPL CALCULATED.3IONS-SCNC: 9 MMOL/L (ref 4–13)
AST SERPL W P-5'-P-CCNC: 20 U/L (ref 5–45)
BASOPHILS # BLD AUTO: 0.06 THOUSANDS/ΜL (ref 0–0.1)
BASOPHILS NFR BLD AUTO: 1 % (ref 0–1)
BILIRUB SERPL-MCNC: 0.52 MG/DL (ref 0.2–1)
BUN SERPL-MCNC: 14 MG/DL (ref 5–25)
CALCIUM SERPL-MCNC: 9 MG/DL (ref 8.3–10.1)
CARDIAC TROPONIN I PNL SERPL HS: 2 NG/L
CHLORIDE SERPL-SCNC: 101 MMOL/L (ref 100–108)
CK MB SERPL-MCNC: <1 % (ref 0–2.5)
CK MB SERPL-MCNC: <1 NG/ML (ref 0–5)
CK SERPL-CCNC: 141 U/L (ref 26–192)
CO2 SERPL-SCNC: 30 MMOL/L (ref 21–32)
CREAT SERPL-MCNC: 0.91 MG/DL (ref 0.6–1.3)
EOSINOPHIL # BLD AUTO: 0.31 THOUSAND/ΜL (ref 0–0.61)
EOSINOPHIL NFR BLD AUTO: 3 % (ref 0–6)
ERYTHROCYTE [DISTWIDTH] IN BLOOD BY AUTOMATED COUNT: 13.1 % (ref 11.6–15.1)
EXT PREG TEST URINE: NEGATIVE
EXT. CONTROL ED NAV: NORMAL
FLUAV RNA RESP QL NAA+PROBE: NEGATIVE
FLUBV RNA RESP QL NAA+PROBE: NEGATIVE
GFR SERPL CREATININE-BSD FRML MDRD: 78 ML/MIN/1.73SQ M
GLUCOSE SERPL-MCNC: 122 MG/DL (ref 65–140)
HCT VFR BLD AUTO: 39.8 % (ref 34.8–46.1)
HGB BLD-MCNC: 13.2 G/DL (ref 11.5–15.4)
LYMPHOCYTES # BLD AUTO: 2.6 THOUSANDS/ΜL (ref 0.6–4.47)
LYMPHOCYTES NFR BLD AUTO: 25 % (ref 14–44)
MAGNESIUM SERPL-MCNC: 2.2 MG/DL (ref 1.6–2.6)
MCH RBC QN AUTO: 30.6 PG (ref 26.8–34.3)
MCHC RBC AUTO-ENTMCNC: 33.2 G/DL (ref 31.4–37.4)
MCV RBC AUTO: 92 FL (ref 82–98)
MONOCYTES # BLD AUTO: 0.5 THOUSAND/ΜL (ref 0.17–1.22)
MONOCYTES NFR BLD AUTO: 5 % (ref 4–12)
NEUTROPHILS # BLD AUTO: 6.72 THOUSANDS/ΜL (ref 1.85–7.62)
NEUTS SEG NFR BLD AUTO: 66 % (ref 43–75)
NT-PROBNP SERPL-MCNC: 12 PG/ML
PLATELET # BLD AUTO: 322 THOUSANDS/UL (ref 149–390)
PMV BLD AUTO: 9.3 FL (ref 8.9–12.7)
POTASSIUM SERPL-SCNC: 3.4 MMOL/L (ref 3.5–5.3)
PROT SERPL-MCNC: 7.9 G/DL (ref 6.4–8.2)
RBC # BLD AUTO: 4.32 MILLION/UL (ref 3.81–5.12)
RSV RNA RESP QL NAA+PROBE: NEGATIVE
SARS-COV-2 RNA RESP QL NAA+PROBE: NEGATIVE
SODIUM SERPL-SCNC: 140 MMOL/L (ref 136–145)
TSH SERPL DL<=0.05 MIU/L-ACNC: 0.79 UIU/ML (ref 0.36–3.74)
WBC # BLD AUTO: 10.22 THOUSAND/UL (ref 4.31–10.16)

## 2021-12-07 PROCEDURE — 84443 ASSAY THYROID STIM HORMONE: CPT | Performed by: PHYSICIAN ASSISTANT

## 2021-12-07 PROCEDURE — 82553 CREATINE MB FRACTION: CPT | Performed by: PHYSICIAN ASSISTANT

## 2021-12-07 PROCEDURE — 85025 COMPLETE CBC W/AUTO DIFF WBC: CPT | Performed by: EMERGENCY MEDICINE

## 2021-12-07 PROCEDURE — 81025 URINE PREGNANCY TEST: CPT | Performed by: PHYSICIAN ASSISTANT

## 2021-12-07 PROCEDURE — 99285 EMERGENCY DEPT VISIT HI MDM: CPT

## 2021-12-07 PROCEDURE — 84484 ASSAY OF TROPONIN QUANT: CPT | Performed by: EMERGENCY MEDICINE

## 2021-12-07 PROCEDURE — 36415 COLL VENOUS BLD VENIPUNCTURE: CPT

## 2021-12-07 PROCEDURE — G1004 CDSM NDSC: HCPCS

## 2021-12-07 PROCEDURE — 71275 CT ANGIOGRAPHY CHEST: CPT

## 2021-12-07 PROCEDURE — 0241U HB NFCT DS VIR RESP RNA 4 TRGT: CPT | Performed by: PHYSICIAN ASSISTANT

## 2021-12-07 PROCEDURE — 83880 ASSAY OF NATRIURETIC PEPTIDE: CPT | Performed by: PHYSICIAN ASSISTANT

## 2021-12-07 PROCEDURE — 83735 ASSAY OF MAGNESIUM: CPT | Performed by: PHYSICIAN ASSISTANT

## 2021-12-07 PROCEDURE — 80053 COMPREHEN METABOLIC PANEL: CPT | Performed by: EMERGENCY MEDICINE

## 2021-12-07 PROCEDURE — 85379 FIBRIN DEGRADATION QUANT: CPT | Performed by: PHYSICIAN ASSISTANT

## 2021-12-07 PROCEDURE — 71045 X-RAY EXAM CHEST 1 VIEW: CPT

## 2021-12-07 PROCEDURE — 93005 ELECTROCARDIOGRAM TRACING: CPT

## 2021-12-07 PROCEDURE — 82550 ASSAY OF CK (CPK): CPT | Performed by: PHYSICIAN ASSISTANT

## 2021-12-07 RX ORDER — POTASSIUM CHLORIDE 20 MEQ/1
20 TABLET, EXTENDED RELEASE ORAL ONCE
Status: COMPLETED | OUTPATIENT
Start: 2021-12-07 | End: 2021-12-08

## 2021-12-08 VITALS
DIASTOLIC BLOOD PRESSURE: 82 MMHG | HEART RATE: 80 BPM | TEMPERATURE: 98.9 F | RESPIRATION RATE: 18 BRPM | OXYGEN SATURATION: 97 % | SYSTOLIC BLOOD PRESSURE: 135 MMHG

## 2021-12-08 LAB
ATRIAL RATE: 73 BPM
ATRIAL RATE: 81 BPM
D DIMER PPP FEU-MCNC: <0.27 UG/ML FEU
P AXIS: 50 DEGREES
P AXIS: 70 DEGREES
PR INTERVAL: 162 MS
PR INTERVAL: 166 MS
QRS AXIS: 79 DEGREES
QRS AXIS: 81 DEGREES
QRSD INTERVAL: 84 MS
QRSD INTERVAL: 84 MS
QT INTERVAL: 354 MS
QT INTERVAL: 406 MS
QTC INTERVAL: 404 MS
QTC INTERVAL: 447 MS
T WAVE AXIS: 36 DEGREES
T WAVE AXIS: 66 DEGREES
VENTRICULAR RATE: 73 BPM
VENTRICULAR RATE: 78 BPM

## 2021-12-08 PROCEDURE — 93010 ELECTROCARDIOGRAM REPORT: CPT | Performed by: INTERNAL MEDICINE

## 2021-12-08 PROCEDURE — 99284 EMERGENCY DEPT VISIT MOD MDM: CPT | Performed by: PHYSICIAN ASSISTANT

## 2021-12-08 RX ADMIN — POTASSIUM CHLORIDE 20 MEQ: 1500 TABLET, EXTENDED RELEASE ORAL at 00:27

## 2021-12-08 RX ADMIN — IOHEXOL 85 ML: 350 INJECTION, SOLUTION INTRAVENOUS at 00:27

## 2021-12-10 ENCOUNTER — TELEPHONE (OUTPATIENT)
Dept: OTHER | Facility: OTHER | Age: 41
End: 2021-12-10

## 2021-12-13 ENCOUNTER — TELEPHONE (OUTPATIENT)
Dept: PAIN MEDICINE | Facility: CLINIC | Age: 41
End: 2021-12-13

## 2021-12-21 ENCOUNTER — PROCEDURE VISIT (OUTPATIENT)
Dept: PAIN MEDICINE | Facility: CLINIC | Age: 41
End: 2021-12-21
Payer: COMMERCIAL

## 2021-12-21 VITALS
WEIGHT: 220 LBS | SYSTOLIC BLOOD PRESSURE: 113 MMHG | HEART RATE: 86 BPM | DIASTOLIC BLOOD PRESSURE: 72 MMHG | BODY MASS INDEX: 33.7 KG/M2

## 2021-12-21 DIAGNOSIS — M62.838 MUSCLE SPASM: Primary | ICD-10-CM

## 2021-12-21 PROCEDURE — 20552 NJX 1/MLT TRIGGER POINT 1/2: CPT | Performed by: PHYSICAL MEDICINE & REHABILITATION

## 2021-12-21 PROCEDURE — 76942 ECHO GUIDE FOR BIOPSY: CPT | Performed by: PHYSICAL MEDICINE & REHABILITATION

## 2021-12-21 RX ORDER — METHYLPREDNISOLONE ACETATE 40 MG/ML
40 INJECTION, SUSPENSION INTRA-ARTICULAR; INTRALESIONAL; INTRAMUSCULAR; SOFT TISSUE ONCE
Status: COMPLETED | OUTPATIENT
Start: 2021-12-21 | End: 2021-12-21

## 2021-12-21 RX ORDER — LIDOCAINE HYDROCHLORIDE 10 MG/ML
10 INJECTION, SOLUTION INFILTRATION; PERINEURAL ONCE
Status: COMPLETED | OUTPATIENT
Start: 2021-12-21 | End: 2021-12-21

## 2021-12-21 RX ORDER — TRAZODONE HYDROCHLORIDE 50 MG/1
50 TABLET ORAL
COMMUNITY
Start: 2021-12-06 | End: 2022-01-05 | Stop reason: SDUPTHER

## 2021-12-21 RX ADMIN — METHYLPREDNISOLONE ACETATE 40 MG: 40 INJECTION, SUSPENSION INTRA-ARTICULAR; INTRALESIONAL; INTRAMUSCULAR; SOFT TISSUE at 08:14

## 2021-12-21 RX ADMIN — LIDOCAINE HYDROCHLORIDE 5 ML: 10 INJECTION, SOLUTION INFILTRATION; PERINEURAL at 08:13

## 2022-01-03 ENCOUNTER — NURSE TRIAGE (OUTPATIENT)
Dept: OTHER | Facility: OTHER | Age: 42
End: 2022-01-03

## 2022-01-03 ENCOUNTER — TELEPHONE (OUTPATIENT)
Dept: PAIN MEDICINE | Facility: MEDICAL CENTER | Age: 42
End: 2022-01-03

## 2022-01-03 PROCEDURE — 99285 EMERGENCY DEPT VISIT HI MDM: CPT

## 2022-01-03 NOTE — TELEPHONE ENCOUNTER
Called and informed the patient but she did not want to go to the ER  She wanted to be prescribed medication but not seen   I strongly advised that she go to be evaluated that the ER

## 2022-01-03 NOTE — TELEPHONE ENCOUNTER
Regarding: Covid Symptomatic Hard to breath  ----- Message from [facility] sent at 1/3/2022  7:06 AM EST -----  "I have fever 102 (head), chills, a little hard to breath(winded) and everything taste the same  I have a rash behind the back of my neck near my ears  "

## 2022-01-03 NOTE — TELEPHONE ENCOUNTER
Pt called stating she is positive for COVID and needs to reschedule procedure     Pt can be reached at [phone]

## 2022-01-03 NOTE — TELEPHONE ENCOUNTER
Patient tested positive on an at home test 1/2/22  She is symptomatic and has SOB with exertion, talking  She refuses to go to the ER and would like something prescribed for SOB if possible  Virtual visit made for 1/4/22 at 0915  Patient would like a call if there is something she can be prescribed or an earlier appointment

## 2022-01-03 NOTE — TELEPHONE ENCOUNTER
1  Were you within 6 feet or less, for up to 15 minutes or more with a person that has a confirmed COVID-19 test? Denies   2  What was the date of your exposure? N/A  3  Are you experiencing any symptoms attributed to the virus?  (Assess for SOB, cough, fever, difficulty breathing) Fever (102), chills, loss of taste, SOB with talking, with exertion, rash on back of neck near ears, onset 1/1/22  4  HIGH RISK: Do you have any history heart or lung conditions, weakened immune system, diabetes, Asthma, CHF, HIV, COPD, Chemo, renal failure, sickle cell, etc? Denies   5  PREGNANCY: Are you pregnant or did you recently give birth? N/A  6  VACCINE: "Have you gotten the COVID-19 vaccine?" If Yes ask: "Which one, how many shots, when did you get it?" 2, 2021     Positive home test 1/2/22     Reason for Disposition   MODERATE difficulty breathing (e g , speaks in phrases, SOB even at rest, pulse 100-120)    Answer Assessment - Initial Assessment Questions  1  COVID-19 DIAGNOSIS: "Who made your COVID-19 diagnosis?" "Was it confirmed by a positive lab test?" If not diagnosed by a HCP, ask "Are there lots of cases (community spread) where you live?" Note: See public health department website, if unsure  Home test 1/2/22  2  COVID-19 EXPOSURE: "Was there any known exposure to COVID before the symptoms began?" CDC Definition of close contact: within 6 feet (2 meters) for a total of 15 minutes or more over a 24-hour period  Unaware  3  ONSET: "When did the COVID-19 symptoms start?"       1/1/22  4  WORST SYMPTOM: "What is your worst symptom?" (e g , cough, fever, shortness of breath, muscle aches)      SOB   5  COUGH: "Do you have a cough?" If Yes, ask: "How bad is the cough?"        Occasional   6  FEVER: "Do you have a fever?" If Yes, ask: "What is your temperature, how was it measured, and when did it start?"      100-102   7   RESPIRATORY STATUS: "Describe your breathing?" (e g , shortness of breath, wheezing, unable to speak)       SOB  8  BETTER-SAME-WORSE: "Are you getting better, staying the same or getting worse compared to yesterday?"  If getting worse, ask, "In what way?"      Worse   9  HIGH RISK DISEASE: "Do you have any chronic medical problems?" (e g , asthma, heart or lung disease, weak immune system, obesity, etc )      Denies   10  VACCINE: "Have you gotten the COVID-19 vaccine?" If Yes ask: "Which one, how many shots, when did you get it?"        2-2022  11  PREGNANCY: "Is there any chance you are pregnant?" "When was your last menstrual period?"        N/A  12   OTHER SYMPTOMS: "Do you have any other symptoms?"  (e g , chills, fatigue, headache, loss of smell or taste, muscle pain, sore throat; new loss of smell or taste especially support the diagnosis of COVID-19)        Fever, chills, loss of taste, SOB, rash on back of neck near ears    Protocols used: CORONAVIRUS (COVID-19) DIAGNOSED OR SUSPECTED-ADULT-[place]

## 2022-01-04 ENCOUNTER — APPOINTMENT (EMERGENCY)
Dept: RADIOLOGY | Facility: HOSPITAL | Age: 42
End: 2022-01-04
Payer: COMMERCIAL

## 2022-01-04 ENCOUNTER — HOSPITAL ENCOUNTER (EMERGENCY)
Facility: HOSPITAL | Age: 42
Discharge: HOME | End: 2022-01-04
Attending: EMERGENCY MEDICINE
Payer: COMMERCIAL

## 2022-01-04 ENCOUNTER — TELEPHONE (OUTPATIENT)
Dept: FAMILY MEDICINE CLINIC | Facility: CLINIC | Age: 42
End: 2022-01-04

## 2022-01-04 VITALS
BODY MASS INDEX: 34.84 KG/M2 | SYSTOLIC BLOOD PRESSURE: 118 MMHG | WEIGHT: 222 LBS | TEMPERATURE: 98.7 F | DIASTOLIC BLOOD PRESSURE: 82 MMHG | RESPIRATION RATE: 20 BRPM | HEIGHT: 67 IN | OXYGEN SATURATION: 97 % | HEART RATE: 78 BPM

## 2022-01-04 DIAGNOSIS — J02.9 PHARYNGITIS: Primary | ICD-10-CM

## 2022-01-04 DIAGNOSIS — U07.1 COVID: ICD-10-CM

## 2022-01-04 LAB
ALBUMIN SERPL BCP-MCNC: 3.5 G/DL (ref 3.5–5)
ALP SERPL-CCNC: 51 U/L (ref 46–116)
ALT SERPL W P-5'-P-CCNC: 36 U/L (ref 12–78)
ANION GAP SERPL CALCULATED.3IONS-SCNC: 10 MMOL/L (ref 4–13)
AST SERPL W P-5'-P-CCNC: 19 U/L (ref 5–45)
BASOPHILS # BLD AUTO: 0.03 THOUSANDS/ΜL (ref 0–0.1)
BASOPHILS NFR BLD AUTO: 1 % (ref 0–1)
BILIRUB SERPL-MCNC: 0.46 MG/DL (ref 0.2–1)
BUN SERPL-MCNC: 8 MG/DL (ref 5–25)
CALCIUM SERPL-MCNC: 8.4 MG/DL (ref 8.3–10.1)
CARDIAC TROPONIN I PNL SERPL HS: 4 NG/L
CHLORIDE SERPL-SCNC: 105 MMOL/L (ref 100–108)
CO2 SERPL-SCNC: 26 MMOL/L (ref 21–32)
CREAT SERPL-MCNC: 0.82 MG/DL (ref 0.6–1.3)
EOSINOPHIL # BLD AUTO: 0.14 THOUSAND/ΜL (ref 0–0.61)
EOSINOPHIL NFR BLD AUTO: 3 % (ref 0–6)
ERYTHROCYTE [DISTWIDTH] IN BLOOD BY AUTOMATED COUNT: 13.2 % (ref 11.6–15.1)
FLUAV RNA RESP QL NAA+PROBE: NEGATIVE
FLUBV RNA RESP QL NAA+PROBE: NEGATIVE
GFR SERPL CREATININE-BSD FRML MDRD: 87 ML/MIN/1.73SQ M
GLUCOSE SERPL-MCNC: 139 MG/DL (ref 65–140)
HCT VFR BLD AUTO: 37.1 % (ref 34.8–46.1)
HGB BLD-MCNC: 12.4 G/DL (ref 11.5–15.4)
IMM GRANULOCYTES # BLD AUTO: 0.01 THOUSAND/UL (ref 0–0.2)
IMM GRANULOCYTES NFR BLD AUTO: 0 % (ref 0–2)
LYMPHOCYTES # BLD AUTO: 1.83 THOUSANDS/ΜL (ref 0.6–4.47)
LYMPHOCYTES NFR BLD AUTO: 33 % (ref 14–44)
MCH RBC QN AUTO: 31.2 PG (ref 26.8–34.3)
MCHC RBC AUTO-ENTMCNC: 33.4 G/DL (ref 31.4–37.4)
MCV RBC AUTO: 94 FL (ref 82–98)
MONOCYTES # BLD AUTO: 0.47 THOUSAND/ΜL (ref 0.17–1.22)
MONOCYTES NFR BLD AUTO: 9 % (ref 4–12)
NEUTROPHILS # BLD AUTO: 3.04 THOUSANDS/ΜL (ref 1.85–7.62)
NEUTS SEG NFR BLD AUTO: 54 % (ref 43–75)
NRBC BLD AUTO-RTO: 0 /100 WBCS
PLATELET # BLD AUTO: 244 THOUSANDS/UL (ref 149–390)
PMV BLD AUTO: 9.5 FL (ref 8.9–12.7)
POTASSIUM SERPL-SCNC: 3.4 MMOL/L (ref 3.5–5.3)
PROT SERPL-MCNC: 6.9 G/DL (ref 6.4–8.2)
RBC # BLD AUTO: 3.97 MILLION/UL (ref 3.81–5.12)
RSV RNA RESP QL NAA+PROBE: NEGATIVE
S PYO DNA THROAT QL NAA+PROBE: NORMAL
SARS-COV-2 RNA RESP QL NAA+PROBE: POSITIVE
SODIUM SERPL-SCNC: 141 MMOL/L (ref 136–145)
WBC # BLD AUTO: 5.52 THOUSAND/UL (ref 4.31–10.16)

## 2022-01-04 PROCEDURE — 87651 STREP A DNA AMP PROBE: CPT | Performed by: PHYSICIAN ASSISTANT

## 2022-01-04 PROCEDURE — 80053 COMPREHEN METABOLIC PANEL: CPT | Performed by: EMERGENCY MEDICINE

## 2022-01-04 PROCEDURE — 71045 X-RAY EXAM CHEST 1 VIEW: CPT

## 2022-01-04 PROCEDURE — 0241U HB NFCT DS VIR RESP RNA 4 TRGT: CPT | Performed by: PHYSICIAN ASSISTANT

## 2022-01-04 PROCEDURE — 85025 COMPLETE CBC W/AUTO DIFF WBC: CPT | Performed by: EMERGENCY MEDICINE

## 2022-01-04 PROCEDURE — 96374 THER/PROPH/DIAG INJ IV PUSH: CPT

## 2022-01-04 PROCEDURE — 36415 COLL VENOUS BLD VENIPUNCTURE: CPT

## 2022-01-04 PROCEDURE — 99284 EMERGENCY DEPT VISIT MOD MDM: CPT | Performed by: PHYSICIAN ASSISTANT

## 2022-01-04 PROCEDURE — 84484 ASSAY OF TROPONIN QUANT: CPT | Performed by: EMERGENCY MEDICINE

## 2022-01-04 RX ORDER — DEXAMETHASONE SODIUM PHOSPHATE 4 MG/ML
10 INJECTION, SOLUTION INTRA-ARTICULAR; INTRALESIONAL; INTRAMUSCULAR; INTRAVENOUS; SOFT TISSUE ONCE
Status: COMPLETED | OUTPATIENT
Start: 2022-01-04 | End: 2022-01-04

## 2022-01-04 RX ORDER — ALBUTEROL SULFATE 90 UG/1
1 AEROSOL, METERED RESPIRATORY (INHALATION) ONCE
Status: COMPLETED | OUTPATIENT
Start: 2022-01-04 | End: 2022-01-04

## 2022-01-04 RX ORDER — OXYMETAZOLINE HYDROCHLORIDE 0.05 G/100ML
2 SPRAY NASAL ONCE
Status: COMPLETED | OUTPATIENT
Start: 2022-01-04 | End: 2022-01-04

## 2022-01-04 RX ORDER — ACETAMINOPHEN 160 MG/5ML
650 SUSPENSION, ORAL (FINAL DOSE FORM) ORAL ONCE
Status: COMPLETED | OUTPATIENT
Start: 2022-01-04 | End: 2022-01-04

## 2022-01-04 RX ORDER — LORATADINE 10 MG/1
10 TABLET ORAL ONCE
Status: COMPLETED | OUTPATIENT
Start: 2022-01-04 | End: 2022-01-04

## 2022-01-04 RX ADMIN — OXYMETAZOLINE HYDROCHLORIDE 2 SPRAY: 0.05 SPRAY NASAL at 07:21

## 2022-01-04 RX ADMIN — DEXAMETHASONE SODIUM PHOSPHATE 10 MG: 4 INJECTION INTRA-ARTICULAR; INTRALESIONAL; INTRAMUSCULAR; INTRAVENOUS; SOFT TISSUE at 07:26

## 2022-01-04 RX ADMIN — ACETAMINOPHEN 650 MG: 650 SUSPENSION ORAL at 07:22

## 2022-01-04 RX ADMIN — LORATADINE 10 MG: 10 TABLET ORAL at 07:24

## 2022-01-04 RX ADMIN — ALBUTEROL SULFATE 1 PUFF: 90 AEROSOL, METERED RESPIRATORY (INHALATION) at 07:22

## 2022-01-04 NOTE — ED PROVIDER NOTES
History  Chief Complaint   Patient presents with    Shortness of Breath     Reports starting fever saturday 1/1/22 and tested positive for covid yesterday w/ at home test  +SOB and cough  Reports having apointment for PCP tomorrow but told to come to ED if it gets worse  Reports feeling like throat is closing up and drowning  Patient is a 70-year-old female with history of hypertension, migraine, and no significant past surgical history that presents emergency department with intermittent dry nonproductive cough with yellow sputum for 2 days  Patient reports fever yesterday and tested positive for COVID yesterday with home test   Patient reports also having appointment for PCP tomorrow, however came to emergency department for further evaluation given persistent coughing and dull achy nonradiating throat pain  Patient presents with her  this evening that provides part patient history  Patient reports that patient and her  that is currently present, had been in contact with a COVID positive person couple days prior to current ED presentation  Patient denies recent antibiotic use  Patient denies history of anaphylaxis  Patient denies palliative factors with provocative factors of coughing and clearing throat  Patient denies not effective treatment  Patient patient affirms subjective fevers  Patient denies chills, nausea, vomiting, diarrhea, constipation urinary symptoms  Patient's recent fall or recent trauma  Patient affirms possible sick contacts; patient     Patient denies recent travel  Patient denies chest pain, shortness of breath, abdominal pain  History provided by:  Patient   used: No        Prior to Admission Medications   Prescriptions Last Dose Informant Patient Reported? Taking?    Ascorbic Acid (VITAMIN C) 100 MG tablet   Yes No   Sig: Take 100 mg by mouth daily   Etonogestrel (NEXPLANON SC)   Yes No   Sig: Inject under the skin Located Left upper inner arm   Ginkgo Biloba 40 MG TABS   Yes No   Sig: Take by mouth    Multiple Vitamins-Minerals (MULTIVITAL-M) TABS   Yes No   Sig: Take by mouth   QUEtiapine (SEROquel) 25 mg tablet  Self No No   Sig: Take 1 tablet (25 mg total) by mouth daily at bedtime   Patient taking differently: Take 25 mg by mouth 5 (five) times a day     Turmeric 400 MG CAPS   Yes No   Sig: Take by mouth   Patient not taking: Reported on 7/26/2021   Zinc 100 MG TABS  Self Yes No   Sig: Take by mouth   acetaminophen (TYLENOL) 325 mg tablet   Yes No   Sig: Take 650 mg by mouth every 6 (six) hours as needed for mild pain   cholecalciferol (VITAMIN D3) 1,000 units tablet   Yes No   Sig: Take 1,000 Units by mouth daily   docusate sodium (COLACE) 100 mg capsule   No No   Sig: Take 1 capsule (100 mg total) by mouth 2 (two) times a day   fenofibrate (TRICOR) 145 mg tablet   No No   Sig: Take 1 tablet (145 mg total) by mouth daily   gabapentin (Neurontin) 600 MG tablet   No No   Sig: Take 1 tablet (600 mg total) by mouth daily at bedtime   hydrochlorothiazide (HYDRODIURIL) 25 mg tablet   No No   Sig: TAKE 1 TABLET BY MOUTH EVERY DAY   losartan (COZAAR) 50 mg tablet   No No   Sig: TAKE 1 TABLET BY MOUTH EVERY DAY AT BEDTIME   magnesium 30 MG tablet   Yes No   Sig: Take 30 mg by mouth 2 (two) times a day   magnesium hydroxide (MILK OF MAGNESIA) 400 mg/5 mL oral suspension   No No   Sig: Take 30 mL by mouth daily as needed for constipation   omega-3-acid ethyl esters (LOVAZA) 1 g capsule   No No   Sig: Take 2 capsules (2 g total) by mouth 2 (two) times a day   omeprazole (PriLOSEC) 20 mg delayed release capsule   No No   Sig: Take 1 capsule (20 mg total) by mouth daily   predniSONE 10 mg tablet   No No   Sig: Take 3t daily for 3d then 2t daily for 3d then 1t daily for 3d   Patient not taking: Reported on 7/26/2021   senna (SENOKOT) 8 6 mg   No No   Sig: Take 1 tablet (8 6 mg total) by mouth daily   tiZANidine (ZANAFLEX) 4 mg tablet   No No   Sig: Take 1 tablet (4 mg total) by mouth 3 (three) times a day as needed for muscle spasms   traZODone (DESYREL) 50 mg tablet   Yes No   Sig: Take 50 mg by mouth daily at bedtime as needed      Facility-Administered Medications: None       Past Medical History:   Diagnosis Date    Constipation     Depression     Eczema     GERD (gastroesophageal reflux disease)     Grand mal convulsion (HCC)     X 2 last episode -Sleep deprivation-Resolved with Cpap    Hypertension     Insomnia     Medial meniscus tear 2015    Migraine     PONV (postoperative nausea and vomiting)     Psoriasis     Seizures (HCC)     Shoulder dislocation, right, sequela 2018    Sinusitis     Sleep apnea     uses cpap    Tinnitus        Past Surgical History:   Procedure Laterality Date     SECTION   and     COLONOSCOPY      DILATION AND CURETTAGE OF UTERUS      DILATION AND CURETTAGE OF UTERUS WITH HYSTEROSOCPY N/A 2017    Procedure: D&C; IUD REMOVAL;  Surgeon: Reanna Gonzalez MD;  [facility];  Service: Gynecology    EGD      HYSTEROSCOPY      LAPAROSCOPY      (Diagnostic)    LASIK      AR COLONOSCOPY FLX DX W/COLLJ SPEC WHEN PFRMD N/A 2018    Procedure: EGD AND COLONOSCOPY;  Surgeon: Wilder Dobbins MD;  [facility]; Service: Gastroenterology     [facility] BODY,CERV,ONE SGMT Bilateral 2021    Procedure: CORPECTOMY SPINE CERVICAL ANTERIOR: C4-7 ACDF with C6 /hemicorpectomy, C4-7 instrumentation and fusion (neuromonitoring);   Surgeon: Carmen Mosquera MD;  [facility];  Service: Neurosurgery    REMOVAL OF INTRAUTERINE DEVICE (IUD)         Family History   Problem Relation Age of Onset    Diabetes Father         Diabetes Mellitus    Hyperlipidemia Father     Hypertension Father     Brain cancer Maternal Grandmother 64    Migraines Family     Diabetes Family         Type 2 Diabetes Mellitus    No Known Problems Mother     No Known Problems Sister     No Known Problems Daughter     No Known Problems Son      I have reviewed and agree with the history as documented  E-Cigarette/Vaping    E-Cigarette Use Never User      E-Cigarette/Vaping Substances     Social History     Tobacco Use    Smoking status: Never Smoker    Smokeless tobacco: Never Used    Tobacco comment: Former smoker per [employer]   Vaping Use    Vaping Use: Never used   Substance Use Topics    Alcohol use: Not Currently     Alcohol/week: 7 0 standard drinks     Types: 7 Glasses of wine per week     Comment: occassionally    Drug use: No       Review of Systems   Constitutional: Negative for activity change, appetite change, chills and fever  HENT: Positive for congestion, postnasal drip and sore throat  Negative for rhinorrhea, sinus pressure, sinus pain and tinnitus  Eyes: Negative for photophobia and visual disturbance  Respiratory: Positive for cough  Negative for chest tightness and shortness of breath  Cardiovascular: Negative for chest pain and palpitations  Gastrointestinal: Negative for abdominal pain, constipation, diarrhea, nausea and vomiting  Genitourinary: Negative for difficulty urinating, dysuria, flank pain, frequency and urgency  Musculoskeletal: Negative for back pain, gait problem, neck pain and neck stiffness  Skin: Negative for pallor and rash  Allergic/Immunologic: Negative for environmental allergies and food allergies  Neurological: Negative for dizziness, weakness, numbness and headaches  Psychiatric/Behavioral: Negative for confusion  All other systems reviewed and are negative  Physical Exam  Physical Exam  Vitals and nursing note reviewed  Constitutional:       General: She is awake  Appearance: Normal appearance  She is well-developed  She is not ill-appearing, toxic-appearing or diaphoretic        Comments: BP 99/65 (BP Location: Left arm)   Pulse 77   Temp 98 7 °F (37 1 °C) (Oral)   Resp 20   Ht 5' 7" (1 702 m)   Wt 101 kg (222 lb)   SpO2 97%   BMI 34 77 kg/m²      HENT:      Head: Normocephalic and atraumatic  Right Ear: Hearing, tympanic membrane, ear canal and external ear normal  No decreased hearing noted  No drainage, swelling or tenderness  No mastoid tenderness  Left Ear: Hearing, tympanic membrane, ear canal and external ear normal  No decreased hearing noted  No drainage, swelling or tenderness  No mastoid tenderness  Nose: Nose normal       Mouth/Throat:      Lips: Pink  Mouth: Mucous membranes are moist       Tongue: No lesions  Pharynx: Oropharynx is clear  Uvula midline  Tonsils: No tonsillar exudate or tonsillar abscesses  Eyes:      General: Lids are normal  Vision grossly intact  Right eye: No discharge  Left eye: No discharge  Extraocular Movements: Extraocular movements intact  Conjunctiva/sclera: Conjunctivae normal       Pupils: Pupils are equal, round, and reactive to light  Neck:      Vascular: No JVD  Trachea: Trachea and phonation normal  No tracheal tenderness or tracheal deviation  Cardiovascular:      Rate and Rhythm: Normal rate and regular rhythm  Pulses: Normal pulses  Radial pulses are 2+ on the right side and 2+ on the left side  Posterior tibial pulses are 2+ on the right side and 2+ on the left side  Heart sounds: Normal heart sounds  Pulmonary:      Effort: Pulmonary effort is normal       Breath sounds: Normal breath sounds  No stridor  No decreased breath sounds, wheezing, rhonchi or rales  Chest:      Chest wall: No tenderness  Abdominal:      General: Abdomen is flat  Bowel sounds are normal  There is no distension  Palpations: Abdomen is soft  Abdomen is not rigid  Tenderness: There is no abdominal tenderness  There is no guarding or rebound  Musculoskeletal:         General: Normal range of motion        Cervical back: Full passive range of motion without pain, normal range of motion and neck supple  No rigidity  No spinous process tenderness or muscular tenderness  Normal range of motion  Lymphadenopathy:      Head:      Right side of head: No submental, submandibular, tonsillar, preauricular, posterior auricular or occipital adenopathy  Left side of head: No submental, submandibular, tonsillar, preauricular, posterior auricular or occipital adenopathy  Cervical: No cervical adenopathy  Right cervical: No superficial, deep or posterior cervical adenopathy  Left cervical: No superficial, deep or posterior cervical adenopathy  Skin:     General: Skin is warm  Capillary Refill: Capillary refill takes less than 2 seconds  Neurological:      General: No focal deficit present  Mental Status: She is alert and oriented to person, place, and time  GCS: GCS eye subscore is 4  GCS verbal subscore is 5  GCS motor subscore is 6  Sensory: No sensory deficit  Deep Tendon Reflexes: Reflexes are normal and symmetric  Reflex Scores:       Patellar reflexes are 2+ on the right side and 2+ on the left side  Psychiatric:         Mood and Affect: Mood normal          Speech: Speech normal          Behavior: Behavior normal  Behavior is cooperative  Thought Content:  Thought content normal          Judgment: Judgment normal          Vital Signs  ED Triage Vitals   Temperature Pulse Respirations Blood Pressure SpO2   01/04/22 0106 01/04/22 0108 01/04/22 0108 01/04/22 0108 01/04/22 0108   98 7 °F (37 1 °C) 77 20 99/65 97 %      Temp Source Heart Rate Source Patient Position - Orthostatic VS BP Location FiO2 (%)   01/04/22 0106 01/04/22 0108 01/04/22 0108 01/04/22 0108 --   Oral Monitor Lying Left arm       Pain Score       01/04/22 0722       Med Not Given for Pain - for MAR use only           Vitals:    01/04/22 0108 01/04/22 0729   BP: 99/65 118/82   Pulse: 77 78   Patient Position - Orthostatic VS: Lying Lying         Visual Acuity      ED Medications  Medications   dexamethasone (DECADRON) injection 10 mg (10 mg Intravenous Given 1/4/22 0726)   albuterol (PROVENTIL HFA,VENTOLIN HFA) inhaler 1 puff (1 puff Inhalation Given 1/4/22 0722)   loratadine (CLARITIN) tablet 10 mg (10 mg Oral Given 1/4/22 0724)   oxymetazoline (AFRIN) 0 05 % nasal spray 2 spray (2 sprays Each Nare Given 1/4/22 0721)   acetaminophen (TYLENOL) oral suspension 650 mg (650 mg Oral Given 1/4/22 0722)       Diagnostic Studies  Results Reviewed     Procedure Component Value Units Date/Time    COVID/FLU/RSV - 2 hour TAT [[id number]]  (Abnormal) Collected: 01/04/22 0724    Lab Status: Final result Specimen: Nasopharyngeal Swab Updated: 01/04/22 0814     SARS-CoV-2 Positive     INFLUENZA A PCR Negative     INFLUENZA B PCR Negative     RSV PCR Negative    Narrative:      FOR PEDIATRIC PATIENTS - copy/paste COVID Guidelines URL to browser: [email or web address]  [employer]    SARS-CoV-2 assay is a Nucleic Acid Amplification assay intended for the  qualitative detection of nucleic acid from SARS-CoV-2 in nasopharyngeal  swabs  Results are for the presumptive identification of SARS-CoV-2 RNA  Positive results are indicative of infection with SARS-CoV-2, the virus  causing COVID-19, but do not rule out bacterial infection or co-infection  with other viruses  Laboratories within the [place] and its  territories are required to report all positive results to the appropriate  public health authorities  Negative results do not preclude SARS-CoV-2  infection and should not be used as the sole basis for treatment or other  patient management decisions  Negative results must be combined with  clinical observations, patient history, and epidemiological information  This test has not been FDA cleared or approved  This test has been authorized by FDA under an Emergency Use Authorization  (EUA)   This test is only authorized for the duration of time the  declaration that circumstances exist justifying the authorization of the  emergency use of an in vitro diagnostic tests for detection of SARS-CoV-2  virus and/or diagnosis of COVID-19 infection under section 564(b)(1) of  the Act, 21 U  S C  754GEV-1(Y)(6), unless the authorization is terminated  or revoked sooner  The test has been validated but independent review by FDA  and CLIA is pending  Test performed using [employer] GeneXpert: This RT-PCR assay targets N2,  a region unique to SARS-CoV-2  A conserved region in the E-gene was chosen  for pan-Sarbecovirus detection which includes SARS-CoV-2  Strep A PCR [[id number]]  (Normal) Collected: 01/04/22 0724    Lab Status: Final result Specimen: Throat Updated: 01/04/22 0813     STREP A PCR None Detected    [place] draw [[id number]] Collected: 01/04/22 0114    Lab Status: Final result Specimen: Blood from Arm, Right Updated: 01/04/22 0302    Narrative: The following orders were created for panel order [place] draw  Procedure                               Abnormality         Status                     ---------                               -----------         ------                     Eulas Serum Top on MEWS[[id number]]                           Final result               Gold top on ZVHV[[id number]]                                 Final result                 Please view results for these tests on the individual orders      HS Troponin 0hr (reflex protocol) [[id number]]  (Normal) Collected: 01/04/22 0114    Lab Status: Final result Specimen: Blood from Arm, Right Updated: 01/04/22 0211     hs TnI 0hr 4 ng/L     Comprehensive metabolic panel [[id number]]  (Abnormal) Collected: 01/04/22 0114    Lab Status: Final result Specimen: Blood from Arm, Right Updated: 01/04/22 0205     Sodium 141 mmol/L      Potassium 3 4 mmol/L      Chloride 105 mmol/L      CO2 26 mmol/L      ANION GAP 10 mmol/L      BUN 8 mg/dL      Creatinine 0 82 mg/dL      Glucose 139 mg/dL Calcium 8 4 mg/dL      AST 19 U/L      ALT 36 U/L      Alkaline Phosphatase 51 U/L      Total Protein 6 9 g/dL      Albumin 3 5 g/dL      Total Bilirubin 0 46 mg/dL      eGFR 87 ml/min/1 73sq m     Narrative:      [place] guidelines for Chronic Kidney Disease (CKD):     Stage 1 with normal or high GFR (GFR > 90 mL/min/1 73 square meters)    Stage 2 Mild CKD (GFR = 60-89 mL/min/1 73 square meters)    Stage 3A Moderate CKD (GFR = 45-59 mL/min/1 73 square meters)    Stage 3B Moderate CKD (GFR = 30-44 mL/min/1 73 square meters)    Stage 4 Severe CKD (GFR = 15-29 mL/min/1 73 square meters)    Stage 5 End Stage CKD (GFR <15 mL/min/1 73 square meters)  Note: GFR calculation is accurate only with a steady state creatinine    CBC and differential [[id number]] Collected: 01/04/22 0114    Lab Status: Final result Specimen: Blood from Arm, Right Updated: 01/04/22 0141     WBC 5 52 Thousand/uL      RBC 3 97 Million/uL      Hemoglobin 12 4 g/dL      Hematocrit 37 1 %      MCV 94 fL      MCH 31 2 pg      MCHC 33 4 g/dL      RDW 13 2 %      MPV 9 5 fL      Platelets 414 Thousands/uL      nRBC 0 /100 WBCs      Neutrophils Relative 54 %      Immat GRANS % 0 %      Lymphocytes Relative 33 %      Monocytes Relative 9 %      Eosinophils Relative 3 %      Basophils Relative 1 %      Neutrophils Absolute 3 04 Thousands/µL      Immature Grans Absolute 0 01 Thousand/uL      Lymphocytes Absolute 1 83 Thousands/µL      Monocytes Absolute 0 47 Thousand/µL      Eosinophils Absolute 0 14 Thousand/µL      Basophils Absolute 0 03 Thousands/µL                  XR chest 1 view portable    (Results Pending)              Procedures  Procedures         ED Course  ED Course as of 01/04/22 0828   Tue Jan 04, 2022   0800 ED RN reported that patient had ambulatory pulse oximeter on room air 97-98%   0827 Sign-out to Joanna Aden PA-C                                             MDM  Number of Diagnoses or Management Options  COVID: new and does not require workup  Pharyngitis: new and does not require workup     Amount and/or Complexity of Data Reviewed  Clinical lab tests: ordered and reviewed  Tests in the radiology section of CPT®: ordered and reviewed  Review and summarize past medical records: yes  Independent visualization of images, tracings, or specimens: yes    Risk of Complications, Morbidity, and/or Mortality  Presenting problems: moderate  Diagnostic procedures: moderate    Patient Progress  Patient progress: stable       Patient is a 20-year-old female with history of hypertension, migraine, and no significant past surgical history that presents emergency department with intermittent dry nonproductive cough with yellow sputum for 2 days  Patient reports fever yesterday and tested positive for COVID yesterday with home test   Patient reports also having appointment for PCP tomorrow, however came to emergency department for further evaluation given persistent coughing and dull achy nonradiating throat pain  Chest x-ray no acute cardiopulmonary disease on initial read  Hemodynamically stable and afebrile  No sirs  No leukocytosis, no banding  Delivered Tylenol, albuterol, Decadron, Claritin, Afrin with patient verbalized decrease in throat pain and nasal congestion symptoms status post medication delivery    Strep in process  COVID/RSV/fluid in process process at time my sign-out to Hailey Levin PA-C      Disposition  Final diagnoses:   Pharyngitis   COVID     Time reflects when diagnosis was documented in both MDM as applicable and the Disposition within this note     Time User Action Codes Description Comment    1/4/2022  8:13 AM Blayne Gonzalez [J02 [id number]] Pharyngitis     1/4/2022  8:14 AM Blayne Gonzalez [U07 [id number]] Blaire       ED Disposition     ED Disposition Condition Date/Time Comment    Discharge Stable Tue Jan 4, 2022  8:14 AM Jenean Boeck discharge to home/self care              Follow-up Information     Follow up With Specialties Details Why Contact Info Additional [address] Emergency Department Emergency Medicine Go to  If symptoms worsen [address] [facility] [address] [facility] Emergency Department, [address], [place], [address] MD [facility] Medicine Call   [address]  [address]  [phone]             Patient's Medications   Discharge Prescriptions    No medications on file       No discharge procedures on file      PDMP Review     None          ED Provider  Electronically Signed by           Shirley Wall PA-C  01/04/22 0827       Shirley Wall PA-C  01/04/22 7857

## 2022-01-04 NOTE — TELEPHONE ENCOUNTER
Sent in basket to PCP & Team to schedule virtual visit for COVID + F/U  Pt is vaccinated and does not meet criteria for MAB

## 2022-01-04 NOTE — Clinical Note
Steve Mcfarland was seen and treated in our emergency department on 1/3/2022  Diagnosis: Amalia Beard  is off the rest of the shift today, may return to work on return date  She may return on this date: 01/12/2022    May return to work if the patient has been without symptoms for 5 days (must wear a mask at all times) on 01/08/2021     If you have any questions or concerns, please don't hesitate to call        Aurelia Singletary PA-C    ______________________________           _______________          _______________  Hospital Representative                              Date                                Time

## 2022-01-04 NOTE — DISCHARGE INSTRUCTIONS
COVID-19 (Coronavirus Disease 2019)   WHAT YOU NEED TO KNOW:   Coronavirus disease 2019 (COVID-19) is the disease caused by a coronavirus first discovered in December 2019  Coronaviruses generally cause upper respiratory (nose, throat, and lung) infections, such as a cold  The new virus spreads quickly and easily  The virus can be spread starting 2 days before symptoms even begin  The virus has also changed into several new forms (called variants) since it was discovered  The variants may be more contagious (easily spread) than the original form  Some may also cause more severe illness than others  It is important to follow local, national, and worldwide measures to protect yourself and others from infection  DISCHARGE INSTRUCTIONS:   If you think you or someone you know may be infected:  Do the following to protect others:  · If emergency care is needed,  tell the  about the possible infection, or call ahead and tell the emergency department  · Call a healthcare provider  for instructions if symptoms are mild  Anyone who may be infected should not  arrive without calling first  The provider will need to protect staff members and other patients  · The person who may be infected needs to wear a face covering  while getting medical care  This will help lower the risk of infecting others  Coverings are not used for anyone who is younger than 2 years, has breathing problems, or cannot remove it  The provider can give you instructions for anyone who cannot wear a covering  Call your local emergency number (911 in the [address] [place] [address],[address]) or an emergency department if:   · You have trouble breathing or shortness of breath at rest     · You have chest pain or pressure that lasts longer than 5 minutes  · You become confused or hard to wake  · Your lips or face are blue  · You have a fever of 104°F (40°C) or higher      Call your doctor if:   · You do not  have symptoms of COVID-19 but had close physical contact within 14 days with someone who tested positive  · You have questions or concerns about your condition or care  Medicines: You may need any of the following for mild symptoms:  · Decongestants  help reduce nasal congestion and help you breathe more easily  If you take decongestant pills, they may make you feel restless or cause problems with your sleep  Do not use decongestant sprays for more than a few days  · Cough suppressants  help reduce coughing  Ask your healthcare provider which type of cough medicine is best for you  · Acetaminophen  decreases pain and fever  It is available without a doctor's order  Ask how much to take and how often to take it  Follow directions  Read the labels of all other medicines you are using to see if they also contain acetaminophen, or ask your doctor or pharmacist  Acetaminophen can cause liver damage if not taken correctly  Do not use more than 4 grams (4,000 milligrams) total of acetaminophen in one day  · NSAIDs , such as ibuprofen, help decrease swelling, pain, and fever  NSAIDs can cause stomach bleeding or kidney problems in certain people  If you take blood thinner medicine, always ask your healthcare provider if NSAIDs are safe for you  Always read the medicine label and follow directions  · Take your medicine as directed  Contact your healthcare provider if you think your medicine is not helping or if you have side effects  Tell him or her if you are allergic to any medicine  Keep a list of the medicines, vitamins, and herbs you take  Include the amounts, and when and why you take them  Bring the list or the pill bottles to follow-up visits  Carry your medicine list with you in case of an emergency  What you need to know about COVID-19 vaccines:  Get a vaccine even if you already had COVID-19  · COVID-19 vaccines are given as a shot in 1 or 2 doses  Some vaccines have emergency use authorization (EUA)   An EUA means the vaccine is not approved but is given because the benefits outweigh the risks  A 2-dose vaccine is fully approved for use in those 16 years or older  This vaccine also has an EUA for adolescents 12 to 15 years  Your healthcare provider can help you understand the benefits and risks  · A third dose is recommended for adults with a weakened immune system who get a 2-dose vaccine  The third dose is given at least 28 days after the second  · Even after you get the vaccine, continue social distancing and other measures  Experts are still learning how well the vaccines work to prevent infection, transmission, and severe illness  Although rare, you can become infected after you get the vaccine  You may also be able to pass the virus to others without knowing you are infected  · After you get the vaccine, check local, national, and international travel rules  Check to see if you need to be tested before you travel  You may also need to quarantine after you return  Some countries require proof of a negative test before you leave  You should also be tested 3 to 5 days after you return from another country  How the 2019 coronavirus spreads: The following are ways the virus is thought to spread, but more information may be coming:  · Droplets are the main way all coronaviruses spread  The virus travels in droplets that form when a person talks, coughs, or sneezes  The droplets can also float in the air for minutes or hours  Infection happens when you breathe in the droplets or get them in your eyes or nose  Close personal contact with an infected person increases your risk for infection  This means being within 6 feet (2 meters) of the person for at least 15 minutes over 24 hours  · Person-to-person contact can spread the virus  For example, a person with the virus on his or her hands can spread it by shaking hands with someone  · The virus can stay on objects and surfaces for a short time    You may become infected by touching the object or surface and then touching your eyes or mouth  · An infected animal may be able to infect a person who touches it  This may happen at live markets or on a farm  Help lower the risk for COVID-19:  The best way to prevent infection is to avoid anyone who is infected, but this can be hard to do  An infected person can spread the virus before signs or symptoms begin, or even if signs or symptoms never develop  The following can help lower the risk for infection:      · Wash your hands often throughout the day  Use soap and water  Rub your soapy hands together, lacing your fingers, for at least 20 seconds  Rinse with warm, running water  Dry your hands with a clean towel or paper towel  Use hand  that contains alcohol if soap and water are not available  Teach children how to wash their hands and use hand   · Cover sneezes and coughs  Turn your face away and cover your mouth and nose with a tissue  Throw the tissue away  Use the bend of your arm if a tissue is not available  Then wash your hands well with soap and water or use hand   Teach children how to cover a cough or sneeze  · Wear a face covering (mask) around anyone who does not live in your home  Use a cloth covering with at least 2 layers  You can also create layers by putting a cloth covering over a disposable non-medical mask  Cover your mouth and your nose  The covering should fit snugly against the bridge of your nose  Securely fasten it under your chin and on the sides of your face  Do not  wear a plastic face shield instead of a covering  Continue social distancing and washing your hands often  A face covering is not a substitute for social distancing safety measures  · Follow worldwide, national, and local social distancing guidelines  Keep at least 6 feet (2 meters) between you and others  Also keep this distance from anyone who comes to your home, such as someone making a delivery  Wear a face covering while you are around others  You will need to wear a covering in restaurants, stores, and other public buildings  You will also need a covering on mass transit, such as a bus, subway, or airplane  Remember to use a covering made from thick material or wear 2 coverings together  · Make a habit of not touching your face  If you get the virus on your hands, you can transfer it to your eyes, nose, or mouth and become infected  You can also transfer it to objects, surfaces, or people  Do not touch your eyes, nose, or mouth without washing your hands first     · Clean and disinfect high-touch surfaces and objects often  Use disinfecting wipes, or make a solution of 4 teaspoons of bleach in 1 quart (4 cups) of water  Clean and disinfect even if you think no one living in or coming to your home is infected with the virus  · Ask about other vaccines you may need  Get the influenza (flu) vaccine as soon as recommended each year, usually starting in September or October  Get the pneumonia vaccine if recommended  Your healthcare provider can tell you if you should also get other vaccines, and when to get them  Follow social distancing guidelines:  National and local social distancing rules vary  Rules may change over time as restrictions are lifted  Restrictions may return if an outbreak happens where you live  It is important to know and follow all current social distancing rules in your area  The following are general guidelines:  · Stay home if you are sick or think you may have COVID-19  It is important to stay home if you are waiting for a testing appointment or for test results  Even if you do not have symptoms, you can pass the virus to others  · Limit trips out of your home  Have food, medicines, and other supplies delivered and left at your door or other area, if possible  Plan trips out of your home so you make the fewest stops possible to limit close personal contact   Keep track of places you go  This will help contact tracers notify others if you become infected  · Avoid close physical contact with anyone who does not live in your home  Do not shake hands with, hug, or kiss a person as a greeting  If you must use public transportation (such as a bus or subway), try to sit or stand away from others  Only allow necessary people into your home  Wear your face covering, and remind others to wear a face covering  Remind them to wash their hands when they arrive and before they leave  Do not  let someone into your home or go to someone's home just to visit  Even if you both do not feel sick, the virus can pass from one of you to the other  · Avoid in-person gatherings and crowds  Gatherings or crowds of 10 or more individuals can cause the virus to spread  Avoid places such as [place], beaches, sporting events, and tourist attractions  For events such as parties, holiday meals, Mosque services, and conferences, attend virtually (on a computer), if possible  · Ask your healthcare provider for other ways to have appointments  Some providers offer phone, video, or other types of appointments  You may also be able to get prescriptions for a few months of your medicines at a time  · Stay safe if you must go out to work  Keep physical distance between you and other workers as much as possible  Follow your employer's rules so everyone stays safe  If you have COVID-19 and are recovering at home,  healthcare providers will give you specific instructions to follow  The following are general guidelines to remind you how to keep others safe until you are well:  · Wash your hands often  Use soap and water as much as possible  Use hand  that contains alcohol if soap and water are not available  Dry your hands with a clean towel or paper towel  Do not share towels with anyone  If you use paper towels, throw them away in a lined trash can kept in your room or area   Use a covered trash can, if possible  · Do not go out of your home unless it is necessary  Ask someone who is not infected to go out for groceries or supplies, or have them delivered  Do not go to your healthcare provider's office without an appointment  · Only have close physical contact with a person giving direct care, or a baby or child you must care for  Family members and friends should not visit you  If possible, stay in a separate area or room of your home if you live with others  No one should go into the area or room except to give you care  You can visit with others by phone, video chat, e-mail, or similar systems  · Wear a face covering while others are near you  This can help prevent droplets from spreading the virus when you talk, sneeze, or cough  Put the covering on before anyone comes into your room or area  Remind the person to cover his or her nose and mouth before coming in to provide care for you  · Do not share items  Do not share dishes, towels, or other items with anyone  Items need to be washed after you use them  · Protect your baby  Some newborns have tested positive for the virus  It is not known if they became infected before or after birth  The highest risk is when a  has close contact with an infected person  If you are pregnant or breastfeeding, talk to your healthcare provider or obstetrician about any concerns you have  He or she will tell you when to bring your baby in for check-ups and vaccines  He or she will also tell you what to do if you think your baby was infected with the coronavirus  Wash your hands and put on a clean face covering before you breastfeed or care for your baby  · Do not handle live animals unless it is necessary  Some animals, including pets, have been infected with the new coronavirus  Ask someone who is not infected to take care of your pet until you are well  If you must care for a pet, wear a face covering   Wash your hands before and after you give care  Talk to your healthcare provider about how to keep a service animal safe, if needed  · Follow directions from your healthcare provider for being around others after you recover  It is not known if or for how long a recovered person can pass the virus to others  Your provider may give you instructions, such as continuing social distancing and wearing a face covering  He or she will tell you when it is okay to be around others again  This may be 10 to 20 days after symptoms started or you had a positive test  Most symptoms will also need to be gone  Your provider will give you more information  Follow up with your doctor as directed:  Write down your questions so you remember to ask them during your visits  For more information:   · Centers for Disease Control and Prevention  [address] [place] , [address]  Phone: 3- 745 - 044-2002  Web Address: [employer]    © [address] [place] [address] 2021 Information is for End User's use only and may not be sold, redistributed or otherwise used for commercial purposes  All illustrations and images included in CareNotes® are the copyrighted property of [employer]  or [address] [place], [place] [address]   The above information is an  only  It is not intended as medical advice for individual conditions or treatments  Talk to your doctor, nurse or pharmacist before following any medical regimen to see if it is safe and effective for you

## 2022-01-04 NOTE — TELEPHONE ENCOUNTER
----- Message from Julia Carias PA-C sent at 1/4/2022  8:15 AM EST -----  Patient tested positive for COVID today  May be a candidate for MAB   Please reach out to patient

## 2022-01-05 ENCOUNTER — TELEMEDICINE (OUTPATIENT)
Dept: FAMILY MEDICINE CLINIC | Facility: CLINIC | Age: 42
End: 2022-01-05
Payer: COMMERCIAL

## 2022-01-05 DIAGNOSIS — Z98.890 S/P CERVICAL DISCECTOMY: ICD-10-CM

## 2022-01-05 DIAGNOSIS — U07.1 COVID-19: ICD-10-CM

## 2022-01-05 DIAGNOSIS — Z12.31 VISIT FOR SCREENING MAMMOGRAM: Primary | ICD-10-CM

## 2022-01-05 DIAGNOSIS — I10 ESSENTIAL HYPERTENSION: ICD-10-CM

## 2022-01-05 DIAGNOSIS — F51.01 PRIMARY INSOMNIA: ICD-10-CM

## 2022-01-05 DIAGNOSIS — Z99.89 OBSTRUCTIVE SLEEP APNEA ON CPAP: ICD-10-CM

## 2022-01-05 DIAGNOSIS — G47.33 OBSTRUCTIVE SLEEP APNEA ON CPAP: ICD-10-CM

## 2022-01-05 PROCEDURE — 99214 OFFICE O/P EST MOD 30 MIN: CPT | Performed by: FAMILY MEDICINE

## 2022-01-05 RX ORDER — QUETIAPINE FUMARATE 25 MG/1
25 TABLET, FILM COATED ORAL
Qty: 90 TABLET | Refills: 3
Start: 2022-01-05 | End: 2022-03-25

## 2022-01-05 RX ORDER — DEXAMETHASONE 4 MG/1
4 TABLET ORAL 2 TIMES DAILY
Qty: 12 G | Refills: 0 | Status: SHIPPED | OUTPATIENT
Start: 2022-01-05 | End: 2022-03-25

## 2022-01-05 RX ORDER — ALBUTEROL SULFATE 90 UG/1
2 AEROSOL, METERED RESPIRATORY (INHALATION) EVERY 6 HOURS PRN
Qty: 18 G | Refills: 0 | Status: SHIPPED | OUTPATIENT
Start: 2022-01-05 | End: 2022-05-09 | Stop reason: SDUPTHER

## 2022-01-05 RX ORDER — LIDOCAINE HYDROCHLORIDE 20 MG/ML
10 SOLUTION OROPHARYNGEAL 3 TIMES DAILY PRN
Qty: 200 ML | Refills: 0 | Status: SHIPPED | OUTPATIENT
Start: 2022-01-05 | End: 2022-03-25

## 2022-01-05 RX ORDER — PREDNISONE 10 MG/1
TABLET ORAL
Qty: 20 TABLET | Refills: 0 | Status: SHIPPED | OUTPATIENT
Start: 2022-01-05 | End: 2022-01-17 | Stop reason: ALTCHOICE

## 2022-01-05 RX ORDER — TRAZODONE HYDROCHLORIDE 50 MG/1
50 TABLET ORAL
Qty: 90 TABLET | Refills: 3 | Status: SHIPPED | OUTPATIENT
Start: 2022-01-05

## 2022-01-05 NOTE — ASSESSMENT & PLAN NOTE
Day # 4 from onset of symptoms   Not doing well with cough and congestion and fatigue  Trial of prednisone taper, flovent and albuterol   Cleared medically when she is feeling better and after day 5 from onset of symtoms

## 2022-01-05 NOTE — PROGRESS NOTES
COVID-19 Outpatient Progress Note    Assessment/Plan:    Problem List Items Addressed This Visit        Respiratory    Obstructive sleep apnea on CPAP     CPAP as directed            Cardiovascular and Mediastinum    Essential hypertension     Stable on current meds            Other    Insomnia    Relevant Medications    QUEtiapine (SEROquel) 25 mg tablet    traZODone (DESYREL) 50 mg tablet    S/P cervical discectomy     neurontin is helping         COVID-19     Day # 4 from onset of symptoms   Not doing well with cough and congestion and fatigue  Trial of prednisone taper, flovent and albuterol   Cleared medically when she is feeling better and after day 5 from onset of symtoms         Relevant Medications    fluticasone (Flovent HFA) 110 MCG/ACT inhaler    predniSONE 10 mg tablet    Lidocaine Viscous HCl (XYLOCAINE) 2 % mucosal solution    albuterol (ProAir HFA) 90 mcg/act inhaler      Other Visit Diagnoses     Visit for screening mammogram    -  Primary    Relevant Orders    Mammo screening bilateral w 3d & cad         Disposition:     Patient has COVID-19 infection  Based off CDC guidelines, they were recommended to isolate for 5 days from the date of the positive test  If they remain asymptomatic, isolation may be ended followed by 5 days of wearing a mask when around othes to minimize risk of infecting others  If they have a fever, continue to stay home until fever resolves for at least 24 hours  I recommended continued isolation until at least 24 hours have passed since recovery defined as resolution of fever without the use of fever-reducing medications AND improvement in COVID symptoms AND 10 days have passed since onset of symptoms (or 10 days have passed since date of first positive viral diagnostic test for asymptomatic patients)  I have spent 15 minutes directly with the patient        Encounter provider Michaela Hickey MD    Provider located at [address] [place] [address] [facility]  72 [place] [id number]    Recent Visits  No visits were found meeting these conditions  Showing recent visits within past 7 days and meeting all other requirements  Today's Visits  Date Type Provider Dept   01/05/22 Telemedicine MD Varinder Torrez today's visits and meeting all other requirements  Future Appointments  No visits were found meeting these conditions  Showing future appointments within next 150 days and meeting all other requirements     This virtual check-in was done via [employer] and patient was informed that this is a secure, HIPAA-compliant platform  She agrees to proceed  Patient agrees to participate in a virtual check in via telephone or video visit instead of presenting to the office to address urgent/immediate medical needs  Patient is aware this is a billable service  After connecting through [facility], the patient was identified by name and date of birth  Pankaj Kumar was informed that this was a telemedicine visit and that the exam was being conducted confidentially over secure lines  My office door was closed  No one else was in the room  Pankaj Kumar acknowledged consent and understanding of privacy and security of the telemedicine visit  I informed the patient that I have reviewed her record in Epic and presented the opportunity for her to ask any questions regarding the visit today  The patient agreed to participate  Verification of patient location:  Patient is located in the following state in which I hold an active license: [place]    Subjective:   Pankaj Kumar is a 35 y o  female who has been screened for COVID-19  Symptom change since last report: unchanged  Patient's symptoms include fever, chills, fatigue, cough, shortness of breath, chest tightness and myalgias  Patient denies malaise, congestion, rhinorrhea, sore throat, anosmia, loss of taste, abdominal pain, nausea, vomiting, diarrhea and headaches       Date of symptom onset: 2022  Date of positive COVID-19 PCR: 2022  COVID-19 vaccination status: Fully vaccinated (primary series)    Johnnie Kc has been staying home and has isolated themselves in her home  She is taking care to not share personal items and is cleaning all surfaces that are touched often, like counters, tabletops, and doorknobs using household cleaning sprays or wipes  She is wearing a mask when she leaves her room  Lab Results   Component Value Date    SARSCOV2 Positive (A) 2022     Past Medical History:   Diagnosis Date    Constipation     Depression     Eczema     GERD (gastroesophageal reflux disease)     Grand mal convulsion ([address] )     X 2 last episode -Sleep deprivation-Resolved with Cpap    Hypertension     Insomnia     Medial meniscus tear 2015    Migraine     PONV (postoperative nausea and vomiting)     Psoriasis     Seizures (HCC)     Shoulder dislocation, right, sequela 2018    Sinusitis     Sleep apnea     uses cpap    Tinnitus      Past Surgical History:   Procedure Laterality Date     SECTION   and     COLONOSCOPY      DILATION AND CURETTAGE OF UTERUS      DILATION AND CURETTAGE OF UTERUS WITH HYSTEROSOCPY N/A 2017    Procedure: D&C; IUD REMOVAL;  Surgeon: Gómez Carrillo MD;  [facility];  Service: Gynecology    EGD      HYSTEROSCOPY      LAPAROSCOPY      (Diagnostic)    LASIK      MS COLONOSCOPY FLX DX W/COLLJ SPEC WHEN PFRMD N/A 2018    Procedure: EGD AND COLONOSCOPY;  Surgeon: Christine Gilliam MD;  [facility]; Service: Gastroenterology     [facility] BODY,CERV,ONE SGMT Bilateral 2021    Procedure: CORPECTOMY SPINE CERVICAL ANTERIOR: C4-7 ACDF with C6 /hemicorpectomy, C4-7 instrumentation and fusion (neuromonitoring);   Surgeon: Tatum Knapp MD;  [facility];  Service: Neurosurgery    REMOVAL OF INTRAUTERINE DEVICE (IUD)       Current Outpatient Medications   Medication Sig Dispense Refill    acetaminophen (TYLENOL) 325 mg tablet Take 650 mg by mouth every 6 (six) hours as needed for mild pain      albuterol (ProAir HFA) 90 mcg/act inhaler Inhale 2 puffs every 6 (six) hours as needed for shortness of breath 18 g 0    Ascorbic Acid (VITAMIN C) 100 MG tablet Take 100 mg by mouth daily      cholecalciferol (VITAMIN D3) 1,000 units tablet Take 1,000 Units by mouth daily      docusate sodium (COLACE) 100 mg capsule Take 1 capsule (100 mg total) by mouth 2 (two) times a day 20 capsule 0    Etonogestrel (NEXPLANON SC) Inject under the skin Located Left upper inner arm      fenofibrate (TRICOR) 145 mg tablet Take 1 tablet (145 mg total) by mouth daily 90 tablet 0    fluticasone (Flovent HFA) 110 MCG/ACT inhaler Inhale 4 puffs 2 (two) times a day for 14 days Rinse mouth after use   12 g 0    gabapentin (Neurontin) 600 MG tablet Take 1 tablet (600 mg total) by mouth daily at bedtime 30 tablet 1    hydrochlorothiazide (HYDRODIURIL) 25 mg tablet TAKE 1 TABLET BY MOUTH EVERY DAY 90 tablet 1    Lidocaine Viscous HCl (XYLOCAINE) 2 % mucosal solution Swish and spit 10 mL 3 (three) times a day as needed for mouth pain or discomfort 200 mL 0    losartan (COZAAR) 50 mg tablet TAKE 1 TABLET BY MOUTH EVERY DAY AT BEDTIME 90 tablet 1    magnesium 30 MG tablet Take 30 mg by mouth 2 (two) times a day      magnesium hydroxide (MILK OF MAGNESIA) 400 mg/5 mL oral suspension Take 30 mL by mouth daily as needed for constipation 118 mL 0    Multiple Vitamins-Minerals (MULTIVITAL-M) TABS Take by mouth      omega-3-acid ethyl esters (LOVAZA) 1 g capsule Take 2 capsules (2 g total) by mouth 2 (two) times a day 60 capsule 3    omeprazole (PriLOSEC) 20 mg delayed release capsule Take 1 capsule (20 mg total) by mouth daily 90 capsule 1    predniSONE 10 mg tablet 4 tabs x 2 days, 3 tabs x 2 days, 2 tabs x 2 days, 1 tab x 2 days 20 tablet 0    QUEtiapine (SEROquel) 25 mg tablet Take 1 tablet (25 mg total) by mouth daily at bedtime 90 tablet 3    senna (SENOKOT) 8 6 mg Take 1 tablet (8 6 mg total) by mouth daily 10 tablet 0    tiZANidine (ZANAFLEX) 4 mg tablet Take 1 tablet (4 mg total) by mouth 3 (three) times a day as needed for muscle spasms 90 tablet 0    traZODone (DESYREL) 50 mg tablet Take 1 tablet (50 mg total) by mouth daily at bedtime as needed for sleep or depression 90 tablet 3    Zinc 100 MG TABS Take by mouth       No current facility-administered medications for this visit  Allergies   Allergen Reactions    Gluten Meal - Food Allergy GI Intolerance     " Highly Sensitivity"    Lactose - Food Allergy GI Intolerance     " Highly Sensitive"       Review of Systems   Constitutional: Positive for chills, fatigue and fever  HENT: Negative for congestion, rhinorrhea and sore throat  Respiratory: Positive for cough, chest tightness and shortness of breath  Gastrointestinal: Negative for abdominal pain, diarrhea, nausea and vomiting  Musculoskeletal: Positive for myalgias  Neurological: Negative for headaches  Objective: There were no vitals filed for this visit  Physical Exam  Constitutional:       Appearance: Normal appearance  Pulmonary:      Effort: Pulmonary effort is normal  No respiratory distress  Neurological:      Mental Status: She is alert  Psychiatric:         Mood and Affect: Mood normal          Behavior: Behavior normal          VIRTUAL VISIT DISCLAIMER    Malika Roberson verbally agrees to participate in [employer]  Pt is aware that [employer] could be limited without vital signs or the ability to perform a full hands-on physical Weskan Sit understands she or the provider may request at any time to terminate the video visit and request the patient to seek care or treatment in person

## 2022-01-17 ENCOUNTER — TELEMEDICINE (OUTPATIENT)
Dept: FAMILY MEDICINE CLINIC | Facility: CLINIC | Age: 42
End: 2022-01-17
Payer: COMMERCIAL

## 2022-01-17 VITALS
BODY MASS INDEX: 34.84 KG/M2 | WEIGHT: 222 LBS | OXYGEN SATURATION: 96 % | HEIGHT: 67 IN | SYSTOLIC BLOOD PRESSURE: 110 MMHG | DIASTOLIC BLOOD PRESSURE: 64 MMHG

## 2022-01-17 DIAGNOSIS — G47.33 OBSTRUCTIVE SLEEP APNEA ON CPAP: ICD-10-CM

## 2022-01-17 DIAGNOSIS — I10 ESSENTIAL HYPERTENSION: ICD-10-CM

## 2022-01-17 DIAGNOSIS — E66.09 CLASS 1 OBESITY DUE TO EXCESS CALORIES WITH BODY MASS INDEX (BMI) OF 34.0 TO 34.9 IN ADULT, UNSPECIFIED WHETHER SERIOUS COMORBIDITY PRESENT: ICD-10-CM

## 2022-01-17 DIAGNOSIS — L40.9 PSORIASIS: Primary | ICD-10-CM

## 2022-01-17 DIAGNOSIS — Z99.89 OBSTRUCTIVE SLEEP APNEA ON CPAP: ICD-10-CM

## 2022-01-17 DIAGNOSIS — M54.12 CERVICAL RADICULOPATHY: ICD-10-CM

## 2022-01-17 PROCEDURE — 99214 OFFICE O/P EST MOD 30 MIN: CPT | Performed by: FAMILY MEDICINE

## 2022-01-17 PROCEDURE — 3725F SCREEN DEPRESSION PERFORMED: CPT | Performed by: FAMILY MEDICINE

## 2022-01-17 PROCEDURE — 1036F TOBACCO NON-USER: CPT | Performed by: FAMILY MEDICINE

## 2022-01-17 RX ORDER — CEPHALEXIN 500 MG/1
500 CAPSULE ORAL EVERY 8 HOURS SCHEDULED
Qty: 21 CAPSULE | Refills: 0 | Status: SHIPPED | OUTPATIENT
Start: 2022-01-17 | End: 2022-01-24

## 2022-01-17 RX ORDER — KETOCONAZOLE 20 MG/G
CREAM TOPICAL DAILY
Qty: 15 G | Refills: 0 | Status: SHIPPED | OUTPATIENT
Start: 2022-01-17

## 2022-01-17 NOTE — PROGRESS NOTES
Virtual Regular Visit    Verification of patient location:    Patient is located in the following state in which I hold an active license [place]      Assessment/Plan:    Problem List Items Addressed This Visit        Respiratory    Obstructive sleep apnea on CPAP     CPAP at night            Cardiovascular and Mediastinum    Essential hypertension     Stable on current meds  Continue to monitor            Nervous and Auditory    Cervical radiculopathy     S/p surgery   Stable  Continue to monitor            Other    Obesity     Diet and exercise encouraged           Other Visit Diagnoses     Psoriasis    -  Primary    seeing derm tomorrow at [facility]    Relevant Medications    halobetasol (ULTRAVATE) 0 05 % cream    ketoconazole (NIZORAL) 2 % cream    cephalexin (KEFLEX) 500 mg capsule    Other Relevant Orders    Ambulatory Referral to Dermatology          BMI Counseling: Body mass index is 34 77 kg/m²  The BMI is above normal  Nutrition recommendations include decreasing portion sizes and encouraging healthy choices of fruits and vegetables  Exercise recommendations include moderate physical activity 150 minutes/week  No pharmacotherapy was ordered  Rationale for BMI follow-up plan is due to patient being overweight or obese  Depression Screening and Follow-up Plan: Patient was screened for depression during today's encounter  They screened negative with a PHQ-2 score of 0  Reason for visit is   Chief Complaint   Patient presents with    Virtual Regular Visit        Encounter provider Magui Guillaume MD    Provider located at [address] [id number]      Recent Visits  No visits were found meeting these conditions    Showing recent visits within past 7 days and meeting all other requirements  Today's Visits  Date Type Provider Dept   01/17/22 Telemedicine Magui Guillaume MD [address] [facility] today's visits and meeting all other requirements  Future Appointments  No visits were found meeting these conditions  Showing future appointments within next 150 days and meeting all other requirements       The patient was identified by name and date of birth  Pankaj Kumar was informed that this is a telemedicine visit and that the visit is being conducted through [facility] and patient was informed this is a secure, HIPAA-complaint platform  She agrees to proceed     My office door was closed  No one else was in the room  She acknowledged consent and understanding of privacy and security of the video platform  The patient has agreed to participate and understands they can discontinue the visit at any time  Patient is aware this is a billable service  Subjective  Pankaj Kumar is a 35 y o  female   Eczema in her ears started 1 month ago  Worsening rash over eye lids  Chest, behind the ears for the last 1 week  Very itchy    Rash  This is a new problem  The problem has been gradually worsening since onset  The rash is diffuse  The rash is characterized by dryness, itchiness, peeling and scaling  She was exposed to nothing  Pertinent negatives include no anorexia, congestion, cough, diarrhea, eye pain, facial edema, fatigue, fever, joint pain, nail changes, rhinorrhea, shortness of breath, sore throat or vomiting  Past treatments include nothing  The treatment provided no relief  Her past medical history is significant for eczema  There is no history of allergies or varicella          Past Medical History:   Diagnosis Date    Constipation     Depression     Eczema     Epilepsy ([address] ) 11/15/2013    Description: well controlled with sleep    GERD (gastroesophageal reflux disease)     Grand mal convulsion ([address] )     X 2 last episode 2009-Sleep deprivation-Resolved with Cpap    Hypertension     Insomnia     Medial meniscus tear 01/12/2015    Migraine     PONV (postoperative nausea and vomiting)     Primary generalized epilepsy [facility] [address] [facility] ) 2011    Psoriasis     Seizures (HCC)     Shoulder dislocation, right, sequela 2018    Sinusitis     Sleep apnea     uses cpap    Tinnitus        Past Surgical History:   Procedure Laterality Date     SECTION   and     COLONOSCOPY      DILATION AND CURETTAGE OF UTERUS      DILATION AND CURETTAGE OF UTERUS WITH HYSTEROSOCPY N/A 2017    Procedure: D&C; IUD REMOVAL;  Surgeon: Татьяна Castellanos MD;  [facility];  Service: Gynecology    EGD      HYSTEROSCOPY      LAPAROSCOPY      (Diagnostic)    LASIK      TX COLONOSCOPY FLX DX W/COLLJ SPEC WHEN PFRMD N/A 2018    Procedure: EGD AND COLONOSCOPY;  Surgeon: Beryle Homme, MD;  [facility]; Service: Gastroenterology     [facility] [address] VERT BODY,CERV,ONE SGMT Bilateral 2021    Procedure: CORPECTOMY SPINE CERVICAL ANTERIOR: C4-7 ACDF with C6 /hemicorpectomy, C4-7 instrumentation and fusion (neuromonitoring); Surgeon: Ben Multani MD;  [facility];  Service: Neurosurgery    REMOVAL OF INTRAUTERINE DEVICE (IUD)         Current Outpatient Medications   Medication Sig Dispense Refill    acetaminophen (TYLENOL) 325 mg tablet Take 650 mg by mouth every 6 (six) hours as needed for mild pain      albuterol (ProAir HFA) 90 mcg/act inhaler Inhale 2 puffs every 6 (six) hours as needed for shortness of breath 18 g 0    Ascorbic Acid (VITAMIN C) 100 MG tablet Take 100 mg by mouth daily      cholecalciferol (VITAMIN D3) 1,000 units tablet Take 1,000 Units by mouth daily      docusate sodium (COLACE) 100 mg capsule Take 1 capsule (100 mg total) by mouth 2 (two) times a day 20 capsule 0    Etonogestrel (NEXPLANON SC) Inject under the skin Located Left upper inner arm      fenofibrate (TRICOR) 145 mg tablet Take 1 tablet (145 mg total) by mouth daily 90 tablet 0    fluticasone (Flovent HFA) 110 MCG/ACT inhaler Inhale 4 puffs 2 (two) times a day for 14 days Rinse mouth after use   12 g 0    gabapentin (Neurontin) 600 MG tablet Take 1 tablet (600 mg total) by mouth daily at bedtime 30 tablet 1    hydrochlorothiazide (HYDRODIURIL) 25 mg tablet TAKE 1 TABLET BY MOUTH EVERY DAY 90 tablet 1    Lidocaine Viscous HCl (XYLOCAINE) 2 % mucosal solution Swish and spit 10 mL 3 (three) times a day as needed for mouth pain or discomfort 200 mL 0    losartan (COZAAR) 50 mg tablet TAKE 1 TABLET BY MOUTH EVERY DAY AT BEDTIME 90 tablet 1    magnesium 30 MG tablet Take 30 mg by mouth 2 (two) times a day      magnesium hydroxide (MILK OF MAGNESIA) 400 mg/5 mL oral suspension Take 30 mL by mouth daily as needed for constipation 118 mL 0    Multiple Vitamins-Minerals (MULTIVITAL-M) TABS Take by mouth      omega-3-acid ethyl esters (LOVAZA) 1 g capsule Take 2 capsules (2 g total) by mouth 2 (two) times a day 60 capsule 3    omeprazole (PriLOSEC) 20 mg delayed release capsule Take 1 capsule (20 mg total) by mouth daily 90 capsule 1    QUEtiapine (SEROquel) 25 mg tablet Take 1 tablet (25 mg total) by mouth daily at bedtime 90 tablet 3    senna (SENOKOT) 8 6 mg Take 1 tablet (8 6 mg total) by mouth daily 10 tablet 0    tiZANidine (ZANAFLEX) 4 mg tablet Take 1 tablet (4 mg total) by mouth 3 (three) times a day as needed for muscle spasms 90 tablet 0    traZODone (DESYREL) 50 mg tablet Take 1 tablet (50 mg total) by mouth daily at bedtime as needed for sleep or depression 90 tablet 3    Zinc 100 MG TABS Take by mouth      cephalexin (KEFLEX) 500 mg capsule Take 1 capsule (500 mg total) by mouth every 8 (eight) hours for 7 days 21 capsule 0    halobetasol (ULTRAVATE) 0 05 % cream Apply topically 2 (two) times a day 50 g 0    ketoconazole (NIZORAL) 2 % cream Apply topically daily 15 g 0     No current facility-administered medications for this visit          Allergies   Allergen Reactions    Gluten Meal - Food Allergy GI Intolerance     " Highly Sensitivity"    Lactose - Food Allergy GI Intolerance     " Highly Sensitive" Review of Systems   Constitutional: Negative  Negative for fatigue and fever  HENT: Negative for congestion, rhinorrhea and sore throat  Eyes: Negative for pain  Respiratory: Negative  Negative for cough and shortness of breath  Cardiovascular: Negative  Gastrointestinal: Negative  Negative for anorexia, diarrhea and vomiting  Endocrine: Negative  Musculoskeletal: Negative  Negative for joint pain  Skin: Positive for color change and rash  Negative for nail changes  Video Exam    Vitals:    01/17/22 1501   BP: 110/64   SpO2: 96%   Weight: 101 kg (222 lb)   Height: 5' 7" (1 702 m)       Physical Exam  Constitutional:       Appearance: Normal appearance  Skin:     Findings: Erythema and rash present  Comments: Excoriation over bilateral external ears  Chest, bilateral eye lids   Neurological:      Mental Status: She is alert  I spent 25 minutes directly with the patient during this visit    VIRTUAL VISIT DISCLAIMER      Pankaj Kumar verbally agrees to participate in [employer]  Pt is aware that [employer] could be limited without vital signs or the ability to perform a full hands-on physical Keeley Jj understands she or the provider may request at any time to terminate the video visit and request the patient to seek care or treatment in person

## 2022-01-18 ENCOUNTER — OFFICE VISIT (OUTPATIENT)
Dept: DERMATOLOGY | Facility: CLINIC | Age: 42
End: 2022-01-18
Payer: COMMERCIAL

## 2022-01-18 VITALS — WEIGHT: 224 LBS | TEMPERATURE: 99.5 F | BODY MASS INDEX: 35.08 KG/M2

## 2022-01-18 DIAGNOSIS — L40.9 PSORIASIS: Primary | ICD-10-CM

## 2022-01-18 PROCEDURE — 87070 CULTURE OTHR SPECIMN AEROBIC: CPT | Performed by: DERMATOLOGY

## 2022-01-18 PROCEDURE — 99203 OFFICE O/P NEW LOW 30 MIN: CPT | Performed by: DERMATOLOGY

## 2022-01-18 PROCEDURE — 87205 SMEAR GRAM STAIN: CPT | Performed by: DERMATOLOGY

## 2022-01-18 PROCEDURE — 87186 SC STD MICRODIL/AGAR DIL: CPT | Performed by: DERMATOLOGY

## 2022-01-18 NOTE — PROGRESS NOTES
[facility] Note     Patient Name: Laura Burden  Encounter Date: 01/18/2022     Have you been cared for by a [facility] in the last 3 years and, if so, which one? No    · Have you traveled outside of the [address] [place] [address] in the past 3 months or outside of the [facility] area in the last 2 weeks? No     May we call your Preferred Phone number to discuss your specific medical information? Yes     May we leave a detailed message that includes your specific medical information? Yes      Today's Chief Concerns:   Concern #1:  Psoriasis     Past Medical History:  Have you personally ever had or currently have any of the following? · Skin cancer (such as Melanoma, Basal Cell Carcinoma, Squamous Cell Carcinoma? (If Yes, please provide more detail)- No  · Eczema: YES  · Psoriasis: YES  · HIV/AIDS: No  · Hepatitis B or C: No  · Tuberculosis: No  · Systemic Immunosuppression such as Diabetes, Biologic or Immunotherapy, Chemotherapy, Organ Transplantation, Bone Marrow Transplantation (If YES, please provide more detail): No  · Radiation Treatment (If YES, please provide more detail): No  · Any other major medical conditions/concerns? 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 No  · Eczema, Asthma, Hay Fever or Seasonal Allergies: No  · Psoriasis or Psoriatic Arthritis: YES, father side of family  · Do any other medical conditions seem to run in your family? If Yes, what condition and which relatives?   No    Current Medications:       Current Outpatient Medications:     acetaminophen (TYLENOL) 325 mg tablet, Take 650 mg by mouth every 6 (six) hours as needed for mild pain, Disp: , Rfl:     albuterol (ProAir HFA) 90 mcg/act inhaler, Inhale 2 puffs every 6 (six) hours as needed for shortness of breath, Disp: 18 g, Rfl: 0    Ascorbic Acid (VITAMIN C) 100 MG tablet, Take 100 mg by mouth daily, Disp: , Rfl:     cephalexin (KEFLEX) 500 mg capsule, Take 1 capsule (500 mg total) by mouth every 8 (eight) hours for 7 days, Disp: 21 capsule, Rfl: 0    cholecalciferol (VITAMIN D3) 1,000 units tablet, Take 1,000 Units by mouth daily, Disp: , Rfl:     docusate sodium (COLACE) 100 mg capsule, Take 1 capsule (100 mg total) by mouth 2 (two) times a day, Disp: 20 capsule, Rfl: 0    Etonogestrel (NEXPLANON SC), Inject under the skin Located Left upper inner arm, Disp: , Rfl:     fenofibrate (TRICOR) 145 mg tablet, Take 1 tablet (145 mg total) by mouth daily, Disp: 90 tablet, Rfl: 0    fluticasone (Flovent HFA) 110 MCG/ACT inhaler, Inhale 4 puffs 2 (two) times a day for 14 days Rinse mouth after use , Disp: 12 g, Rfl: 0    gabapentin (Neurontin) 600 MG tablet, Take 1 tablet (600 mg total) by mouth daily at bedtime, Disp: 30 tablet, Rfl: 1    hydrochlorothiazide (HYDRODIURIL) 25 mg tablet, TAKE 1 TABLET BY MOUTH EVERY DAY, Disp: 90 tablet, Rfl: 1    Lidocaine Viscous HCl (XYLOCAINE) 2 % mucosal solution, Swish and spit 10 mL 3 (three) times a day as needed for mouth pain or discomfort, Disp: 200 mL, Rfl: 0    losartan (COZAAR) 50 mg tablet, TAKE 1 TABLET BY MOUTH EVERY DAY AT BEDTIME, Disp: 90 tablet, Rfl: 1    magnesium 30 MG tablet, Take 30 mg by mouth 2 (two) times a day, Disp: , Rfl:     magnesium hydroxide (MILK OF MAGNESIA) 400 mg/5 mL oral suspension, Take 30 mL by mouth daily as needed for constipation, Disp: 118 mL, Rfl: 0    Multiple Vitamins-Minerals (MULTIVITAL-M) TABS, Take by mouth, Disp: , Rfl:     omega-3-acid ethyl esters (LOVAZA) 1 g capsule, Take 2 capsules (2 g total) by mouth 2 (two) times a day, Disp: 60 capsule, Rfl: 3    omeprazole (PriLOSEC) 20 mg delayed release capsule, Take 1 capsule (20 mg total) by mouth daily, Disp: 90 capsule, Rfl: 1    QUEtiapine (SEROquel) 25 mg tablet, Take 1 tablet (25 mg total) by mouth daily at bedtime, Disp: 90 tablet, Rfl: 3    senna (SENOKOT) 8 6 mg, Take 1 tablet (8 6 mg total) by mouth daily, Disp: 10 tablet, Rfl: 0    tiZANidine (ZANAFLEX) 4 mg tablet, Take 1 tablet (4 mg total) by mouth 3 (three) times a day as needed for muscle spasms, Disp: 90 tablet, Rfl: 0    traZODone (DESYREL) 50 mg tablet, Take 1 tablet (50 mg total) by mouth daily at bedtime as needed for sleep or depression, Disp: 90 tablet, Rfl: 3    Zinc 100 MG TABS, Take by mouth, Disp: , Rfl:     halobetasol (ULTRAVATE) 0 05 % cream, Apply topically 2 (two) times a day (Patient not taking: Reported on 1/18/2022 ), Disp: 50 g, Rfl: 0    ketoconazole (NIZORAL) 2 % cream, Apply topically daily (Patient not taking: Reported on 1/18/2022 ), Disp: 15 g, Rfl: 0      Review of Systems:  Have you recently had or currently have any of the following? If YES, what are you doing for the problem? · Fever, chills or unintended weight loss: No   · Sudden loss or change in your vision: No  · Nausea, vomiting or blood in your stool: No  · Painful or swollen joints: No  · Wheezing or cough: No  · Changing mole or non-healing wound: No  · Nosebleeds: No  · Excessive sweating: No  · Easy or prolonged bleeding? No  · Over the last 2 weeks, how often have you been bothered by the following problems? · Taking little interest or pleasure in doing things: 1 - Not at All  · Feeling down, depressed, or hopeless: 1 - Not at All  · Rapid heartbeat with epinephrine:  No    · FEMALES ONLY:    · Are you pregnant or planning to become pregnant? No  · Are you currently or planning to be nursing or breast feeding? No    · Any known allergies?       Allergies   Allergen Reactions    Gluten Meal - Food Allergy GI Intolerance     " Highly Sensitivity"    Lactose - Food Allergy GI Intolerance     " Highly Sensitive"         Physical Exam:     Was a chaperone (Derm Clinical Assistant) present throughout the entire Physical Exam? Yes     Did the Dermatology Team specifically  the patient on the importance of a Full Skin Exam to be sure that nothing is missed clinically? Yes}  o Did the patient ultimately request or accept a Full Skin Exam?  Yes  o Did the patient specifically refuse to have the areas "under-the-bra" examined by the Dermatologist? Aurea Montes  o Did the patient specifically refuse to have the areas "under-the-underwear" examined by the Dermatologist? YES    CONSTITUTIONAL:   Vitals:    01/18/22 1404   Temp: 99 5 °F (37 5 °C)   TempSrc: Temporal   Weight: 102 kg (224 lb)       PSYCH: Normal mood and affect  EYES: Normal conjunctiva  ENT: Normal lips and oral mucosa  CARDIOVASCULAR: No edema  RESPIRATORY: Normal respirations  HEME/LYMPH/IMMUNO:  No regional lymphadenopathy except as noted below in "ASSESSMENT AND PLAN BY DIAGNOSIS"    SKIN:  FULL ORGAN SYSTEM EXAM  Hair, Scalp, Ears, Face Normal except as noted below in Assessment   Neck, Cervical Chain Nodes Normal except as noted below in Assessment            Assessment and Plan by Diagnosis:    PSORIASIS, possible atopic dermatitis, seborrheic  dermatitis with secondary infection  Physical Exam:   Anatomic Location Affected:  Ears, eyes, neck    Morphological Description:  Scaly pink plaques, worse in the ars    Severity: severe   Body Percent Affected: 2   Pertinent Positives:   Pertinent Negatives: Additional History of Present Condition:  Patient was on [address] [place] [address] and became completely clear  She ended up having to be taken off due to the cost  Patient also used Zum oil and that as well helped       Assessment and Plan:  Based on a thorough discussion of this condition and the management approach to it (including a comprehensive discussion of the known risks, side effects and potential benefits of treatment), the patient (family) agrees to implement the following specific plan:   Wound culture done in office today   Continue with Cephalexin 500 mg   Continue with Halobetasol cream 0 05% cream, apply a thin layer to the flared spots twice daily for 7 days   For moisturizers use Vaseline    - Recommend sensitive skin care regimen  - detergent free of dyes and perfumes (example, free and clear)  Try washing clothes with extra rinse cycle  - Short lukewarm showers  - White dove bar soap   Get a mask that wont go behind the ears, trying to avoid irritants  Psoriasis is a chronic inflammatory condition that causes the body to make new skin cells in days rather than weeks  As these cells pile up on the surface of the skin, you may see thick, scaly patches of thickened skin  Psoriasis affects 2-4% of males and females  It can start at any age including childhood, with peaks of onset at 15-25 years and 50-60 years  It tends to persist lifelong, fluctuating in extent and severity  It is particularly common in Caucasians but may affect people of any race  About one-third of patients with psoriasis have family members with psoriasis  Psoriasis is multifactorial  It is classified as an immune-mediated inflammatory disease (IMID)  Genetic factors are important and influence the type of psoriasis and response to treatment  What are the signs and symptoms of psoriasis? There are many different types of psoriasis that each have present uniquely  The types of psoriasis include:    Plaque psoriasis: About 80% to 90% of people who have psoriasis develop this type  When plaque psoriasis appears, you may see:  Plaque psoriasis usually presents with symmetrically distributed, red, scaly plaques with well-defined edges  The scale is typically silvery white, except in skin folds where the plaques often appear shiny and they may have a moist peeling surface   The most common sites are scalp, elbows and knees, but any part of the skin can be involved  The plaques are usually very persistent without treatment  Itch is mostly mild but may be severe in some patients, leading to scratching and lichenification (thickened leathery skin with increased skin markings)  Painful skin cracks or fissures may occur  When psoriatic plaques clear up, they may leave brown or pale marks that can be expected to fade over several months  Guttate psoriasis: When someone gets this type of psoriasis, you often see tiny bumps appear on the skin quite suddenly  The bumps tend to cover much of the torso, legs, and arms  Sometimes, the bumps also develop on the face, scalp, and ears  No matter where they appear, the bumps tend to be:    Small and scaly   [place]-colored to pink   Temporary, clearing in a few weeks or months without treatment  When guttate psoriasis clears, it may never return  Why this happens is still a bit of a mystery  Guttate psoriasis tends to develop in children and young adults who've had an infection, such as strep throat  It's possible that when the infection clears so does guttate psoriasis  It's also possible to have:   Guttate psoriasis for life   See the guttate psoriasis clear and plaque psoriasis develop later in life   Plaque psoriasis when you develop guttate psoriasis  There's no way to predict what will happen after the first flare-up of guttate psoriasis clears  Inverse psoriasis: This type of psoriasis develops in areas where skin touches skin, such as the armpits, genitals, and crease of the buttocks  Where the inverse psoriasis appears, you're likely to notice:   Smooth, red patches of skin that look raw   Little, if any, silvery-white coating   Sore or painful skin  Other names for this type of psoriasis are intertriginous psoriasis or flexural psoriasis  Pustular psoriasis: This type of psoriasis causes pus-filled bumps that usually appear only on the feet and hands   While the pus-filled bumps may look like an infection, the skin is not infected  The bumps don't contain bacteria or anything else that could cause an infection  Where pustular psoriasis appears, you tend to notice:   Red, swollen skin that is dotted with pus-filled bumps   Extremely sore or painful skin   Brown dots (and sometimes scale) appear as the pus-filled bumps dry  Pustular psoriasis can make just about any activity that requires your hands or feet, such as typing or walking, unbearably painful  Pustular psoriasis (generalized): Serious and life-threatening, this rare type of psoriasis causes pus-filled bumps to develop on much of the skin  Also called von Zumbusch psoriasis, a flare-up causes this sequence of events:  1  Skin on most of the body suddenly turns dry, red, and tender  2  Within hours, pus-filled bumps cover most of the skin  3  Often within a day, the pus-filled bumps break open and pools of pus leak onto the skin  4  As the pus dries (usually within 24 to 48 hours), the skin dries out and peels (as shown in this picture)  5  When the dried skin peels off, you see a smooth, glazed surface  6  In a few days or weeks, you may see a new crop of pus-filled bumps covering most of the skin, as the cycle repeats itself  Anyone with pustular psoriasis also feels very sick, and may develop a fever, headache, muscle weakness, and other symptoms  Medical care is often necessary to save the person's life  Erythrodermic psoriasis: Serious and life-threatening, this type of psoriasis requires immediate medical care  When someone develops erythrodermic psoriasis, you may notice:   Skin on most of the body looks burnt   Chills, fever, and the person looks extremely ill   Muscle weakness, a rapid pulse, and severe itch  The person may also be unable to keep warm, so hypothermia can set in quickly  Most people who develop this type of psoriasis already have another type of psoriasis   Before developing erythrodermic psoriasis, they often notice that their psoriasis is worsening or not improving with treatment  If you notice either of these happening, see a board-certified dermatologist     Nails    Nail psoriasis: With any type of psoriasis, you may see changes to your fingernails or toenails  About half of the people who have plaque psoriasis see signs of psoriasis on their fingernails at some point2  When psoriasis affects the nails, you may notice:   Tiny dents in your nails (called nail pits)   White, yellow, or brown discoloration under one or more nails   Crumbling, rough nails   A nail lifting up so that it's no longer attached   Buildup of skin cells beneath one or more nails, which lifts up the nail  Treatment and proper nail care can help you control nail psoriasis  Psoriatic arthritis: If you have psoriasis, it's important to pay attention to your joints  Some people who have psoriasis develop a type of arthritis called psoriatic arthritis  This is more likely to occur if you have severe psoriasis  Most people notice psoriasis on their skin years before they develop psoriatic arthritis  It's also possible to get psoriatic arthritis before psoriasis, but this is less common  When psoriatic arthritis develops, the signs can be subtle  At first, you may notice:   A swollen and tender joint, especially in a finger or toe   Heel pain   Swelling on the back of your leg, just above your heel   Stiffness in the morning that fades during the day  Like psoriasis, psoriatic arthritis cannot be cured  Treatment can prevent psoriatic arthritis from worsening, which is important  Allowed to progress, psoriatic arthritis can become disabling  Diagnosis and treatment of psoriasis   Psoriasis is usually diagnosed by clinical features, and skin biopsy if necessary  It is important to decrease factors that aggravate psoriasis   These include treating streptococcal infections, minimizing skin injuries, avoiding sun exposure if it exacerbates psoriasis, smoking, alcohol usage, decreasing stress, and maintaining an optimal body weight  Certain medications such as lithium, beta blockers, antimalarials, and NSAIDs have also been implicated  Suddenly stopping oral steroids or strong topical steroids can cause rebound disease  There are many categories of psoriasis treatments available  Topical therapy  Mild psoriasis is generally treated with topical agents alone  Which treatment is selected may depend on body site, extent and severity of psoriasis   Emollients   Coal tar preparations   Dithranol   Salicylic acid   Vitamin D analogue (calcipotriol)   Topical corticosteroids   Calcineurin inhibitor (tacrolimus, pimecrolimus)  Phototherapy  Most psoriasis centres offer phototherapy with ultraviolet (UV) radiation, often in combination with topical or systemic agents  Types of phototherapy include   Narrowband UVB   Broadband UVB   Photochemotherapy (PUVA)   Targeted phototherapy  Systemic therapy  Moderate to severe psoriasis warrants treatment with a systemic agent and/or phototherapy  The most common treatments are:   Methotrexate   Ciclosporin   Acitretin  Other medicines occasionally used for psoriasis include:   Mycophenolate   Apremilast   Hydroxyurea   Azathioprine   6-mercaptopurine  Systemic corticosteroids are best avoided due to a risk of severe withdrawal flare of psoriasis and adverse effects  Biologics or targeted therapies are reserved for conventional treatment-resistant severe psoriasis, mainly because of expense, as side effects compare favorably with other systemic agents  These include:   Anti-tumour necrosis factor-alpha antagonists (anti-TNF?) infliximab, adalimumab and etanercept   The interleukin (IL)-12/23 antagonist ustekinumab   IL-17 antagonists such as secukinumab  Many other monoclonal antibodies are under investigation in the treatment of psoriasis      Scribe Attestation    I,:  Rogelio Kim MA am acting as a scribe while in the presence of the attending physician :       I,:  Allegra Jackson MD personally performed the services described in this documentation    as scribed in my presence :

## 2022-01-18 NOTE — PATIENT INSTRUCTIONS
PSORIASIS, possible atopic dermatitis, seborrheic  dermatitis with secondary injection  Assessment and Plan:  Based on a thorough discussion of this condition and the management approach to it (including a comprehensive discussion of the known risks, side effects and potential benefits of treatment), the patient (family) agrees to implement the following specific plan:   Wound culture done in office today   Continue with Cephalexin 500 mg   Continue with Halobetasol cream 0 05% cream, apply a thin layer to the flared spots twice daily for 7 days   For moisturizers use Vaseline    - Recommend sensitive skin care regimen  - detergent free of dyes and perfumes (example, free and clear)  Try washing clothes with extra rinse cycle  - Short lukewarm showers  - White dove bar soap   Get a mask that wont go behind the ears, trying to avoid irritants  Psoriasis is a chronic inflammatory condition that causes the body to make new skin cells in days rather than weeks  As these cells pile up on the surface of the skin, you may see thick, scaly patches of thickened skin  Psoriasis affects 2-4% of males and females  It can start at any age including childhood, with peaks of onset at 15-25 years and 50-60 years  It tends to persist lifelong, fluctuating in extent and severity  It is particularly common in Caucasians but may affect people of any race  About one-third of patients with psoriasis have family members with psoriasis  Psoriasis is multifactorial  It is classified as an immune-mediated inflammatory disease (IMID)  Genetic factors are important and influence the type of psoriasis and response to treatment  What are the signs and symptoms of psoriasis? There are many different types of psoriasis that each have present uniquely  The types of psoriasis include:    Plaque psoriasis: About 80% to 90% of people who have psoriasis develop this type   When plaque psoriasis appears, you may see:  Plaque psoriasis usually presents with symmetrically distributed, red, scaly plaques with well-defined edges  The scale is typically silvery white, except in skin folds where the plaques often appear shiny and they may have a moist peeling surface  The most common sites are scalp, elbows and knees, but any part of the skin can be involved  The plaques are usually very persistent without treatment  Itch is mostly mild but may be severe in some patients, leading to scratching and lichenification (thickened leathery skin with increased skin markings)  Painful skin cracks or fissures may occur  When psoriatic plaques clear up, they may leave brown or pale marks that can be expected to fade over several months  Guttate psoriasis: When someone gets this type of psoriasis, you often see tiny bumps appear on the skin quite suddenly  The bumps tend to cover much of the torso, legs, and arms  Sometimes, the bumps also develop on the face, scalp, and ears  No matter where they appear, the bumps tend to be:    Small and scaly   Butte-colored to pink   Temporary, clearing in a few weeks or months without treatment  When guttate psoriasis clears, it may never return  Why this happens is still a bit of a mystery  Guttate psoriasis tends to develop in children and young adults who've had an infection, such as strep throat  It's possible that when the infection clears so does guttate psoriasis  It's also possible to have:   Guttate psoriasis for life   See the guttate psoriasis clear and plaque psoriasis develop later in life   Plaque psoriasis when you develop guttate psoriasis  There's no way to predict what will happen after the first flare-up of guttate psoriasis clears  Inverse psoriasis: This type of psoriasis develops in areas where skin touches skin, such as the armpits, genitals, and crease of the buttocks   Where the inverse psoriasis appears, you're likely to notice:   Smooth, red patches of skin that look raw   Little, if any, silvery-white coating   Sore or painful skin  Other names for this type of psoriasis are intertriginous psoriasis or flexural psoriasis  Pustular psoriasis: This type of psoriasis causes pus-filled bumps that usually appear only on the feet and hands  While the pus-filled bumps may look like an infection, the skin is not infected  The bumps don't contain bacteria or anything else that could cause an infection  Where pustular psoriasis appears, you tend to notice:   Red, swollen skin that is dotted with pus-filled bumps   Extremely sore or painful skin   Brown dots (and sometimes scale) appear as the pus-filled bumps dry  Pustular psoriasis can make just about any activity that requires your hands or feet, such as typing or walking, unbearably painful  Pustular psoriasis (generalized): Serious and life-threatening, this rare type of psoriasis causes pus-filled bumps to develop on much of the skin  Also called von Zumbusch psoriasis, a flare-up causes this sequence of events:  1  Skin on most of the body suddenly turns dry, red, and tender  2  Within hours, pus-filled bumps cover most of the skin  3  Often within a day, the pus-filled bumps break open and pools of pus leak onto the skin  4  As the pus dries (usually within 24 to 48 hours), the skin dries out and peels (as shown in this picture)  5  When the dried skin peels off, you see a smooth, glazed surface  6  In a few days or weeks, you may see a new crop of pus-filled bumps covering most of the skin, as the cycle repeats itself  Anyone with pustular psoriasis also feels very sick, and may develop a fever, headache, muscle weakness, and other symptoms  Medical care is often necessary to save the person's life  Erythrodermic psoriasis: Serious and life-threatening, this type of psoriasis requires immediate medical care   When someone develops erythrodermic psoriasis, you may notice:   Skin on most of the body looks burnt   Chills, fever, and the person looks extremely ill   Muscle weakness, a rapid pulse, and severe itch  The person may also be unable to keep warm, so hypothermia can set in quickly  Most people who develop this type of psoriasis already have another type of psoriasis  Before developing erythrodermic psoriasis, they often notice that their psoriasis is worsening or not improving with treatment  If you notice either of these happening, see a board-certified dermatologist     Nails    Nail psoriasis: With any type of psoriasis, you may see changes to your fingernails or toenails  About half of the people who have plaque psoriasis see signs of psoriasis on their fingernails at some point2  When psoriasis affects the nails, you may notice:   Tiny dents in your nails (called nail pits)   White, yellow, or brown discoloration under one or more nails   Crumbling, rough nails   A nail lifting up so that it's no longer attached   Buildup of skin cells beneath one or more nails, which lifts up the nail  Treatment and proper nail care can help you control nail psoriasis  Psoriatic arthritis: If you have psoriasis, it's important to pay attention to your joints  Some people who have psoriasis develop a type of arthritis called psoriatic arthritis  This is more likely to occur if you have severe psoriasis  Most people notice psoriasis on their skin years before they develop psoriatic arthritis  It's also possible to get psoriatic arthritis before psoriasis, but this is less common  When psoriatic arthritis develops, the signs can be subtle  At first, you may notice:   A swollen and tender joint, especially in a finger or toe   Heel pain   Swelling on the back of your leg, just above your heel   Stiffness in the morning that fades during the day  Like psoriasis, psoriatic arthritis cannot be cured  Treatment can prevent psoriatic arthritis from worsening, which is important   Allowed to progress, psoriatic arthritis can become disabling  Diagnosis and treatment of psoriasis   Psoriasis is usually diagnosed by clinical features, and skin biopsy if necessary  It is important to decrease factors that aggravate psoriasis  These include treating streptococcal infections, minimizing skin injuries, avoiding sun exposure if it exacerbates psoriasis, smoking, alcohol usage, decreasing stress, and maintaining an optimal body weight  Certain medications such as lithium, beta blockers, antimalarials, and NSAIDs have also been implicated  Suddenly stopping oral steroids or strong topical steroids can cause rebound disease  There are many categories of psoriasis treatments available  Topical therapy  Mild psoriasis is generally treated with topical agents alone  Which treatment is selected may depend on body site, extent and severity of psoriasis   Emollients   Coal tar preparations   Dithranol   Salicylic acid   Vitamin D analogue (calcipotriol)   Topical corticosteroids   Calcineurin inhibitor (tacrolimus, pimecrolimus)  Phototherapy  Most psoriasis centres offer phototherapy with ultraviolet (UV) radiation, often in combination with topical or systemic agents  Types of phototherapy include   Narrowband UVB   Broadband UVB   Photochemotherapy (PUVA)   Targeted phototherapy  Systemic therapy  Moderate to severe psoriasis warrants treatment with a systemic agent and/or phototherapy  The most common treatments are:   Methotrexate   Ciclosporin   Acitretin  Other medicines occasionally used for psoriasis include:   Mycophenolate   Apremilast   Hydroxyurea   Azathioprine   6-mercaptopurine  Systemic corticosteroids are best avoided due to a risk of severe withdrawal flare of psoriasis and adverse effects  Biologics or targeted therapies are reserved for conventional treatment-resistant severe psoriasis, mainly because of expense, as side effects compare favorably with other systemic agents   These include:   Anti-tumour necrosis factor-alpha antagonists (anti-TNF?) infliximab, adalimumab and etanercept   The interleukin (IL)-12/23 antagonist ustekinumab   IL-17 antagonists such as secukinumab  Many other monoclonal antibodies are under investigation in the treatment of psoriasis

## 2022-01-20 DIAGNOSIS — M54.12 RADICULOPATHY, CERVICAL REGION: ICD-10-CM

## 2022-01-20 DIAGNOSIS — M47.12 SPONDYLOGENIC COMPRESSION OF CERVICAL SPINAL CORD: ICD-10-CM

## 2022-01-20 DIAGNOSIS — M48.02 DEGENERATIVE CERVICAL SPINAL STENOSIS: ICD-10-CM

## 2022-01-20 LAB
BACTERIA WND AEROBE CULT: ABNORMAL
GRAM STN SPEC: ABNORMAL

## 2022-01-20 RX ORDER — TIZANIDINE 4 MG/1
TABLET ORAL
Qty: 90 TABLET | Refills: 0 | Status: SHIPPED | OUTPATIENT
Start: 2022-01-20 | End: 2022-04-14

## 2022-01-21 DIAGNOSIS — M47.12 SPONDYLOGENIC COMPRESSION OF CERVICAL SPINAL CORD: ICD-10-CM

## 2022-01-21 DIAGNOSIS — M48.02 DEGENERATIVE CERVICAL SPINAL STENOSIS: ICD-10-CM

## 2022-01-21 DIAGNOSIS — M77.11 LATERAL EPICONDYLITIS OF RIGHT ELBOW: ICD-10-CM

## 2022-01-21 DIAGNOSIS — M54.2 NECK PAIN: ICD-10-CM

## 2022-01-21 DIAGNOSIS — M79.18 MYOFASCIAL PAIN SYNDROME: ICD-10-CM

## 2022-01-21 DIAGNOSIS — Z98.890 S/P CERVICAL DISCECTOMY: ICD-10-CM

## 2022-01-21 DIAGNOSIS — M54.12 RADICULOPATHY, CERVICAL REGION: ICD-10-CM

## 2022-01-21 DIAGNOSIS — M54.12 CERVICAL RADICULOPATHY: ICD-10-CM

## 2022-01-21 DIAGNOSIS — M48.02 CERVICAL SPINAL STENOSIS: ICD-10-CM

## 2022-01-21 RX ORDER — GABAPENTIN 600 MG/1
TABLET ORAL
Qty: 30 TABLET | Refills: 1 | Status: SHIPPED | OUTPATIENT
Start: 2022-01-21

## 2022-01-25 ENCOUNTER — OFFICE VISIT (OUTPATIENT)
Dept: DERMATOLOGY | Facility: CLINIC | Age: 42
End: 2022-01-25
Payer: COMMERCIAL

## 2022-01-25 VITALS — TEMPERATURE: 98.3 F | HEIGHT: 68 IN | WEIGHT: 222 LBS | BODY MASS INDEX: 33.65 KG/M2

## 2022-01-25 DIAGNOSIS — L20.9 ATOPIC DERMATITIS, UNSPECIFIED TYPE: ICD-10-CM

## 2022-01-25 DIAGNOSIS — R21 RASH: Primary | ICD-10-CM

## 2022-01-25 PROCEDURE — 3008F BODY MASS INDEX DOCD: CPT | Performed by: FAMILY MEDICINE

## 2022-01-25 PROCEDURE — 99214 OFFICE O/P EST MOD 30 MIN: CPT | Performed by: DERMATOLOGY

## 2022-01-25 RX ORDER — TACROLIMUS 1 MG/G
OINTMENT TOPICAL 2 TIMES DAILY
Qty: 60 G | Refills: 5 | Status: SHIPPED | OUTPATIENT
Start: 2022-01-25

## 2022-01-25 NOTE — PATIENT INSTRUCTIONS
Assessment and Plan:  Based on a thorough discussion of this condition and the management approach to it (including a comprehensive discussion of the known risks, side effects and potential benefits of treatment), the patient (family) agrees to implement the following specific plan:   Start Protopic 0 1% ointment, apply topically twice a day    Follow up in May/June

## 2022-01-25 NOTE — PROGRESS NOTES
[facility] Follow Up Note    Patient Name: Mitch Hess  Encounter Date: 01/25/2022    Today's Chief Concerns:   Concern #1:  Psoriasis follow up     Current Medications:    Current Outpatient Medications:     Ascorbic Acid (VITAMIN C) 100 MG tablet, Take 100 mg by mouth daily, Disp: , Rfl:     cholecalciferol (VITAMIN D3) 1,000 units tablet, Take 1,000 Units by mouth daily, Disp: , Rfl:     Etonogestrel (NEXPLANON SC), Inject under the skin Located Left upper inner arm, Disp: , Rfl:     gabapentin (NEURONTIN) 600 MG tablet, TAKE ONE TABLET (600 MG TOTAL) BY MOUTH DAILY AT BEDTIME, Disp: 30 tablet, Rfl: 1    halobetasol (ULTRAVATE) 0 05 % cream, Apply topically 2 (two) times a day, Disp: 50 g, Rfl: 0    hydrochlorothiazide (HYDRODIURIL) 25 mg tablet, TAKE 1 TABLET BY MOUTH EVERY DAY, Disp: 90 tablet, Rfl: 1    losartan (COZAAR) 50 mg tablet, TAKE 1 TABLET BY MOUTH EVERY DAY AT BEDTIME, Disp: 90 tablet, Rfl: 1    magnesium 30 MG tablet, Take 30 mg by mouth 2 (two) times a day, Disp: , Rfl:     Multiple Vitamins-Minerals (MULTIVITAL-M) TABS, Take by mouth, Disp: , Rfl:     omega-3-acid ethyl esters (LOVAZA) 1 g capsule, Take 2 capsules (2 g total) by mouth 2 (two) times a day, Disp: 60 capsule, Rfl: 3    omeprazole (PriLOSEC) 20 mg delayed release capsule, Take 1 capsule (20 mg total) by mouth daily, Disp: 90 capsule, Rfl: 1    QUEtiapine (SEROquel) 25 mg tablet, Take 1 tablet (25 mg total) by mouth daily at bedtime, Disp: 90 tablet, Rfl: 3    tiZANidine (ZANAFLEX) 4 mg tablet, TAKE ONE TABLET (4 MG TOTAL) BY MOUTH THREE TIMES A DAY AS NEEDED FOR MUSCLE SPASMS, Disp: 90 tablet, Rfl: 0    traZODone (DESYREL) 50 mg tablet, Take 1 tablet (50 mg total) by mouth daily at bedtime as needed for sleep or depression, Disp: 90 tablet, Rfl: 3    acetaminophen (TYLENOL) 325 mg tablet, Take 650 mg by mouth every 6 (six) hours as needed for mild pain, Disp: , Rfl:     albuterol (ProAir HFA) 90 mcg/act inhaler, Inhale 2 puffs every 6 (six) hours as needed for shortness of breath, Disp: 18 g, Rfl: 0    docusate sodium (COLACE) 100 mg capsule, Take 1 capsule (100 mg total) by mouth 2 (two) times a day, Disp: 20 capsule, Rfl: 0    fenofibrate (TRICOR) 145 mg tablet, Take 1 tablet (145 mg total) by mouth daily (Patient not taking: Reported on 1/25/2022 ), Disp: 90 tablet, Rfl: 0    fluticasone (Flovent HFA) 110 MCG/ACT inhaler, Inhale 4 puffs 2 (two) times a day for 14 days Rinse mouth after use , Disp: 12 g, Rfl: 0    ketoconazole (NIZORAL) 2 % cream, Apply topically daily (Patient not taking: Reported on 1/18/2022 ), Disp: 15 g, Rfl: 0    Lidocaine Viscous HCl (XYLOCAINE) 2 % mucosal solution, Swish and spit 10 mL 3 (three) times a day as needed for mouth pain or discomfort (Patient not taking: Reported on 1/25/2022 ), Disp: 200 mL, Rfl: 0    magnesium hydroxide (MILK OF MAGNESIA) 400 mg/5 mL oral suspension, Take 30 mL by mouth daily as needed for constipation (Patient not taking: Reported on 1/25/2022 ), Disp: 118 mL, Rfl: 0    senna (SENOKOT) 8 6 mg, Take 1 tablet (8 6 mg total) by mouth daily, Disp: 10 tablet, Rfl: 0    Zinc 100 MG TABS, Take by mouth (Patient not taking: Reported on 1/25/2022 ), Disp: , Rfl:     CONSTITUTIONAL:   Vitals:    01/25/22 0820   Temp: 98 3 °F (36 8 °C)   TempSrc: Temporal   Weight: 101 kg (222 lb)   Height: 5' 8" (1 727 m)         Specific Alerts:    Have you been seen by a [facility]ologist in the last 3 years? YES    Are you pregnant or planning to become pregnant? No    Are you currently or planning to be nursing or breast feeding? No    Allergies   Allergen Reactions    Gluten Meal - Food Allergy GI Intolerance     " Highly Sensitivity"    Lactose - Food Allergy GI Intolerance     " Highly Sensitive"       May we call your Preferred Phone number to discuss your specific medical information?  YES    May we leave a detailed message that includes your specific medical information? YES    Have you traveled outside of the [facility] in the past 3 months? No    Do you currently have a pacemaker or defibrillator? No    Do you have any artificial heart valves, joints, plates, screws, rods, stents, pins, etc? YES   - If Yes, were any placed within the last 2 years? Yes feb of 2021     Do you require any medications prior to a surgical procedure? No   - If Yes, for which procedure? n a   - If Yes, what medications to you require? n a    Are you taking any medications that cause you to bleed more easily ("blood thinners") YES    Have you ever experienced a rapid heartbeat with epinephrine? No    Have you ever been treated with "gold" (gold sodium thiomalate) therapy? No    [facility] can help with wrinkles, "laugh lines," facial volume loss, "double chin," "love handles," age spots, and more  Are you interested in learning today about some of the skin enhancement procedures that we offer? (If Yes, please provide more detail) No    Review of Systems:  Have you recently had or currently have any of the following?     · Fever or chills: No  · Night Sweats: No  · Headaches: No  · Weight Gain: No  · Weight Loss: No  · Blurry Vision: No  · Nausea: No  · Vomiting: No  · Diarrhea: No  · Blood in Stool: No  · Abdominal Pain: No  · Itchy Skin: YES  · Painful Joints: YES  · Swollen Joints: YES  · Muscle Pain: YES  · Irregular Mole: No  · Sun Burn: No  · Dry Skin: YES  · Skin Color Changes: No  · Scar or Keloid: No  · Cold Sores/Fever Blisters: No  · Bacterial Infections/MRSA: No  · Anxiety: No  · Depression: YES  · Suicidal or Homicidal Thoughts: No      PSYCH: Normal mood and affect  EYES: Normal conjunctiva  ENT: Normal lips and oral mucosa  CARDIOVASCULAR: No edema  RESPIRATORY: Normal respirations  HEME/LYMPH/IMMUNO:  No regional lymphadenopathy except as noted below in ASSESSMENT AND PLAN BY DIAGNOSIS        PSORIASIS, possible atopic dermatitis, seborrheic dermatitis with secondary injection   1   RASH    Physical Exam:   (Anatomic Location); (Size and Morphological Description); (Differential Diagnosis):  o Ears, eyes, and neck; clear     Pertinent Positives:   Pertinent Negatives:    Assessment and Plan:  Based on a thorough discussion of this condition and the management approach to it (including a comprehensive discussion of the known risks, side effects and potential benefits of treatment), the patient (family) agrees to implement the following specific plan:   Start Protopic 0 1% ointment, apply topically twice a day    Follow up in [facility]    I,:  Kirit Sandoval MA am acting as a scribe while in the presence of the attending physician :       I,:  Radha Bassett MD personally performed the services described in this documentation    as scribed in my presence :

## 2022-01-25 NOTE — Clinical Note
Patient need's PA for Tacrolimus 0 1% ointment  IDC-10- L20 9 Atopic Dermatitis   See note from 1/18/22 for previous medications tried and additional information

## 2022-01-27 ENCOUNTER — TELEPHONE (OUTPATIENT)
Dept: OTHER | Facility: OTHER | Age: 42
End: 2022-01-27

## 2022-01-27 NOTE — TELEPHONE ENCOUNTER
Pt called in stating she has her annual physical scheduled for 1/28/2022  She is just getting over covid and hasn't had the chance to get her mammo and bloodwork done  She would like a call from the office letting her know her options

## 2022-02-11 NOTE — PROGRESS NOTES
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  She also had a cervical spine fusion for disc issues about 1 year ago and is still having some residual symptoms in the right upper extremity for which she is seeing Pain Management  She does not describe typical features of an inflammatory arthritis however and exam today does not show any concerns of active inflammation which we discussed  Current suspicion is for osteoarthritis given her report of prior injuries to her joints which we discussed predisposes to accelerated or earlier onset OA  We will do further rheumatologic workup with labs and x-rays to rule out inflammatory disease but current suspicion is low based off history and exam   We discussed this and that if workup is negative she will not need additional rheumatology follow-up  Plan:  Diagnoses and all orders for this visit:    Polyarthralgia  -     Cyclic citrul peptide antibody, IgG  -     C-reactive protein  -     Sjogren's Antibodies  -     Sedimentation rate, automated  -     RF Screen w/ Reflex to Titer  -     MANE Screen w/ Reflex to Titer/Pattern  -     XR knee 3 vw left non injury; Future  -     XR knee 3 vw right non injury; Future  -     XR ankle 3+ vw left;  Future  -     XR ankle 3+ vw right; Future    Psoriasis  -     Cyclic citrul peptide antibody, IgG  -     C-reactive protein  -     Sjogren's Antibodies  -     Sedimentation rate, automated  -     RF Screen w/ Reflex to Titer  -     MANE Screen w/ Reflex to Titer/Pattern    Status post cervical spinal fusion  - Ambulatory referral to Rheumatology    Cervical radiculopathy  -     Ambulatory referral to Rheumatology    Joint pain  -     Ambulatory referral to Rheumatology    Chronic bilateral low back pain with right-sided sciatica  -     XR sacroiliac joints 3+ views; Future  -     XR spine lumbar minimum 4 views non injury; Future        Follow-up plan: no rheumatology follow-up needed if work-up negative       HPI  Laura Burden is a 35 y o   female with cervical stenosis s/p cervical spine fusion, hypertension, epilepsy, hyperlipidemia, GERD, insomnia, KHADAR, who presents for rheumatology consult by request of Dr Eric Guzmán for polyarthralgia, psoriasis  [facility]rie rf ccp ss esr crp    Never saw rheum  Reports she has psoriasis and there is question if she could have psoriatic arthritis  When her father was dying she was very stressed, and had a significant onset of psoriasis on her scalp diffusely, Dr David Guzmán biopsied this and confirmed this was psoriasis  This was about 4 years ago  She recognized that stress was a trigger for her psoriasis  Reports it was so bad that she was balding in areas of the psoriasis and ultimately she shaved her head because of this  She then went on otezla and reports within about 3 months, her scalp cleared up and it worked very well for her  She went off otezla within the same year she started it because of significant expense  However reports with managing her stress and anxiety she controlled her psoriasis without any treatments and did not have any significant outbreaks again until recently  She had a large amount of stress in dec with family deaths and multiple members getting COVID  On 1/1/22 she had COVID and went on systemic steroids, then 2 weeks after coming off the steroids she had significant flare of psoriasis again all over her scalp and face and neck  In terms of joint pain, she has pain in mostly every joint, has a constant 4/10 pain in the joints    When it worsens her pain goes to 6-10/10  She has pain at the R shoulder which radiates down the R arm, feels like someone is putting out a cigar at the base of her neck on the R  Has pain focal at R shoulder and R elbow  Up until her cervical fusion, she had no use of her right upper extremity and so after this surgery this did significantly improved  But she still has these residual symptoms  She also has signs of carpal and cubital tunnel on an EMG  She is following with Pain Management and was supposed to have an epidural done but then got COVID and so has been unable to have this done yet  In the morning she has numbness/tingling in the RUE into the hand, cannot use the hand right away  Everything else is stiff in the morning, tries to stretch first thing in the morning, feels loosened up in 15-20 minutes, but still has burning joint pain in the morning after that  Burning in her joints worsens with persistent movement and activity, the muscle pain improves  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 and does have history of multiple injuries to both of her knees and both of her ankles  These joints cause her chronic pain on a regular basis  No photosensitivity, sicca symptoms, oral or nasal ulcers, alopecia, Raynaud's, h/o pericarditis or pleurisy, h/o blood clots  Reports her goal would be to get off all medications for pain control  Reports she does not take anything in the daytime including Tylenol or NSAIDs, weight is until bedtime to take pain medications  Review of Systems  Review of Systems   Constitutional: Positive for fatigue  Negative for fever and unexpected weight change  HENT: Negative for mouth sores  Respiratory: Negative for cough and shortness of breath  Cardiovascular: Negative for chest pain and leg swelling  Gastrointestinal: Negative for abdominal pain, constipation and diarrhea  Musculoskeletal: Positive for arthralgias, back pain, myalgias and neck pain   Negative for joint swelling  Skin: Positive for rash  Negative for color change  Neurological: Negative for weakness  Hematological: Negative for adenopathy  Psychiatric/Behavioral: Negative for sleep disturbance         Allergies  Allergies   Allergen Reactions    Gluten Meal - Food Allergy GI Intolerance     " Highly Sensitivity"    Lactose - Food Allergy GI Intolerance     " Highly Sensitive"       Home Medications    Current Outpatient Medications:     acetaminophen (TYLENOL) 325 mg tablet, Take 650 mg by mouth every 6 (six) hours as needed for mild pain, Disp: , Rfl:     albuterol (ProAir HFA) 90 mcg/act inhaler, Inhale 2 puffs every 6 (six) hours as needed for shortness of breath, Disp: 18 g, Rfl: 0    Ascorbic Acid (VITAMIN C) 100 MG tablet, Take 100 mg by mouth daily, Disp: , Rfl:     cholecalciferol (VITAMIN D3) 1,000 units tablet, Take 1,000 Units by mouth daily, Disp: , Rfl:     docusate sodium (COLACE) 100 mg capsule, Take 1 capsule (100 mg total) by mouth 2 (two) times a day, Disp: 20 capsule, Rfl: 0    Etonogestrel (NEXPLANON SC), Inject under the skin Located Left upper inner arm, Disp: , Rfl:     gabapentin (NEURONTIN) 600 MG tablet, TAKE ONE TABLET (600 MG TOTAL) BY MOUTH DAILY AT BEDTIME, Disp: 30 tablet, Rfl: 1    halobetasol (ULTRAVATE) 0 05 % cream, Apply topically 2 (two) times a day, Disp: 50 g, Rfl: 0    hydrochlorothiazide (HYDRODIURIL) 25 mg tablet, TAKE 1 TABLET BY MOUTH EVERY DAY, Disp: 90 tablet, Rfl: 1    ketoconazole (NIZORAL) 2 % cream, Apply topically daily, Disp: 15 g, Rfl: 0    Lidocaine Viscous HCl (XYLOCAINE) 2 % mucosal solution, Swish and spit 10 mL 3 (three) times a day as needed for mouth pain or discomfort, Disp: 200 mL, Rfl: 0    losartan (COZAAR) 50 mg tablet, TAKE 1 TABLET BY MOUTH EVERY DAY AT BEDTIME, Disp: 90 tablet, Rfl: 1    magnesium 30 MG tablet, Take 30 mg by mouth 2 (two) times a day, Disp: , Rfl:     magnesium hydroxide (MILK OF MAGNESIA) 400 mg/5 mL oral suspension, Take 30 mL by mouth daily as needed for constipation, Disp: 118 mL, Rfl: 0    Multiple Vitamins-Minerals (MULTIVITAL-M) TABS, Take by mouth, Disp: , Rfl:     omega-3-acid ethyl esters (LOVAZA) 1 g capsule, Take 2 capsules (2 g total) by mouth 2 (two) times a day, Disp: 60 capsule, Rfl: 3    omeprazole (PriLOSEC) 20 mg delayed release capsule, Take 1 capsule (20 mg total) by mouth daily, Disp: 90 capsule, Rfl: 1    QUEtiapine (SEROquel) 25 mg tablet, Take 1 tablet (25 mg total) by mouth daily at bedtime, Disp: 90 tablet, Rfl: 3    senna (SENOKOT) 8 6 mg, Take 1 tablet (8 6 mg total) by mouth daily, Disp: 10 tablet, Rfl: 0    tacrolimus (PROTOPIC) 0 1 % ointment, Apply topically 2 (two) times a day, Disp: 60 g, Rfl: 5    tiZANidine (ZANAFLEX) 4 mg tablet, TAKE ONE TABLET (4 MG TOTAL) BY MOUTH THREE TIMES A DAY AS NEEDED FOR MUSCLE SPASMS, Disp: 90 tablet, Rfl: 0    traZODone (DESYREL) 50 mg tablet, Take 1 tablet (50 mg total) by mouth daily at bedtime as needed for sleep or depression, Disp: 90 tablet, Rfl: 3    Zinc 100 MG TABS, Take by mouth  , Disp: , Rfl:     fenofibrate (TRICOR) 145 mg tablet, Take 1 tablet (145 mg total) by mouth daily (Patient not taking: Reported on 1/25/2022 ), Disp: 90 tablet, Rfl: 0    fluticasone (Flovent HFA) 110 MCG/ACT inhaler, Inhale 4 puffs 2 (two) times a day for 14 days Rinse mouth after use , Disp: 12 g, Rfl: 0    Past Medical History  Past Medical History:   Diagnosis Date    Constipation     Depression     Eczema     Epilepsy ([place] [address] ) 11/15/2013    Description: well controlled with sleep    GERD (gastroesophageal reflux disease)     Grand mal convulsion (HCC)     X 2 last episode 2009-Sleep deprivation-Resolved with Cpap    Hypertension     Insomnia     Medial meniscus tear 01/12/2015    Migraine     PONV (postoperative nausea and vomiting)     Primary generalized epilepsy ([facility] ) 12/6/2011    Psoriasis     Seizures [facility])     Shoulder dislocation, right, sequela 2018    Sinusitis     Sleep apnea     uses cpap    Tinnitus        Past Surgical History   Past Surgical History:   Procedure Laterality Date     SECTION   and     COLONOSCOPY      DILATION AND CURETTAGE OF UTERUS      DILATION AND CURETTAGE OF UTERUS WITH HYSTEROSOCPY N/A 2017    Procedure: D&C; IUD REMOVAL;  Surgeon: Miguel Foster MD;  [facility];  Service: Gynecology    EGD      HYSTEROSCOPY      LAPAROSCOPY      (Diagnostic)    LASIK      SD COLONOSCOPY FLX DX W/COLLJ SPEC WHEN PFRMD N/A 2018    Procedure: EGD AND COLONOSCOPY;  Surgeon: Francois Arreaga MD;  [facility]; Service: Gastroenterology     [facility] [address] VERT BODY,CERV,ONE SGMT Bilateral 2021    Procedure: CORPECTOMY SPINE CERVICAL ANTERIOR: C4-7 ACDF with C6 /hemicorpectomy, C4-7 instrumentation and fusion (neuromonitoring);   Surgeon: Renetta Weiss MD;  [facility];  Service: Neurosurgery    REMOVAL OF INTRAUTERINE DEVICE (IUD)         Family History  Multiple relatives with psoriasis, no definite psoriatic arthritis except a cousin   Family History   Problem Relation Age of Onset    Diabetes Father         Diabetes Mellitus    Hyperlipidemia Father     Hypertension Father     Brain cancer Maternal Grandmother 64    Migraines Family     Diabetes Family         Type 2 Diabetes Mellitus    No Known Problems Mother     No Known Problems Sister     No Known Problems Daughter     No Known Problems Son        Social History  Occupation: small business owner  Social History     Substance and Sexual Activity   Alcohol Use Yes    Comment: occassionally     Social History     Substance and Sexual Activity   Drug Use Never     Social History     Tobacco Use   Smoking Status Former Smoker   Smokeless Tobacco Never Used   Tobacco Comment    Former smoker per [employer]       Objective:    Vitals:    22 0804   BP: 128/82   Weight: 101 kg (222 lb) Height: 5' 8" (1 727 m)       Physical Exam  Constitutional:       General: She is not in acute distress  HENT:      Head: Normocephalic and atraumatic  Eyes:      Conjunctiva/sclera: Conjunctivae normal    Pulmonary:      Effort: Pulmonary effort is normal  No respiratory distress  Musculoskeletal:      Cervical back: Neck supple  Comments: No joint swelling or synovitis anywhere  No dactylitis or enthesitis noted  Skin:     Coloration: Skin is not pale  Neurological:      Mental Status: She is alert  Mental status is at baseline  Psychiatric:         Mood and Affect: Mood normal          Behavior: Behavior normal          Imaging:   MRI R shoulder 2018: IMPRESSION:  1  Supraspinatus and infraspinatus insertional cysts without rotator cuff tear  2   Mild proximal biceps tendinosis without tear  3   Intact glenoid labrum  4   Large chronic Hill-Sachs deformity indicating prior anterior shoulder dislocation      Labs:   Component      Latest Ref Rng & Units 1/4/2022   WBC      4 31 - 10 16 Thousand/uL 5 52   Red Blood Cell Count      3 81 - 5 12 Million/uL 3 97   Hemoglobin      11 5 - 15 4 g/dL 12 4   HCT      34 8 - 46 1 % 37 1   MCV      82 - 98 fL 94   MCH      26 8 - 34 3 pg 31 2   MCHC      31 4 - 37 4 g/dL 33 4   RDW      11 6 - 15 1 % 13 2   MPV      8 9 - 12 7 fL 9 5   Platelet Count      008 - 390 Thousands/uL 244   nRBC      /100 WBCs 0   Neutrophils %      43 - 75 % 54   Immat GRANS %      0 - 2 % 0   Lymphocytes Relative      14 - 44 % 33   Monocytes Relative      4 - 12 % 9   Eosinophils      0 - 6 % 3   Basophils Relative      0 - 1 % 1   Absolute Neutrophils      1 85 - 7 62 Thousands/µL 3 04   Immature Grans Absolute      0 00 - 0 20 Thousand/uL 0 01   Lymphocytes Absolute      0 60 - 4 47 Thousands/µL 1 83   Absolute Monocytes      0 17 - 1 22 Thousand/µL 0 47   Absolute Eosinophils      0 00 - 0 61 Thousand/µL 0 14   Basophils Absolute      0 00 - 0 10 Thousands/µL 0 03 Sodium      136 - 145 mmol/L 141   Potassium      3 5 - 5 3 mmol/L 3 4 (L)   Chloride      100 - 108 mmol/L 105   CO2      21 - 32 mmol/L 26   Anion Gap      4 - 13 mmol/L 10   BUN      5 - 25 mg/dL 8   Creatinine      0 60 - 1 30 mg/dL 0 82   Glucose, Random      65 - 140 mg/dL 139   Calcium      8 3 - 10 1 mg/dL 8 4   AST      5 - 45 U/L 19   ALT      12 - 78 U/L 36   Alkaline Phosphatase      46 - 116 U/L 51   Total Protein      6 4 - 8 2 g/dL 6 9   Albumin      3 5 - 5 0 g/dL 3 5   TOTAL BILIRUBIN      0 20 - 1 00 mg/dL 0 46   eGFR      ml/min/1 73sq m 87     Component      Latest Ref Rng & Units 12/7/2021   TSH 3RD GENERATON      0 358 - 3 740 uIU/mL 0 792   Total CK      26 - 192 U/L 141     Component      Latest Ref Rng & Units 7/22/2019   HEPATITIS B SURFACE ANTIGEN      Non-reactive, NonReactive - Confirmed Non-reactive   HEPATITIS A IGM ANTIBODY      Non-reactive, Equivocal-Suggest Recollect Non-reactive   HEPATITIS C ANTIBODY      Non-reactive Non-reactive   HEPATITIS B CORE IGM ANTIBODY      Non-reactive Non-reactive

## 2022-02-14 ENCOUNTER — OFFICE VISIT (OUTPATIENT)
Dept: RHEUMATOLOGY | Facility: CLINIC | Age: 42
End: 2022-02-14
Payer: COMMERCIAL

## 2022-02-14 VITALS
HEIGHT: 68 IN | BODY MASS INDEX: 33.65 KG/M2 | WEIGHT: 222 LBS | DIASTOLIC BLOOD PRESSURE: 82 MMHG | SYSTOLIC BLOOD PRESSURE: 128 MMHG

## 2022-02-14 DIAGNOSIS — L40.9 PSORIASIS: ICD-10-CM

## 2022-02-14 DIAGNOSIS — M54.41 CHRONIC BILATERAL LOW BACK PAIN WITH RIGHT-SIDED SCIATICA: ICD-10-CM

## 2022-02-14 DIAGNOSIS — M25.50 POLYARTHRALGIA: Primary | ICD-10-CM

## 2022-02-14 DIAGNOSIS — Z98.1 STATUS POST CERVICAL SPINAL FUSION: ICD-10-CM

## 2022-02-14 DIAGNOSIS — G89.29 CHRONIC BILATERAL LOW BACK PAIN WITH RIGHT-SIDED SCIATICA: ICD-10-CM

## 2022-02-14 DIAGNOSIS — M25.50 JOINT PAIN: ICD-10-CM

## 2022-02-14 DIAGNOSIS — M54.12 CERVICAL RADICULOPATHY: ICD-10-CM

## 2022-02-14 PROCEDURE — 1036F TOBACCO NON-USER: CPT | Performed by: INTERNAL MEDICINE

## 2022-02-14 PROCEDURE — 3008F BODY MASS INDEX DOCD: CPT | Performed by: INTERNAL MEDICINE

## 2022-02-14 PROCEDURE — 99245 OFF/OP CONSLTJ NEW/EST HI 55: CPT | Performed by: INTERNAL MEDICINE

## 2022-02-17 ENCOUNTER — APPOINTMENT (OUTPATIENT)
Dept: LAB | Facility: CLINIC | Age: 42
End: 2022-02-17
Payer: COMMERCIAL

## 2022-02-17 ENCOUNTER — HOSPITAL ENCOUNTER (OUTPATIENT)
Dept: RADIOLOGY | Facility: HOSPITAL | Age: 42
Discharge: HOME | End: 2022-02-17
Payer: COMMERCIAL

## 2022-02-17 ENCOUNTER — TELEPHONE (OUTPATIENT)
Dept: FAMILY MEDICINE CLINIC | Facility: CLINIC | Age: 42
End: 2022-02-17

## 2022-02-17 DIAGNOSIS — G89.29 CHRONIC BILATERAL LOW BACK PAIN WITH RIGHT-SIDED SCIATICA: ICD-10-CM

## 2022-02-17 DIAGNOSIS — M25.50 POLYARTHRALGIA: ICD-10-CM

## 2022-02-17 DIAGNOSIS — M54.41 CHRONIC BILATERAL LOW BACK PAIN WITH RIGHT-SIDED SCIATICA: ICD-10-CM

## 2022-02-17 LAB
CRP SERPL QL: 5.5 MG/L
ERYTHROCYTE [SEDIMENTATION RATE] IN BLOOD: 21 MM/HOUR (ref 0–19)

## 2022-02-17 PROCEDURE — 85652 RBC SED RATE AUTOMATED: CPT | Performed by: INTERNAL MEDICINE

## 2022-02-17 PROCEDURE — 86038 ANTINUCLEAR ANTIBODIES: CPT | Performed by: INTERNAL MEDICINE

## 2022-02-17 PROCEDURE — 73562 X-RAY EXAM OF KNEE 3: CPT

## 2022-02-17 PROCEDURE — 86200 CCP ANTIBODY: CPT | Performed by: INTERNAL MEDICINE

## 2022-02-17 PROCEDURE — 86430 RHEUMATOID FACTOR TEST QUAL: CPT | Performed by: INTERNAL MEDICINE

## 2022-02-17 PROCEDURE — 36415 COLL VENOUS BLD VENIPUNCTURE: CPT | Performed by: INTERNAL MEDICINE

## 2022-02-17 PROCEDURE — 72202 X-RAY EXAM SI JOINTS 3/> VWS: CPT

## 2022-02-17 PROCEDURE — 73610 X-RAY EXAM OF ANKLE: CPT

## 2022-02-17 PROCEDURE — 86140 C-REACTIVE PROTEIN: CPT | Performed by: INTERNAL MEDICINE

## 2022-02-17 PROCEDURE — 72110 X-RAY EXAM L-2 SPINE 4/>VWS: CPT

## 2022-02-17 PROCEDURE — 86235 NUCLEAR ANTIGEN ANTIBODY: CPT | Performed by: INTERNAL MEDICINE

## 2022-02-17 NOTE — TELEPHONE ENCOUNTER
Patient called and asked about her annual lab  The patient stated that she was at lab and they told her that her labs were   Please advise

## 2022-02-18 LAB
CCP AB SER IA-ACNC: 1.9
ENA SS-A AB SER-ACNC: <0.2 AI (ref 0–0.9)
ENA SS-B AB SER-ACNC: <0.2 AI (ref 0–0.9)
RHEUMATOID FACT SER QL LA: NEGATIVE
RYE IGE QN: NEGATIVE

## 2022-02-22 ENCOUNTER — HOSPITAL ENCOUNTER (OUTPATIENT)
Dept: MAMMOGRAPHY | Facility: HOSPITAL | Age: 42
Discharge: HOME | End: 2022-02-22
Payer: COMMERCIAL

## 2022-02-22 VITALS — HEIGHT: 68 IN | WEIGHT: 222.66 LBS | BODY MASS INDEX: 33.75 KG/M2

## 2022-02-22 DIAGNOSIS — Z12.31 VISIT FOR SCREENING MAMMOGRAM: ICD-10-CM

## 2022-02-22 PROCEDURE — 77067 SCR MAMMO BI INCL CAD: CPT

## 2022-02-22 PROCEDURE — 77063 BREAST TOMOSYNTHESIS BI: CPT

## 2022-02-23 ENCOUNTER — TELEPHONE (OUTPATIENT)
Dept: PAIN MEDICINE | Facility: MEDICAL CENTER | Age: 42
End: 2022-02-23

## 2022-02-23 NOTE — TELEPHONE ENCOUNTER
Pt called stating that she would like to schedule her procedure     Pt can be reached at [phone]

## 2022-02-23 NOTE — TELEPHONE ENCOUNTER
Pt has been rescheduled for Tuan for 2/28/22  Pt denies rx blood thinners Pt is taking self prescribed aspirin but has not taken any since 2/4/22  Went over pre-procedure instructions below:  Nothing to eat or drink 1 hr prior to procedure  Need to arrange transportation  Proper clothing for procedure  If ill or placed on antibiotics please call to reschedule  Covid/travel/ and vaccine instructions

## 2022-02-28 ENCOUNTER — HOSPITAL ENCOUNTER (OUTPATIENT)
Dept: RADIOLOGY | Facility: CLINIC | Age: 42
Discharge: HOME | End: 2022-02-28
Attending: PHYSICAL MEDICINE & REHABILITATION
Payer: COMMERCIAL

## 2022-02-28 VITALS
OXYGEN SATURATION: 96 % | TEMPERATURE: 98 F | RESPIRATION RATE: 18 BRPM | SYSTOLIC BLOOD PRESSURE: 117 MMHG | DIASTOLIC BLOOD PRESSURE: 74 MMHG | HEART RATE: 80 BPM

## 2022-02-28 DIAGNOSIS — M48.02 CERVICAL SPINAL STENOSIS: ICD-10-CM

## 2022-02-28 DIAGNOSIS — M77.11 LATERAL EPICONDYLITIS OF RIGHT ELBOW: ICD-10-CM

## 2022-02-28 DIAGNOSIS — M54.2 NECK PAIN: ICD-10-CM

## 2022-02-28 DIAGNOSIS — M54.12 CERVICAL RADICULOPATHY: ICD-10-CM

## 2022-02-28 DIAGNOSIS — Z98.890 S/P CERVICAL DISCECTOMY: ICD-10-CM

## 2022-02-28 PROCEDURE — 62321 NJX INTERLAMINAR CRV/THRC: CPT | Performed by: PHYSICAL MEDICINE & REHABILITATION

## 2022-02-28 RX ORDER — METHYLPREDNISOLONE ACETATE 80 MG/ML
80 INJECTION, SUSPENSION INTRA-ARTICULAR; INTRALESIONAL; INTRAMUSCULAR; PARENTERAL; SOFT TISSUE ONCE
Status: COMPLETED | OUTPATIENT
Start: 2022-02-28 | End: 2022-02-28

## 2022-02-28 RX ADMIN — METHYLPREDNISOLONE ACETATE 80 MG: 80 INJECTION, SUSPENSION INTRA-ARTICULAR; INTRALESIONAL; INTRAMUSCULAR; PARENTERAL; SOFT TISSUE at 15:00

## 2022-02-28 RX ADMIN — IOHEXOL 1 ML: 300 INJECTION, SOLUTION INTRAVENOUS at 15:00

## 2022-02-28 NOTE — DISCHARGE INSTR - LAB
Epidural Steroid Injection   WHAT YOU NEED TO KNOW:   An epidural steroid injection (KIESHA) is a procedure to inject steroid medicine into the epidural space  The epidural space is between your spinal cord and vertebrae  Steroids reduce inflammation and fluid buildup in your spine that may be causing pain  You may be given pain medicine along with the steroids  ACTIVITY  Do not drive or operate machinery today  No strenuous activity today - bending, lifting, etc   You may resume normal activites starting tomorrow - start slowly and as tolerated  You may shower today, but no tub baths or hot tubs  You may have numbness for several hours from the local anesthetic  Please use caution and common sense, especially with weight-bearing activities  CARE OF THE INJECTION SITE  If you have soreness or pain, apply ice to the area today (20 minutes on/20 minutes off)  Starting tomorrow, you may use warm, moist heat or ice if needed  You may have an increase or change in your discomfort for 36-48 hours after your treatment  Apply ice and continue with any pain medication you have been prescribed  Notify the [facility] if you have any of the following: redness, drainage, swelling, headache, stiff neck or fever above 100°F     SPECIAL INSTRUCTIONS  Our office will contact you in approximately 7 days for a progress report  MEDICATIONS  Continue to take all routine medications  Our office may have instructed you to hold some medications  As no general anesthesia was used in today's procedure, you should not experience any side effects related to anesthesia  If you have a problem specifically related to your procedure, please call our office at [phone]  Problems not related to your procedure should be directed to your primary care physician

## 2022-02-28 NOTE — H&P
History of Present Illness: The patient is a 35 y o  female who presents with complaints of neck pain    Patient Active Problem List   Diagnosis    Essential hypertriglyceridemia    Insomnia    Low back pain    Obesity    Obstructive sleep apnea on CPAP    Lateral epicondylitis of right elbow    Constipation    Essential hypertension    Cervical radiculopathy    Cervical spinal stenosis    S/P cervical discectomy    Muscle spasm    COVID-19       Past Medical History:   Diagnosis Date    Constipation     Depression     Eczema     Epilepsy ([address] ) 11/15/2013    Description: well controlled with sleep    GERD (gastroesophageal reflux disease)     Grand mal convulsion ([address] )     X 2 last episode -Sleep deprivation-Resolved with Cpap    Hypertension     Insomnia     Medial meniscus tear 2015    Migraine     PONV (postoperative nausea and vomiting)     [facility] 2011    Psoriasis     Seizures [facility]     Shoulder dislocation, right, sequela 2018    Sinusitis     Sleep apnea     uses cpap    Tinnitus        Past Surgical History:   Procedure Laterality Date     SECTION   and     COLONOSCOPY      DILATION AND CURETTAGE OF UTERUS      DILATION AND CURETTAGE OF UTERUS WITH HYSTEROSOCPY N/A 2017    Procedure: D&C; IUD REMOVAL;  Surgeon: Tobias Schuster MD;  [facility];  Service: Gynecology    EGD      HYSTEROSCOPY      LAPAROSCOPY      (Diagnostic)    LASIK      NC COLONOSCOPY FLX DX W/COLLJ SPEC WHEN PFRMD N/A 2018    Procedure: EGD AND COLONOSCOPY;  Surgeon: Karyn Lopez MD;  [facility]; Service: Gastroenterology     [facility] BODY,[facility],ONE SGMT Bilateral 2021    Procedure: CORPECTOMY SPINE CERVICAL ANTERIOR: C4-7 ACDF with C6 /hemicorpectomy, C4-7 instrumentation and fusion (neuromonitoring);   Surgeon: Jeffry Dela Cruz MD;  [facility];  Service: Neurosurgery    REMOVAL OF INTRAUTERINE DEVICE (IUD)           Current Outpatient Medications:     acetaminophen (TYLENOL) 325 mg tablet, Take 650 mg by mouth every 6 (six) hours as needed for mild pain, Disp: , Rfl:     albuterol (ProAir HFA) 90 mcg/act inhaler, Inhale 2 puffs every 6 (six) hours as needed for shortness of breath, Disp: 18 g, Rfl: 0    Ascorbic Acid (VITAMIN C) 100 MG tablet, Take 100 mg by mouth daily, Disp: , Rfl:     cholecalciferol (VITAMIN D3) 1,000 units tablet, Take 1,000 Units by mouth daily, Disp: , Rfl:     docusate sodium (COLACE) 100 mg capsule, Take 1 capsule (100 mg total) by mouth 2 (two) times a day, Disp: 20 capsule, Rfl: 0    Etonogestrel (NEXPLANON SC), Inject under the skin Located Left upper inner arm, Disp: , Rfl:     fenofibrate (TRICOR) 145 mg tablet, Take 1 tablet (145 mg total) by mouth daily (Patient not taking: Reported on 1/25/2022 ), Disp: 90 tablet, Rfl: 0    fluticasone (Flovent HFA) 110 MCG/ACT inhaler, Inhale 4 puffs 2 (two) times a day for 14 days Rinse mouth after use , Disp: 12 g, Rfl: 0    gabapentin (NEURONTIN) 600 MG tablet, TAKE ONE TABLET (600 MG TOTAL) BY MOUTH DAILY AT BEDTIME, Disp: 30 tablet, Rfl: 1    halobetasol (ULTRAVATE) 0 05 % cream, Apply topically 2 (two) times a day, Disp: 50 g, Rfl: 0    hydrochlorothiazide (HYDRODIURIL) 25 mg tablet, TAKE 1 TABLET BY MOUTH EVERY DAY, Disp: 90 tablet, Rfl: 1    ketoconazole (NIZORAL) 2 % cream, Apply topically daily, Disp: 15 g, Rfl: 0    Lidocaine Viscous HCl (XYLOCAINE) 2 % mucosal solution, Swish and spit 10 mL 3 (three) times a day as needed for mouth pain or discomfort, Disp: 200 mL, Rfl: 0    losartan (COZAAR) 50 mg tablet, TAKE 1 TABLET BY MOUTH EVERY DAY AT BEDTIME, Disp: 90 tablet, Rfl: 1    magnesium 30 MG tablet, Take 30 mg by mouth 2 (two) times a day, Disp: , Rfl:     magnesium hydroxide (MILK OF MAGNESIA) 400 mg/5 mL oral suspension, Take 30 mL by mouth daily as needed for constipation, Disp: 118 mL, Rfl: 0    Multiple Vitamins-Minerals (MULTIVITAL-M) TABS, Take by mouth, Disp: , Rfl:     omega-3-acid ethyl esters (LOVAZA) 1 g capsule, Take 2 capsules (2 g total) by mouth 2 (two) times a day, Disp: 60 capsule, Rfl: 3    omeprazole (PriLOSEC) 20 mg delayed release capsule, Take 1 capsule (20 mg total) by mouth daily, Disp: 90 capsule, Rfl: 1    QUEtiapine (SEROquel) 25 mg tablet, Take 1 tablet (25 mg total) by mouth daily at bedtime, Disp: 90 tablet, Rfl: 3    senna (SENOKOT) 8 6 mg, Take 1 tablet (8 6 mg total) by mouth daily, Disp: 10 tablet, Rfl: 0    tacrolimus (PROTOPIC) 0 1 % ointment, Apply topically 2 (two) times a day, Disp: 60 g, Rfl: 5    tiZANidine (ZANAFLEX) 4 mg tablet, TAKE ONE TABLET (4 MG TOTAL) BY MOUTH THREE TIMES A DAY AS NEEDED FOR MUSCLE SPASMS, Disp: 90 tablet, Rfl: 0    traZODone (DESYREL) 50 mg tablet, Take 1 tablet (50 mg total) by mouth daily at bedtime as needed for sleep or depression, Disp: 90 tablet, Rfl: 3    Zinc 100 MG TABS, Take by mouth  , Disp: , Rfl:     Current Facility-Administered Medications:     iohexol (OMNIPAQUE) 300 mg/mL injection 50 mL, 50 mL, Epidural, Once, Arlyce Myranda, DO    methylPREDNISolone acetate (DEPO-MEDROL) injection 80 mg, 80 mg, Epidural, Once, Arlyce Myranda, DO    Allergies   Allergen Reactions    Gluten Meal - Food Allergy GI Intolerance     " Highly Sensitivity"    Lactose - Food Allergy GI Intolerance     " Highly Sensitive"       Physical Exam:   Vitals:    02/28/22 1440   BP: 114/76   Pulse: 82   Resp: 18   Temp: 98 °F (36 7 °C)   SpO2: 98%     General: Awake, Alert, Oriented x 3, Mood and affect appropriate  Respiratory: Respirations even and unlabored  Cardiovascular: Peripheral pulses intact; no edema  Musculoskeletal Exam:  Tenderness to palpation bilateral cervical paraspinals    ASA Score: 2    Patient/Chart Verification  Patient ID Verified: Verbal  ID Band Applied: No  Consents Confirmed: Procedural,To be obtained in the Pre-Procedure area  H&P( within 30 days) Verified: To be obtained in the Pre-Procedure area  Allergies Reviewed: Yes  Anticoag/NSAID held?: No  Currently on antibiotics?: No    Assessment:   1  Cervical radiculopathy    2  Lateral epicondylitis of right elbow    3  Cervical spinal stenosis    4  S/P cervical discectomy    5   Neck pain        Plan: JEANNA

## 2022-03-07 ENCOUNTER — TELEPHONE (OUTPATIENT)
Dept: PAIN MEDICINE | Facility: CLINIC | Age: 42
End: 2022-03-07

## 2022-03-21 ENCOUNTER — TELEPHONE (OUTPATIENT)
Dept: FAMILY MEDICINE CLINIC | Facility: CLINIC | Age: 42
End: 2022-03-21

## 2022-03-21 NOTE — TELEPHONE ENCOUNTER
Spoke with the patient   She will call back to schedule a physical appt to discuss the letter   You can use same days as needed

## 2022-03-21 NOTE — TELEPHONE ENCOUNTER
Patient called and wanted to know If you can give her a call, she stated that the message would be to long to leave here and it would be easier to just talk to you  That's all the info I could get   Please advise

## 2022-03-25 ENCOUNTER — OFFICE VISIT (OUTPATIENT)
Dept: FAMILY MEDICINE CLINIC | Facility: CLINIC | Age: 42
End: 2022-03-25
Payer: COMMERCIAL

## 2022-03-25 VITALS
OXYGEN SATURATION: 95 % | TEMPERATURE: 99.5 F | HEIGHT: 67 IN | WEIGHT: 225.6 LBS | DIASTOLIC BLOOD PRESSURE: 80 MMHG | RESPIRATION RATE: 16 BRPM | SYSTOLIC BLOOD PRESSURE: 130 MMHG | HEART RATE: 96 BPM | BODY MASS INDEX: 35.41 KG/M2

## 2022-03-25 DIAGNOSIS — Z00.00 ANNUAL PHYSICAL EXAM: Primary | ICD-10-CM

## 2022-03-25 DIAGNOSIS — F51.01 PRIMARY INSOMNIA: ICD-10-CM

## 2022-03-25 DIAGNOSIS — Z23 NEED FOR DIPHTHERIA-TETANUS-PERTUSSIS (TDAP) VACCINE: ICD-10-CM

## 2022-03-25 DIAGNOSIS — Z99.89 OBSTRUCTIVE SLEEP APNEA ON CPAP: ICD-10-CM

## 2022-03-25 DIAGNOSIS — M54.12 CERVICAL RADICULOPATHY: ICD-10-CM

## 2022-03-25 DIAGNOSIS — G47.33 OBSTRUCTIVE SLEEP APNEA ON CPAP: ICD-10-CM

## 2022-03-25 DIAGNOSIS — I10 ESSENTIAL HYPERTENSION: ICD-10-CM

## 2022-03-25 PROBLEM — U07.1 COVID-19: Status: RESOLVED | Noted: 2022-01-05 | Resolved: 2022-03-25

## 2022-03-25 PROCEDURE — 90471 IMMUNIZATION ADMIN: CPT

## 2022-03-25 PROCEDURE — 90715 TDAP VACCINE 7 YRS/> IM: CPT

## 2022-03-25 PROCEDURE — 1036F TOBACCO NON-USER: CPT | Performed by: FAMILY MEDICINE

## 2022-03-25 PROCEDURE — 3008F BODY MASS INDEX DOCD: CPT | Performed by: FAMILY MEDICINE

## 2022-03-25 PROCEDURE — 99396 PREV VISIT EST AGE 40-64: CPT | Performed by: FAMILY MEDICINE

## 2022-03-25 RX ORDER — QUETIAPINE FUMARATE 25 MG/1
TABLET, FILM COATED ORAL
Qty: 90 TABLET | Refills: 3
Start: 2022-03-25 | End: 2022-03-25 | Stop reason: SDUPTHER

## 2022-03-25 RX ORDER — QUETIAPINE FUMARATE 25 MG/1
TABLET, FILM COATED ORAL
Qty: 180 TABLET | Refills: 3 | Status: SHIPPED | OUTPATIENT
Start: 2022-03-25

## 2022-03-25 NOTE — PROGRESS NOTES
[address]    NAME: Marvin Patel  AGE: 35 y o  SEX: female  : 1980     DATE: 3/25/2022     Assessment and Plan:     Problem List Items Addressed This Visit        Respiratory    Obstructive sleep apnea on CPAP     CPAP machine as directed            Cardiovascular and Mediastinum    Essential hypertension     Stable on current meds            Nervous and Auditory    Cervical radiculopathy     Stable  S/p surgery  Sees pain management now            Other    Insomnia     Stable on seroquel   Sees therapist for anxiety         Relevant Medications    QUEtiapine (SEROquel) 25 mg tablet      Other Visit Diagnoses     Annual physical exam    -  Primary    Need for diphtheria-tetanus-pertussis (Tdap) vaccine        Relevant Orders    TDAP VACCINE GREATER THAN OR EQUAL TO 8YO IM          Immunizations and preventive care screenings were discussed with patient today  Appropriate education was printed on patient's after visit summary  Counseling:  Alcohol/drug use: discussed moderation in alcohol intake, the recommendations for healthy alcohol use, and avoidance of illicit drug use  Dental Health: discussed importance of regular tooth brushing, flossing, and dental visits  Injury prevention: discussed safety/seat belts, safety helmets, smoke detectors, carbon dioxide detectors, and smoking near bedding or upholstery  Sexual health: discussed sexually transmitted diseases, partner selection, use of condoms, avoidance of unintended pregnancy, and contraceptive alternatives  · Exercise: the importance of regular exercise/physical activity was discussed  Recommend exercise 3-5 times per week for at least 30 minutes  No follow-ups on file       Chief Complaint:     Chief Complaint   Patient presents with    Physical Exam     Patient is here today for an annual exam       History of Present Illness:     Adult Annual Physical   Patient here for a comprehensive physical exam  The patient reports problems - still going through neck and back pain  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 in the last few months ,      Diet and Physical Activity  · Diet/Nutrition: well balanced diet and consuming 3-5 servings of fruits/vegetables daily  · Exercise: no formal exercise  Depression Screening  PHQ-2/9 Depression Screening         General Health  · Sleep: sleeps well  · Hearing: normal - bilateral   · Vision: most recent eye exam <1 year ago  · Dental: regular dental visits and brushes teeth twice daily  /GYN Health  · Patient is: premenopausal  · Last menstrual period: no periods since  due to nexplanon   · Contraceptive method: none  Review of Systems:     Review of Systems   Constitutional: Negative  HENT: Negative  Eyes: Negative  Respiratory: Negative  Cardiovascular: Negative  Gastrointestinal: Negative  Endocrine: Negative  Genitourinary: Negative  Musculoskeletal: Negative  Skin: Negative  Allergic/Immunologic: Negative  Neurological: Negative  Hematological: Negative  Psychiatric/Behavioral: Negative         Past Medical History:     Past Medical History:   Diagnosis Date    Constipation     COVID-19 2022    Depression     Eczema     Epilepsy ([address] ) 11/15/2013    Description: well controlled with sleep    GERD (gastroesophageal reflux disease)     Grand mal convulsion ([address] )     X 2 last episode -Sleep deprivation-Resolved with Cpap    Hypertension     Insomnia     Medial meniscus tear 2015    Migraine     PONV (postoperative nausea and vomiting)     Primary generalized epilepsy ([address] ) 2011    Psoriasis     Seizures ([address] )     Shoulder dislocation, right, sequela 2018    Sinusitis     Sleep apnea     uses cpap    Tinnitus       Past Surgical History:     Past Surgical History:   Procedure Laterality Date     SECTION  2007 and 2009    COLONOSCOPY      DILATION AND CURETTAGE OF UTERUS      DILATION AND CURETTAGE OF UTERUS WITH HYSTEROSOCPY N/A 12/6/2017    Procedure: D&C; IUD REMOVAL;  Surgeon: Raven Stein MD;  [facility];  Service: Gynecology    EGD      HYSTEROSCOPY      LAPAROSCOPY      (Diagnostic)    LASIK      [place] COLONOSCOPY FLX DX W/COLLJ SPEC WHEN PFRMD N/A 11/8/2018    Procedure: EGD AND COLONOSCOPY;  Surgeon: Jaimie Patino MD;  [facility]; Service: Gastroenterology     [facility] [address] VERT BODY,CERV,ONE SGMT Bilateral 2/2/2021    Procedure: CORPECTOMY SPINE CERVICAL ANTERIOR: C4-7 ACDF with C6 /hemicorpectomy, C4-7 instrumentation and fusion (neuromonitoring);   Surgeon: Nabor Tai MD;  [facility];  Service: Neurosurgery    REMOVAL OF INTRAUTERINE DEVICE (IUD)        Social History:     Social History     Socioeconomic History    Marital status: /Civil Union     Spouse name: None    Number of children: None    Years of education: None    Highest education level: None   Occupational History    None   Tobacco Use    Smoking status: Former Smoker    Smokeless tobacco: Never Used    Tobacco comment: Former smoker per [employer]   Vaping Use    Vaping Use: Never used   Substance and Sexual Activity    Alcohol use: Yes     Comment: occassionally    Drug use: Never    Sexual activity: Yes     Partners: Male     Birth control/protection: Implant     Comment: 11/30/17 - Nexplanon inserted   Other Topics Concern    None   Social History Narrative    Denied:  Exercising Regularly     Social Determinants of Health     Financial Resource Strain: Not on file   Food Insecurity: Not on file   Transportation Needs: Not on file   Physical Activity: Not on file   Stress: Not on file   Social Connections: Not on file   Intimate Partner Violence: Not on file   Housing Stability: Not on file      Family History:     Family History   Problem Relation Age of Onset    Diabetes Father         Diabetes Mellitus    Hyperlipidemia Father     Hypertension Father     Brain cancer Maternal Grandmother 64    Migraines Family     Diabetes Family         Type 2 Diabetes Mellitus    No Known Problems Mother     No Known Problems Sister     No Known Problems Daughter     No Known Problems Son       Current Medications:     Current Outpatient Medications   Medication Sig Dispense Refill    acetaminophen (TYLENOL) 325 mg tablet Take 650 mg by mouth every 6 (six) hours as needed for mild pain      albuterol (ProAir HFA) 90 mcg/act inhaler Inhale 2 puffs every 6 (six) hours as needed for shortness of breath 18 g 0    Ascorbic Acid (VITAMIN C) 100 MG tablet Take 100 mg by mouth daily      cholecalciferol (VITAMIN D3) 1,000 units tablet Take 5,000 Units by mouth daily        docusate sodium (COLACE) 100 mg capsule Take 1 capsule (100 mg total) by mouth 2 (two) times a day 20 capsule 0    Etonogestrel (NEXPLANON SC) Inject under the skin Located Left upper inner arm      gabapentin (NEURONTIN) 600 MG tablet TAKE ONE TABLET (600 MG TOTAL) BY MOUTH DAILY AT BEDTIME 30 tablet 1    halobetasol (ULTRAVATE) 0 05 % cream Apply topically 2 (two) times a day (Patient taking differently: Apply topically if needed  ) 50 g 0    hydrochlorothiazide (HYDRODIURIL) 25 mg tablet TAKE 1 TABLET BY MOUTH EVERY DAY 90 tablet 1    losartan (COZAAR) 50 mg tablet TAKE 1 TABLET BY MOUTH EVERY DAY AT BEDTIME 90 tablet 1    magnesium 30 MG tablet Take 500 mg by mouth 2 (two) times a day        Multiple Vitamins-Minerals (MULTIVITAL-M) TABS Take by mouth      omega-3-acid ethyl esters (LOVAZA) 1 g capsule Take 2 capsules (2 g total) by mouth 2 (two) times a day 60 capsule 3    omeprazole (PriLOSEC) 20 mg delayed release capsule Take 1 capsule (20 mg total) by mouth daily 90 capsule 1    QUEtiapine (SEROquel) 25 mg tablet 1 tab in AM in 1 tab in  tablet 3    senna (SENOKOT) 8 6 mg Take 1 tablet (8 6 mg total) by mouth daily 10 tablet 0    tacrolimus (PROTOPIC) 0 1 % ointment Apply topically 2 (two) times a day (Patient taking differently: Apply topically daily  ) 60 g 5    tiZANidine (ZANAFLEX) 4 mg tablet TAKE ONE TABLET (4 MG TOTAL) BY MOUTH THREE TIMES A DAY AS NEEDED FOR MUSCLE SPASMS 90 tablet 0    traZODone (DESYREL) 50 mg tablet Take 1 tablet (50 mg total) by mouth daily at bedtime as needed for sleep or depression 90 tablet 3    Zinc 100 MG TABS Take by mouth        fenofibrate (TRICOR) 145 mg tablet Take 1 tablet (145 mg total) by mouth daily (Patient not taking: Reported on 1/25/2022 ) 90 tablet 0    ketoconazole (NIZORAL) 2 % cream Apply topically daily (Patient not taking: Reported on 3/25/2022 ) 15 g 0     No current facility-administered medications for this visit  Allergies: Allergies   Allergen Reactions    Gluten Meal - Food Allergy GI Intolerance     " Highly Sensitivity"    Lactose - Food Allergy GI Intolerance     " Highly Sensitive"      Physical Exam:     /80 (BP Location: Left arm, Patient Position: Sitting, Cuff Size: Large)   Pulse 96   Temp 99 5 °F (37 5 °C) (Tympanic)   Resp 16   Ht 5' 7 13" (1 705 m)   Wt 102 kg (225 lb 9 6 oz)   SpO2 95%   BMI 35 20 kg/m²     Physical Exam  Constitutional:       Appearance: She is well-developed  HENT:      Head: Normocephalic and atraumatic  Right Ear: Tympanic membrane normal       Left Ear: Tympanic membrane normal       Nose: Nose normal       Mouth/Throat:      Mouth: Mucous membranes are moist    Eyes:      Pupils: Pupils are equal, round, and reactive to light  Cardiovascular:      Rate and Rhythm: Normal rate and regular rhythm  Pulses: Normal pulses  Pulmonary:      Effort: Pulmonary effort is normal  No respiratory distress  Breath sounds: Normal breath sounds  No wheezing  Abdominal:      General: Bowel sounds are normal       Palpations: Abdomen is soft     Musculoskeletal: General: No swelling or deformity  Normal range of motion  Cervical back: Normal range of motion and neck supple  Skin:     General: Skin is warm  Capillary Refill: Capillary refill takes less than 2 seconds  Neurological:      General: No focal deficit present  Mental Status: She is alert and oriented to person, place, and time     Psychiatric:         Mood and Affect: Mood normal          Behavior: Behavior normal           Love Padilla MD  [address] [facility]

## 2022-03-25 NOTE — PATIENT INSTRUCTIONS

## 2022-04-14 DIAGNOSIS — M48.02 DEGENERATIVE CERVICAL SPINAL STENOSIS: ICD-10-CM

## 2022-04-14 DIAGNOSIS — M54.12 RADICULOPATHY, CERVICAL REGION: ICD-10-CM

## 2022-04-14 DIAGNOSIS — M47.12 SPONDYLOGENIC COMPRESSION OF CERVICAL SPINAL CORD: ICD-10-CM

## 2022-04-14 RX ORDER — TIZANIDINE 4 MG/1
TABLET ORAL
Qty: 90 TABLET | Refills: 0 | Status: SHIPPED | OUTPATIENT
Start: 2022-04-14

## 2022-05-09 ENCOUNTER — OFFICE VISIT (OUTPATIENT)
Dept: URGENT CARE | Age: 42
End: 2022-05-09
Payer: COMMERCIAL

## 2022-05-09 VITALS
HEART RATE: 83 BPM | TEMPERATURE: 97.6 F | SYSTOLIC BLOOD PRESSURE: 145 MMHG | DIASTOLIC BLOOD PRESSURE: 77 MMHG | OXYGEN SATURATION: 99 % | RESPIRATION RATE: 20 BRPM | BODY MASS INDEX: 35.11 KG/M2 | WEIGHT: 225 LBS

## 2022-05-09 DIAGNOSIS — J01.90 ACUTE SINUSITIS, RECURRENCE NOT SPECIFIED, UNSPECIFIED LOCATION: Primary | ICD-10-CM

## 2022-05-09 DIAGNOSIS — H10.31 ACUTE BACTERIAL CONJUNCTIVITIS OF RIGHT EYE: ICD-10-CM

## 2022-05-09 DIAGNOSIS — R06.02 SHORTNESS OF BREATH: ICD-10-CM

## 2022-05-09 PROCEDURE — S9083 URGENT CARE CENTER GLOBAL: HCPCS | Performed by: PHYSICIAN ASSISTANT

## 2022-05-09 PROCEDURE — G0382 LEV 3 HOSP TYPE B ED VISIT: HCPCS | Performed by: PHYSICIAN ASSISTANT

## 2022-05-09 RX ORDER — TOBRAMYCIN 3 MG/ML
2 SOLUTION/ DROPS OPHTHALMIC
Qty: 5 ML | Refills: 0 | Status: SHIPPED | OUTPATIENT
Start: 2022-05-09 | End: 2022-05-16

## 2022-05-09 RX ORDER — AMOXICILLIN AND CLAVULANATE POTASSIUM 875; 125 MG/1; MG/1
1 TABLET, FILM COATED ORAL EVERY 12 HOURS SCHEDULED
Qty: 14 TABLET | Refills: 0 | Status: SHIPPED | OUTPATIENT
Start: 2022-05-09 | End: 2022-05-16

## 2022-05-09 RX ORDER — ALBUTEROL SULFATE 90 UG/1
2 AEROSOL, METERED RESPIRATORY (INHALATION) EVERY 6 HOURS PRN
Qty: 8.5 G | Refills: 0 | Status: SHIPPED | OUTPATIENT
Start: 2022-05-09

## 2022-05-10 NOTE — PROGRESS NOTES
[address]        NAME: Abril Johnson is a 35 y o  female  : 1980    MRN: [id number]  DATE: May 9, 2022  TIME: 8:43 PM    Assessment and Plan   Acute sinusitis, recurrence not specified, unspecified location [J01 90]  1  Acute sinusitis, recurrence not specified, unspecified location  amoxicillin-clavulanate (AUGMENTIN) 875-125 mg per tablet   2  Acute bacterial conjunctivitis of right eye  tobramycin (TOBREX) 0 3 % SOLN   3  Shortness of breath  albuterol (PROVENTIL HFA,VENTOLIN HFA) 90 mcg/act inhaler         Patient Instructions       Follow up with PCP in 3-5 days  Proceed to  ER if symptoms worsen  Chief Complaint     Chief Complaint   Patient presents with    Eye Pain     SINUS PRESURE BETWEEN RIGHT EYE AND HAVINGF PROBLEM TAKING A DEEP BREATH IT STARTE THURSDAY   Headache         History of Present Illness       Patient for evaluation of bilateral sinus congestion, pressure, postnasal drip, right eye redness and irritation  Patient's symptoms started about 5 days ago  Eye Pain   Associated symptoms include eye redness  Pertinent negatives include no eye discharge, itching or photophobia  Headache   Associated symptoms include eye pain, eye redness, rhinorrhea and sinus pressure  Pertinent negatives include no ear pain, photophobia or sore throat  Review of Systems   Review of Systems   Constitutional: Negative  HENT: Positive for congestion, postnasal drip, rhinorrhea and sinus pressure  Negative for ear discharge, ear pain, sinus pain, sore throat and trouble swallowing  Eyes: Positive for pain and redness  Negative for photophobia, discharge, itching and visual disturbance  Respiratory: Negative  Cardiovascular: Negative  Neurological: Positive for headaches           Current Medications       Current Outpatient Medications:     acetaminophen (TYLENOL) 325 mg tablet, Take 650 mg by mouth every 6 (six) hours as needed for mild pain, Disp: , Rfl:    Ascorbic Acid (VITAMIN C) 100 MG tablet, Take 100 mg by mouth daily, Disp: , Rfl:     cholecalciferol (VITAMIN D3) 1,000 units tablet, Take 5,000 Units by mouth daily  , Disp: , Rfl:     docusate sodium (COLACE) 100 mg capsule, Take 1 capsule (100 mg total) by mouth 2 (two) times a day, Disp: 20 capsule, Rfl: 0    Etonogestrel (NEXPLANON SC), Inject under the skin Located Left upper inner arm, Disp: , Rfl:     fenofibrate (TRICOR) 145 mg tablet, Take 1 tablet (145 mg total) by mouth daily, Disp: 90 tablet, Rfl: 0    gabapentin (NEURONTIN) 600 MG tablet, TAKE ONE TABLET (600 MG TOTAL) BY MOUTH DAILY AT BEDTIME, Disp: 30 tablet, Rfl: 1    halobetasol (ULTRAVATE) 0 05 % cream, Apply topically 2 (two) times a day (Patient taking differently: Apply topically if needed  ), Disp: 50 g, Rfl: 0    hydrochlorothiazide (HYDRODIURIL) 25 mg tablet, TAKE 1 TABLET BY MOUTH EVERY DAY, Disp: 90 tablet, Rfl: 1    ketoconazole (NIZORAL) 2 % cream, Apply topically daily, Disp: 15 g, Rfl: 0    losartan (COZAAR) 50 mg tablet, TAKE 1 TABLET BY MOUTH EVERY DAY AT BEDTIME, Disp: 90 tablet, Rfl: 1    magnesium 30 MG tablet, Take 500 mg by mouth 2 (two) times a day  , Disp: , Rfl:     Multiple Vitamins-Minerals (MULTIVITAL-M) TABS, Take by mouth, Disp: , Rfl:     omega-3-acid ethyl esters (LOVAZA) 1 g capsule, Take 2 capsules (2 g total) by mouth 2 (two) times a day, Disp: 60 capsule, Rfl: 3    omeprazole (PriLOSEC) 20 mg delayed release capsule, Take 1 capsule (20 mg total) by mouth daily, Disp: 90 capsule, Rfl: 1    QUEtiapine (SEROquel) 25 mg tablet, 1 tab in AM in 1 tab in PM, Disp: 180 tablet, Rfl: 3    senna (SENOKOT) 8 6 mg, Take 1 tablet (8 6 mg total) by mouth daily, Disp: 10 tablet, Rfl: 0    tacrolimus (PROTOPIC) 0 1 % ointment, Apply topically 2 (two) times a day (Patient taking differently: Apply topically daily  ), Disp: 60 g, Rfl: 5    tiZANidine (ZANAFLEX) 4 mg tablet, TAKE 1 TABLET BY MOUTH THREE TIMES DAILY AS NEEDED FOR MUSCLE SPASMS, Disp: 90 tablet, Rfl: 0    traZODone (DESYREL) 50 mg tablet, Take 1 tablet (50 mg total) by mouth daily at bedtime as needed for sleep or depression, Disp: 90 tablet, Rfl: 3    Zinc 100 MG TABS, Take by mouth  , Disp: , Rfl:     albuterol (PROVENTIL HFA,VENTOLIN HFA) 90 mcg/act inhaler, Inhale 2 puffs every 6 (six) hours as needed for wheezing, Disp: 8 5 g, Rfl: 0    amoxicillin-clavulanate (AUGMENTIN) 875-125 mg per tablet, Take 1 tablet by mouth every 12 (twelve) hours for 7 days, Disp: 14 tablet, Rfl: 0    tobramycin (TOBREX) 0 3 % SOLN, Administer 2 drops to the right eye every 4 (four) hours while awake for 7 days, Disp: 5 mL, Rfl: 0    Current Allergies     Allergies as of 2022 - Reviewed 2022   Allergen Reaction Noted    Gluten meal - food allergy GI Intolerance 2019    Lactose - food allergy GI Intolerance 2019            The following portions of the patient's history were reviewed and updated as appropriate: allergies, current medications, past family history, past medical history, past social history, past surgical history and problem list      Past Medical History:   Diagnosis Date    Constipation     COVID-19 2022    Depression     Eczema     Epilepsy ([address] ) 11/15/2013    Description: well controlled with sleep    GERD (gastroesophageal reflux disease)     Grand mal convulsion ([address] )     X 2 last episode -Sleep deprivation-Resolved with Cpap    Hypertension     Insomnia     Medial meniscus tear 2015    Migraine     PONV (postoperative nausea and vomiting)     Primary generalized epilepsy ([address] ) 2011    Psoriasis     Seizures ([address] )     Shoulder dislocation, right, sequela 2018    Sinusitis     Sleep apnea     uses cpap    Tinnitus        Past Surgical History:   Procedure Laterality Date     SECTION   and     COLONOSCOPY      DILATION AND CURETTAGE OF UTERUS      DILATION AND CURETTAGE OF UTERUS WITH HYSTEROSOCPY N/A 12/6/2017    Procedure: D&C; IUD REMOVAL;  Surgeon: Aaron Dubon MD;  [facility];  Service: Gynecology    EGD      HYSTEROSCOPY      LAPAROSCOPY      (Diagnostic)    LASIK      [place] COLONOSCOPY FLX DX W/COLLJ SPEC WHEN PFRMD N/A 11/8/2018    Procedure: EGD AND COLONOSCOPY;  Surgeon: Saeid Love MD;  [facility]; Service: Gastroenterology     [facility] [address] VERT BODY,CERV,ONE SGMT Bilateral 2/2/2021    Procedure: CORPECTOMY SPINE CERVICAL ANTERIOR: C4-7 ACDF with C6 /hemicorpectomy, C4-7 instrumentation and fusion (neuromonitoring); Surgeon: Alena Gramajo MD;  [facility];  Service: Neurosurgery    REMOVAL OF INTRAUTERINE DEVICE (IUD)         Family History   Problem Relation Age of Onset    Diabetes Father         Diabetes Mellitus    Hyperlipidemia Father     Hypertension Father     Brain cancer Maternal Grandmother 64    Migraines Family     Diabetes Family         Type 2 Diabetes Mellitus    No Known Problems Mother     No Known Problems Sister     No Known Problems Daughter     No Known Problems Son          Medications have been verified  Objective   /77   Pulse 83   Temp 97 6 °F (36 4 °C) (Temporal)   Resp 20   Wt 102 kg (225 lb)   SpO2 99%   BMI 35 11 kg/m²   No LMP recorded  Patient has had an implant  Physical Exam     Physical Exam  Vitals and nursing note reviewed  Constitutional:       General: She is not in acute distress  Appearance: Normal appearance  She is well-developed  She is not ill-appearing, toxic-appearing or diaphoretic  HENT:      Head: Normocephalic and atraumatic  Right Ear: Tympanic membrane and ear canal normal       Left Ear: Tympanic membrane and ear canal normal       Nose: Congestion present  Comments: Bilateral nasal erythema with mucopurulent drainage bilaterally  Bilateral frontal maxillary sinus tenderness       Mouth/Throat:      Mouth: Mucous membranes are moist       Pharynx: No oropharyngeal exudate or posterior oropharyngeal erythema  Eyes:      General:         Right eye: Discharge present  Left eye: No discharge  Extraocular Movements: Extraocular movements intact  Pupils: Pupils are equal, round, and reactive to light  Comments: Right eye conjunctivitis   Cardiovascular:      Rate and Rhythm: Normal rate and regular rhythm  Heart sounds: Normal heart sounds  No murmur heard  Pulmonary:      Effort: Pulmonary effort is normal  No respiratory distress  Breath sounds: Normal breath sounds  No stridor  No wheezing, rhonchi or rales  Lymphadenopathy:      Cervical: No cervical adenopathy  Skin:     General: Skin is warm and dry  Findings: No rash  Neurological:      General: No focal deficit present  Mental Status: She is alert and oriented to person, place, and time  Psychiatric:         Mood and Affect: Mood normal          Behavior: Behavior normal          Thought Content:  Thought content normal          Judgment: Judgment normal

## 2022-05-31 ENCOUNTER — OFFICE VISIT (OUTPATIENT)
Dept: DERMATOLOGY | Facility: CLINIC | Age: 42
End: 2022-05-31
Payer: COMMERCIAL

## 2022-05-31 VITALS — HEIGHT: 67 IN | BODY MASS INDEX: 35.31 KG/M2 | WEIGHT: 225 LBS | TEMPERATURE: 98.2 F

## 2022-05-31 DIAGNOSIS — L40.9 PSORIASIS: Primary | ICD-10-CM

## 2022-05-31 PROCEDURE — 99214 OFFICE O/P EST MOD 30 MIN: CPT | Performed by: DERMATOLOGY

## 2022-05-31 RX ORDER — CALCIPOTRIENE 50 UG/G
OINTMENT TOPICAL
Qty: 60 G | Refills: 1 | Status: SHIPPED | OUTPATIENT
Start: 2022-05-31

## 2022-05-31 NOTE — PROGRESS NOTES
[facility] Follow Up Note    Patient Name: Maximo Hawley  Encounter Date: 5/31/2022    Today's Chief Concerns:   Concern #1:  Follow up Psoriasis    Concern #2:    [facility] Concern #3:      Current Medications:    Current Outpatient Medications:     acetaminophen (TYLENOL) 325 mg tablet, Take 650 mg by mouth every 6 (six) hours as needed for mild pain, Disp: , Rfl:     albuterol (PROVENTIL HFA,VENTOLIN HFA) 90 mcg/act inhaler, Inhale 2 puffs every 6 (six) hours as needed for wheezing, Disp: 8 5 g, Rfl: 0    Ascorbic Acid (VITAMIN C) 100 MG tablet, Take 100 mg by mouth daily, Disp: , Rfl:     cholecalciferol (VITAMIN D3) 1,000 units tablet, Take 5,000 Units by mouth daily  , Disp: , Rfl:     docusate sodium (COLACE) 100 mg capsule, Take 1 capsule (100 mg total) by mouth 2 (two) times a day, Disp: 20 capsule, Rfl: 0    Etonogestrel (NEXPLANON SC), Inject under the skin Located Left upper inner arm, Disp: , Rfl:     fenofibrate (TRICOR) 145 mg tablet, Take 1 tablet (145 mg total) by mouth daily, Disp: 90 tablet, Rfl: 0    gabapentin (NEURONTIN) 600 MG tablet, TAKE ONE TABLET (600 MG TOTAL) BY MOUTH DAILY AT BEDTIME, Disp: 30 tablet, Rfl: 1    halobetasol (ULTRAVATE) 0 05 % cream, Apply topically 2 (two) times a day (Patient taking differently: Apply topically if needed), Disp: 50 g, Rfl: 0    hydrochlorothiazide (HYDRODIURIL) 25 mg tablet, TAKE 1 TABLET BY MOUTH EVERY DAY, Disp: 90 tablet, Rfl: 1    ketoconazole (NIZORAL) 2 % cream, Apply topically daily, Disp: 15 g, Rfl: 0    losartan (COZAAR) 50 mg tablet, TAKE 1 TABLET BY MOUTH EVERY DAY AT BEDTIME, Disp: 90 tablet, Rfl: 1    magnesium 30 MG tablet, Take 500 mg by mouth 2 (two) times a day  , Disp: , Rfl:     Multiple Vitamins-Minerals (MULTIVITAL-M) TABS, Take by mouth, Disp: , Rfl:     omega-3-acid ethyl esters (LOVAZA) 1 g capsule, Take 2 capsules (2 g total) by mouth 2 (two) times a day, Disp: 60 capsule, Rfl: 3    omeprazole (PriLOSEC) 20 mg delayed release capsule, Take 1 capsule (20 mg total) by mouth daily, Disp: 90 capsule, Rfl: 1    QUEtiapine (SEROquel) 25 mg tablet, 1 tab in AM in 1 tab in PM, Disp: 180 tablet, Rfl: 3    senna (SENOKOT) 8 6 mg, Take 1 tablet (8 6 mg total) by mouth daily, Disp: 10 tablet, Rfl: 0    tacrolimus (PROTOPIC) 0 1 % ointment, Apply topically 2 (two) times a day (Patient taking differently: Apply topically daily), Disp: 60 g, Rfl: 5    tiZANidine (ZANAFLEX) 4 mg tablet, TAKE 1 TABLET BY MOUTH THREE TIMES DAILY AS NEEDED FOR MUSCLE SPASMS, Disp: 90 tablet, Rfl: 0    traZODone (DESYREL) 50 mg tablet, Take 1 tablet (50 mg total) by mouth daily at bedtime as needed for sleep or depression, Disp: 90 tablet, Rfl: 3    Zinc 100 MG TABS, Take by mouth  , Disp: , Rfl:     CONSTITUTIONAL:   Vitals:    05/31/22 1331   Temp: 98 2 °F (36 8 °C)   Weight: 102 kg (225 lb)   Height: 5' 7 13" (1 705 m)       Specific Alerts:    Have you been seen by a [facility] in the last 3 years? YES    Are you pregnant or planning to become pregnant? No    Are you currently or planning to be nursing or breast feeding? No    Allergies   Allergen Reactions    Gluten Meal - Food Allergy GI Intolerance     " Highly Sensitivity"    Lactose - Food Allergy GI Intolerance     " Highly Sensitive"       May we call your Preferred Phone number to discuss your specific medical information? YES    May we leave a detailed message that includes your specific medical information? YES    Have you traveled outside of the [facility] in the past 3 months? No    Do you currently have a pacemaker or defibrillator? No    Do you have any artificial heart valves, joints, plates, screws, rods, stents, pins, etc? YES   - If Yes, were any placed within the last 2 years? Do you require any medications prior to a surgical procedure? No   - If Yes, for which procedure? - If Yes, what medications to you require?      Are you taking any medications that cause you to bleed more easily ("blood thinners") YES    Have you ever experienced a rapid heartbeat with epinephrine? No    Review of Systems:  Have you recently had or currently have any of the following? · Fever or chills: No  · Night Sweats: No  · Headaches: No  · Weight Gain: No  · Weight Loss: No  · Blurry Vision: No  · Nausea: No  · Vomiting: No  · Diarrhea: No  · Blood in Stool: No  · Abdominal Pain: No  · Itchy Skin: YES  · Painful Joints: YES  · Swollen Joints: YES  · Muscle Pain: YES  · Irregular Mole: No  · Sun Burn: No  · Dry Skin: YES  · Skin Color Changes: YES  · Scar or Keloid: No  · Cold Sores/Fever Blisters: No  · Bacterial Infections/MRSA: No  · Anxiety: No  · Depression: YES  · Suicidal or Homicidal Thoughts: No      PSYCH: Normal mood and affect  EYES: Normal conjunctiva  ENT: Normal lips and oral mucosa  CARDIOVASCULAR: No edema  RESPIRATORY: Normal respirations  HEME/LYMPH/IMMUNO:  No regional lymphadenopathy except as noted below in ASSESSMENT AND PLAN BY DIAGNOSIS    FULL ORGAN SYSTEM SKIN EXAM (SKIN)   Hair, Scalp, Ears, Face Normal except as noted below in Assessment   Neck, Cervical Chain Nodes Normal except as noted below in Assessment   Right Arm/Hand/Fingers Normal except as noted below in Assessment   Left Arm/Hand/Fingers Normal except as noted below in Assessment   Chest/Breasts Viewed areas Normal except as noted below in Assessment   Abdomen Normal except as noted below in Assessment   Back/Spine    Groin Viewed areas Normal except as noted below in Assessment   Right Leg, Foot, Toes    Left Leg, Foot, Toes        1  FOLLOW UP PSORIASIS    Physical Exam:   (Anatomic Location); (Size and Morphological Description); (Differential Diagnosis):  o Bilateral elbows with thin scaly pink plaques   Pertinent Positives:   Pertinent Negatives:     Additional History of Present Condition:  Last visit 1/25/2022 and patient started Protopic 0 1% ointment twice a day which patient reports burns her skin  Patient is still using halobetasol 0 05% cream once a week which is more helpful  Patient reports high level of stress  Currently located on scalp, behind ears  Under breasts, groin area and elbows  Assessment and Plan:  Based on a thorough discussion of this condition and the management approach to it (including a comprehensive discussion of the known risks, side effects and potential benefits of treatment), the patient (family) agrees to implement the following specific plan:   Recommend starting the triamcinolone 0 1% ointment: apply topically twice a day to affected areas 3 days of the week only  Limit the use of this medication on your face!  Recommend starting calcipotriene 0 005% ointment : apply topically twice a day to affected areas 4 days a week only  This medication is safe to use on your face   Recommend stopping the halobetasol cream and the protopic ointment   Please contact us depending how the medication is working             Scribe Attestation    I,:  Orlin Lawrence am acting as a scribe while in the presence of the attending physician :       I,:  Tito Archibald MD personally performed the services described in this documentation    as scribed in my presence :

## 2022-05-31 NOTE — PATIENT INSTRUCTIONS
FOLLOW UP PSORIASIS    Assessment and Plan:  Based on a thorough discussion of this condition and the management approach to it (including a comprehensive discussion of the known risks, side effects and potential benefits of treatment), the patient (family) agrees to implement the following specific plan:  Recommend starting the triamcinolone 0 1% ointment: apply topically twice a day to affected areas 3 days of the week only  Limit the use of this medication on your face! Recommend starting calcipotriene 0 005% ointment : apply topically twice a day to affected areas 4 days a week only  This medication is safe to use on your face  Recommend stopping the halobetasol cream and the protopic ointment  Please contact us depending how the medication is working

## 2022-09-26 ENCOUNTER — OFFICE VISIT (OUTPATIENT)
Dept: FAMILY MEDICINE CLINIC | Facility: CLINIC | Age: 42
End: 2022-09-26
Payer: COMMERCIAL

## 2022-09-26 VITALS
OXYGEN SATURATION: 97 % | DIASTOLIC BLOOD PRESSURE: 80 MMHG | BODY MASS INDEX: 35.12 KG/M2 | HEART RATE: 101 BPM | WEIGHT: 223.8 LBS | HEIGHT: 67 IN | TEMPERATURE: 98.4 F | RESPIRATION RATE: 16 BRPM | SYSTOLIC BLOOD PRESSURE: 138 MMHG

## 2022-09-26 DIAGNOSIS — M54.12 CERVICAL RADICULOPATHY: ICD-10-CM

## 2022-09-26 DIAGNOSIS — G47.33 OBSTRUCTIVE SLEEP APNEA ON CPAP: Primary | ICD-10-CM

## 2022-09-26 DIAGNOSIS — Z99.89 OBSTRUCTIVE SLEEP APNEA ON CPAP: Primary | ICD-10-CM

## 2022-09-26 DIAGNOSIS — F51.01 PRIMARY INSOMNIA: ICD-10-CM

## 2022-09-26 DIAGNOSIS — Z11.4 SCREENING FOR HIV (HUMAN IMMUNODEFICIENCY VIRUS): ICD-10-CM

## 2022-09-26 DIAGNOSIS — E78.1 ESSENTIAL HYPERTRIGLYCERIDEMIA: ICD-10-CM

## 2022-09-26 DIAGNOSIS — L40.9 PSORIASIS: ICD-10-CM

## 2022-09-26 DIAGNOSIS — Z12.31 VISIT FOR SCREENING MAMMOGRAM: ICD-10-CM

## 2022-09-26 DIAGNOSIS — Z98.890 S/P CERVICAL DISCECTOMY: ICD-10-CM

## 2022-09-26 DIAGNOSIS — I10 ESSENTIAL HYPERTENSION: ICD-10-CM

## 2022-09-26 DIAGNOSIS — K21.9 GASTROESOPHAGEAL REFLUX DISEASE WITHOUT ESOPHAGITIS: ICD-10-CM

## 2022-09-26 DIAGNOSIS — Z23 ENCOUNTER FOR IMMUNIZATION: ICD-10-CM

## 2022-09-26 DIAGNOSIS — E66.09 CLASS 1 OBESITY DUE TO EXCESS CALORIES WITH BODY MASS INDEX (BMI) OF 34.0 TO 34.9 IN ADULT, UNSPECIFIED WHETHER SERIOUS COMORBIDITY PRESENT: ICD-10-CM

## 2022-09-26 DIAGNOSIS — M25.50 POLYARTHRALGIA: ICD-10-CM

## 2022-09-26 PROBLEM — M54.50 LOW BACK PAIN: Status: RESOLVED | Noted: 2017-11-02 | Resolved: 2022-09-26

## 2022-09-26 PROCEDURE — 90471 IMMUNIZATION ADMIN: CPT

## 2022-09-26 PROCEDURE — 90686 IIV4 VACC NO PRSV 0.5 ML IM: CPT

## 2022-09-26 PROCEDURE — 99214 OFFICE O/P EST MOD 30 MIN: CPT | Performed by: FAMILY MEDICINE

## 2022-09-26 RX ORDER — KETOCONAZOLE 20 MG/G
CREAM TOPICAL DAILY PRN
Qty: 15 G | Refills: 0 | Status: SHIPPED | OUTPATIENT
Start: 2022-09-26

## 2022-09-26 NOTE — PROGRESS NOTES
Name: Radha Velásquez      : 1980      MRN: [id number]  Encounter Provider: Clyde Vasquez MD  Encounter Date: 2022   Encounter department: [place] [address]  Obstructive sleep apnea on CPAP  Assessment & Plan:  Doing well on CPAP machine at bedtime      2  Screening for HIV (human immunodeficiency virus)  -     HIV 1/2 Antigen/Antibody (4th Generation) w Reflex [facility]; Future    3  Psoriasis  Comments:  seeing derm tomorrow at [facility]  Orders:  -     ketoconazole (NIZORAL) 2 % cream; Apply topically daily as needed    4  Essential hypertension  Assessment & Plan:  Stable off of meds  Diet and exercise    Orders:  -     Comprehensive metabolic panel  -     CBC and differential  -     Lipid panel    5  Primary insomnia  Assessment & Plan:  Stable on Seroquel   hasnt needed any therapist recently       10  Class 1 obesity due to excess calories with body mass index (BMI) of 34 0 to 34 9 in adult, unspecified whether serious comorbidity present  Assessment & Plan:  Diet and exercise discussed today      7  Essential hypertriglyceridemia  Assessment & Plan:  She is not taking any prescriptions at this time  She is taking over the counter omega 3   Recheck labs    Orders:  -     Lipid panel    8  Cervical radiculopathy  Assessment & Plan:  S/p surgery   tizinadine PRN    Orders:  -     Ambulatory referral to Orthopedic Surgery    9  Gastroesophageal reflux disease without esophagitis  Assessment & Plan:  Omeprazole as needed  To avoid triggers      10  Polyarthralgia  Assessment & Plan:  Seen by pain management and Rheumatologist in the past   Continued to have neck pain and right arm pain s/p surgery last year 2022  Referral to ortho spine again today    Orders:  -     Ambulatory referral to Orthopedic Surgery    11  S/P cervical discectomy  -     Ambulatory referral to Orthopedic Surgery    12   Visit for screening mammogram  -     Mammo screening bilateral w 3d & cad; Future; Expected date: 09/26/2022         Subjective      HPI   Here for follow up  Feeling well overall  Staying home and taking care of her kids now     Review of Systems   Constitutional: Negative  HENT: Negative  Respiratory: Negative  Cardiovascular: Negative  Gastrointestinal: Negative  Endocrine: Negative  Genitourinary: Negative  Musculoskeletal: Negative  Neurological: Negative  Hematological: Negative  Psychiatric/Behavioral: Negative          Current Outpatient Medications on File Prior to Visit   Medication Sig    acetaminophen (TYLENOL) 325 mg tablet Take 650 mg by mouth every 6 (six) hours as needed for mild pain    albuterol (PROVENTIL HFA,VENTOLIN HFA) 90 mcg/act inhaler Inhale 2 puffs every 6 (six) hours as needed for wheezing    calcipotriene (DOVONOX) 0 005 % ointment Apply topically twice a day to affected areas 4 days a week only    cholecalciferol (VITAMIN D3) 1,000 units tablet Take 5,000 Units by mouth daily      docusate sodium (COLACE) 100 mg capsule Take 1 capsule (100 mg total) by mouth 2 (two) times a day    Etonogestrel (NEXPLANON SC) Inject under the skin Located Left upper inner arm    halobetasol (ULTRAVATE) 0 05 % cream Apply topically 2 (two) times a day (Patient taking differently: Apply topically if needed)    magnesium 30 MG tablet Take 500 mg by mouth 2 (two) times a day      Multiple Vitamins-Minerals (MULTIVITAL-M) TABS Take by mouth    omega-3-acid ethyl esters (LOVAZA) 1 g capsule Take 2 capsules (2 g total) by mouth 2 (two) times a day    omeprazole (PriLOSEC) 20 mg delayed release capsule Take 1 capsule (20 mg total) by mouth daily (Patient taking differently: Take 20 mg by mouth if needed)    QUEtiapine (SEROquel) 25 mg tablet 1 tab in AM in 1 tab in PM (Patient taking differently: 1 tab in PM)    senna (SENOKOT) 8 6 mg Take 1 tablet (8 6 mg total) by mouth daily    tacrolimus (PROTOPIC) 0 1 % ointment Apply topically 2 (two) times a day (Patient taking differently: Apply topically daily)    tiZANidine (ZANAFLEX) 4 mg tablet TAKE 1 TABLET BY MOUTH THREE TIMES DAILY AS NEEDED FOR MUSCLE SPASMS    traZODone (DESYREL) 50 mg tablet Take 1 tablet (50 mg total) by mouth daily at bedtime as needed for sleep or depression    triamcinolone (KENALOG) 0 1 % ointment Apply topically twice a day to affected areas 3 days a week only  Avoid using on your face   Zinc 100 MG TABS Take by mouth      [DISCONTINUED] ketoconazole (NIZORAL) 2 % cream Apply topically daily    Ascorbic Acid (VITAMIN C) 100 MG tablet Take 100 mg by mouth daily (Patient not taking: Reported on 9/26/2022)    [DISCONTINUED] fenofibrate (TRICOR) 145 mg tablet Take 1 tablet (145 mg total) by mouth daily (Patient not taking: Reported on 9/26/2022)    [DISCONTINUED] gabapentin (NEURONTIN) 600 MG tablet TAKE ONE TABLET (600 MG TOTAL) BY MOUTH DAILY AT BEDTIME (Patient not taking: Reported on 9/26/2022)    [DISCONTINUED] hydrochlorothiazide (HYDRODIURIL) 25 mg tablet TAKE 1 TABLET BY MOUTH EVERY DAY (Patient not taking: Reported on 9/26/2022)    [DISCONTINUED] losartan (COZAAR) 50 mg tablet TAKE 1 TABLET BY MOUTH EVERY DAY AT BEDTIME (Patient not taking: Reported on 9/26/2022)       Objective     /80 (BP Location: Left arm, Patient Position: Sitting, Cuff Size: Large)   Pulse 101   Temp 98 4 °F (36 9 °C) (Tympanic)   Resp 16   Ht 5' 7 13" (1 705 m)   Wt 102 kg (223 lb 12 8 oz)   SpO2 97%   BMI 34 92 kg/m²     Physical Exam  Constitutional:       Appearance: She is well-developed  HENT:      Head: Normocephalic and atraumatic  Right Ear: Tympanic membrane normal       Left Ear: Tympanic membrane normal       Nose: Nose normal       Mouth/Throat:      Mouth: Mucous membranes are moist    Eyes:      Pupils: Pupils are equal, round, and reactive to light  Neck:      Thyroid: No thyromegaly     Cardiovascular:      Rate and Rhythm: Normal rate and regular rhythm  Pulses: Normal pulses  Heart sounds: Normal heart sounds  No murmur heard  Pulmonary:      Effort: Pulmonary effort is normal       Breath sounds: Normal breath sounds  Abdominal:      General: Bowel sounds are normal       Palpations: Abdomen is soft  Musculoskeletal:         General: No deformity  Normal range of motion  Cervical back: Normal range of motion and neck supple  Skin:     General: Skin is warm  Findings: No erythema or rash  Neurological:      General: No focal deficit present  Mental Status: She is alert and oriented to person, place, and time  Deep Tendon Reflexes: Reflexes are normal and symmetric     Psychiatric:         Behavior: Behavior normal        Nito Ford MD

## 2022-09-26 NOTE — ASSESSMENT & PLAN NOTE
Seen by pain management and Rheumatologist in the past   Continued to have neck pain and right arm pain s/p surgery last year 2/2022  Referral to ortho spine again today

## 2022-09-26 NOTE — ASSESSMENT & PLAN NOTE
She is not taking any prescriptions at this time  She is taking over the counter omega 3   Recheck labs

## 2022-10-04 ENCOUNTER — TELEPHONE (OUTPATIENT)
Dept: OBGYN CLINIC | Facility: CLINIC | Age: 42
End: 2022-10-04

## 2022-10-04 NOTE — TELEPHONE ENCOUNTER
Patient needs a Nexplanon replaced from 12/4/2017, has had bloating and breast swelling, along with brown spotting   Has [employer] #[id number] Group #[id number]

## 2022-10-05 NOTE — TELEPHONE ENCOUNTER
Spoke with patient, states the symptoms she was experiencing is what alerted her that the nexplanon had   Aware office is working on [employer] with insurance  Patient aware to monitor symptoms and to call if they do not improve  Likely is experiencing cycle symptoms since Nexplanon is

## 2022-10-12 NOTE — TELEPHONE ENCOUNTER
PT covered 100% per availity  Scheduled 12/15/2022 @ 1:00 pm with Dr Quoc Shen  Pt aware [place] location  Please order device

## 2022-10-18 ENCOUNTER — VBI (OUTPATIENT)
Dept: ADMINISTRATIVE | Facility: OTHER | Age: 42
End: 2022-10-18

## 2022-10-21 ENCOUNTER — APPOINTMENT (EMERGENCY)
Dept: RADIOLOGY | Facility: HOSPITAL | Age: 42
End: 2022-10-21
Payer: COMMERCIAL

## 2022-10-21 ENCOUNTER — HOSPITAL ENCOUNTER (EMERGENCY)
Facility: HOSPITAL | Age: 42
Discharge: HOME | End: 2022-10-21
Attending: EMERGENCY MEDICINE
Payer: COMMERCIAL

## 2022-10-21 VITALS
TEMPERATURE: 98.1 F | HEART RATE: 82 BPM | DIASTOLIC BLOOD PRESSURE: 88 MMHG | BODY MASS INDEX: 36.22 KG/M2 | OXYGEN SATURATION: 95 % | SYSTOLIC BLOOD PRESSURE: 151 MMHG | WEIGHT: 232.14 LBS | RESPIRATION RATE: 16 BRPM

## 2022-10-21 DIAGNOSIS — S39.012A STRAIN OF LUMBAR REGION, INITIAL ENCOUNTER: ICD-10-CM

## 2022-10-21 DIAGNOSIS — S70.02XA CONTUSION OF LEFT HIP, INITIAL ENCOUNTER: ICD-10-CM

## 2022-10-21 DIAGNOSIS — S80.01XA CONTUSION OF RIGHT KNEE, INITIAL ENCOUNTER: ICD-10-CM
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
## 2022-10-21 DIAGNOSIS — S76.302A LEFT HAMSTRING INJURY, INITIAL ENCOUNTER: ICD-10-CM

## 2022-10-21 DIAGNOSIS — S80.02XA CONTUSION OF LEFT KNEE, INITIAL ENCOUNTER: ICD-10-CM

## 2022-10-21 DIAGNOSIS — S30.0XXA CONTUSION OF BUTTOCK, INITIAL ENCOUNTER: ICD-10-CM

## 2022-10-21 PROCEDURE — 99283 EMERGENCY DEPT VISIT LOW MDM: CPT

## 2022-10-21 PROCEDURE — 72170 X-RAY EXAM OF PELVIS: CPT

## 2022-10-21 PROCEDURE — 73552 X-RAY EXAM OF FEMUR 2/>: CPT

## 2022-10-21 PROCEDURE — 73564 X-RAY EXAM KNEE 4 OR MORE: CPT

## 2022-10-21 PROCEDURE — 99285 EMERGENCY DEPT VISIT HI MDM: CPT | Performed by: EMERGENCY MEDICINE

## 2022-10-21 PROCEDURE — 72100 X-RAY EXAM L-S SPINE 2/3 VWS: CPT

## 2022-10-21 RX ORDER — OXYCODONE HYDROCHLORIDE 5 MG/1
5 CAPSULE ORAL EVERY 6 HOURS PRN
Qty: 5 CAPSULE | Refills: 0 | Status: SHIPPED | OUTPATIENT
Start: 2022-10-21

## 2022-10-21 RX ORDER — LIDOCAINE 50 MG/G
1 PATCH TOPICAL ONCE
Status: DISCONTINUED | OUTPATIENT
Start: 2022-10-21 | End: 2022-10-21 | Stop reason: HOSPADM

## 2022-10-21 RX ORDER — TIZANIDINE HYDROCHLORIDE 4 MG/1
4 CAPSULE, GELATIN COATED ORAL 3 TIMES DAILY PRN
Qty: 20 CAPSULE | Refills: 3 | Status: SHIPPED | OUTPATIENT
Start: 2022-10-21

## 2022-10-21 RX ORDER — OXYCODONE HYDROCHLORIDE 5 MG/1
5 TABLET ORAL ONCE
Status: COMPLETED | OUTPATIENT
Start: 2022-10-21 | End: 2022-10-21

## 2022-10-21 RX ADMIN — LIDOCAINE 5% 1 PATCH: 700 PATCH TOPICAL at 15:49

## 2022-10-21 RX ADMIN — OXYCODONE HYDROCHLORIDE 5 MG: 5 TABLET ORAL at 15:47

## 2022-10-21 NOTE — DISCHARGE INSTRUCTIONS
Diagnosis: fall/  low back strain // left buttock/ hip contusion- bruise/-- left hamstring muscle strain // bilaterla knee contusion- bruises    - activity as tolerated     - for pain-- would use over the counter generic tylenol 500 mg every 4 hrs while awake     - the lidocaine patch on your back can reamin on for up to 8-12 hrs at  a time- can not get warm or wet- can use over the counter lidocaine patch to areas as needed - cannot get warm or wet     - for severe pain only oxycodone 1 tablet as needed     - tizanidine  - as needed 1 tablet 3 times a day     - most muscular type pain will resolve on its own over 2-3 weeks-  should gradually get better over time       - if after 1 week still having significant hip/knee pain -- can call sports medicine to schedule an appointment     - please return to  the er for any worsening/ intractable pain / any inability to do daily activities - any new foot/leg weakness/ any inability to move or control your urine/stools or any numbness of your saddle area

## 2022-10-21 NOTE — ED PROVIDER NOTES
History  Chief Complaint   Patient presents with   • Fall     [place] mall yesterday and she slipped and fell on wet floor  States she fell hitting her L back/leg and when she was trying to get up she fell again injuring her R knee  Denies headstike -thinners     35 yr female last night in local [facility] slipped from feet on what seemed to be vomitus and fell onto left buttock  And left knee -- no other complaints or injury - no head/neckchest/abd/bue injury -- then tried to get up and slipped again and fell directly on r anterior knee- pt eventually able to be helped up and was assisted to car-- drive home- was assisted out of car by teenage son -- today  C/o bilateral knee pain - left thigh- hamstring pain - bilateral knee pain and low back pain - no other comps       History provided by:  Patient   used: No    Fall  Mechanism of injury: fall    Associated symptoms: back pain    Associated symptoms: no neck pain        Prior to Admission Medications   Prescriptions Last Dose Informant Patient Reported? Taking?    Ascorbic Acid (VITAMIN C) 100 MG tablet   Yes No   Sig: Take 100 mg by mouth daily   Patient not taking: Reported on 2022   Etonogestrel (Dayanna Loosen [place])   Yes No   Sig: Inject under the skin Located Left upper inner arm   Multiple Vitamins-Minerals (MULTIVITAL-M) TABS   Yes No   Sig: Take by mouth   QUEtiapine (SEROquel) 25 mg tablet   No No   Si tab in AM in 1 tab in PM   Patient taking differently: 1 tab in PM   Zinc 100 MG TABS  Self Yes No   Sig: Take by mouth     acetaminophen (TYLENOL) 325 mg tablet   Yes No   Sig: Take 650 mg by mouth every 6 (six) hours as needed for mild pain   albuterol (PROVENTIL HFA,VENTOLIN HFA) 90 mcg/act inhaler   No No   Sig: Inhale 2 puffs every 6 (six) hours as needed for wheezing   calcipotriene (DOVONOX) 0 005 % ointment   No No   Sig: Apply topically twice a day to affected areas 4 days a week only   cholecalciferol (VITAMIN D3) 1,000 units tablet   Yes No   Sig: Take 5,000 Units by mouth daily     docusate sodium (COLACE) 100 mg capsule   No No   Sig: Take 1 capsule (100 mg total) by mouth 2 (two) times a day   halobetasol (ULTRAVATE) 0 05 % cream   No No   Sig: Apply topically 2 (two) times a day   Patient taking differently: Apply topically if needed   ketoconazole (NIZORAL) 2 % cream   No No   Sig: Apply topically daily as needed   magnesium 30 MG tablet   Yes No   Sig: Take 500 mg by mouth 2 (two) times a day     omega-3-acid ethyl esters (LOVAZA) 1 g capsule   No No   Sig: Take 2 capsules (2 g total) by mouth 2 (two) times a day   omeprazole (PriLOSEC) 20 mg delayed release capsule   No No   Sig: Take 1 capsule (20 mg total) by mouth daily   Patient taking differently: Take 20 mg by mouth if needed   senna (SENOKOT) 8 6 mg   No No   Sig: Take 1 tablet (8 6 mg total) by mouth daily   tacrolimus (PROTOPIC) 0 1 % ointment   No No   Sig: Apply topically 2 (two) times a day   Patient taking differently: Apply topically daily   tiZANidine (ZANAFLEX) 4 mg tablet   No No   Sig: TAKE 1 TABLET BY MOUTH THREE TIMES DAILY AS NEEDED FOR MUSCLE SPASMS   traZODone (DESYREL) 50 mg tablet   No No   Sig: Take 1 tablet (50 mg total) by mouth daily at bedtime as needed for sleep or depression   triamcinolone (KENALOG) 0 1 % ointment   No No   Sig: Apply topically twice a day to affected areas 3 days a week only  Avoid using on your face        Facility-Administered Medications: None       Past Medical History:   Diagnosis Date   • Constipation    • COVID-19 1/5/2022   • Depression    • Eczema    • Epilepsy ([facility] [address] ) 11/15/2013    Description: well controlled with sleep   • GERD (gastroesophageal reflux disease)    • Grand mal convulsion ([facility] [address] )     X 2 last episode 2009-Sleep deprivation-Resolved with Cpap   • Hypertension    • Insomnia    • Medial meniscus tear 01/12/2015   • Migraine    • PONV (postoperative nausea and vomiting)    • Primary generalized epilepsy ([facility] [address] ) 2011   • Psoriasis    • Seizures ([facility] [address] )    • Shoulder dislocation, right, sequela 2018   • Sinusitis    • Sleep apnea     uses cpap   • Tinnitus        Past Surgical History:   Procedure Laterality Date   •  SECTION   and    • COLONOSCOPY     • DILATION AND CURETTAGE OF UTERUS     • DILATION AND CURETTAGE OF UTERUS WITH HYSTEROSOCPY N/A 2017    Procedure: D&C; IUD REMOVAL;  Surgeon: Haley Crocker MD;  [facility]: [facility];  Service: Gynecology   • EGD     • HYSTEROSCOPY     • LAPAROSCOPY      (Diagnostic)   • LASIK     • NJ COLONOSCOPY FLX DX W/COLLJ SPEC WHEN PFRMD N/A 2018    Procedure: EGD AND COLONOSCOPY;  Surgeon: Vilma Arrieta MD;  Location: [facility]  GI [facility]; Service: Gastroenterology   • NJ REMV VERT BODY,CERV,ONE SGMT Bilateral 2021    Procedure: CORPECTOMY SPINE CERVICAL ANTERIOR: C4-7 ACDF with C6 /hemicorpectomy, C4-7 instrumentation and fusion (neuromonitoring); Surgeon: Medina Orr MD;  Location: [facility];  Service: Neurosurgery   • REMOVAL OF INTRAUTERINE DEVICE (IUD)         Family History   Problem Relation Age of Onset   • Diabetes Father         Diabetes Mellitus   • Hyperlipidemia Father    • Hypertension Father    • Brain cancer Maternal Grandmother 64   • Migraines Family    • Diabetes Family         Type 2 Diabetes Mellitus   • No Known Problems Mother    • No Known Problems Sister    • No Known Problems Daughter    • No Known Problems Son      I have reviewed and agree with the history as documented      E-Cigarette/Vaping   • E-Cigarette Use Never User      E-Cigarette/Vaping Substances   • Nicotine No    • THC No    • CBD No    • Flavoring No    • Other No    • Unknown No      Social History     Tobacco Use   • Smoking status: Former Smoker   • Smokeless tobacco: Never Used   • Tobacco comment: Former smoker per [employer]   Vaping Use   • Vaping Use: Never used   Substance Use Topics   • Alcohol use: Yes     Comment: occassionally   • Drug use: Never Review of Systems   Constitutional: Negative  HENT: Negative  Eyes: Negative  Respiratory: Negative  Cardiovascular: Negative  Gastrointestinal: Negative  Endocrine: Negative  Genitourinary: Negative  Musculoskeletal: Positive for back pain  Negative for arthralgias, gait problem, joint swelling, myalgias, neck pain and neck stiffness  Bilateral knee injury -- left hip/buttock injury  Low back /  left hamstring area injury from fall from feet last night   Skin: Negative  Allergic/Immunologic: Negative  Neurological: Negative  Hematological: Negative  Psychiatric/Behavioral: Negative  Physical Exam  Physical Exam  Vitals and nursing note reviewed  Constitutional:       General: She is not in acute distress  Appearance: Normal appearance  She is not ill-appearing, toxic-appearing or diaphoretic  Comments: avss-- htnsive- pulse ox 95 % on ra- interpretation is normal- no intervention    HENT:      Head: Normocephalic and atraumatic  Comments: No scalp hematoma/contusion/ tenderness     Nose: Nose normal       Mouth/Throat:      Mouth: Mucous membranes are moist    Eyes:      General: No scleral icterus  Right eye: No discharge  Left eye: No discharge  Extraocular Movements: Extraocular movements intact  Conjunctiva/sclera: Conjunctivae normal       Pupils: Pupils are equal, round, and reactive to light  Comments: Mm pink   Neck:      Vascular: No carotid bruit  Comments: No pmt c / t spine  Cardiovascular:      Rate and Rhythm: Normal rate and regular rhythm  Pulses: Normal pulses  Heart sounds: Normal heart sounds  No murmur heard  No friction rub  No gallop  Pulmonary:      Effort: Pulmonary effort is normal  No respiratory distress  Breath sounds: Normal breath sounds  No stridor  No wheezing, rhonchi or rales  Chest:      Chest wall: No tenderness     Abdominal:      General: Bowel sounds are normal  There is no distension  Palpations: Abdomen is soft  There is no mass  Tenderness: There is no abdominal tenderness  There is no right CVA tenderness, left CVA tenderness, guarding or rebound  Hernia: No hernia is present  Comments: Soft nt/nd - no hsm- no cva tenderness- no ascites- no peritoneal signs    Musculoskeletal:         General: Tenderness and signs of injury present  No swelling or deformity  Cervical back: Normal range of motion and neck supple  No rigidity or tenderness  Right lower leg: No edema  Left lower leg: No edema  Comments: rle- nt pelvis/ inguinal/femoral area-- r knee- pos anterior/patellar tenderness-  Leonidas to fully extend but decreased rom at r knee- no effusion- no lig laxity- normal quad/patellar tendons- nt at rle - no r ankle /foot tenderness - normal achilles tendon function- normal rle distal pulse-sensation/strenght/rom/cap refill     - llle- nt at inguinal area-- pos left lateral buttock tenderness- no overlying ecchymosis/sts- pos left mid lateral thigh tenderness to palpation - no deformity- overlying ecchymosis- left knee - pos fib head tenderness- nt of patella - no lig laxity of knee- no knee effusion - decreased rom at but leonidas to fully extend at knee  Left knee-  - normal quad/patellar tendon exam-- nt at lle- nt at ankle/foot- normal lle distal pulse-sensation-strength/rom -cap refill    Lymphadenopathy:      Cervical: No cervical adenopathy  Skin:     General: Skin is warm  Capillary Refill: Capillary refill takes less than 2 seconds  Coloration: Skin is not jaundiced or pale  Findings: No bruising, erythema, lesion or rash  Neurological:      General: No focal deficit present  Mental Status: She is alert and oriented to person, place, and time  Mental status is at baseline  Cranial Nerves: No cranial nerve deficit  Sensory: No sensory deficit  Motor: No weakness        Comments: Normal non focal neuro exam    Psychiatric:         Mood and Affect: Mood normal          Behavior: Behavior normal          Vital Signs  ED Triage Vitals [10/21/22 1437]   Temperature Pulse Respirations Blood Pressure SpO2   98 1 °F (36 7 °C) 88 18 164/92 98 %      Temp Source Heart Rate Source Patient Position - Orthostatic VS BP Location FiO2 (%)   Oral Monitor -- -- --      Pain Score       8           Vitals:    10/21/22 1437 10/21/22 1530   BP: 164/92 151/88   Pulse: 88 82         Visual Acuity      ED Medications  Medications   lidocaine (LIDODERM) 5 % patch 1 patch (1 patch Topical Medication Applied 10/21/22 1549)   oxyCODONE (ROXICODONE) IR tablet 5 mg (5 mg Oral Given 10/21/22 1547)       Diagnostic Studies  Results Reviewed     None                 XR lumbar spine 2 or 3 views    (Results Pending)   XR hip/pelv 2-3 vws left    (Results Pending)   XR femur 2 views LEFT    (Results Pending)   XR knee 4+ views left injury    (Results Pending)   XR knee 4+ views Right injury    (Results Pending)              Procedures  Procedures         ED Course  ED Course as of 10/23/22 1917   Fri Oct 21, 2022   1748 L/s spine xray - no fx     - pelvis/ left hip xray - no fx     - left femur xray - no fx     - bilateral knee xray - no fx    1800 Er md note- all xray results reviewed by er md -- discussed with pt and - pt asking for rx for zanaflex-- a muscle relaxant that she is on as needed with d/c-- pt offered walker fro home- does not think she will need this as has a caen and family to help                                              MDM    Disposition  Final diagnoses:   None     ED Disposition     None      Follow-up Information    None         Patient's Medications   Discharge Prescriptions    No medications on file       No discharge procedures on file      PDMP Review     None          ED Provider  Electronically Signed by           Wing Rodger MD  10/23/22 1659

## 2022-10-26 ENCOUNTER — TELEPHONE (OUTPATIENT)
Dept: OTHER | Facility: OTHER | Age: 42
End: 2022-10-26

## 2022-10-26 NOTE — TELEPHONE ENCOUNTER
Patient called in post fall on 10/20/22; went to ED on 10/21 and had x-rays done; no fractures    Patient is called in today to report worsening pain in left hip with decreased range of motion (bending, lift left leg)  Also reports bilateral knees with internal pressure that limits movement (hard to go up stairs)  She is using ice and ibuprofen with no relief  Reports twisting a lot of muscles and acknowledges muscular pain  Patient is requesting prescription for MRI and triage RN advised patient that PCP may want OV prior to prescription  Please follow up with patient

## 2022-11-01 ENCOUNTER — APPOINTMENT (OUTPATIENT)
Dept: RADIOLOGY | Facility: AMBULARY SURGERY CENTER | Age: 42
End: 2022-11-01
Attending: STUDENT IN AN ORGANIZED HEALTH CARE EDUCATION/TRAINING PROGRAM

## 2022-11-01 ENCOUNTER — OFFICE VISIT (OUTPATIENT)
Dept: OBGYN CLINIC | Facility: CLINIC | Age: 42
End: 2022-11-01

## 2022-11-01 VITALS
HEIGHT: 67 IN | WEIGHT: 232 LBS | SYSTOLIC BLOOD PRESSURE: 150 MMHG | HEART RATE: 96 BPM | DIASTOLIC BLOOD PRESSURE: 91 MMHG | BODY MASS INDEX: 36.41 KG/M2

## 2022-11-01 DIAGNOSIS — M25.552 LEFT HIP PAIN: Primary | ICD-10-CM

## 2022-11-01 DIAGNOSIS — M17.0 BILATERAL PRIMARY OSTEOARTHRITIS OF KNEE: ICD-10-CM

## 2022-11-01 DIAGNOSIS — M25.552 LEFT HIP PAIN: ICD-10-CM

## 2022-11-01 RX ADMIN — ROPIVACAINE HYDROCHLORIDE 10 ML: 5 INJECTION, SOLUTION EPIDURAL; INFILTRATION; PERINEURAL at 17:10

## 2022-11-01 RX ADMIN — METHYLPREDNISOLONE ACETATE 1 ML: 40 INJECTION, SUSPENSION INTRA-ARTICULAR; INTRALESIONAL; INTRAMUSCULAR; SOFT TISSUE at 17:10

## 2022-11-01 NOTE — PROGRESS NOTES
Orthopaedics Office Visit - new Patient Visit    ASSESSMENT/PLAN:    Assessment:   1  Left hip contusion  2  Bilateral knee contusions in the setting of previous osteoarthritis  3  Contusion to the posterior superior iliac spine on the left     Plan:   1  Patient x-rays are all negative  She has bilateral knee osteoarthritis which has been aggravated from her fall  Corticosteroid injections were offered to the patient she accepted  2  Corticosteroid injections were injected in the bilateral knees patient tolerated the procedure well  Risks of corticosteroid injections were discussed   3  Judet X-rays were obtained of the left hip to rule out any posterior wall fracture I do not see any evidence of any acute posterior wall fracture  4  Patient has swelling and pain over the left posterior superior iliac spine  With likely contusion   5  Patient should continue to take anti-inflammatory medications, Tylenol, ice or heat the lower back for pain control  She was prescribed Voltaren gel to rub on the posterior superior iliac spine for pain relief  6  Patient is hardly started in scheduled with physical therapy for rehabilitation of her lower back and bilateral knees and left hip  7  Weight-bearing as tolerated to bilateral lower extremities  Activity as tolerated  8  I discussed with the patient that the next step in her clinical course is physical therapy  She will complete his physical therapy and return to the office if pain persists in 6 weeks      To Do Next Visit:  None    _____________________________________________________  CHIEF COMPLAINT:  Chief Complaint   Patient presents with   • Left Hip - Pain     Fell , pain isn't getting better          SUBJECTIVE:  Analy Flynn is a 35 y o  female who presents after [place] in the mall on October 20, 2022  She had a fall onto her left side into some vomit and trying to get and slipped again fell onto her right knee    She went home that day and then after waking up the next day an extensive amount of pain was seen in the emergency department  X-rays were taken of her pelvis her left hip and bilateral knees demonstrate no acute fractures or dislocations  Patient was referred for follow-up in physical therapy  Patient is just set up for physical therapy however is complaining of the extensive amount of pain in the posterior aspect of her hip which is preventing her from lying down  She is not complaining of any groin pain  She also complains of bilateral knee pain which she had before however is markedly increased at this time  Denies any feelings of instability  Denies any numbness or paresthesias  PAST MEDICAL HISTORY:  Past Medical History:   Diagnosis Date   • Constipation    • COVID-19 2022   • Depression    • Eczema    • Epilepsy ([facility] [address] ) 11/15/2013    Description: well controlled with sleep   • GERD (gastroesophageal reflux disease)    • Grand mal convulsion ([address] )     X 2 last episode -Sleep deprivation-Resolved with Cpap   • Hypertension    • Insomnia    • Medial meniscus tear 2015   • Migraine    • PONV (postoperative nausea and vomiting)    • Primary generalized epilepsy ([facility] [address] ) 2011   • Psoriasis    • Seizures ([address] )    • Shoulder dislocation, right, sequela 2018   • Sinusitis    • Sleep apnea     uses cpap   • Tinnitus        PAST SURGICAL HISTORY:  Past Surgical History:   Procedure Laterality Date   •  SECTION   and    • COLONOSCOPY     • DILATION AND CURETTAGE OF UTERUS     • DILATION AND CURETTAGE OF UTERUS WITH HYSTEROSOCPY N/A 2017    Procedure: D&C; IUD REMOVAL;  Surgeon: Florian Johnston MD;  [facility];  Service: Gynecology   • EGD     • HYSTEROSCOPY     • LAPAROSCOPY      (Diagnostic)   • LASIK     • OR COLONOSCOPY FLX DX W/COLLJ SPEC WHEN PFRMD N/A 2018    Procedure: EGD AND COLONOSCOPY;  Surgeon: Valentina Mcrae MD;  Location: [facility];   Service: Gastroenterology   • OR [address] [facility] BODY,CERV,ONE SGMT Bilateral 2/2/2021    Procedure: CORPECTOMY SPINE CERVICAL ANTERIOR: C4-7 ACDF with C6 /hemicorpectomy, C4-7 instrumentation and fusion (neuromonitoring);   Surgeon: Mae Goncalves MD;  Location: [facility];  Service: Neurosurgery   • REMOVAL OF INTRAUTERINE DEVICE (IUD)         FAMILY HISTORY:  Family History   Problem Relation Age of Onset   • Diabetes Father         Diabetes Mellitus   • Hyperlipidemia Father    • Hypertension Father    • Brain cancer Maternal Grandmother 64   • Migraines Family    • Diabetes Family         Type 2 Diabetes Mellitus   • No Known Problems Mother    • No Known Problems Sister    • No Known Problems Daughter    • No Known Problems Son        SOCIAL HISTORY:  Social History     Tobacco Use   • Smoking status: Former Smoker   • Smokeless tobacco: Never Used   • Tobacco comment: Former smoker per [employer]   Vaping Use   • Vaping Use: Never used   Substance Use Topics   • Alcohol use: Yes     Comment: occassionally   • Drug use: Never       MEDICATIONS:    Current Outpatient Medications:   •  Diclofenac Sodium (VOLTAREN) 1 %, Apply 2 g topically 4 (four) times a day, Disp: 150 g, Rfl: 3  •  acetaminophen (TYLENOL) 325 mg tablet, Take 650 mg by mouth every 6 (six) hours as needed for mild pain, Disp: , Rfl:   •  albuterol (PROVENTIL HFA,VENTOLIN HFA) 90 mcg/act inhaler, Inhale 2 puffs every 6 (six) hours as needed for wheezing, Disp: 8 5 g, Rfl: 0  •  Ascorbic Acid (VITAMIN C) 100 MG tablet, Take 100 mg by mouth daily (Patient not taking: Reported on 9/26/2022), Disp: , Rfl:   •  calcipotriene (DOVONOX) 0 005 % ointment, Apply topically twice a day to affected areas 4 days a week only, Disp: 60 g, Rfl: 1  •  cholecalciferol (VITAMIN D3) 1,000 units tablet, Take 5,000 Units by mouth daily  , Disp: , Rfl:   •  docusate sodium (COLACE) 100 mg capsule, Take 1 capsule (100 mg total) by mouth 2 (two) times a day, Disp: 20 capsule, Rfl: 0  •  Etonogestrel (NEXPLANON SC), Inject under the skin Located Left upper inner arm, Disp: , Rfl:   •  halobetasol (ULTRAVATE) 0 05 % cream, Apply topically 2 (two) times a day (Patient taking differently: Apply topically if needed), Disp: 50 g, Rfl: 0  •  ketoconazole (NIZORAL) 2 % cream, Apply topically daily as needed, Disp: 15 g, Rfl: 0  •  magnesium 30 MG tablet, Take 500 mg by mouth 2 (two) times a day  , Disp: , Rfl:   •  Multiple Vitamins-Minerals (MULTIVITAL-M) TABS, Take by mouth, Disp: , Rfl:   •  omega-3-acid ethyl esters (LOVAZA) 1 g capsule, Take 2 capsules (2 g total) by mouth 2 (two) times a day, Disp: 60 capsule, Rfl: 3  •  omeprazole (PriLOSEC) 20 mg delayed release capsule, Take 1 capsule (20 mg total) by mouth daily (Patient taking differently: Take 20 mg by mouth if needed), Disp: 90 capsule, Rfl: 1  •  oxyCODONE (OXY-IR) 5 MG capsule, Take 1 capsule (5 mg total) by mouth every 6 (six) hours as needed for severe pain for up to 5 doses Can substitute  Oxycodone tablets for capsules as needed for severe pain Max Daily Amount: 20 mg, Disp: 5 capsule, Rfl: 0  •  QUEtiapine (SEROquel) 25 mg tablet, 1 tab in AM in 1 tab in PM (Patient taking differently: 1 tab in PM), Disp: 180 tablet, Rfl: 3  •  senna (SENOKOT) 8 6 mg, Take 1 tablet (8 6 mg total) by mouth daily, Disp: 10 tablet, Rfl: 0  •  tacrolimus (PROTOPIC) 0 1 % ointment, Apply topically 2 (two) times a day (Patient taking differently: Apply topically daily), Disp: 60 g, Rfl: 5  •  TiZANidine (ZANAFLEX) 4 MG capsule, Take 1 capsule (4 mg total) by mouth 3 (three) times a day as needed for muscle spasms for up to 20 doses, Disp: 20 capsule, Rfl: 3  •  tiZANidine (ZANAFLEX) 4 mg tablet, TAKE 1 TABLET BY MOUTH THREE TIMES DAILY AS NEEDED FOR MUSCLE SPASMS, Disp: 90 tablet, Rfl: 0  •  traZODone (DESYREL) 50 mg tablet, Take 1 tablet (50 mg total) by mouth daily at bedtime as needed for sleep or depression, Disp: 90 tablet, Rfl: 3  •  triamcinolone (KENALOG) 0 1 % ointment, Apply topically twice a day to affected areas 3 days a week only  Avoid using on your face , Disp: 80 g, Rfl: 1  •  Zinc 100 MG TABS, Take by mouth  , Disp: , Rfl:     ALLERGIES:  Allergies   Allergen Reactions   • Gluten Meal - Food Allergy GI Intolerance     " Highly Sensitivity"   • Lactose - Food Allergy GI Intolerance     " Highly Sensitive"       REVIEW OF SYSTEMS:  MSK:  Left hip bilateral knee pain  Neuro:  No numbness or paresthesias  Pertinent items are otherwise noted in HPI  A comprehensive review of systems was otherwise negative  LABS:  HgA1c:   Lab Results   Component Value Date    HGBA1C 5 6 09/24/2021     BMP:   Lab Results   Component Value Date    GLUCOSE 94 10/16/2013    CALCIUM 8 4 01/04/2022     10/16/2013    K 3 4 (L) 01/04/2022    CO2 26 01/04/2022     01/04/2022    BUN 8 01/04/2022    CREATININE 0 82 01/04/2022     CBC: No components found for: CBC    _____________________________________________________  PHYSICAL EXAMINATION:  Vital signs: /91   Pulse 96   Ht 5' 7" (1 702 m)   Wt 105 kg (232 lb)   BMI 36 34 kg/m²   General: No acute distress, awake and alert  Psychiatric: Mood and affect appear appropriate  HEENT: Trachea Midline, No torticollis, no apparent facial trauma  Cardiovascular: No audible murmurs; Extremities appear perfused  Pulmonary: No audible wheezing or stridor  Skin: No open lesions; see further details (if any) below    MUSCULOSKELETAL EXAMINATION:  Extremities:  Left lower extremity exposed inspected  Skin is intact with some moderate swelling around the left hip and left knee  Patient has tenderness to palpation over the posterior superior iliac spine  She is slightly tender over the abductor musculature  No tenderness over the trochanteric bursa  No groin pain  Over the left knee patient is tender over the medial joint line  No tenderness over the patellar lateral joint line  No effusion  Range of motion of the left hip is limited due to pain  Patient was too painful to participate in any exam [place]  Has patient had pain over posterior superior iliac spine when laying down  There is no instability of the help hip that I was able to appreciate on my limited exam of the hip  No groin pain was elicited with flexion internal rotation  Left knee range of motion was within normal limits with minimal pain  No varus valgus instability  Negative anterior-posterior drawer testing  Straight extensor mechanism was intact  Sensation is intact to light touch in the superficial peroneal, deep peroneal, sural, saphenous, plantar nerve distributions  5/5 muscle strength was intact  Limb is well perfused next     The right lower extremity was exposed inspected  Skin is intact with no abrasions  Patient has no knee effusion on the right knee  She has some tenderness over the medial aspect of the patella  No medial or lateral joint line tenderness  Range of motion is within normal limits  No crepitus with range of motion  There is no instability with varus valgus stress  Negative anterior posterior drawer testing  Sensation is intact to light touch in superficial peroneal, deep peroneal, sural, saphenous, plantar nerve distributions  Extensor mechanism, tibialis anterior, extensor hallucis longus, gastrocnemius muscles intact  Limb is well perfused      _____________________________________________________  STUDIES REVIEWED:  I personally reviewed the images and interpretation is as follows:  X-rays of the right knee demonstrate mild-to-moderate tricompartmental osteoarthritis with no acute fractures or dislocations  X-rays of the left knee demonstrate no acute fractures dislocations with minimal osteoarthritic changes  X-rays of the left hip demonstrate cystic subchondral cyst of the left hip  Judet x-rays were obtained to rule out posterior wall fracture  No posterior wall fracture was able to be appreciated       PROCEDURES PERFORMED:  Large joint arthrocentesis: bilateral knee  Universal Protocol:  Consent: Verbal consent obtained  Risks and benefits: risks, benefits and alternatives were discussed  Consent given by: patient  Patient understanding: patient states understanding of the procedure being performed  Radiology Images displayed and confirmed   If images not available, report reviewed: imaging studies available  Patient identity confirmed: verbally with patient    Supporting Documentation  Indications: pain   Procedure Details  Location: knee - bilateral knee  Preparation: Patient was prepped and draped in the usual sterile fashion  Needle size: 22 G  Ultrasound guidance: no  Approach: anterolateral    Medications (Right): 1 mL methylPREDNISolone acetate 40 mg/mL; 10 mL ropivacaine 0 5 %Medications (Left): 1 mL methylPREDNISolone acetate 40 mg/mL; 10 mL ropivacaine 0 5 %   Patient tolerance: patient tolerated the procedure well with no immediate complications  Dressing:  Sterile dressing applied          [employer]

## 2022-11-07 RX ORDER — ROPIVACAINE HYDROCHLORIDE 5 MG/ML
10 INJECTION, SOLUTION EPIDURAL; INFILTRATION; PERINEURAL
Status: COMPLETED | OUTPATIENT
Start: 2022-11-01 | End: 2022-11-01

## 2022-11-07 RX ORDER — METHYLPREDNISOLONE ACETATE 40 MG/ML
1 INJECTION, SUSPENSION INTRA-ARTICULAR; INTRALESIONAL; INTRAMUSCULAR; SOFT TISSUE
Status: COMPLETED | OUTPATIENT
Start: 2022-11-01 | End: 2022-11-01

## 2022-11-29 ENCOUNTER — TELEPHONE (OUTPATIENT)
Dept: OTHER | Facility: OTHER | Age: 42
End: 2022-11-29

## 2022-11-29 NOTE — TELEPHONE ENCOUNTER
Pt  Has a mamogram scheduled for sometime in February and for the last month her right breast has been hurting and she found a small marble like lump on her breast  The girl scheduling the procedure suggested Dr Aries Conner change the order from routine to diagnostic  Can you please change this order for her

## 2022-11-30 ENCOUNTER — TELEPHONE (OUTPATIENT)
Dept: OBGYN CLINIC | Facility: HOSPITAL | Age: 42
End: 2022-11-30

## 2022-11-30 DIAGNOSIS — M17.0 BILATERAL PRIMARY OSTEOARTHRITIS OF KNEE: Primary | ICD-10-CM

## 2022-11-30 DIAGNOSIS — S70.02XA CONTUSION OF LEFT HIP, INITIAL ENCOUNTER: ICD-10-CM

## 2022-11-30 NOTE — TELEPHONE ENCOUNTER
Caller: Self    Doctor: Marshall De La Torre    Reason for call: Patient called to request an order/referral for physical therapy  She has been receiving laser and chiropratic therapy out of network   Would like a referral so she can go to [facility] site     Call back#: [phone]

## 2022-12-01 DIAGNOSIS — Z12.31 SCREENING MAMMOGRAM FOR HIGH-RISK PATIENT: Primary | ICD-10-CM

## 2022-12-05 ENCOUNTER — EVALUATION (OUTPATIENT)
Dept: PHYSICAL THERAPY | Facility: CLINIC | Age: 42
End: 2022-12-05

## 2022-12-05 DIAGNOSIS — S70.02XA CONTUSION OF LEFT HIP, INITIAL ENCOUNTER: ICD-10-CM

## 2022-12-05 DIAGNOSIS — M17.0 BILATERAL PRIMARY OSTEOARTHRITIS OF KNEE: Primary | ICD-10-CM

## 2022-12-05 NOTE — PROGRESS NOTES
PT Evaluation     Today's date: 2022  Patient name: Marvin Patel  : 1980  MRN: [id number]  Referring provider: Sammie Hansen PA-C  Dx:   Encounter Diagnosis     ICD-10-CM    1  Bilateral primary osteoarthritis of knee  M17 0 Ambulatory Referral to Physical Therapy      2  Contusion of left hip, initial encounter  S70  02XA Ambulatory Referral to Physical Therapy          Start Time: 66  Stop Time: 1115  Total time in clinic (min): 40 minutes    Assessment  Assessment details: Marvin Patel is a pleasant 38 y o  female who presents with left hip and B/L knee pain  The patient's greatest concerns are the pain she is experiencing, concern at no signs of improvement and fear of not being able to keep active  No further referral appears necessary at this time based upon examination results  Primary movement impairment diagnosis of altered lumbar spine mechanics and increased neural tension resulting in pathoanatomical symptoms of left hip and LE pain and limiting her ability to care for self, drive, exercise or recreation, get out of a chair, lift, perform household chores, sit, sleep and walk  Primary Impairments:  1) Altered lumbar spine mechanics   2) Increased neural tension in the left LE  3) Decreased hip and core stabilization and strength     Etiologic factors include repetitive poor body mechanics, poor lower extremity strength and poor hip mobility  Impairments: abnormal gait, abnormal or restricted ROM, activity intolerance, impaired physical strength, lacks appropriate home exercise program, pain with function and poor body mechanics    Symptom irritability: moderateUnderstanding of Dx/Px/POC: good   Prognosis: good  Prognosis details: Positive prognostic indicators include positive attitude toward recovery  Negative prognostic indicators include high symptom irritability, degree of peripheralization and multiple concurrent orthopedic problems        Goals  Short Term Goals 2-4 wks   1  Pt will be independent with initial HEP   2  Pt will decrease pain in the left hip and LEs to < 3/10 at worst   3  Pt will improve lumbar and hip ROM to WNL and pain free     Long Term Goals 6-8 wks  1  Pt will improve deficient mm strength to 5/5 t/o   2  Pt will be able to walk, bend, and squat without symptoms   3  Pt will improve FOTO score to >61 by d/c      Plan  Patient would benefit from: skilled physical therapy  Planned modality interventions: Modalities PRN  Planned therapy interventions: activity modification, manual therapy, neuromuscular re-education, patient education, therapeutic activities, therapeutic exercise, graded activity, home exercise program, behavior modification, self care, joint mobilization and postural training  Frequency: 1x week  Duration in weeks: 6  Treatment plan discussed with: patient         Subjective Evaluation    History of Present Illness  Mechanism of injury: Hemalatha Farr presents with c/c of acute left hip and B/L knee pain  Symptoms began on 10/20/22 after a slip and fall at the mall where the patient landed on her left hip and right knee  Pt reports she "twisted" her knees and landed on her left hip, with bruising afterwards  Continues to have tenderness  Notes instability in B/L knees, R>L, and feels that her left hip may "pop out" when moving certain ways     Pain location: left hip and B/L knees, descriptors: pressure, burning, aching, sharp, throbbing  Aggravating factors: squatting, bending, descending stairs, sitting, walking up incline   Relieving factors: rest, avoiding activities, heat   24hr pain pattern: 0/10 (current), 0/10 (best), 8/10 (worst)   Imaging: x-ray of pelvis, left hip, and B/L knees   Previous treatments: chiropractor and laser (minimal improvement with LBP), injections in B/L knees (no lasting relief)  Sleeping: disturbed sleep, currently sleeping on back with bolster under legs   Patient concerns: improving overall function, decreasing pain, being able to perform necessary activities   Special Questions: denies radicular symptoms           Objective     Tenderness     Lumbar Spine  No tenderness in the spinous process, left transverse process and right transverse process  Left Hip   No tenderness in the PSIS  Right Hip   No tenderness in the PSIS  Left Knee   Tenderness in the lateral joint line and medial joint line  Right Knee   Tenderness in the lateral joint line and medial joint line  Active Range of Motion     Lumbar   Flexion:  with pain Restriction level: moderate  Extension:  WFL  Left lateral flexion:  Restriction level: moderate  Right lateral flexion:  with pain Restriction level: moderate  Left rotation:  WFL  Right rotation:  WFL and with pain  Mechanical Assessment    Cervical      Thoracic      Lumbar    Standing extension: repeated movements  Pain intensity: better  Pain level: decreased    Strength/Myotome Testing     Left Hip   Planes of Motion   Flexion: 3+  Extension: 4  Abduction: 3+  Adduction: 3    Right Hip   Planes of Motion   Flexion: 4+  Extension: 4+  Abduction: 4+  Adduction: 4    Left Knee   Flexion: 4  Extension: 3+    Right Knee   Flexion: 4+  Extension: 4+    Left Ankle/Foot   Dorsiflexion: 5  Plantar flexion: 5    Right Ankle/Foot   Dorsiflexion: 5  Plantar flexion: 5    Additional Strength Details  Inhibition 2* pain in the left low back and hip     Tests     Lumbar     Left   Positive passive SLR  Negative crossed SLR  Right   Negative crossed SLR and passive SLR       General Comments:      Hip Comments   Decreased HS flexibility on the left, limited 25%  Increased neural tension in the left LE compared to the right              Precautions: Hx of C-spine fusion, Epilepsy/Seizures       Manuals 12/5            L/S Over Pressure              L/S PA Mobs              Sciatic N Glide                          Neuro Re-Ed             TA Brace              PPT [facility]                                        Education  S/S, POC, HEP             Ther Ex             LTR             Hip Self Stretches             Squat              Leg Press              Leg Ext              Leg Curl                           Left Side Glides  HEP            DEBBI  HEP            Ther Activity                                       Gait Training                                       Modalities

## 2022-12-12 ENCOUNTER — OFFICE VISIT (OUTPATIENT)
Dept: PHYSICAL THERAPY | Facility: CLINIC | Age: 42
End: 2022-12-12

## 2022-12-12 DIAGNOSIS — M17.0 BILATERAL PRIMARY OSTEOARTHRITIS OF KNEE: Primary | ICD-10-CM

## 2022-12-12 DIAGNOSIS — S70.02XA CONTUSION OF LEFT HIP, INITIAL ENCOUNTER: ICD-10-CM

## 2022-12-12 NOTE — PROGRESS NOTES
Daily Note     Today's date: 2022  Patient name: Naz Warren  : 1980  MRN: [id number]  Referring provider: Zoltan Elam PA-C  Dx:   Encounter Diagnosis     ICD-10-CM    1  Bilateral primary osteoarthritis of knee  M17 0       2  Contusion of left hip, initial encounter  S70  02XA           Start Time: 0730  Stop Time: 0815  Total time in clinic (min): 45 minutes    Subjective: Pt reports she was able to complete HEP without complications  Reports she feels that she has been walking better since starting the home exercises  No new concerns noted at this time  Objective: See treatment diary below      Assessment: Initiated TE and manual program  Tolerated treatment well  Pt reported some discomfort, primarily in the left posterior and lateral hip, with some exercises that decreased with rest  Updated HEP with the patient verbalizing understanding  Patient would benefit from continued PT      Plan: Continue per plan of care        Precautions: Hx of C-spine fusion, Epilepsy/Seizures     Access Code: [email or web address]     Manuals          L/S Over Pressure            L/S PA Mobs   Gr1-2  KCB         Sciatic N Glide                      Neuro Re-Ed           TA Brace            PPT   10x          Bridge   2x10  HEP        Clamshell   10x RTB HEP        TB Side Step  3x at bar GTB         Lat Step Down  10x 4" step          Sciatic N [place] 90/90  10x LLE  HEP                   Education  S/S, POC, HEP  Update HEP          Ther Ex           LTR  10x 3" HEP        Hip add iso  10x 3" HEP        Squat            Leg Press            Leg Ext            Leg Curl                       Left Side Glides  HEP HEP         DEBBI  HEP HEP         Ther Activity           Step Ups   assess                               Gait Training                                 Modalities

## 2022-12-15 ENCOUNTER — PROCEDURE VISIT (OUTPATIENT)
Dept: OBGYN CLINIC | Facility: CLINIC | Age: 42
End: 2022-12-15

## 2022-12-15 VITALS
SYSTOLIC BLOOD PRESSURE: 140 MMHG | DIASTOLIC BLOOD PRESSURE: 90 MMHG | WEIGHT: 227 LBS | BODY MASS INDEX: 35.63 KG/M2 | HEIGHT: 67 IN

## 2022-12-15 DIAGNOSIS — Z30.46 ENCOUNTER FOR REMOVAL AND REINSERTION OF NEXPLANON: Primary | ICD-10-CM

## 2022-12-15 NOTE — PROGRESS NOTES
Universal Protocol:  Consent: Verbal consent obtained  Risks and benefits: risks, benefits and alternatives were discussed  Consent given by: patient  Patient understanding: patient states understanding of the procedure being performed  Patient consent: the patient's understanding of the procedure matches consent given  Procedure consent: procedure consent matches procedure scheduled  Required items: required blood products, implants, devices, and special equipment available  Patient identity confirmed: verbally with patient    Remove and insert drug implant    Date/Time: 12/15/2022 1:43 PM  Performed by: Santosh Germain MD  Authorized by: Santosh Germain MD     Indication:     Indication: Presence of non-biodegradable drug delivery implant      Indication comment:  Desire for insertion; Current Nexplanon   Pre-procedure:     Prepped with: povidone-iodine      Local anesthetic:  Lidocaine 1%    The site was cleaned and prepped in a sterile fashion: yes    Procedure:     Procedure:  Removal with reinsertion    Small stab incision was made in arm: yes      Preloaded contraceptive capsule trocar was placed subdermally: yes      Visualization of implant was obtained: yes      Contraceptive capsule was inserted and trocar removed: yes      Visualization of notch in stylet and palpation of device: yes      Palpation confirms placement by provider and patient: yes      Site was closed with steri-strips and pressure bandage applied: yes    Comments:      Delroy Agustin has like her Nexplanon as she has been amenorrheic with it in place  She is aware of the risks, benefits, and alternatives for Nexplanon  She is requesting removal of  Nexplanon and insertion of new Nexplanon  New Nexplanon was inserted in patient's right arm first  Area was identified per Nexplanon instructions and prepped with betadine  Area was confirmed numb after administration of lidocaine   Preloaded contraceptive device was inserted and Nexplanon was released subdermally  Steristrips and bandage were placed over insertion site  Attention was then directed to patient's left arm where existing Nexplanon was palpated  Area was infiltrated with lidocaine and cleansed with betadine  Small stab incision was made and Nexplanon was visualized, grasped with hemostat, and removed intact  Steristrips and bandage were placed  Patient tolerated both insertion and removal well  Patient to RTO for annual exam      Kamari Walls MD  OB/GYN  12/15/2022  1:54 PM

## 2022-12-20 ENCOUNTER — APPOINTMENT (OUTPATIENT)
Dept: PHYSICAL THERAPY | Facility: CLINIC | Age: 42
End: 2022-12-20

## 2022-12-23 ENCOUNTER — OFFICE VISIT (OUTPATIENT)
Dept: PHYSICAL THERAPY | Facility: CLINIC | Age: 42
End: 2022-12-23

## 2022-12-23 DIAGNOSIS — M17.0 BILATERAL PRIMARY OSTEOARTHRITIS OF KNEE: Primary | ICD-10-CM

## 2022-12-23 DIAGNOSIS — S70.02XA CONTUSION OF LEFT HIP, INITIAL ENCOUNTER: ICD-10-CM

## 2022-12-23 NOTE — PROGRESS NOTES
Daily Note     Today's date: 2022  Patient name: Topher Marshall  : 1980   MRN: [id number]  Referring provider: Macie Rust PA-C  Dx:   Encounter Diagnosis     ICD-10-CM    1  Bilateral primary osteoarthritis of knee  M17 0       2  Contusion of left hip, initial encounter  S70  02XA           Start Time: 0730  Stop Time: 0815  Total time in clinic (min): 45 minutes    Subjective: Pt reported she has continued to perform HEP at home  Pt states that she felt pain during standing hip abduction  Pt reported that she feels like she is moving better  Objective: See treatment diary below      Assessment: Tolerated treatment fair  Patient reported discomfort when performing exercises  Pt was educated regarding exercise response and focusing on proper exercise performance  HEP was updated and the pt was educated to only perform strengthening exercises prescribed as well as to limit flexion based movements, especially sustained stretches  Continue to progress as tolerated  Patient would benefit from continued PT      Plan: Continue per plan of care        Precautions: Hx of C-spine fusion, Epilepsy/Seizures     Access Code: [email or web address]     Manuals         L/S Over Pressure            L/S PA Mobs   Gr1-2  KCB         Sciatic N Glide                      Neuro Re-Ed           TA Brace            PPT   10x          Bridge   2x10  HEP        Clamshell   10x RTB HEP        TB Side Step  3x at bar GTB         Lat Step Down  10x 4" step  2x10 4" step        Sciatic N [place] 90/90  10x LLE  HEP        Side Plank w/ Hip Abd   10x B/L         Standing Fire Hydrant    2x10 B/L RTB                              Education  S/S, POC, HEP  Update HEP  Update HEP        Ther Ex           LTR  10x 3" HEP        Hip add iso  10x 3" HEP        Squat            Leg Press    2x10 70#        Leg Ext    10x SL ecc 22#        Leg Curl                       Left Side Glides  HEP HEP         DEBBI  HEP HEP REIL 2x10 Ther Activity           Step Ups   assess                               Gait Training                                 Modalities

## 2023-01-09 ENCOUNTER — OFFICE VISIT (OUTPATIENT)
Dept: PHYSICAL THERAPY | Facility: CLINIC | Age: 43
End: 2023-01-09

## 2023-01-09 DIAGNOSIS — M17.0 BILATERAL PRIMARY OSTEOARTHRITIS OF KNEE: Primary | ICD-10-CM

## 2023-01-09 DIAGNOSIS — S70.02XA CONTUSION OF LEFT HIP, INITIAL ENCOUNTER: ICD-10-CM

## 2023-01-09 NOTE — PROGRESS NOTES
Daily Note     Today's date: 2023  Patient name: Pankaj Kumar  : 1980  MRN: [id number]  Referring provider: Reina Cleary PA-C  Dx:   Encounter Diagnosis     ICD-10-CM    1  Bilateral primary osteoarthritis of knee  M17 0       2  Contusion of left hip, initial encounter  S70  02XA           Start Time: 1020  Stop Time: 1100  Total time in clinic (min): 40 minutes    Subjective: Pt reports improvement of symptoms in the left hip and knee  Symptoms currently in the left LE related more to overuse and muscle soreness  Continues to report some occasional instability in the right knee when stepping a certain way  Symptoms in the left low back more constant but not as intense  Objective: See treatment diary below      Assessment: Tolerated treatment well  Improvement of symptoms in the left low back noted following PA mobs and REIL  Advised importance of REIL at home at least 4x/day to decrease nerve irritability  No significant limitation reported with other exercises in HEP  Educated the patient on progress made from [place] [facility] and current symptom presentation likely due to continued nerve irritation  Pt educated on ways to decrease nerve irritability  Progress as tolerated  Patient would benefit from continued PT      Plan: Continue per plan of care        Precautions: Hx of C-spine fusion, Epilepsy/Seizures     Access Code: [email or web address]     Manuals        L/S Over Pressure            L/S KANE Cerda   Gr1-2  KCB  Gr1-4 KCB       Sciatic N Glide                      Neuro Re-Ed           TA Brace            PPT   10x          Bridge   2x10  HEP        Clamshell   10x RTB HEP        TB Side Step  3x at bar GTB         Lat Step Down  10x 4" step  2x10 4" step        Sciatic N [place] 90/90  10x LLE  HEP        Side Plank w/ Hip Abd   10x B/L         Standing Fire Hydrant    2x10 B/L RTB                              Education  S/S, POC, HEP  Update HEP  Update HEP Review HEP, foto, current sx Ther Ex           LTR  10x 3" HEP        Hip add iso  10x 3" HEP        Squat            Leg Press    2x10 70#        Leg Ext    10x SL ecc 22#        Leg Curl                       Left Side Glides  HEP HEP         DEBBI  HEP HEP REIL 2x10 REIL 2x10       Ther Activity           Step Ups   assess                               Gait Training                                 Modalities

## 2023-01-09 NOTE — PROGRESS NOTES
Assessment/Plan   Problem List Items Addressed This Visit    None  Visit Diagnoses     Routine gynecological examination    -  Primary    Relevant Orders    Liquid-based pap, screening    Candida infection        Relevant Medications    clotrimazole-betamethasone (LOTRISONE) 1-0 05 % cream          Discussion    All questions have been answered to her satisfaction  RTO for APE or sooner if needed  Will plan lotrisone cream BID  Will also plan to utilize coconut oil  Will plan to keep area dry as well  Will f/u if no response to tx and consider culture to r/o overlying bacterial infection as well  Subjective     HPI   Blanchie Ache is a 38 y o  female who presents for annual well woman exam    LMP -12/1/22-can carloz with Nexplanon for [facility] ;     No vulvar itch/burn; No vaginal itch/burn; No abn discharge or odor; No urinary sx - burning/pain/frequency/hematuria    Pt notes red rash in her groin that began over the last month  She has tried to change to dye free detergent  She also treated w lotrimin OTC w some relief of sx, then sx recur  Pt does note mastodynia in right breast  Notes a lump that will fluctuate in size  Has diag mammo scheduled  Ordered through PCP  No abd/pelvic pain or HAs; No menopausal symptoms: No hot flashes/night sweats, problems with intercourse, vaginal dryness; sleeping well  Pt is not currently sexually active, but in a mutually monog/ relationship;; She declines sti/hiv/hep testing; Feels safe at home    Contraception: Nexplanon inserted 12/22 for [facility]    (+) PCP for routine Bw/care; Last Pap - 7/2/19 WNL neg HPV   History of abnormal Pap smear: denies   Last mammo - 2/22/22 BIRADs 1, schedule 2/24/23  History of abnormal mammogram: denies       Review of Systems   Constitutional: Negative  Respiratory: Negative  Gastrointestinal: Negative  Endocrine: Negative  Genitourinary: Negative          The following portions of the patient's history were reviewed and updated as appropriate: allergies, current medications, past family history, past medical history, past social history, past surgical history and problem list          OB History        5    Para   2    Term   2            AB   3    Living           SAB   3    IAB        Ectopic        Multiple        Live Births   2           Obstetric Comments   : SABs x 3;  C/S M;  C/S F             Past Medical History:   Diagnosis Date   • Constipation    • COVID-19 2022   • Depression    • Eczema    • Epilepsy ([facility] [address] ) 11/15/2013    Description: well controlled with sleep   • GERD (gastroesophageal reflux disease)    • Grand mal convulsion ([facility] [address] )     X 2 last episode -Sleep deprivation-Resolved with Cpap   • Hypertension    • Insomnia    • Medial meniscus tear 2015   • Migraine    • PONV (postoperative nausea and vomiting)    • Primary generalized epilepsy ([facility] [address] ) 2011   • Psoriasis    • Seizures ([facility] [address] )    • Shoulder dislocation, right, sequela 2018   • Sinusitis    • Sleep apnea     uses cpap   • Tinnitus        Past Surgical History:   Procedure Laterality Date   •  SECTION   and    • COLONOSCOPY     • DILATION AND CURETTAGE OF UTERUS     • DILATION AND CURETTAGE OF UTERUS WITH HYSTEROSOCPY N/A 2017    Procedure: D&C; IUD REMOVAL;  Surgeon: Dale Daniels MD;  [facility];  Service: Gynecology   • EGD     • HYSTEROSCOPY     • LAPAROSCOPY      (Diagnostic)   • LASIK     • NH COLONOSCOPY FLX DX W/COLLJ SPEC WHEN PFRMD N/A 2018    Procedure: EGD AND COLONOSCOPY;  Surgeon: Tonie Worthington MD;  Location: [facility]; Service: Gastroenterology   • NH VERTEBRAL CORPECTOMY ANT DCMPRN CERVICAL 1 SEG Bilateral 2021    Procedure: CORPECTOMY SPINE CERVICAL ANTERIOR: C4-7 ACDF with C6 /hemicorpectomy, C4-7 instrumentation and fusion (neuromonitoring);   Surgeon: Sammie Gabriel MD;  Location: [facility];  Service: Neurosurgery   • REMOVAL OF INTRAUTERINE DEVICE (IUD)         Family History   Problem Relation Age of Onset   • No Known Problems Mother    • Diabetes Father         Diabetes Mellitus   • Hyperlipidemia Father    • Hypertension Father    • No Known Problems Sister    • No Known Problems Daughter    • No Known Problems Son    • Brain cancer Maternal Grandmother 64   • Migraines Family    • Diabetes Family         Type 2 Diabetes Mellitus       Social History     Socioeconomic History   • Marital status: /Civil Union     Spouse name: Not on file   • Number of children: Not on file   • Years of education: Not on file   • Highest education level: Not on file   Occupational History   • Not on file   Tobacco Use   • Smoking status: Former   • Smokeless tobacco: Never   • Tobacco comments:     Former smoker per [employer]   Vaping Use   • Vaping Use: Never used   Substance and Sexual Activity   • Alcohol use: Yes     Comment: occassionally   • Drug use: Never   • Sexual activity: Not Currently     Partners: Male     Birth control/protection: Implant     Comment: 11/30/17 - Nexplanon inserted   Other Topics Concern   • Not on file   Social History Narrative    Denied:  Exercising Regularly     Social Determinants of Health     Financial Resource Strain: Not on file   Food Insecurity: Not on file   Transportation Needs: Not on file   Physical Activity: Not on file   Stress: Not on file   Social Connections: Not on file   Intimate Partner Violence: Not on file   Housing Stability: Not on file         Current Outpatient Medications:   •  cholecalciferol (VITAMIN D3) 1,000 units tablet, Take 5,000 Units by mouth daily  , Disp: , Rfl:   •  clotrimazole-betamethasone (LOTRISONE) 1-0 05 % cream, Apply topically 2 (two) times a day, Disp: 30 g, Rfl: 0  •  Etonogestrel (NEXPLANON SC), Inject under the skin Located Left upper inner arm, Disp: , Rfl:   •  magnesium 30 MG tablet, Take 500 mg by mouth 2 (two) times a day  , Disp: , Rfl:   •  Multiple Vitamins-Minerals (MULTIVITAL-M) TABS, Take by mouth, Disp: , Rfl:   •  NON FORMULARY, daily at bedtime Takes Valarien Root that's 1 PO QHS for Sleep, Disp: , Rfl:   •  Omega 3-6-9 Fatty Acids (OMEGA 3-6-9 PO), Take by mouth, Disp: , Rfl:   •  QUEtiapine (SEROquel) 25 mg tablet, 1 tab in AM in 1 tab in PM (Patient taking differently: 1 tab in PM), Disp: 180 tablet, Rfl: 3  •  acetaminophen (TYLENOL) 325 mg tablet, Take 650 mg by mouth every 6 (six) hours as needed for mild pain, Disp: , Rfl:   •  omeprazole (PriLOSEC) 20 mg delayed release capsule, Take 1 capsule (20 mg total) by mouth daily (Patient taking differently: Take 20 mg by mouth if needed), Disp: 90 capsule, Rfl: 1  •  tacrolimus (PROTOPIC) 0 1 % ointment, Apply topically 2 (two) times a day (Patient not taking: Reported on 12/15/2022), Disp: 60 g, Rfl: 5  •  TiZANidine (ZANAFLEX) 4 MG capsule, Take 1 capsule (4 mg total) by mouth 3 (three) times a day as needed for muscle spasms for up to 20 doses, Disp: 20 capsule, Rfl: 3  •  traZODone (DESYREL) 50 mg tablet, Take 1 tablet (50 mg total) by mouth daily at bedtime as needed for sleep or depression, Disp: 90 tablet, Rfl: 3    Allergies   Allergen Reactions   • Gluten Meal - Food Allergy GI Intolerance     " Highly Sensitivity"   • Lactose - Food Allergy GI Intolerance     " Highly Sensitive"       Objective   Vitals:    01/10/23 1115   BP: 142/86   BP Location: Left arm   Patient Position: Sitting   Cuff Size: Large   Weight: 102 kg (225 lb)   Height: 5' 7" (1 702 m)     Physical Exam  Vitals reviewed  Constitutional:       Appearance: She is obese  HENT:      Head: Normocephalic and atraumatic  Cardiovascular:      Rate and Rhythm: Normal rate and regular rhythm  Pulmonary:      Effort: Pulmonary effort is normal       Breath sounds: Normal breath sounds     Chest:   Breasts:     Breasts are symmetrical       Right: No swelling, bleeding, inverted nipple, mass, nipple discharge, skin change or tenderness  Left: No swelling, bleeding, inverted nipple, mass, nipple discharge, skin change or tenderness  Abdominal:      General: Abdomen is flat  Bowel sounds are normal       Palpations: Abdomen is soft  Tenderness: There is no abdominal tenderness  There is no right CVA tenderness, left CVA tenderness or guarding  Genitourinary:     General: Normal vulva  Pubic Area: No rash  Labia:         Right: No rash, tenderness, lesion or injury  Left: No rash, tenderness, lesion or injury  Urethra: No prolapse, urethral pain, urethral swelling or urethral lesion  Vagina: Normal  No signs of injury and foreign body  No vaginal discharge or erythema  Cervix: Normal       Uterus: Normal        Adnexa: Right adnexa normal and left adnexa normal       Comments: In b/l groin pt noted to have mildly edematous, erythematous rash present  Clearly demarcated with excoriations noted c/w candidal infection  Musculoskeletal:      Cervical back: Neck supple  Lymphadenopathy:      Upper Body:      Right upper body: No axillary adenopathy  Left upper body: No axillary adenopathy  Skin:     General: Skin is warm and dry  Neurological:      Mental Status: She is alert and oriented to person, place, and time  Psychiatric:         Mood and Affect: Mood normal          Behavior: Behavior normal          Thought Content: Thought content normal          Judgment: Judgment normal          There are no Patient Instructions on file for this visit

## 2023-01-10 ENCOUNTER — OFFICE VISIT (OUTPATIENT)
Dept: OBGYN CLINIC | Facility: CLINIC | Age: 43
End: 2023-01-10

## 2023-01-10 VITALS
DIASTOLIC BLOOD PRESSURE: 86 MMHG | BODY MASS INDEX: 35.31 KG/M2 | SYSTOLIC BLOOD PRESSURE: 142 MMHG | HEIGHT: 67 IN | WEIGHT: 225 LBS

## 2023-01-10 DIAGNOSIS — Z01.419 ROUTINE GYNECOLOGICAL EXAMINATION: Primary | ICD-10-CM

## 2023-01-10 DIAGNOSIS — B37.9 CANDIDA INFECTION: ICD-10-CM

## 2023-01-10 RX ORDER — CLOTRIMAZOLE AND BETAMETHASONE DIPROPIONATE 10; .64 MG/G; MG/G
CREAM TOPICAL 2 TIMES DAILY
Qty: 30 G | Refills: 0 | Status: SHIPPED | OUTPATIENT
Start: 2023-01-10

## 2023-01-12 LAB
HPV HR 12 DNA CVX QL NAA+PROBE: NEGATIVE
HPV16 DNA CVX QL NAA+PROBE: NEGATIVE
HPV18 DNA CVX QL NAA+PROBE: NEGATIVE

## 2023-01-17 LAB
LAB AP GYN PRIMARY INTERPRETATION: NORMAL
Lab: NORMAL

## 2023-01-23 ENCOUNTER — APPOINTMENT (OUTPATIENT)
Dept: PHYSICAL THERAPY | Facility: CLINIC | Age: 43
End: 2023-01-23

## 2023-01-25 ENCOUNTER — APPOINTMENT (OUTPATIENT)
Dept: PHYSICAL THERAPY | Facility: CLINIC | Age: 43
End: 2023-01-25

## 2023-01-26 ENCOUNTER — EVALUATION (OUTPATIENT)
Dept: PHYSICAL THERAPY | Facility: CLINIC | Age: 43
End: 2023-01-26

## 2023-01-26 DIAGNOSIS — S70.02XA CONTUSION OF LEFT HIP, INITIAL ENCOUNTER: ICD-10-CM

## 2023-01-26 DIAGNOSIS — M17.0 BILATERAL PRIMARY OSTEOARTHRITIS OF KNEE: Primary | ICD-10-CM

## 2023-01-26 NOTE — PROGRESS NOTES
Progress Note     Today's date: 2023  Patient name: Felecia Atkinson  : 1980  MRN: [id number]  Referring provider: Edwin Cortes PA-C  Dx:   Encounter Diagnosis     ICD-10-CM    1  Bilateral primary osteoarthritis of knee  M17 0       2  Contusion of left hip, initial encounter  S70  02XA           Start Time: 0409  Stop Time: 1100  Total time in clinic (min): 45 minutes    Subjective: The patient presents for re-evaluation today  The patient reports improvement in symptoms since beginning skilled physical therapy  The patient reports 4/10 pain at it's worst over the past 24 hours, and reports 80% improvement in overall condition since beginning formal PT care  Pt reports significant improvement since [facility]  No longer reports radicular symptoms into the left LE or burning in the left hip  No pain reported in the left knee at this point  Notes occasional intense, short lasting pain in the right knee when stepping on the right LE the wrong way  Reports limitations of mobility in the low back that are limiting daily function  The patient's chief remaining concern is improving mobility in the low back and continuing to improve strength in the LEs        Objective  Tenderness     Lumbar Spine  Tenderness to palpation reported at the spinous process in the lumbar spine, improved with PA mobs and REIL        Active Range of Motion     Lumbar   Flexion:  Restriction level: minimal   Extension:  WFL  Left lateral flexion:  WFL  Right lateral flexion:  WFL  Left rotation:  WFL  Right rotation:  [facility]     Mechanical Assessment  Lumbar    Standing extension: repeated movements  Pain intensity: better  Pain level: decreased    Strength/Myotome Testing     Left Hip   Planes of Motion   Flexion: 5  Extension: 5  Abduction: 5  Adduction: 5    Right Hip   Planes of Motion   Flexion: 5  Extension: 5  Abduction: 5  Adduction: 5    Left Knee   Flexion: 5  Extension: 5    Right Knee   Flexion: 5  Extension: 5    Left Ankle/Foot Dorsiflexion: 5  Plantar flexion: 5    Right Ankle/Foot   Dorsiflexion: 5  Plantar flexion: 5      Hip Comments   Hamstring length equal B/L, hip mobility WFL and equal B/L with no reproduction of symptoms        Assessment: Barbara Hylton is a pleasant 38 y o  female who has been receiving PT intervention for acute B/L knee and hip pain  The patient has demonstrated decreased pain, increased strength, increased ROM, improved posture  and increased activity tolerance since beginning treatment  Primary remaining impairments include:    1)  Poor lumbo-pelvic-hip dissociation     2)  Impaired core and trunk stabilization     Goals  Short Term Goals 2-4 wks   1  Pt will be independent with initial HEP -MET  2  Pt will decrease pain in the left hip and LEs to < 3/10 at worst -MET (4/10 pain in the low back and right knee occasionally)   3  Pt will improve lumbar and hip ROM to WNL and pain free -MET    Long Term Goals 6-8 wks  1  Pt will improve deficient mm strength to 5/5 t/o -MET  2  Pt will be able to walk, bend, and squat without symptoms- Partially MET   3  Pt will improve FOTO score to >61 by d/c- MET    Plan:   Patient would benefit from: skilled physical therapy  Planned modality interventions: Modalities PRN    Planned therapy interventions: activity modification, manual therapy, neuromuscular re-education, patient education, therapeutic activities, therapeutic exercise, graded activity, home exercise program, behavior modification, self care, joint mobilization and postural training  Frequency: 1x week  Duration in weeks: 4-6  Treatment plan discussed with: patient       Precautions: Hx of C-spine fusion, Epilepsy/Seizures     Access Code: [email or web address]     Manuals 12/5 12/12 12/23 1/9 1/26      L/S Over Pressure            L/S PA Mobs   Gr1-2  KCB  Gr1-4 KCB Gr1-4 KCB      Sciatic N Glide                      Neuro Re-Ed           TA Brace            PPT   10x          Bridge   2x10  HEP        Clamshell 10x RTB HEP        TB Side Step  3x at bar GTB         Lat Step Down  10x 4" step  2x10 4" step        Sciatic N [place] 90/90  10x LLE  HEP        Side Plank w/ Hip Abd   10x B/L         Standing Fire Hydrant    2x10 B/L RTB                              Education  S/S, POC, HEP  Update HEP  Update HEP Review HEP, foto, current sx Update HEP  R/A      Ther Ex           LTR  10x 3" HEP        Hip add iso  10x 3" HEP        Squat            Leg Press    2x10 70#        Leg Ext    10x SL ecc 22#        Leg Curl                       Left Side Glides  HEP HEP         DEBBI  HEP HEP REIL 2x10 REIL 2x10 REIL 2x10   10x w/ OP      Ther Activity           Step Ups   assess                               Gait Training                                 Modalities

## 2023-02-13 ENCOUNTER — OFFICE VISIT (OUTPATIENT)
Dept: PHYSICAL THERAPY | Facility: CLINIC | Age: 43
End: 2023-02-13

## 2023-02-13 DIAGNOSIS — S70.02XA CONTUSION OF LEFT HIP, INITIAL ENCOUNTER: ICD-10-CM

## 2023-02-13 DIAGNOSIS — M17.0 BILATERAL PRIMARY OSTEOARTHRITIS OF KNEE: Primary | ICD-10-CM

## 2023-02-13 NOTE — PROGRESS NOTES
Daily Note     Today's date: 2023  Patient name: Hernan Hamm  : 1980  MRN: [id number]  Referring provider: Lynne Faria PA-C  Dx:   Encounter Diagnosis     ICD-10-CM    1  Bilateral primary osteoarthritis of knee  M17 0       2  Contusion of left hip, initial encounter  S70  02XA           Start Time: 1020  Stop Time: 1050  Total time in clinic (min): 30 minutes    Subjective: Pt reports she continues to perform home exercises as instructed  No pain reported while performing exercises/activity, however, notes soreness and discomfort afterwards  No limitation reported with daily activities  Has not returned to higher level activities such as basketball and throwing  No new concerns noted at this time  Objective: See treatment diary below      Assessment: Tolerated treatment well  Updated HEP with the patient verbalizing understanding  Improved segmental mobility noted in the lumbar spine during PA mobs  Pt to continue with HEP and contact clinic if additional PT services are required  Plan: Hold PT and continue with HEP  Pt to contact clinic if additional PT services are required        Precautions: Hx of C-spine fusion, Epilepsy/Seizures     Access Code: [email or web address]     Manuals      L/S Over Pressure            L/S PA Mobs   Gr1-2  KCB  Gr1-4 KCB Gr1-4 KCB Gr1-4 KCB     Sciatic N Glide                      Neuro Re-Ed           TA Brace            PPT   10x          Bridge   2x10  HEP        Clamshell   10x RTB HEP        TB Side Step  3x at bar GTB         Lat Step Down  10x 4" step  2x10 4" step        Sciatic N [place] 90/90  10x LLE  HEP        Side Plank w/ Hip Abd   10x B/L         Standing Fire Hydrant    2x10 B/L RTB                              Education  S/S, POC, HEP  Update HEP  Update HEP Review HEP, foto, current sx Update HEP  R/A Update HEP  foto       Ther Ex           LTR  10x 3" HEP        Hip add iso  10x 3" HEP        Squat            Leg Press    2x10 70#        Leg Ext    10x SL ecc 22#        Leg Curl                       Left Side Glides  HEP HEP         DEBBI  HEP HEP REIL 2x10 REIL 2x10 REIL 2x10   10x w/ OP      Ther Activity           Step Ups   assess                               Gait Training                                 Modalities

## 2023-02-24 ENCOUNTER — HOSPITAL ENCOUNTER (OUTPATIENT)
Dept: RADIOLOGY | Age: 43
Discharge: HOME | End: 2023-02-24

## 2023-02-24 VITALS — HEIGHT: 67 IN | WEIGHT: 225 LBS | BODY MASS INDEX: 35.31 KG/M2

## 2023-02-24 DIAGNOSIS — Z12.31 VISIT FOR SCREENING MAMMOGRAM: ICD-10-CM

## 2023-03-14 ENCOUNTER — RA CDI HCC (OUTPATIENT)
Dept: OTHER | Facility: HOSPITAL | Age: 43
End: 2023-03-14

## 2023-03-14 NOTE — PROGRESS NOTES
[facility]  coding opportunities       Chart reviewed, no opportunity found: CHART REVIEWED, NO OPPORTUNITY FOUND        Patients Insurance        Commercial Insurance: [address] [place] [address]

## 2023-03-17 ENCOUNTER — OFFICE VISIT (OUTPATIENT)
Dept: URGENT CARE | Age: 43
End: 2023-03-17

## 2023-03-17 VITALS
TEMPERATURE: 99.7 F | SYSTOLIC BLOOD PRESSURE: 158 MMHG | DIASTOLIC BLOOD PRESSURE: 88 MMHG | RESPIRATION RATE: 12 BRPM | HEART RATE: 89 BPM | OXYGEN SATURATION: 97 %

## 2023-03-17 DIAGNOSIS — J01.40 ACUTE PANSINUSITIS, RECURRENCE NOT SPECIFIED: Primary | ICD-10-CM

## 2023-03-17 RX ORDER — AMOXICILLIN AND CLAVULANATE POTASSIUM 875; 125 MG/1; MG/1
1 TABLET, FILM COATED ORAL EVERY 12 HOURS SCHEDULED
Qty: 14 TABLET | Refills: 0 | Status: SHIPPED | OUTPATIENT
Start: 2023-03-17 | End: 2023-03-24

## 2023-03-17 NOTE — PROGRESS NOTES
[facility] [employer] [facility]        NAME: Felecia Atkinson is a 38 y o  female  : 1980    MRN: [id number]  DATE: 2023  TIME: 3:20 PM    Assessment and Plan   Acute pansinusitis, recurrence not specified [J01 40]  1  Acute pansinusitis, recurrence not specified  amoxicillin-clavulanate (AUGMENTIN) 875-125 mg per tablet        Declined covid/flu testing- neg covid test at home  Will treat acute sinusitis at this time     Patient Instructions     Take antibiotics as prescribed  Use Flonase or nasal saline spray for symptomatic treatment  Stay hydrated with lots of water/fluids  Follow-up with PCP in the next 1-2 days for reexamination and to ensure resolution of symptoms  Go to the ED if any fevers, unable to stay hydrated, abdominal pain, chest pain, shortness of breath, change in voice, pain or difficulty swallowing, new or worsening symptoms or other concerning symptoms  Chief Complaint     Chief Complaint   Patient presents with   • Nasal Congestion     Ongoing for 3 weeks   • Cough     Productive cough; clear          History of Present Illness       13-year-old female presents for evaluation of sinus pressure, postnasal drip, runny nose/nasal congestion, mild intermittent cough x3 to 4 weeks  States has been having "low-grade temperatures"-Tmax was 99 degrees  States has been trying multiple over-the-counter cough and cold medications, nasal sprays with some improvement  Notes history of sinus infections, states it feels similar  States the Augmentin always works for her  States her son is sick with similar symptoms  Denies any recent travel or other known sick contacts  States she did a COVID test at home that was negative, declines COVID/flu testing at this time  Eating and drinking normally  Denies any chest pain, chest tightness, shortness of breath, wheezing, GI/ symptoms or other complaints  Denies any pregnancy risk        Review of Systems   Review of Systems Constitutional: Positive for fever ("low grade of 99")  Negative for activity change, appetite change, chills and fatigue  HENT: Positive for congestion, postnasal drip, rhinorrhea and sinus pressure  Negative for ear pain, facial swelling, sore throat, trouble swallowing and voice change  Eyes: Negative for discharge, itching and visual disturbance  Respiratory: Positive for cough  Negative for chest tightness, shortness of breath and wheezing  Cardiovascular: Negative for chest pain  Gastrointestinal: Negative for abdominal pain, diarrhea, nausea and vomiting  Genitourinary: Negative for decreased urine volume  Musculoskeletal: Negative for back pain and neck pain  Skin: Negative for rash  Neurological: Negative for dizziness, syncope, weakness, numbness and headaches  All other systems reviewed and are negative          Current Medications       Current Outpatient Medications:   •  amoxicillin-clavulanate (AUGMENTIN) 875-125 mg per tablet, Take 1 tablet by mouth every 12 (twelve) hours for 7 days, Disp: 14 tablet, Rfl: 0  •  acetaminophen (TYLENOL) 325 mg tablet, Take 650 mg by mouth every 6 (six) hours as needed for mild pain, Disp: , Rfl:   •  cholecalciferol (VITAMIN D3) 1,000 units tablet, Take 5,000 Units by mouth daily  , Disp: , Rfl:   •  clotrimazole-betamethasone (LOTRISONE) 1-0 05 % cream, Apply topically 2 (two) times a day, Disp: 30 g, Rfl: 0  •  Etonogestrel (NEXPLANON SC), Inject under the skin Located Left upper inner arm, Disp: , Rfl:   •  magnesium 30 MG tablet, Take 500 mg by mouth 2 (two) times a day  , Disp: , Rfl:   •  Multiple Vitamins-Minerals (MULTIVITAL-M) TABS, Take by mouth, Disp: , Rfl:   •  NON FORMULARY, daily at bedtime Takes Valarien Root that's 1 PO QHS for Sleep, Disp: , Rfl:   •  Omega 3-6-9 Fatty Acids (OMEGA 3-6-9 PO), Take by mouth, Disp: , Rfl:   •  omeprazole (PriLOSEC) 20 mg delayed release capsule, Take 1 capsule (20 mg total) by mouth daily (Patient taking differently: Take 20 mg by mouth if needed), Disp: 90 capsule, Rfl: 1  •  QUEtiapine (SEROquel) 25 mg tablet, 1 tab in AM in 1 tab in PM (Patient taking differently: 1 tab in PM), Disp: 180 tablet, Rfl: 3  •  tacrolimus (PROTOPIC) 0 1 % ointment, Apply topically 2 (two) times a day (Patient not taking: Reported on 12/15/2022), Disp: 60 g, Rfl: 5  •  TiZANidine (ZANAFLEX) 4 MG capsule, Take 1 capsule (4 mg total) by mouth 3 (three) times a day as needed for muscle spasms for up to 20 doses (Patient not taking: Reported on 3/17/2023), Disp: 20 capsule, Rfl: 3  •  traZODone (DESYREL) 50 mg tablet, Take 1 tablet (50 mg total) by mouth daily at bedtime as needed for sleep or depression (Patient not taking: Reported on 3/17/2023), Disp: 90 tablet, Rfl: 3    Current Allergies     Allergies as of 2023 - Reviewed 2023   Allergen Reaction Noted   • Gluten meal - food allergy GI Intolerance 2019   • Lactose - food allergy GI Intolerance 2019            The following portions of the patient's history were reviewed and updated as appropriate: allergies, current medications, past family history, past medical history, past social history, past surgical history and problem list      Past Medical History:   Diagnosis Date   • Constipation    • COVID-19 2022   • Depression    • Eczema    • Epilepsy ([address] ) 11/15/2013    Description: well controlled with sleep   • GERD (gastroesophageal reflux disease)    • Grand mal convulsion ([address] )     X 2 last episode 2009-Sleep deprivation-Resolved with Cpap   • Hypertension    • Insomnia    • Medial meniscus tear 2015   • Migraine    • PONV (postoperative nausea and vomiting)    • Primary generalized epilepsy ([address] ) 2011   • Psoriasis    • Seizures ([address] )    • Shoulder dislocation, right, sequela 2018   • Sinusitis    • Sleep apnea     uses cpap   • Tinnitus        Past Surgical History:   Procedure Laterality Date   •  SECTION   and 2009   • COLONOSCOPY     • DILATION AND CURETTAGE OF UTERUS     • DILATION AND CURETTAGE OF UTERUS WITH HYSTEROSOCPY N/A 12/6/2017    Procedure: D&C; IUD REMOVAL;  Surgeon: Mckenna Ordaz MD;  [facility];  Service: Gynecology   • EGD     • HYSTEROSCOPY     • LAPAROSCOPY      (Diagnostic)   • LASIK     • GA COLONOSCOPY FLX DX W/COLLJ SPEC WHEN PFRMD N/A 11/8/2018    Procedure: EGD AND COLONOSCOPY;  Surgeon: Slim Anthony MD;  [facility]; Service: Gastroenterology   • GA VERTEBRAL CORPECTOMY ANT DCMPRN CERVICAL 1 SEG Bilateral 2/2/2021    Procedure: CORPECTOMY SPINE CERVICAL ANTERIOR: C4-7 ACDF with C6 /hemicorpectomy, C4-7 instrumentation and fusion (neuromonitoring); Surgeon: Wei Chang MD;  [facility];  Service: Neurosurgery   • REMOVAL OF INTRAUTERINE DEVICE (IUD)         Family History   Problem Relation Age of Onset   • No Known Problems Mother    • Diabetes Father         Diabetes Mellitus   • Hyperlipidemia Father    • Hypertension Father    • No Known Problems Sister    • No Known Problems Daughter    • No Known Problems Son    • Brain cancer Maternal Grandmother 64   • Migraines Family    • Diabetes Family         Type 2 Diabetes Mellitus         Medications have been verified  Objective   /88 (BP Location: Right arm, Patient Position: Sitting, Cuff Size: Extra-Large)   Pulse 89   Temp 99 7 °F (37 6 °C) (Temporal)   Resp 12   SpO2 97%        Physical Exam     Physical Exam  Vitals and nursing note reviewed  Constitutional:       General: She is not in acute distress  Appearance: Normal appearance  She is not ill-appearing or toxic-appearing  HENT:      Head: Normocephalic and atraumatic  Right Ear: Tympanic membrane normal       Left Ear: Tympanic membrane normal       Nose:      Right Sinus: Maxillary sinus tenderness and frontal sinus tenderness present  Left Sinus: Maxillary sinus tenderness and frontal sinus tenderness present  Mouth/Throat:      Mouth: Mucous membranes are moist       Pharynx: No oropharyngeal exudate or posterior oropharyngeal erythema  Comments: Mild post nasal drip  Eyes:      Pupils: Pupils are equal, round, and reactive to light  Cardiovascular:      Rate and Rhythm: Normal rate and regular rhythm  Heart sounds: Normal heart sounds  Pulmonary:      Effort: Pulmonary effort is normal       Breath sounds: Normal breath sounds  Skin:     Capillary Refill: Capillary refill takes less than 2 seconds  Neurological:      Mental Status: She is alert and oriented to person, place, and time     Psychiatric:         Mood and Affect: Mood normal          Behavior: Behavior normal

## 2023-03-17 NOTE — PATIENT INSTRUCTIONS
Take antibiotics as prescribed  Use Flonase or nasal saline spray for symptomatic treatment  Stay hydrated with lots of water/fluids  Follow-up with PCP in the next 1-2 days for reexamination and to ensure resolution of symptoms  Go to the ED if any fevers, unable to stay hydrated, abdominal pain, chest pain, shortness of breath, change in voice, pain or difficulty swallowing, new or worsening symptoms or other concerning symptoms

## 2023-03-20 DIAGNOSIS — F51.01 PRIMARY INSOMNIA: ICD-10-CM

## 2023-03-20 RX ORDER — QUETIAPINE FUMARATE 25 MG/1
25 TABLET, FILM COATED ORAL
Qty: 90 TABLET | Refills: 3 | Status: SHIPPED | OUTPATIENT
Start: 2023-03-20 | End: 2023-03-23 | Stop reason: SDUPTHER

## 2023-03-20 NOTE — TELEPHONE ENCOUNTER
Nicole Kwan from [facility] called in stating she needs clarification on the direction for this medication

## 2023-03-23 DIAGNOSIS — F51.01 PRIMARY INSOMNIA: ICD-10-CM

## 2023-03-23 RX ORDER — QUETIAPINE FUMARATE 25 MG/1
25 TABLET, FILM COATED ORAL
Qty: 90 TABLET | Refills: 3 | Status: SHIPPED | OUTPATIENT
Start: 2023-03-23 | End: 2023-06-07

## 2023-03-23 NOTE — TELEPHONE ENCOUNTER
Hemant Patel from [facility] needs direction on dosage  please give  [phone]

## 2023-05-11 ENCOUNTER — OFFICE VISIT (OUTPATIENT)
Dept: URGENT CARE | Age: 43
End: 2023-05-11

## 2023-05-11 VITALS
HEART RATE: 88 BPM | RESPIRATION RATE: 12 BRPM | DIASTOLIC BLOOD PRESSURE: 97 MMHG | SYSTOLIC BLOOD PRESSURE: 148 MMHG | TEMPERATURE: 98.8 F | OXYGEN SATURATION: 98 %

## 2023-05-11 DIAGNOSIS — H66.93 BILATERAL OTITIS MEDIA, UNSPECIFIED OTITIS MEDIA TYPE: Primary | ICD-10-CM

## 2023-05-11 RX ORDER — AMOXICILLIN AND CLAVULANATE POTASSIUM 875; 125 MG/1; MG/1
1 TABLET, FILM COATED ORAL EVERY 12 HOURS SCHEDULED
Qty: 14 TABLET | Refills: 0 | Status: SHIPPED | OUTPATIENT
Start: 2023-05-11 | End: 2023-05-18

## 2023-05-11 NOTE — PROGRESS NOTES
[facility] [employer] [facility]        NAME: Roshan Serna is a 38 y o  female  : 1980    MRN: [id number]  DATE: May 11, 2023  TIME: 4:06 PM    Assessment and Plan   Bilateral otitis media, unspecified otitis media type [H66 93]  1  Bilateral otitis media, unspecified otitis media type  amoxicillin-clavulanate (AUGMENTIN) 875-125 mg per tablet            Patient Instructions     Take med as prescribed  Sinuses is l;ikely viral  Antibiotics for ear infection; however choosed Augmentin so it helps the sinuses if necessary  OTC med for congestion   Follow up with PCP in 3-5 days  Proceed to  ER if symptoms worsen  Chief Complaint     Chief Complaint   Patient presents with   • Sinusitis     Sinus pressure and headache that started Tuesday   • Fever     High 100 5   • Nasal Congestion     Nasal/head congestion         History of Present Illness       HPI   Reports symptoms of sinus congestion and ear discomfort  Had also fever  highest tem,p was 100 5 degrees  Review of Systems   Review of Systems   Constitutional: Positive for fever  Negative for chills  HENT: Positive for congestion, ear pain, sinus pressure and sinus pain  Negative for sore throat  Respiratory: Negative for cough and wheezing  Cardiovascular: Negative for chest pain  Neurological: Negative for headaches           Current Medications       Current Outpatient Medications:   •  amoxicillin-clavulanate (AUGMENTIN) 875-125 mg per tablet, Take 1 tablet by mouth every 12 (twelve) hours for 7 days, Disp: 14 tablet, Rfl: 0  •  acetaminophen (TYLENOL) 325 mg tablet, Take 650 mg by mouth every 6 (six) hours as needed for mild pain, Disp: , Rfl:   •  cholecalciferol (VITAMIN D3) 1,000 units tablet, Take 5,000 Units by mouth daily  , Disp: , Rfl:   •  clotrimazole-betamethasone (LOTRISONE) 1-0 05 % cream, Apply topically 2 (two) times a day, Disp: 30 g, Rfl: 0  •  Etonogestrel (NEXPLANON SC), Inject under the skin Located Left upper inner arm, Disp: , Rfl:   •  magnesium 30 MG tablet, Take 500 mg by mouth 2 (two) times a day  , Disp: , Rfl:   •  Multiple Vitamins-Minerals (MULTIVITAL-M) TABS, Take by mouth, Disp: , Rfl:   •  NON FORMULARY, daily at bedtime Takes Valarien Root that's 1 PO QHS for Sleep, Disp: , Rfl:   •  Omega 3-6-9 Fatty Acids (OMEGA 3-6-9 PO), Take by mouth, Disp: , Rfl:   •  omeprazole (PriLOSEC) 20 mg delayed release capsule, Take 1 capsule (20 mg total) by mouth daily (Patient taking differently: Take 20 mg by mouth if needed), Disp: 90 capsule, Rfl: 1  •  QUEtiapine (SEROquel) 25 mg tablet, Take 1 tablet (25 mg total) by mouth daily at bedtime, Disp: 90 tablet, Rfl: 3  •  tacrolimus (PROTOPIC) 0 1 % ointment, Apply topically 2 (two) times a day (Patient not taking: Reported on 12/15/2022), Disp: 60 g, Rfl: 5  •  TiZANidine (ZANAFLEX) 4 MG capsule, Take 1 capsule (4 mg total) by mouth 3 (three) times a day as needed for muscle spasms for up to 20 doses (Patient not taking: Reported on 3/17/2023), Disp: 20 capsule, Rfl: 3  •  traZODone (DESYREL) 50 mg tablet, Take 1 tablet (50 mg total) by mouth daily at bedtime as needed for sleep or depression (Patient not taking: Reported on 3/17/2023), Disp: 90 tablet, Rfl: 3    Current Allergies     Allergies as of 05/11/2023 - Reviewed 05/11/2023   Allergen Reaction Noted   • Gluten meal - food allergy GI Intolerance 11/19/2019   • Lactose - food allergy GI Intolerance 11/19/2019            The following portions of the patient's history were reviewed and updated as appropriate: allergies, current medications, past family history, past medical history, past social history, past surgical history and problem list      Past Medical History:   Diagnosis Date   • Constipation    • COVID-19 1/5/2022   • Depression    • Eczema    • Epilepsy ([facility] ) 11/15/2013    Description: well controlled with sleep   • GERD (gastroesophageal reflux disease)    • Grand mal convulsion ([facility] )     X 2 last episode -Sleep deprivation-Resolved with Cpap   • Hypertension    • Insomnia    • Medial meniscus tear 2015   • Migraine    • PONV (postoperative nausea and vomiting)    • Primary generalized epilepsy ([facility] ) 2011   • Psoriasis    • Seizures ([facility] )    • Shoulder dislocation, right, sequela 2018   • Sinusitis    • Sleep apnea     uses cpap   • Tinnitus        Past Surgical History:   Procedure Laterality Date   •  SECTION   and    • COLONOSCOPY     • DILATION AND CURETTAGE OF UTERUS     • DILATION AND CURETTAGE OF UTERUS WITH HYSTEROSOCPY N/A 2017    Procedure: D&C; IUD REMOVAL;  Surgeon: Jessica Cedeno MD;  [facility];  Service: Gynecology   • EGD     • HYSTEROSCOPY     • LAPAROSCOPY      (Diagnostic)   • LASIK     • MN COLONOSCOPY FLX DX W/COLLJ SPEC WHEN PFRMD N/A 2018    Procedure: EGD AND COLONOSCOPY;  Surgeon: Maninder Lowery MD;  [facility]; Service: Gastroenterology   • MN VERTEBRAL CORPECTOMY ANT DCMPRN CERVICAL 1 SEG Bilateral 2021    Procedure: CORPECTOMY SPINE CERVICAL ANTERIOR: C4-7 ACDF with C6 /hemicorpectomy, C4-7 instrumentation and fusion (neuromonitoring); Surgeon: Sheeba Turcios MD;  [facility];  Service: Neurosurgery   • REMOVAL OF INTRAUTERINE DEVICE (IUD)         Family History   Problem Relation Age of Onset   • No Known Problems Mother    • Diabetes Father         Diabetes Mellitus   • Hyperlipidemia Father    • Hypertension Father    • No Known Problems Sister    • No Known Problems Daughter    • Brain cancer Maternal Grandmother 64   • No Known Problems Son    • Migraines Family    • Diabetes Family         Type 2 Diabetes Mellitus   • Breast cancer Neg Hx          Medications have been verified  Objective   /97 (BP Location: Left arm, Patient Position: Sitting, Cuff Size: Large)   Pulse 88   Temp 98 8 °F (37 1 °C) (Temporal)   Resp 12   SpO2 98%   No LMP recorded  Patient has had an implant  Physical Exam     Physical Exam  Constitutional:       General: She is not in acute distress  Appearance: She is not ill-appearing  HENT:      Head:      Comments: Tenderness with palpation of the maxillary sinuses     Right Ear: Tympanic membrane is erythematous and bulging  Left Ear: Tympanic membrane is erythematous and bulging  Nose: Rhinorrhea present  Mouth/Throat:      Pharynx: No posterior oropharyngeal erythema  Comments: Post nasal drip  Cardiovascular:      Rate and Rhythm: Regular rhythm  Heart sounds: Normal heart sounds  Pulmonary:      Effort: Pulmonary effort is normal       Breath sounds: Normal breath sounds  No wheezing  Lymphadenopathy:      Cervical: No cervical adenopathy

## 2023-06-01 ENCOUNTER — RA CDI HCC (OUTPATIENT)
Dept: OTHER | Facility: HOSPITAL | Age: 43
End: 2023-06-01

## 2023-06-01 NOTE — PROGRESS NOTES
Care Plan-Care Management  Care Management Goals of the Day: progression of care    Care Progression Reviewed With: Charge RN, RNCM, CMSW, BRENDAN, MD  Barriers to Discharge: Clinical Progression   Discharge Disposition: TCU VS Hospice   Expected Discharge Date: 2 days   Transportation: Family if able.     10:49 AM  08/21/20  SREEDHAR Montez       [facility]  coding opportunities       Chart reviewed, no opportunity found: CHART REVIEWED, NO OPPORTUNITY FOUND     Patients Insurance     Commercial Insurance: [address] [place] [id number]

## 2023-06-07 ENCOUNTER — OFFICE VISIT (OUTPATIENT)
Dept: FAMILY MEDICINE CLINIC | Facility: CLINIC | Age: 43
End: 2023-06-07
Payer: COMMERCIAL

## 2023-06-07 VITALS
DIASTOLIC BLOOD PRESSURE: 80 MMHG | OXYGEN SATURATION: 99 % | WEIGHT: 225 LBS | HEIGHT: 67 IN | HEART RATE: 87 BPM | RESPIRATION RATE: 16 BRPM | TEMPERATURE: 98.8 F | BODY MASS INDEX: 35.31 KG/M2 | SYSTOLIC BLOOD PRESSURE: 138 MMHG

## 2023-06-07 DIAGNOSIS — S39.012A STRAIN OF LUMBAR REGION, INITIAL ENCOUNTER: ICD-10-CM

## 2023-06-07 DIAGNOSIS — Z00.00 ANNUAL PHYSICAL EXAM: Primary | ICD-10-CM

## 2023-06-07 DIAGNOSIS — E66.09 CLASS 1 OBESITY DUE TO EXCESS CALORIES WITH BODY MASS INDEX (BMI) OF 34.0 TO 34.9 IN ADULT, UNSPECIFIED WHETHER SERIOUS COMORBIDITY PRESENT: ICD-10-CM

## 2023-06-07 DIAGNOSIS — M54.12 CERVICAL RADICULOPATHY: ICD-10-CM

## 2023-06-07 DIAGNOSIS — G89.29 CHRONIC MIDLINE LOW BACK PAIN WITHOUT SCIATICA: ICD-10-CM

## 2023-06-07 DIAGNOSIS — I10 ESSENTIAL HYPERTENSION: ICD-10-CM

## 2023-06-07 DIAGNOSIS — F51.01 PRIMARY INSOMNIA: ICD-10-CM

## 2023-06-07 DIAGNOSIS — J45.909 BRONCHITIS, ALLERGIC, UNSPECIFIED ASTHMA SEVERITY, UNCOMPLICATED: ICD-10-CM

## 2023-06-07 DIAGNOSIS — G47.33 OBSTRUCTIVE SLEEP APNEA ON CPAP: ICD-10-CM

## 2023-06-07 DIAGNOSIS — E78.1 ESSENTIAL HYPERTRIGLYCERIDEMIA: ICD-10-CM

## 2023-06-07 DIAGNOSIS — K21.9 GASTROESOPHAGEAL REFLUX DISEASE WITHOUT ESOPHAGITIS: ICD-10-CM

## 2023-06-07 DIAGNOSIS — Z99.89 OBSTRUCTIVE SLEEP APNEA ON CPAP: ICD-10-CM

## 2023-06-07 DIAGNOSIS — M54.50 CHRONIC MIDLINE LOW BACK PAIN WITHOUT SCIATICA: ICD-10-CM

## 2023-06-07 PROCEDURE — 99396 PREV VISIT EST AGE 40-64: CPT | Performed by: FAMILY MEDICINE

## 2023-06-07 PROCEDURE — 99214 OFFICE O/P EST MOD 30 MIN: CPT | Performed by: FAMILY MEDICINE

## 2023-06-07 RX ORDER — TIZANIDINE HYDROCHLORIDE 4 MG/1
4 CAPSULE, GELATIN COATED ORAL 3 TIMES DAILY PRN
Qty: 90 CAPSULE | Refills: 3 | Status: SHIPPED | OUTPATIENT
Start: 2023-06-07

## 2023-06-07 RX ORDER — ALBUTEROL SULFATE 90 UG/1
2 AEROSOL, METERED RESPIRATORY (INHALATION) EVERY 6 HOURS PRN
Qty: 18 G | Refills: 0 | Status: SHIPPED | OUTPATIENT
Start: 2023-06-07

## 2023-06-07 RX ORDER — QUETIAPINE FUMARATE 25 MG/1
TABLET, FILM COATED ORAL
Qty: 270 TABLET | Refills: 3 | Status: SHIPPED | OUTPATIENT
Start: 2023-06-07

## 2023-06-07 NOTE — PROGRESS NOTES
[address] [facility]    NAME: Thompson Goldstein  AGE: 38 y o  SEX: female  : 1980     DATE: 2023     Assessment and Plan:     Problem List Items Addressed This Visit        Digestive    Gastroesophageal reflux disease without esophagitis     Takes it 3-4 times a month   To avoid triggers             Respiratory    Obstructive sleep apnea on CPAP     Still on CPAP at bedtime            Cardiovascular and Mediastinum    Essential hypertension     Refused meds today   She wants to try life style changes first  Recheck in 4 months          Relevant Orders    Comprehensive metabolic panel       Nervous and Auditory    Cervical radiculopathy     Tizanidine as needed             Other    Essential hypertriglyceridemia     Omega 3 - 3 tabs daily          Insomnia     trazodone as directed          Relevant Medications    QUEtiapine (SEROquel) 25 mg tablet    Obesity     Diet and exercise  Portion control         Other Visit Diagnoses     Annual physical exam    -  Primary    Relevant Orders    CBC and differential    Comprehensive metabolic panel    Lipid panel    Strain of lumbar region, initial encounter        Relevant Medications    TiZANidine (ZANAFLEX) 4 MG capsule    Chronic midline low back pain without sciatica        Relevant Orders    Ambulatory Referral to Chiropractic    Bronchitis, allergic, unspecified asthma severity, uncomplicated        Relevant Medications    albuterol (ProAir HFA) 90 mcg/act inhaler          Immunizations and preventive care screenings were discussed with patient today  Appropriate education was printed on patient's after visit summary  Counseling:  • Alcohol/drug use: discussed moderation in alcohol intake, the recommendations for healthy alcohol use, and avoidance of illicit drug use  • Dental Health: discussed importance of regular tooth brushing, flossing, and dental visits    • Injury prevention: discussed safety/seat belts, safety helmets, smoke detectors, carbon dioxide detectors, and smoking near bedding or upholstery  • Sexual health: discussed sexually transmitted diseases, partner selection, use of condoms, avoidance of unintended pregnancy, and contraceptive alternatives  · Exercise: the importance of regular exercise/physical activity was discussed  Recommend exercise 3-5 times per week for at least 30 minutes  BMI Counseling: Body mass index is 35 15 kg/m²  The BMI is above normal  Nutrition recommendations include decreasing portion sizes and encouraging healthy choices of fruits and vegetables  Exercise recommendations include moderate physical activity 150 minutes/week  No pharmacotherapy was ordered  Rationale for BMI follow-up plan is due to patient being overweight or obese  No follow-ups on file  Chief Complaint:     Chief Complaint   Patient presents with   • Physical Exam     Patient is being seen for a Physical Exam       History of Present Illness:     Adult Annual Physical   Patient here for a comprehensive physical exam  The patient reports problems - still have mid and low back pain  Diet and Physical Activity  · Diet/Nutrition: well balanced diet and consuming 3-5 servings of fruits/vegetables daily  · Exercise: walking  Depression Screening  PHQ-2/9 Depression Screening         General Health  · Sleep: sleeps well and gets 7-8 hours of sleep on average  · Hearing: normal - bilateral   · Vision: goes for regular eye exams  · Dental: regular dental visits and brushes teeth twice daily  /GYN Health  · Patient is: perimenopausal  · Last menstrual period: 2012  · Contraceptive method: none  Review of Systems:     Review of Systems   Constitutional: Negative  HENT: Negative  Eyes: Negative  Respiratory: Negative  Cardiovascular: Negative  Gastrointestinal: Negative  Endocrine: Negative  Genitourinary: Negative  Musculoskeletal: Negative  Skin: Negative  Allergic/Immunologic: Negative  Neurological: Negative  Hematological: Negative  Psychiatric/Behavioral: Negative  Past Medical History:     Past Medical History:   Diagnosis Date   • Constipation    • COVID-19 2022   • Depression    • Eczema    • Epilepsy ([facility] [address] ) 11/15/2013    Description: well controlled with sleep   • GERD (gastroesophageal reflux disease)    • Grand mal convulsion ([facility] [address] )     X 2 last episode -Sleep deprivation-Resolved with Cpap   • Hypertension    • Insomnia    • Medial meniscus tear 2015   • Migraine    • PONV (postoperative nausea and vomiting)    • Primary generalized epilepsy ([facility] [address] ) 2011   • Psoriasis    • Seizures ([facility] [address] )    • Shingles    • Shoulder dislocation, right, sequela 2018   • Sinusitis    • Sleep apnea     uses cpap   • Tinnitus       Past Surgical History:     Past Surgical History:   Procedure Laterality Date   •  SECTION   and    • COLONOSCOPY     • DILATION AND CURETTAGE OF UTERUS     • DILATION AND CURETTAGE OF UTERUS WITH HYSTEROSOCPY N/A 2017    Procedure: D&C; IUD REMOVAL;  Surgeon: Guera Ha MD;  [facility];  Service: Gynecology   • EGD     • HYSTEROSCOPY     • LAPAROSCOPY      (Diagnostic)   • LASIK     • [place] COLONOSCOPY FLX DX W/COLLJ SPEC WHEN PFRMD N/A 2018    Procedure: EGD AND COLONOSCOPY;  Surgeon: Aquilino Bowden MD;  Location: [facility]  GI LAB; Service: Gastroenterology   • [place] VERTEBRAL CORPECTOMY ANT DCMPRN CERVICAL 1 SEG Bilateral 2021    Procedure: CORPECTOMY SPINE CERVICAL ANTERIOR: C4-7 ACDF with C6 /hemicorpectomy, C4-7 instrumentation and fusion (neuromonitoring);   Surgeon: Juanita Suarez MD;  Location: [facility];  Service: Neurosurgery   • REMOVAL OF INTRAUTERINE DEVICE (IUD)        Social History:     Social History     Socioeconomic History   • Marital status: /Civil Union     Spouse name: None   • Number of children: None   • Years of education: None   • Highest education level: None   Occupational History   • None   Tobacco Use   • Smoking status: Former     Packs/day: 0 25     Years: 25 00     Total pack years: 6 25     Types: Cigarettes     Start date: 0     Quit date:      Years since quittin 4   • Smokeless tobacco: Never   • Tobacco comments:     Former smoker per [employer]   Vaping Use   • Vaping Use: Never used   Substance and Sexual Activity   • Alcohol use: Yes     Comment: occassionally   • Drug use: Never   • Sexual activity: Yes     Partners: Male     Birth control/protection: Implant     Comment: 15 - Nexplanon inserted   Other Topics Concern   • None   Social History Narrative    Denied:  Exercising Regularly     Social Determinants of Health     Financial Resource Strain: Not on file   Food Insecurity: Not on file   Transportation Needs: Not on file   Physical Activity: Not on file   Stress: Not on file   Social Connections: Not on file   Intimate Partner Violence: Not on file   Housing Stability: Not on file      Family History:     Family History   Problem Relation Age of Onset   • No Known Problems Mother    • Diabetes Father         Diabetes Mellitus   • Hyperlipidemia Father    • Hypertension Father    • No Known Problems Sister    • No Known Problems Daughter    • Brain cancer Maternal Grandmother 64   • No Known Problems Son    • Migraines Family    • Diabetes Family         Type 2 Diabetes Mellitus   • Breast cancer Neg Hx       Current Medications:     Current Outpatient Medications   Medication Sig Dispense Refill   • acetaminophen (TYLENOL) 325 mg tablet Take 650 mg by mouth every 6 (six) hours as needed for mild pain     • albuterol (ProAir HFA) 90 mcg/act inhaler Inhale 2 puffs every 6 (six) hours as needed for shortness of breath 18 g 0   • cholecalciferol (VITAMIN D3) 1,000 units tablet Take 5,000 Units by mouth daily       • clotrimazole-betamethasone (LOTRISONE) 1-0 05 % cream "Apply topically 2 (two) times a day 30 g 0   • Etonogestrel (NEXPLANON SC) Inject under the skin Located Left upper inner arm     • magnesium 30 MG tablet Take 500 mg by mouth 2 (two) times a day       • Multiple Vitamins-Minerals (MULTIVITAL-M) TABS Take by mouth     • NON FORMULARY daily at bedtime Takes Valarien Root that's 1 PO QHS for Sleep     • Omega 3-6-9 Fatty Acids (OMEGA 3-6-9 PO) Take by mouth     • omeprazole (PriLOSEC) 20 mg delayed release capsule Take 1 capsule (20 mg total) by mouth daily (Patient taking differently: Take 20 mg by mouth if needed) 90 capsule 1   • QUEtiapine (SEROquel) 25 mg tablet Take 3 tabs daily 270 tablet 3   • tacrolimus (PROTOPIC) 0 1 % ointment Apply topically 2 (two) times a day 60 g 5   • TiZANidine (ZANAFLEX) 4 MG capsule Take 1 capsule (4 mg total) by mouth 3 (three) times a day as needed for muscle spasms 90 capsule 3   • traZODone (DESYREL) 50 mg tablet Take 1 tablet (50 mg total) by mouth daily at bedtime as needed for sleep or depression 90 tablet 3     No current facility-administered medications for this visit  Allergies: Allergies   Allergen Reactions   • Gluten Meal - Food Allergy GI Intolerance     \" Highly Sensitivity\"   • Lactose - Food Allergy GI Intolerance     \" Highly Sensitive\"      Physical Exam:     /80 (BP Location: Left arm, Patient Position: Sitting, Cuff Size: Large)   Pulse 87   Temp 98 8 °F (37 1 °C) (Tympanic)   Resp 16   Ht 5' 7 09\" (1 704 m)   Wt 102 kg (225 lb)   SpO2 99%   BMI 35 15 kg/m²     Physical Exam  Constitutional:       Appearance: Normal appearance  She is well-developed  HENT:      Head: Normocephalic and atraumatic  Right Ear: Tympanic membrane normal       Left Ear: Tympanic membrane normal       Mouth/Throat:      Mouth: Mucous membranes are moist    Eyes:      Pupils: Pupils are equal, round, and reactive to light  Cardiovascular:      Rate and Rhythm: Normal rate and regular rhythm        Pulses: " Normal pulses  Heart sounds: Normal heart sounds  Pulmonary:      Effort: Pulmonary effort is normal  No respiratory distress  Breath sounds: Normal breath sounds  No wheezing  Abdominal:      General: Bowel sounds are normal       Palpations: Abdomen is soft  Musculoskeletal:         General: No deformity  Normal range of motion  Cervical back: Normal range of motion and neck supple  Skin:     General: Skin is warm  Neurological:      General: No focal deficit present  Mental Status: She is alert and oriented to person, place, and time     Psychiatric:         Mood and Affect: Mood normal          Behavior: Behavior normal           Lamont Ruth MD  [facility]

## 2023-06-08 ENCOUNTER — TELEPHONE (OUTPATIENT)
Dept: FAMILY MEDICINE CLINIC | Facility: CLINIC | Age: 43
End: 2023-06-08

## 2023-06-08 NOTE — TELEPHONE ENCOUNTER
Medication Tizanidine HCI 4MG Capsules was dispensed  Per Dr David Llanes, dispense tablets so there will be no prior auth required  bp

## 2023-06-14 ENCOUNTER — TELEPHONE (OUTPATIENT)
Dept: FAMILY MEDICINE CLINIC | Facility: CLINIC | Age: 43
End: 2023-06-14

## 2023-06-28 ENCOUNTER — TELEPHONE (OUTPATIENT)
Dept: FAMILY MEDICINE CLINIC | Facility: CLINIC | Age: 43
End: 2023-06-28

## 2023-06-28 DIAGNOSIS — I10 ESSENTIAL HYPERTENSION: ICD-10-CM

## 2023-06-28 RX ORDER — HYDROCHLOROTHIAZIDE 25 MG/1
25 TABLET ORAL DAILY
Qty: 90 TABLET | Refills: 2 | Status: SHIPPED | OUTPATIENT
Start: 2023-06-28

## 2023-06-28 NOTE — TELEPHONE ENCOUNTER
Patient is requesting BP med that was discussed previously  Said she was trying to control it without meds, but feels she needs them now        Pharmacy:   [facility] [address] [place] [facility] [address]  Phone: [phone] Fax: [phone]

## 2023-07-10 ENCOUNTER — OFFICE VISIT (OUTPATIENT)
Age: 43
End: 2023-07-10
Payer: COMMERCIAL

## 2023-07-10 VITALS — DIASTOLIC BLOOD PRESSURE: 94 MMHG | SYSTOLIC BLOOD PRESSURE: 142 MMHG

## 2023-07-10 DIAGNOSIS — M99.02 SEGMENTAL DYSFUNCTION OF THORACIC REGION: ICD-10-CM

## 2023-07-10 DIAGNOSIS — M48.02 CERVICAL SPINAL STENOSIS: Primary | ICD-10-CM

## 2023-07-10 DIAGNOSIS — M51.9 LUMBAR DISC DISORDER: ICD-10-CM

## 2023-07-10 DIAGNOSIS — M99.04 SEGMENTAL DYSFUNCTION OF SACRAL REGION: ICD-10-CM

## 2023-07-10 DIAGNOSIS — M62.838 MUSCLE SPASM: ICD-10-CM

## 2023-07-10 DIAGNOSIS — M54.12 CERVICAL RADICULOPATHY: ICD-10-CM

## 2023-07-10 PROCEDURE — 98941 CHIROPRACT MANJ 3-4 REGIONS: CPT | Performed by: CHIROPRACTOR

## 2023-07-10 PROCEDURE — 99203 OFFICE O/P NEW LOW 30 MIN: CPT | Performed by: CHIROPRACTOR

## 2023-07-10 PROCEDURE — 97110 THERAPEUTIC EXERCISES: CPT | Performed by: CHIROPRACTOR

## 2023-07-10 NOTE — PROGRESS NOTES
Initial date of service: 7/10/23    Diagnoses and all orders for this visit:    Cervical spinal stenosis    Lumbar disc disorder    Segmental dysfunction of thoracic region    Segmental dysfunction of sacral region    Cervical radiculopathy    Pt's symptoms, imaging and exam findings consistent with mechanical neck/back pain secondary to repetitive st/sp injury of discogenic origin, exacerbated by hx fo sx c/s fusion and compensation for numerous extremity injuries. Pt responded well to extension biased stretches and manual mobilization of the affected spinal and myofascial tissues with increased ROM; trial of conservative tx recommended consisting of Tammie extension biased exercises, graded mobilization/manipulation of the affected myofascial jt dysfunction, postural/ergonomic education and take home stretches/exercises. If symptoms fail to centralize or neurologic deficit presents/progresses, appropriate imaging and referral will be coordinated. Spent greater than 30 min c pt discussing hx, pe, ddx, tx options and reviewing intake notes/imaging    TREATMENT:  Fear avoidance behavior discussion; encouraged and reassured pt that natural course of condition is to improve over time with adherence to tx plan and home care strategies. Home care recommendations: walk (but avoid hills/trails), gradual return to activity to tolerance (avoid anything that peripheralizes symptoms), call if symptoms peripheralize, worsen, or neurologic deficit progresses. Ther-ex: IASTM to affected mm hypertonicities (discussed soreness/ecchymosis up to 36 hrs post procedure); upper trap stretch, rhomboid stretch, prone on elbows, prone push-ups at shoulders, standing lumbopelvic extension, hip flexor ischemic compression, alternating prone hip extension, glute bridge, transitional mvmt education, abdominal bracing; greater than 15 spent on above mentioned ther-ex designed to improve ROM/flexibility. Cervical traction -supine;  Thoracic mobilization/manipulation: prone P-A mob, supine A-P manip; Lumbar mobilization/manipulation: extension-traction; SIJ Manipulation/Mobilization: R/L SIJ HVLA - long axis distraction    HPI  Chantal Joe is a 38 y.o. female  Chief Complaint   Patient presents with   • Neck Pain     Has had a spinal fusion in the past, feels like she has stiffness and shooting pain as well as burning and radiates down to shoulders and upper back. Pain comes and goes but can be constant if she over does it. About a 4    • Back Pain     Mid and lower back has chronic pain and frequent spasm about a 4    • Hip Pain     Fell in 10/22 had fallen and injured left hip still has pain and burning which radiates to lower back as well. About a 7 on a daily basis    • Ankle Pain     A lot of cramps in foot      Pt presents for eval and tx for exacerbation of chronic intermittent neck/back pain. Pt has hx of C4-7 fusion with Dr Nani Douglass. Pt has extensive hx of injuries and conservative care; maintains home stretching, exercises, inversion table, foam rolling    Neck Pain   This is a chronic problem. The current episode started more than 1 year ago. The problem occurs constantly. The problem has been waxing and waning. The pain is present in the left side, midline and right side. The quality of the pain is described as aching, burning, cramping and shooting. The symptoms are aggravated by position, stress and sneezing. Stiffness is present in the morning. Pertinent negatives include no chest pain, fever, headaches, numbness, trouble swallowing or weakness. She has tried chiropractic manipulation, muscle relaxants and home exercises for the symptoms. Back Pain  This is a chronic problem. The current episode started more than 1 year ago. The problem occurs constantly. The problem has been waxing and waning since onset. The pain is present in the gluteal, lumbar spine, sacro-iliac and thoracic spine.  The quality of the pain is described as aching, burning and cramping. The symptoms are aggravated by bending, standing, sitting, twisting, position and stress. Pertinent negatives include no abdominal pain, chest pain, dysuria, fever, headaches, numbness or weakness. She has tried chiropractic manipulation, ice, heat, muscle relaxant and home exercises for the symptoms. Hip Pain   Pertinent negatives include no numbness. Ankle Pain   Pertinent negatives include no numbness. Past Medical History:   Diagnosis Date   • Constipation    • COVID-19 2022   • Depression    • Eczema    • Epilepsy ([address]) 11/15/2013    Description: well controlled with sleep   • GERD (gastroesophageal reflux disease)    • Grand mal convulsion ([address])     X 2 last episode -Sleep deprivation-Resolved with Cpap   • Hypertension    • Insomnia    • Medial meniscus tear 2015   • Migraine    • PONV (postoperative nausea and vomiting)    • Primary generalized epilepsy ([address]) 2011   • Psoriasis    • Seizures ([address])    • Shingles    • Shoulder dislocation, right, sequela 2018   • Sinusitis    • Sleep apnea     uses cpap   • Tinnitus       Past Surgical History:   Procedure Laterality Date   •  SECTION   and    • COLONOSCOPY     • DILATION AND CURETTAGE OF UTERUS     • DILATION AND CURETTAGE OF UTERUS WITH HYSTEROSOCPY N/A 2017    Procedure: D&C; IUD REMOVAL;  Surgeon: Beatrice Whiting MD;  [facility];  Service: Gynecology   • EGD     • HYSTEROSCOPY     • LAPAROSCOPY      (Diagnostic)   • LASIK     • [place] COLONOSCOPY FLX DX W/COLLJ SPEC WHEN PFRMD N/A 2018    Procedure: EGD AND COLONOSCOPY;  Surgeon: Adelaida Diego MD;  [facility]: [facility]; Service: Gastroenterology   • [place] VERTEBRAL CORPECTOMY ANT DCMPRN CERVICAL 1 SEG Bilateral 2021    Procedure: CORPECTOMY SPINE CERVICAL ANTERIOR: C4-7 ACDF with C6 /hemicorpectomy, C4-7 instrumentation and fusion (neuromonitoring);   Surgeon: Nicki Pandey MD;  [facility];  Service: Neurosurgery   • REMOVAL OF INTRAUTERINE DEVICE (IUD)       The following portions of the patient's history were reviewed and updated as appropriate: allergies, past family history, past medical history, past social history, past surgical history, and problem list.  Review of Systems   Constitutional: Negative for fever and unexpected weight change. HENT: Negative for tinnitus and trouble swallowing. Cardiovascular: Negative for chest pain. Gastrointestinal: Negative for abdominal pain and nausea. Genitourinary: Negative for dysuria. Musculoskeletal: Positive for back pain and neck pain. Neurological: Negative for dizziness, syncope, speech difficulty, weakness, light-headedness, numbness and headaches. Psychiatric/Behavioral: Negative for agitation. Physical Exam  Eyes:      Extraocular Movements: Extraocular movements intact. Neck:        Comments: Pnful and limited in Fl, Rrot, Rlf  Cardiovascular:      Pulses: Normal pulses. Abdominal:      General: There is no distension. Tenderness: There is no abdominal tenderness. Musculoskeletal:      Cervical back: Pain with movement and muscular tenderness present. Decreased range of motion. Thoracic back: Spasms and tenderness present. Decreased range of motion. Lumbar back: Spasms and tenderness present. Decreased range of motion. Negative right straight leg raise test and negative left straight leg raise test.        Back:       Comments: Pnful and limited in Ext centralizes, Blf   Lymphadenopathy:      Cervical: No cervical adenopathy. Skin:     General: Skin is warm and dry. Neurological:      Mental Status: She is alert and oriented to person, place, and time. Cranial Nerves: Cranial nerves 2-12 are intact. Sensory: Sensation is intact. Motor: Motor function is intact. Coordination: Coordination is intact. Gait: Gait is intact.  Gait and tandem walk normal.      Deep Tendon Reflexes: Reflexes normal. Babinski sign absent on the right side. Babinski sign absent on the left side. Reflex Scores:       Tricep reflexes are 2+ on the right side and 2+ on the left side. Bicep reflexes are 2+ on the right side and 2+ on the left side. Brachioradialis reflexes are 2+ on the right side and 2+ on the left side. Patellar reflexes are 2+ on the right side and 2+ on the left side. Achilles reflexes are 2+ on the right side and 2+ on the left side. Psychiatric:         Mood and Affect: Mood and affect normal.         Behavior: Behavior normal.       SOFT TISSUE ASSESSMENT: Hypertonicity and tenderness palpated B upper traps, B rhomboids, T12-S1 erector spinae, hip flexor, glute med/min JOINT RESTRICTIONS: T1-5, T12-S1 and R/L SIJ ORTHO: SI jt point tenderness: +; Tammie repeated flexion peripheralizes, extension centralizes; wanda's, iliac compression, thigh thrust elicit stiffness in R/L SIJ; prone femoral nerve stretch neg for upper lumbar neural tension, elicits R/L SIJ stiffness; sitting root elicits lbp on R/L; slump test elicits neural no tension into RLE/LLE    Return in about 2 weeks (around 7/24/2023) for Next scheduled follow up.

## 2023-07-17 ENCOUNTER — PROCEDURE VISIT (OUTPATIENT)
Age: 43
End: 2023-07-17

## 2023-07-17 DIAGNOSIS — M54.12 CERVICAL RADICULOPATHY: ICD-10-CM

## 2023-07-17 DIAGNOSIS — M62.838 MUSCLE SPASM: ICD-10-CM

## 2023-07-17 DIAGNOSIS — M48.02 CERVICAL SPINAL STENOSIS: Primary | ICD-10-CM

## 2023-07-17 DIAGNOSIS — M51.9 LUMBAR DISC DISORDER: ICD-10-CM

## 2023-07-17 DIAGNOSIS — M99.04 SEGMENTAL DYSFUNCTION OF SACRAL REGION: ICD-10-CM

## 2023-07-17 DIAGNOSIS — M99.02 SEGMENTAL DYSFUNCTION OF THORACIC REGION: ICD-10-CM

## 2023-07-17 PROCEDURE — 98941 CHIROPRACT MANJ 3-4 REGIONS: CPT | Performed by: CHIROPRACTOR

## 2023-07-20 NOTE — PROGRESS NOTES
Initial date of service: 7/10/23    Diagnoses and all orders for this visit:    Cervical spinal stenosis    Lumbar disc disorder    Segmental dysfunction of thoracic region    Segmental dysfunction of sacral region    Cervical radiculopathy    Muscle spasm    Pt improved slightly    TREATMENT:  Ther-ex: IASTM to affected mm hypertonicities (discussed soreness/ecchymosis up to 36 hrs post procedure); upper trap stretch, rhomboid stretch, prone on elbows, prone push-ups at shoulders, standing lumbopelvic extension, hip flexor ischemic compression, alternating prone hip extension, glute bridge, transitional mvmt education, abdominal bracing;Cervical traction -supine; Thoracic mobilization/manipulation: prone P-A mob, supine A-P manip; Lumbar mobilization/manipulation: extension-traction; SIJ Manipulation/Mobilization: R/L SIJ HVLA - long axis distraction    HPI  Twila Elias is a 38 y.o. female  Chief Complaint   Patient presents with   • Neck Pain     A little less stiff complaint with stretching about a 4    • Back Pain     Upper middle and lower back about a 4 but states she has better mobility      Pt presents for eval and tx for exacerbation of chronic intermittent neck/back pain. Pt has hx of C4-7 fusion with Dr Manuel Monk. Pt has extensive hx of injuries and conservative care; maintains home stretching, exercises, inversion table, foam rolling    Neck Pain   This is a chronic problem. The current episode started more than 1 year ago. The problem occurs constantly. The problem has been waxing and waning. The pain is present in the left side, midline and right side. The quality of the pain is described as aching, burning, cramping and shooting. The symptoms are aggravated by position, stress and sneezing. Stiffness is present in the morning. She has tried chiropractic manipulation, muscle relaxants and home exercises for the symptoms. Back Pain  This is a chronic problem.  The current episode started more than 1 year ago. The problem occurs constantly. The problem has been waxing and waning since onset. The pain is present in the gluteal, lumbar spine, sacro-iliac and thoracic spine. The quality of the pain is described as aching, burning and cramping. The symptoms are aggravated by bending, standing, sitting, twisting, position and stress. She has tried chiropractic manipulation, ice, heat, muscle relaxant and home exercises for the symptoms. Hip Pain     Ankle Pain       Past Medical History:   Diagnosis Date   • Constipation    • COVID-19 2022   • Depression    • Eczema    • Epilepsy ([address]) 11/15/2013    Description: well controlled with sleep   • GERD (gastroesophageal reflux disease)    • Grand mal convulsion ([address])     X 2 last episode -Sleep deprivation-Resolved with Cpap   • Hypertension    • Insomnia    • Medial meniscus tear 2015   • Migraine    • PONV (postoperative nausea and vomiting)    • Primary generalized epilepsy ([address]) 2011   • Psoriasis    • Seizures ([address])    • Shingles    • Shoulder dislocation, right, sequela 2018   • Sinusitis    • Sleep apnea     uses cpap   • Tinnitus       Past Surgical History:   Procedure Laterality Date   •  SECTION   and    • COLONOSCOPY     • DILATION AND CURETTAGE OF UTERUS     • DILATION AND CURETTAGE OF UTERUS WITH HYSTEROSOCPY N/A 2017    Procedure: D&C; IUD REMOVAL;  Surgeon: Grzegorz Paul MD;  [facility];  Service: Gynecology   • EGD     • HYSTEROSCOPY     • LAPAROSCOPY      (Diagnostic)   • LASIK     • [place] COLONOSCOPY FLX DX W/COLLJ SPEC WHEN PFRMD N/A 2018    Procedure: EGD AND COLONOSCOPY;  Surgeon: Jamaica Farooq MD;  [facility]; Service: Gastroenterology   • [place] VERTEBRAL CORPECTOMY ANT DCMPRN CERVICAL 1 SEG Bilateral 2021    Procedure: CORPECTOMY SPINE CERVICAL ANTERIOR: C4-7 ACDF with C6 /hemicorpectomy, C4-7 instrumentation and fusion (neuromonitoring);   Surgeon: Steve Doyle MD; [facility];  Service: Neurosurgery   • REMOVAL OF INTRAUTERINE DEVICE (IUD)       The following portions of the patient's history were reviewed and updated as appropriate: allergies, past family history, past medical history, past social history, past surgical history, and problem list.  Review of Systems   Musculoskeletal: Positive for back pain and neck pain. Physical Exam  Neck:        Comments: Pnful and limited in Fl, Rrot, Rlf  Musculoskeletal:      Cervical back: Pain with movement and muscular tenderness present. Decreased range of motion. Thoracic back: Spasms and tenderness present. Decreased range of motion. Lumbar back: Spasms and tenderness present. Decreased range of motion. Negative right straight leg raise test and negative left straight leg raise test.        Back:       Comments: Pnful and limited in Ext centralizes, Blf   Lymphadenopathy:      Cervical: No cervical adenopathy. Skin:     General: Skin is warm and dry. Neurological:      Mental Status: She is alert and oriented to person, place, and time. Gait: Gait is intact. Psychiatric:         Mood and Affect: Mood and affect normal.         Behavior: Behavior normal.       SOFT TISSUE ASSESSMENT: Hypertonicity and tenderness palpated B upper traps, B rhomboids, T12-S1 erector spinae, hip flexor, glute med/min JOINT RESTRICTIONS: T1-5, T12-S1 and R/L SIJ     Return in about 1 week (around 7/24/2023) for Next scheduled follow up.

## 2023-07-24 ENCOUNTER — PROCEDURE VISIT (OUTPATIENT)
Age: 43
End: 2023-07-24

## 2023-07-24 VITALS
BODY MASS INDEX: 35.31 KG/M2 | HEIGHT: 67 IN | HEART RATE: 83 BPM | DIASTOLIC BLOOD PRESSURE: 84 MMHG | SYSTOLIC BLOOD PRESSURE: 138 MMHG | WEIGHT: 225 LBS

## 2023-07-24 DIAGNOSIS — M99.04 SEGMENTAL DYSFUNCTION OF SACRAL REGION: ICD-10-CM

## 2023-07-24 DIAGNOSIS — M51.9 LUMBAR DISC DISORDER: ICD-10-CM

## 2023-07-24 DIAGNOSIS — M48.02 CERVICAL SPINAL STENOSIS: Primary | ICD-10-CM

## 2023-07-24 DIAGNOSIS — M62.838 MUSCLE SPASM: ICD-10-CM

## 2023-07-24 DIAGNOSIS — M54.12 CERVICAL RADICULOPATHY: ICD-10-CM

## 2023-07-24 DIAGNOSIS — M99.02 SEGMENTAL DYSFUNCTION OF THORACIC REGION: ICD-10-CM

## 2023-07-24 PROCEDURE — 98941 CHIROPRACT MANJ 3-4 REGIONS: CPT | Performed by: CHIROPRACTOR

## 2023-07-24 NOTE — PROGRESS NOTES
Initial date of service: 7/10/23    Diagnoses and all orders for this visit:    Cervical spinal stenosis    Lumbar disc disorder    Segmental dysfunction of thoracic region    Cervical radiculopathy    Segmental dysfunction of sacral region    Muscle spasm    Pt improved slightly with reduced pain and increased ROM    TREATMENT:  Ther-ex: IASTM to affected mm hypertonicities (discussed soreness/ecchymosis up to 36 hrs post procedure); upper trap stretch, rhomboid stretch, prone on elbows, prone push-ups at shoulders, standing lumbopelvic extension, hip flexor ischemic compression, alternating prone hip extension, glute bridge, transitional mvmt education, abdominal bracing;Cervical traction -supine; Thoracic mobilization/manipulation: prone P-A mob, supine A-P manip; Lumbar mobilization/manipulation: extension-traction; SIJ Manipulation/Mobilization: R/L SIJ HVLA - long axis distraction    HPI  Alma Fair is a 38 y.o. female  Chief Complaint   Patient presents with   • Neck Pain     Pain score:4   • Back Pain     Pain score:4   • Hip Injury     Pain score:4     Pt presents for tx for exacerbation of chronic intermittent neck/back pain. Pt has hx of C4-7 fusion with Dr Armaan Blunt. Pt has extensive hx of injuries and conservative care; maintains home stretching, exercises, inversion table, foam rolling  7/24: Pt reports feeling and moving slightly better since last tx    Neck Pain   This is a chronic problem. The current episode started more than 1 year ago. The problem occurs constantly. The problem has been waxing and waning. The pain is present in the left side, midline and right side. The quality of the pain is described as aching, burning, cramping and shooting. The symptoms are aggravated by position, stress and sneezing. Stiffness is present in the morning. She has tried chiropractic manipulation, muscle relaxants and home exercises for the symptoms. Back Pain  This is a chronic problem.  The current episode started more than 1 year ago. The problem occurs constantly. The problem has been waxing and waning since onset. The pain is present in the gluteal, lumbar spine, sacro-iliac and thoracic spine. The quality of the pain is described as aching, burning and cramping. The symptoms are aggravated by bending, standing, sitting, twisting, position and stress. She has tried chiropractic manipulation, ice, heat, muscle relaxant and home exercises for the symptoms. Hip Pain     Ankle Pain       Past Medical History:   Diagnosis Date   • Constipation    • COVID-19 2022   • Depression    • Eczema    • Epilepsy ([address]) 11/15/2013    Description: well controlled with sleep   • GERD (gastroesophageal reflux disease)    • Grand mal convulsion ([address])     X 2 last episode -Sleep deprivation-Resolved with Cpap   • Hypertension    • Insomnia    • Medial meniscus tear 2015   • Migraine    • PONV (postoperative nausea and vomiting)    • Primary generalized epilepsy ([address]) 2011   • Psoriasis    • Seizures ([address])    • Shingles    • Shoulder dislocation, right, sequela 2018   • Sinusitis    • Sleep apnea     uses cpap   • Tinnitus       Past Surgical History:   Procedure Laterality Date   •  SECTION   and    • COLONOSCOPY     • DILATION AND CURETTAGE OF UTERUS     • DILATION AND CURETTAGE OF UTERUS WITH HYSTEROSOCPY N/A 2017    Procedure: D&C; IUD REMOVAL;  Surgeon: Jn Babcock MD;  [facility];  Service: Gynecology   • EGD     • HYSTEROSCOPY     • LAPAROSCOPY      (Diagnostic)   • LASIK     • [place] COLONOSCOPY FLX DX W/COLLJ SPEC WHEN PFRMD N/A 2018    Procedure: EGD AND COLONOSCOPY;  Surgeon: Romaine Resendiz MD;  [facility]; Service: Gastroenterology   • [place] VERTEBRAL CORPECTOMY ANT DCMPRN CERVICAL 1 SEG Bilateral 2021    Procedure: CORPECTOMY SPINE CERVICAL ANTERIOR: C4-7 ACDF with C6 /hemicorpectomy, C4-7 instrumentation and fusion (neuromonitoring);   Surgeon: Laurence Gonzalez MD;  [facility];  Service: Neurosurgery   • REMOVAL OF INTRAUTERINE DEVICE (IUD)       The following portions of the patient's history were reviewed and updated as appropriate: allergies, past family history, past medical history, past social history, past surgical history, and problem list.  Review of Systems   Musculoskeletal: Positive for back pain and neck pain. Physical Exam  Neck:        Comments: Pnful and limited in Fl, Rrot, Rlf  Musculoskeletal:      Cervical back: Pain with movement and muscular tenderness present. Decreased range of motion. Thoracic back: Spasms and tenderness present. Decreased range of motion. Lumbar back: Spasms and tenderness present. Decreased range of motion. Negative right straight leg raise test and negative left straight leg raise test.        Back:       Comments: Pnful and limited in Ext centralizes, Blf   Lymphadenopathy:      Cervical: No cervical adenopathy. Skin:     General: Skin is warm and dry. Neurological:      Mental Status: She is alert and oriented to person, place, and time. Gait: Gait is intact. Psychiatric:         Mood and Affect: Mood and affect normal.         Behavior: Behavior normal.       SOFT TISSUE ASSESSMENT: Hypertonicity and tenderness palpated B upper traps, B rhomboids, T12-S1 erector spinae, hip flexor, glute med/min JOINT RESTRICTIONS: T1-5, T12-S1 and R/L SIJ     Return in about 2 weeks (around 8/7/2023) for Next scheduled follow up.

## 2023-08-02 ENCOUNTER — PROCEDURE VISIT (OUTPATIENT)
Age: 43
End: 2023-08-02

## 2023-08-02 VITALS — WEIGHT: 225 LBS | BODY MASS INDEX: 35.31 KG/M2 | HEIGHT: 67 IN

## 2023-08-02 DIAGNOSIS — M48.02 CERVICAL SPINAL STENOSIS: Primary | ICD-10-CM

## 2023-08-02 DIAGNOSIS — M99.04 SEGMENTAL DYSFUNCTION OF SACRAL REGION: ICD-10-CM

## 2023-08-02 DIAGNOSIS — M62.838 MUSCLE SPASM: ICD-10-CM

## 2023-08-02 DIAGNOSIS — M51.9 LUMBAR DISC DISORDER: ICD-10-CM

## 2023-08-02 DIAGNOSIS — M54.12 CERVICAL RADICULOPATHY: ICD-10-CM

## 2023-08-02 DIAGNOSIS — M99.02 SEGMENTAL DYSFUNCTION OF THORACIC REGION: ICD-10-CM

## 2023-08-02 PROCEDURE — 98941 CHIROPRACT MANJ 3-4 REGIONS: CPT | Performed by: CHIROPRACTOR

## 2023-08-02 NOTE — PROGRESS NOTES
Initial date of service: 7/10/23    Diagnoses and all orders for this visit:    Cervical spinal stenosis    Lumbar disc disorder    Segmental dysfunction of thoracic region    Cervical radiculopathy    Segmental dysfunction of sacral region    Muscle spasm    Pt improved slightly with reduced pain and increased ROM    TREATMENT: 33658  Ther-ex: IASTM to affected mm hypertonicities (discussed soreness/ecchymosis up to 36 hrs post procedure); upper trap stretch, rhomboid stretch, prone on elbows, prone push-ups at shoulders, standing lumbopelvic extension, hip flexor ischemic compression, alternating prone hip extension, glute bridge, transitional mvmt education, abdominal bracing;Cervical traction -supine; Thoracic mobilization/manipulation: prone P-A mob, supine A-P manip; Lumbar mobilization/manipulation: extension-traction; SIJ Manipulation/Mobilization: R/L SIJ HVLA - long axis distraction    HPI  Eugenia Spencer is a 38 y.o. female  Chief Complaint   Patient presents with   • Neck Pain     Feeling really stiff all over 4.5 slept in a new bed and now she has pain all over    • Back Pain     Really stiff about a 4.5     Pt presents for tx for exacerbation of chronic intermittent neck/back pain. Pt has hx of C4-7 fusion with Dr Mike Ham. Pt has extensive hx of injuries and conservative care; maintains home stretching, exercises, inversion table, foam rolling  8/2: Pt reports feeling and moving slightly better since last tx but stiff/sore after 3-4 hr drive up to [facility] and sleeping in double bed    Neck Pain   This is a chronic problem. The current episode started more than 1 year ago. The problem occurs constantly. The problem has been waxing and waning. The pain is present in the left side, midline and right side. The quality of the pain is described as aching, burning, cramping and shooting. The symptoms are aggravated by position, stress and sneezing. Stiffness is present in the morning.  She has tried chiropractic manipulation, muscle relaxants and home exercises for the symptoms. Back Pain  This is a chronic problem. The current episode started more than 1 year ago. The problem occurs constantly. The problem has been waxing and waning since onset. The pain is present in the gluteal, lumbar spine, sacro-iliac and thoracic spine. The quality of the pain is described as aching, burning and cramping. The symptoms are aggravated by bending, standing, sitting, twisting, position and stress. She has tried chiropractic manipulation, ice, heat, muscle relaxant and home exercises for the symptoms. Hip Pain     Ankle Pain       Past Medical History:   Diagnosis Date   • Constipation    • COVID-19 2022   • Depression    • Eczema    • Epilepsy ([address]) 11/15/2013    Description: well controlled with sleep   • GERD (gastroesophageal reflux disease)    • Grand mal convulsion ([address])     X 2 last episode -Sleep deprivation-Resolved with Cpap   • Hypertension    • Insomnia    • Medial meniscus tear 2015   • Migraine    • PONV (postoperative nausea and vomiting)    • Primary generalized epilepsy ([address]) 2011   • Psoriasis    • Seizures ([address])    • Shingles    • Shoulder dislocation, right, sequela 2018   • Sinusitis    • Sleep apnea     uses cpap   • Tinnitus       Past Surgical History:   Procedure Laterality Date   •  SECTION   and    • COLONOSCOPY     • DILATION AND CURETTAGE OF UTERUS     • DILATION AND CURETTAGE OF UTERUS WITH HYSTEROSOCPY N/A 2017    Procedure: D&C; IUD REMOVAL;  Surgeon: Carlotta Morales MD;  [facility];  Service: Gynecology   • EGD     • HYSTEROSCOPY     • LAPAROSCOPY      (Diagnostic)   • LASIK     • [place] COLONOSCOPY FLX DX W/COLLJ SPEC WHEN PFRMD N/A 2018    Procedure: EGD AND COLONOSCOPY;  Surgeon: Arash Iglesias MD;  [facility];   Service: Gastroenterology   • [place] VERTEBRAL CORPECTOMY [facility] CERVICAL 1 SEG Bilateral 2021 Procedure: CORPECTOMY SPINE CERVICAL ANTERIOR: C4-7 ACDF with C6 /hemicorpectomy, C4-7 instrumentation and fusion (neuromonitoring); Surgeon: Shanta Gaffney MD;  [facility];  Service: Neurosurgery   • REMOVAL OF INTRAUTERINE DEVICE (IUD)       The following portions of the patient's history were reviewed and updated as appropriate: allergies, past family history, past medical history, past social history, past surgical history, and problem list.  Review of Systems   Musculoskeletal: Positive for back pain and neck pain. Physical Exam  Neck:        Comments: Pnful and limited in Fl, Rrot, Rlf  Musculoskeletal:      Cervical back: Pain with movement and muscular tenderness present. Decreased range of motion. Thoracic back: Spasms and tenderness present. Decreased range of motion. Lumbar back: Spasms and tenderness present. Decreased range of motion. Negative right straight leg raise test and negative left straight leg raise test.        Back:       Comments: Pnful and limited in Ext centralizes, Blf   Lymphadenopathy:      Cervical: No cervical adenopathy. Skin:     General: Skin is warm and dry. Neurological:      Mental Status: She is alert and oriented to person, place, and time. Gait: Gait is intact. Psychiatric:         Mood and Affect: Mood and affect normal.         Behavior: Behavior normal.       SOFT TISSUE ASSESSMENT: Hypertonicity and tenderness palpated B upper traps, B rhomboids, T12-S1 erector spinae, hip flexor, glute med/min JOINT RESTRICTIONS: T1-5, T12-S1 and R/L SIJ     Return in about 1 week (around 8/9/2023).

## 2023-08-16 ENCOUNTER — PROCEDURE VISIT (OUTPATIENT)
Age: 43
End: 2023-08-16

## 2023-08-16 VITALS — HEIGHT: 67 IN | WEIGHT: 225 LBS | BODY MASS INDEX: 35.31 KG/M2

## 2023-08-16 DIAGNOSIS — M99.04 SEGMENTAL DYSFUNCTION OF SACRAL REGION: ICD-10-CM

## 2023-08-16 DIAGNOSIS — M54.12 CERVICAL RADICULOPATHY: ICD-10-CM

## 2023-08-16 DIAGNOSIS — M99.02 SEGMENTAL DYSFUNCTION OF THORACIC REGION: ICD-10-CM

## 2023-08-16 DIAGNOSIS — M62.838 MUSCLE SPASM: ICD-10-CM

## 2023-08-16 DIAGNOSIS — M48.02 CERVICAL SPINAL STENOSIS: Primary | ICD-10-CM

## 2023-08-16 DIAGNOSIS — M51.9 LUMBAR DISC DISORDER: ICD-10-CM

## 2023-08-16 PROCEDURE — 98941 CHIROPRACT MANJ 3-4 REGIONS: CPT | Performed by: CHIROPRACTOR

## 2023-08-17 NOTE — PROGRESS NOTES
Initial date of service: 7/10/23    Diagnoses and all orders for this visit:    Cervical spinal stenosis    Lumbar disc disorder    Segmental dysfunction of thoracic region    Cervical radiculopathy    Segmental dysfunction of sacral region    Muscle spasm    Pt improved slightly with reduced pain and increased ROM    TREATMENT: 68533  Ther-ex: IASTM to affected mm hypertonicities (discussed soreness/ecchymosis up to 36 hrs post procedure); upper trap stretch, rhomboid stretch, prone on elbows, prone push-ups at shoulders, standing lumbopelvic extension, hip flexor ischemic compression, alternating prone hip extension, glute bridge, transitional mvmt education, abdominal bracing;Cervical traction -supine; Thoracic mobilization/manipulation: prone P-A mob, supine A-P manip; Lumbar mobilization/manipulation: extension-traction; SIJ Manipulation/Mobilization: R/L SIJ HVLA - long axis distraction    FELIZ Bone is a 38 y.o. female  Chief Complaint   Patient presents with   • Back Pain     Low back about a 4 sore stiff and tight states its out of wack rest of the back is a 2      Pt presents for tx for exacerbation of chronic intermittent neck/back pain. Pt has hx of C4-7 fusion with Dr Ute Martinez. Pt has extensive hx of injuries and conservative care; maintains home stretching, exercises, inversion table, foam rolling  8/16: Pt reports feeling and moving slightly better since last tx but stiff/sore after climbing [place] of [place] [employer]    Neck Pain   This is a chronic problem. The current episode started more than 1 year ago. The problem occurs constantly. The problem has been waxing and waning. The pain is present in the left side, midline and right side. The quality of the pain is described as aching, burning, cramping and shooting. The symptoms are aggravated by position, stress and sneezing. Stiffness is present in the morning.  She has tried chiropractic manipulation, muscle relaxants and home exercises for the symptoms. Back Pain  This is a chronic problem. The current episode started more than 1 year ago. The problem occurs constantly. The problem has been waxing and waning since onset. The pain is present in the gluteal, lumbar spine, sacro-iliac and thoracic spine. The quality of the pain is described as aching, burning and cramping. The symptoms are aggravated by bending, standing, sitting, twisting, position and stress. She has tried chiropractic manipulation, ice, heat, muscle relaxant and home exercises for the symptoms. Hip Pain     Ankle Pain       Past Medical History:   Diagnosis Date   • Constipation    • COVID-19 2022   • Depression    • Eczema    • Epilepsy ([address]) 11/15/2013    Description: well controlled with sleep   • GERD (gastroesophageal reflux disease)    • Grand mal convulsion ([address])     X 2 last episode -Sleep deprivation-Resolved with Cpap   • Hypertension    • Insomnia    • Medial meniscus tear 2015   • Migraine    • PONV (postoperative nausea and vomiting)    • Primary generalized epilepsy ([address]) 2011   • Psoriasis    • Seizures ([address])    • Shingles    • Shoulder dislocation, right, sequela 2018   • Sinusitis    • Sleep apnea     uses cpap   • Tinnitus       Past Surgical History:   Procedure Laterality Date   •  SECTION   and    • COLONOSCOPY     • DILATION AND CURETTAGE OF UTERUS     • DILATION AND CURETTAGE OF UTERUS WITH HYSTEROSOCPY N/A 2017    Procedure: D&C; IUD REMOVAL;  Surgeon: Joy Ferrer MD;  [facility];  Service: Gynecology   • EGD     • HYSTEROSCOPY     • LAPAROSCOPY      (Diagnostic)   • LASIK     • [place] COLONOSCOPY FLX DX W/COLLJ SPEC WHEN PFRMD N/A 2018    Procedure: EGD AND COLONOSCOPY;  Surgeon: Delonte Sotelo MD;  [facility];   Service: Gastroenterology   • [place] VERTEBRAL CORPECTOMY ANT DCMPRN CERVICAL 1 SEG Bilateral 2021    Procedure: CORPECTOMY SPINE CERVICAL ANTERIOR: C4-7 ACDF with C6 /hemicorpectomy, C4-7 instrumentation and fusion (neuromonitoring); Surgeon: David Hernandez MD;  [facility];  Service: Neurosurgery   • REMOVAL OF INTRAUTERINE DEVICE (IUD)       The following portions of the patient's history were reviewed and updated as appropriate: allergies, past family history, past medical history, past social history, past surgical history, and problem list.  Review of Systems   Musculoskeletal: Positive for back pain and neck pain. Physical Exam  Neck:        Comments: Pnful and limited in Fl, Rrot, Rlf  Musculoskeletal:      Cervical back: Pain with movement and muscular tenderness present. Decreased range of motion. Thoracic back: Spasms and tenderness present. Decreased range of motion. Lumbar back: Spasms and tenderness present. Decreased range of motion. Negative right straight leg raise test and negative left straight leg raise test.        Back:       Comments: Pnful and limited in Ext centralizes, Blf   Lymphadenopathy:      Cervical: No cervical adenopathy. Skin:     General: Skin is warm and dry. Neurological:      Mental Status: She is alert and oriented to person, place, and time. Gait: Gait is intact. Psychiatric:         Mood and Affect: Mood and affect normal.         Behavior: Behavior normal.       SOFT TISSUE ASSESSMENT: Hypertonicity and tenderness palpated B upper traps, B rhomboids, T12-S1 erector spinae, hip flexor, glute med/min JOINT RESTRICTIONS: T1-5, T12-S1 and R/L SIJ     Return in about 2 weeks (around 8/30/2023) for Next scheduled follow up.

## 2023-09-27 ENCOUNTER — APPOINTMENT (EMERGENCY)
Dept: ULTRASOUND IMAGING | Facility: HOSPITAL | Age: 43
End: 2023-09-27
Payer: COMMERCIAL

## 2023-09-27 ENCOUNTER — NURSE TRIAGE (OUTPATIENT)
Dept: OTHER | Facility: OTHER | Age: 43
End: 2023-09-27

## 2023-09-27 ENCOUNTER — APPOINTMENT (EMERGENCY)
Dept: CT IMAGING | Facility: HOSPITAL | Age: 43
End: 2023-09-27
Payer: COMMERCIAL

## 2023-09-27 ENCOUNTER — HOSPITAL ENCOUNTER (EMERGENCY)
Facility: HOSPITAL | Age: 43
Discharge: HOME | End: 2023-09-27
Attending: EMERGENCY MEDICINE
Payer: COMMERCIAL

## 2023-09-27 VITALS
OXYGEN SATURATION: 98 % | RESPIRATION RATE: 16 BRPM | HEART RATE: 96 BPM | DIASTOLIC BLOOD PRESSURE: 87 MMHG | TEMPERATURE: 98.7 F | SYSTOLIC BLOOD PRESSURE: 129 MMHG

## 2023-09-27 DIAGNOSIS — N93.9 EPISODE OF HEAVY VAGINAL BLEEDING: Primary | ICD-10-CM

## 2023-09-27 DIAGNOSIS — N93.9 ABNORMAL VAGINAL BLEEDING: Primary | ICD-10-CM

## 2023-09-27 LAB
ALBUMIN SERPL BCP-MCNC: 4.4 G/DL (ref 3.5–5)
ALP SERPL-CCNC: 59 U/L (ref 34–104)
ALT SERPL W P-5'-P-CCNC: 17 U/L (ref 7–52)
AMORPH URATE CRY URNS QL MICRO: ABNORMAL
ANION GAP SERPL CALCULATED.3IONS-SCNC: 10 MMOL/L
AST SERPL W P-5'-P-CCNC: 15 U/L (ref 13–39)
BACTERIA UR QL AUTO: ABNORMAL /HPF
BASOPHILS # BLD AUTO: 0.1 THOUSANDS/ÂΜL (ref 0–0.1)
BASOPHILS NFR BLD AUTO: 1 % (ref 0–1)
BILIRUB SERPL-MCNC: 0.76 MG/DL (ref 0.2–1)
BILIRUB UR QL STRIP: NEGATIVE
BUN SERPL-MCNC: 16 MG/DL (ref 5–25)
CALCIUM SERPL-MCNC: 10 MG/DL (ref 8.4–10.2)
CHLORIDE SERPL-SCNC: 98 MMOL/L (ref 96–108)
CLARITY UR: ABNORMAL
CO2 SERPL-SCNC: 30 MMOL/L (ref 21–32)
COLOR UR: ABNORMAL
CREAT SERPL-MCNC: 0.81 MG/DL (ref 0.6–1.3)
EOSINOPHIL # BLD AUTO: 0.34 THOUSAND/ÂΜL (ref 0–0.61)
EOSINOPHIL NFR BLD AUTO: 4 % (ref 0–6)
ERYTHROCYTE [DISTWIDTH] IN BLOOD BY AUTOMATED COUNT: 13 % (ref 11.6–15.1)
EXT PREGNANCY TEST URINE: NEGATIVE
EXT. CONTROL: NORMAL
GFR SERPL CREATININE-BSD FRML MDRD: 88 ML/MIN/1.73SQ M
GLUCOSE SERPL-MCNC: 94 MG/DL (ref 65–140)
GLUCOSE UR STRIP-MCNC: NEGATIVE MG/DL
HCT VFR BLD AUTO: 43.8 % (ref 34.8–46.1)
HGB BLD-MCNC: 14.8 G/DL (ref 11.5–15.4)
HGB UR QL STRIP.AUTO: ABNORMAL
IMM GRANULOCYTES # BLD AUTO: 0.04 THOUSAND/UL (ref 0–0.2)
IMM GRANULOCYTES NFR BLD AUTO: 0 % (ref 0–2)
KETONES UR STRIP-MCNC: NEGATIVE MG/DL
LEUKOCYTE ESTERASE UR QL STRIP: NEGATIVE
LIPASE SERPL-CCNC: 44 U/L (ref 11–82)
LYMPHOCYTES # BLD AUTO: 2.75 THOUSANDS/ÂΜL (ref 0.6–4.47)
LYMPHOCYTES NFR BLD AUTO: 29 % (ref 14–44)
MCH RBC QN AUTO: 30.5 PG (ref 26.8–34.3)
MCHC RBC AUTO-ENTMCNC: 33.8 G/DL (ref 31.4–37.4)
MCV RBC AUTO: 90 FL (ref 82–98)
MONOCYTES # BLD AUTO: 0.66 THOUSAND/ÂΜL (ref 0.17–1.22)
MONOCYTES NFR BLD AUTO: 7 % (ref 4–12)
NEUTROPHILS # BLD AUTO: 5.72 THOUSANDS/ÂΜL (ref 1.85–7.62)
NEUTS SEG NFR BLD AUTO: 59 % (ref 43–75)
NITRITE UR QL STRIP: NEGATIVE
NON-SQ EPI CELLS URNS QL MICRO: ABNORMAL /HPF
NRBC BLD AUTO-RTO: 0 /100 WBCS
PH UR STRIP.AUTO: 7 [PH]
PLATELET # BLD AUTO: 322 THOUSANDS/UL (ref 149–390)
PMV BLD AUTO: 9.6 FL (ref 8.9–12.7)
POTASSIUM SERPL-SCNC: 3.7 MMOL/L (ref 3.5–5.3)
PROT SERPL-MCNC: 7.5 G/DL (ref 6.4–8.4)
PROT UR STRIP-MCNC: NEGATIVE MG/DL
RBC # BLD AUTO: 4.85 MILLION/UL (ref 3.81–5.12)
RBC #/AREA URNS AUTO: ABNORMAL /HPF
SODIUM SERPL-SCNC: 138 MMOL/L (ref 135–147)
SP GR UR STRIP.AUTO: 1.01 (ref 1–1.03)
UROBILINOGEN UR STRIP-ACNC: <2 MG/DL
WBC # BLD AUTO: 9.61 THOUSAND/UL (ref 4.31–10.16)
WBC #/AREA URNS AUTO: ABNORMAL /HPF

## 2023-09-27 PROCEDURE — 99284 EMERGENCY DEPT VISIT MOD MDM: CPT

## 2023-09-27 PROCEDURE — 99285 EMERGENCY DEPT VISIT HI MDM: CPT | Performed by: EMERGENCY MEDICINE

## 2023-09-27 PROCEDURE — 76830 TRANSVAGINAL US NON-OB: CPT

## 2023-09-27 PROCEDURE — 74177 CT ABD & PELVIS W/CONTRAST: CPT

## 2023-09-27 PROCEDURE — 76856 US EXAM PELVIC COMPLETE: CPT

## 2023-09-27 PROCEDURE — 81001 URINALYSIS AUTO W/SCOPE: CPT

## 2023-09-27 PROCEDURE — 36415 COLL VENOUS BLD VENIPUNCTURE: CPT

## 2023-09-27 PROCEDURE — 85025 COMPLETE CBC W/AUTO DIFF WBC: CPT | Performed by: EMERGENCY MEDICINE

## 2023-09-27 PROCEDURE — G1004 CDSM NDSC: HCPCS

## 2023-09-27 PROCEDURE — 83690 ASSAY OF LIPASE: CPT | Performed by: EMERGENCY MEDICINE

## 2023-09-27 PROCEDURE — 96374 THER/PROPH/DIAG INJ IV PUSH: CPT

## 2023-09-27 PROCEDURE — 81025 URINE PREGNANCY TEST: CPT

## 2023-09-27 PROCEDURE — 80053 COMPREHEN METABOLIC PANEL: CPT | Performed by: EMERGENCY MEDICINE

## 2023-09-27 RX ORDER — KETOROLAC TROMETHAMINE 30 MG/ML
15 INJECTION, SOLUTION INTRAMUSCULAR; INTRAVENOUS ONCE
Status: COMPLETED | OUTPATIENT
Start: 2023-09-27 | End: 2023-09-27

## 2023-09-27 RX ADMIN — KETOROLAC TROMETHAMINE 15 MG: 30 INJECTION INTRAMUSCULAR; INTRAVENOUS at 17:33

## 2023-09-27 RX ADMIN — IOHEXOL 100 ML: 350 INJECTION, SOLUTION INTRAVENOUS at 19:07

## 2023-09-27 NOTE — ED PROVIDER NOTES
History  Chief Complaint   Patient presents with   • Vaginal Bleeding     Pt states she has right pelvic pain/cramping last night and states she went to the bathroom last night and was vaginally bleeding heavily. States she hasn't had a period since October 2012. She has an IUD placed. States bleeding has currently subsided. Reports fatigue. Denies SOB, chest pain, dizziness. Pt is a 38year old female who presents to ED for evaluation of vaginal bleeding and abdominal pain. Pt states that last evening she developed some RLQ abdominal pain and then around midnight, she had onset of heavy vaginal bleeding with clots. She reports soaking through a super tampon every 2 hours, but states the clots have decreased this afternoon and is just having bright red bleeding. She describes the pain in her RLQ as sharp and like she is "is being stepped on by a stilletto". Pain was rated 8-9/10 at worst, but is currently rated 3-4/10. She notes she took tylenol and ibuprofen at home during the night but it dod not help her pain so she took a muscle relaxer this morning around 8am, which did moderately reduce pain. She states that her last menstrual period was October 2012 when she had an IUD placed. But she had to have the IUD surgically removed around 2019 due to it becoming imbedded into the uterine wall and she was then switched to [facility] for contraception. She notes she has not been sexually active for 1 year. Denies fevers, pain with urination, nausea, vomiting,           Prior to Admission Medications   Prescriptions Last Dose Informant Patient Reported? Taking?    Etonogestrel (NEXPLANON SC)   Yes No   Sig: Inject under the skin Located Left upper inner arm   Multiple Vitamins-Minerals (MULTIVITAL-M) TABS   Yes No   Sig: Take by mouth   NON FORMULARY   Yes No   Sig: daily at bedtime Takes Valarien Root that's 1 PO QHS for Sleep   Omega 3-6-9 Fatty Acids (OMEGA 3-6-9 PO)   Yes No   Sig: Take by mouth   QUEtiapine (SEROquel) 25 mg tablet   No No   Sig: Take 3 tabs daily   TiZANidine (ZANAFLEX) 4 MG capsule   No No   Sig: Take 1 capsule (4 mg total) by mouth 3 (three) times a day as needed for muscle spasms   acetaminophen (TYLENOL) 325 mg tablet   Yes No   Sig: Take 650 mg by mouth every 6 (six) hours as needed for mild pain   albuterol (ProAir HFA) 90 mcg/act inhaler   No No   Sig: Inhale 2 puffs every 6 (six) hours as needed for shortness of breath   cholecalciferol (VITAMIN D3) 1,000 units tablet   Yes No   Sig: Take 5,000 Units by mouth daily     clotrimazole-betamethasone (LOTRISONE) 1-0.05 % cream   No No   Sig: Apply topically 2 (two) times a day   hydrochlorothiazide (HYDRODIURIL) 25 mg tablet   No No   Sig: Take 1 tablet (25 mg total) by mouth daily   magnesium 30 MG tablet   Yes No   Sig: Take 500 mg by mouth 2 (two) times a day     omeprazole (PriLOSEC) 20 mg delayed release capsule   No No   Sig: Take 1 capsule (20 mg total) by mouth daily   Patient taking differently: Take 20 mg by mouth if needed   tacrolimus (PROTOPIC) 0.1 % ointment   No No   Sig: Apply topically 2 (two) times a day   traZODone (DESYREL) 50 mg tablet   No No   Sig: Take 1 tablet (50 mg total) by mouth daily at bedtime as needed for sleep or depression      Facility-Administered Medications: None       Past Medical History:   Diagnosis Date   • Constipation    • COVID-19 01/05/2022   • Depression    • Eczema    • Epilepsy ([address]) 11/15/2013    Description: well controlled with sleep   • GERD (gastroesophageal reflux disease)    • Grand mal convulsion ([address])     X 2 last episode 2009-Sleep deprivation-Resolved with Cpap   • Hypertension    • Insomnia    • Medial meniscus tear 01/12/2015   • Migraine    • PONV (postoperative nausea and vomiting)    • Primary generalized epilepsy ([address]) 12/06/2011   • Psoriasis    • Seizures (HCC)    • Shingles    • Shoulder dislocation, right, sequela 05/01/2018   • Sinusitis    • Sleep apnea     uses cpap   • Tinnitus        Past Surgical History:   Procedure Laterality Date   •  SECTION   and    • COLONOSCOPY     • DILATION AND CURETTAGE OF UTERUS     • DILATION AND CURETTAGE OF UTERUS WITH HYSTEROSOCPY N/A 2017    Procedure: D&C; IUD REMOVAL;  Surgeon: Lilly Riddle MD;  [facility];  Service: Gynecology   • EGD     • HYSTEROSCOPY     • LAPAROSCOPY      (Diagnostic)   • LASIK     • NE COLONOSCOPY FLX DX W/COLLJ SPEC WHEN PFRMD N/A 2018    Procedure: EGD AND COLONOSCOPY;  Surgeon: Jeanne Trivedi MD;  Location: [facility]; Service: Gastroenterology   • NE VERTEBRAL CORPECTOMY ANT DCMPRN CERVICAL 1 SEG Bilateral 2021    Procedure: CORPECTOMY SPINE CERVICAL ANTERIOR: C4-7 ACDF with C6 /hemicorpectomy, C4-7 instrumentation and fusion (neuromonitoring); Surgeon: Van Campbell MD;  Location[facility];  Service: Neurosurgery   • REMOVAL OF INTRAUTERINE DEVICE (IUD)         Family History   Problem Relation Age of Onset   • No Known Problems Mother    • Diabetes Father         Diabetes Mellitus   • Hyperlipidemia Father    • Hypertension Father    • No Known Problems Sister    • No Known Problems Daughter    • Brain cancer Maternal Grandmother 64   • No Known Problems Son    • Migraines Family    • Diabetes Family         Type 2 Diabetes Mellitus   • Breast cancer Neg Hx      I have reviewed and agree with the history as documented.     E-Cigarette/Vaping   • E-Cigarette Use Never User      E-Cigarette/Vaping Substances   • Nicotine No    • THC No    • CBD No    • Flavoring No    • Other No    • Unknown No      Social History     Tobacco Use   • Smoking status: Former     Packs/day: 0.25     Years: 25.00     Total pack years: 6.25     Types: Cigarettes     Start date: 0     Quit date:      Years since quittin.7   • Smokeless tobacco: Never   • Tobacco comments:     Former smoker per [employer]   Vaping Use   • Vaping Use: Never used   Substance Use Topics   • Alcohol use: Yes     Comment: occassionally   • Drug use: Never        Review of Systems    Physical Exam  ED Triage Vitals [09/27/23 1517]   Temperature Pulse Respirations Blood Pressure SpO2   98.7 °F (37.1 °C) 96 16 129/87 98 %      Temp Source Heart Rate Source Patient Position - Orthostatic VS BP Location FiO2 (%)   Oral Monitor Sitting Left arm --      Pain Score       6             Orthostatic Vital Signs  Vitals:    09/27/23 1517   BP: 129/87   Pulse: 96   Patient Position - Orthostatic VS: Sitting       Physical Exam    ED Medications  Medications   ketorolac (TORADOL) injection 15 mg (15 mg Intravenous Given 9/27/23 1733)   iohexol (OMNIPAQUE) 350 MG/ML injection (MULTI-DOSE) 100 mL (100 mL Intravenous Given 9/27/23 1907)       Diagnostic Studies  Results Reviewed     Procedure Component Value Units Date/Time    Urine Microscopic [[id number]]  (Abnormal) Collected: 09/27/23 1735    Lab Status: Final result Specimen: Urine, Clean Catch Updated: 09/27/23 1821     RBC, UA None Seen /hpf      WBC, UA 2-4 /hpf      Epithelial Cells Occasional /hpf      Bacteria, UA Occasional /hpf      Amorphous Crystals, UA Innumerable    UA (URINE) with reflex to Scope [[id number]]  (Abnormal) Collected: 09/27/23 1735    Lab Status: Final result Specimen: Urine, Clean Catch Updated: 09/27/23 1805     Color, UA Light Yellow     Clarity, UA Turbid     Specific Gravity, UA 1.009     pH, UA 7.0     Leukocytes, UA Negative     Nitrite, UA Negative     Protein, UA Negative mg/dl      Glucose, UA Negative mg/dl      Ketones, UA Negative mg/dl      Urobilinogen, UA <2.0 mg/dl      Bilirubin, UA Negative     Occult Blood, UA Small    POCT pregnancy, urine [[id number]]  (Normal) Resulted: 09/27/23 1741    Lab Status: Final result Updated: 09/27/23 1741     EXT Preg Test, Ur Negative     Control Valid    Comprehensive metabolic panel [[id number]] Collected: 09/27/23 1521    Lab Status: Final result Specimen: Blood from Arm, Right Updated: 09/27/23 5453 Sodium 138 mmol/L      Potassium 3.7 mmol/L      Chloride 98 mmol/L      CO2 30 mmol/L      ANION GAP 10 mmol/L      BUN 16 mg/dL      Creatinine 0.81 mg/dL      Glucose 94 mg/dL      Calcium 10.0 mg/dL      AST 15 U/L      ALT 17 U/L      Alkaline Phosphatase 59 U/L      Total Protein 7.5 g/dL      Albumin 4.4 g/dL      Total Bilirubin 0.76 mg/dL      eGFR 88 ml/min/1.73sq m     Narrative:      National Kidney Disease Foundation guidelines for Chronic Kidney Disease (CKD):   •  Stage 1 with normal or high GFR (GFR > 90 mL/min/1.73 square meters)  •  Stage 2 Mild CKD (GFR = 60-89 mL/min/1.73 square meters)  •  Stage 3A Moderate CKD (GFR = 45-59 mL/min/1.73 square meters)  •  Stage 3B Moderate CKD (GFR = 30-44 mL/min/1.73 square meters)  •  Stage 4 Severe CKD (GFR = 15-29 mL/min/1.73 square meters)  •  Stage 5 End Stage CKD (GFR <15 mL/min/1.73 square meters)  Note: GFR calculation is accurate only with a steady state creatinine    Lipase [[id number]]  (Normal) Collected: 09/27/23 1521    Lab Status: Final result Specimen: Blood from Arm, Right Updated: 09/27/23 1602     Lipase 44 u/L     CBC and differential [[id number]] Collected: 09/27/23 1521    Lab Status: Final result Specimen: Blood from Arm, Right Updated: 09/27/23 1546     WBC 9.61 Thousand/uL      RBC 4.85 Million/uL      Hemoglobin 14.8 g/dL      Hematocrit 43.8 %      MCV 90 fL      MCH 30.5 pg      MCHC 33.8 g/dL      RDW 13.0 %      MPV 9.6 fL      Platelets 411 Thousands/uL      nRBC 0 /100 WBCs      Neutrophils Relative 59 %      Immat GRANS % 0 %      Lymphocytes Relative 29 %      Monocytes Relative 7 %      Eosinophils Relative 4 %      Basophils Relative 1 %      Neutrophils Absolute 5.72 Thousands/µL      Immature Grans Absolute 0.04 Thousand/uL      Lymphocytes Absolute 2.75 Thousands/µL      Monocytes Absolute 0.66 Thousand/µL      Eosinophils Absolute 0.34 Thousand/µL      Basophils Absolute 0.10 Thousands/µL                  CT abdomen pelvis with contrast   Final Result by Maribel Ji MD (09/27 1959)      No acute findings. No retained IUD fragment. Tiny uterine calcifications adjacent 2 the endometrium, likely the cause of the shadowing seen on the ultrasound. Workstation performed: [id number]         US pelvis complete w transvaginal   Final Result by Maribel Ji MD (09/27 1840)      No acute findings. Focal areas of shadowing from the endometrium, likely calcifications. An IUD fragment is not excluded. Workstation performed: [id number]               Procedures  Procedures      ED Course  ED Course as of 09/27/23 2101   Wed Sep 27, 2023   1819 CBC and differential  Grossly normal   1819 Comprehensive metabolic panel  Grossly normal    1846 US pelvis complete w transvaginal  IMPRESSION:No acute findings  Focal areas of shadowing from the endometrium, likely calcifications. An IUD fragment is not excluded. 2009 CT abdomen pelvis with contrast  IMPRESSION:No acute findings. No retained IUD fragment. Tiny uterine calcifications adjacent 2 the endometrium, likely the cause of the shadowing seen on the ultrasound. 2010 Discussed results with pt of labs, CT scan, and US. She states the bleeding has decreased at this point and pain has improved to a minimal level. She is comfortable with discharge home and follow up OP with her OB. Discussed indications to return to ED and she understands. SBIRT 22yo+    Flowsheet Row Most Recent Value   Initial Alcohol Screen: US AUDIT-C     1. How often do you have a drink containing alcohol? 0 Filed at: 09/27/2023 1745   2. How many drinks containing alcohol do you have on a typical day you are drinking? 0 Filed at: 09/27/2023 1745   3b. FEMALE Any Age, or MALE 65+: How often do you have 4 or more drinks on one occassion?  0 Filed at: 09/27/2023 1745   Audit-C Score 0 Filed at: 09/27/2023 1745   THEODORE: How many times in the past year have you. .. Used an illegal drug or used a prescription medication for non-medical reasons? Never Filed at: 09/27/2023 1745                Medical Decision Making  DDx: dysfunctional uterine bleeding, ovarian torsion, ovarian cyst rupture    Discussed results of labs and CT and US with pt. Amount and/or Complexity of Data Reviewed  Labs: ordered. Decision-making details documented in ED Course. Radiology: ordered. Decision-making details documented in ED Course. Risk  Prescription drug management. Disposition  Final diagnoses:   Abnormal vaginal bleeding     Time reflects when diagnosis was documented in both MDM as applicable and the Disposition within this note     Time User Action Codes Description Comment    9/27/2023  8:35 PM Ellen Triana Add [N93.9] Abnormal vaginal bleeding       ED Disposition     ED Disposition   Discharge    Condition   Stable    Date/Time   Wed Sep 27, 2023  8:35 PM    Comment   Angela Pollack discharge to home/self care. Follow-up Information     Follow up With Specialties Details Why Contact Info Additional [facility]  Call this week to make an appointment to follow up with your OBGYN provider. [id number] [facility] [address] [address]  [phone] [facility], [address] [place] [address], [id number] [phone]          Patient's Medications   Discharge Prescriptions    No medications on file     No discharge procedures on file. PDMP Review     None           ED Provider  Attending physically available and evaluated Angelajared Pollack. I managed the patient along with the ED Attending.     Electronically Signed by         Ellen Triana MD  09/27/23 5752

## 2023-09-27 NOTE — TELEPHONE ENCOUNTER
Spoke to patient. she woke up at midnight with extreme cramping ludivina on the right side and blood was "pouring out" she has not had a period since 2012 she had the IUD and currently has nexplanon which was replaced in dec 2022 last. She said currently she is changing a super tampon every 1.5-2 hours does not feel dizzy, lightheaded or sob. feels very tired. the cramping is a 7. she tried tylenol and ibuprofen and it is not helping with the pain. Reached out to on call provider to see what her recommendations are.

## 2023-09-27 NOTE — TELEPHONE ENCOUNTER
Patient reports heavy vaginal bleeding with abdominal pain/cramping that started around midnight. She does not wish to go to the ED or C and would like a call back to advise if she can be seen in the office today or tomorrow. Patient denies any other symptoms at this time. Reason for Disposition  • SEVERE abdominal pain    Answer Assessment - Initial Assessment Questions  N/A    Answer Assessment - Initial Assessment Questions  1. AMOUNT: "Describe the bleeding that you are having."     - SPOTTING: spotting, or pinkish / brownish mucous discharge; does not fill panti-liner or pad     - MILD:  less than 1 pad / hour; less than patient's usual menstrual bleeding    - MODERATE: 1-2 pads / hour; 1 menstrual cup every 6 hours; small-medium blood clots (e.g., pea, grape, small coin)    - SEVERE: soaking 2 or more pads/hour for 2 or more hours; 1 menstrual cup every 2 hours; bleeding not contained by pads or continuous red blood from vagina; large blood clots (e.g., golf ball, large coin)       Severe  2. ONSET: "When did the bleeding begin?" "Is it continuing now?"     Midnight   3. MENSTRUAL PERIOD: "When was the last normal menstrual period?" "How is this different than your period?"      October 2012  4. REGULARITY: "How regular are your periods?"      None since 2012  5. ABDOMINAL PAIN: "Do you have any pain?" "How bad is the pain?"  (e.g., Scale 1-10; mild, moderate, or severe)    - MILD (1-3): doesn't interfere with normal activities, abdomen soft and not tender to touch     - MODERATE (4-7): interferes with normal activities or awakens from sleep, tender to touch     - SEVERE (8-10): excruciating pain, doubled over, unable to do any normal activities       Severe  6. PREGNANCY: "Could you be pregnant?" "Are you sexually active?" "Did you recently give birth?"      Denies   7.  BREASTFEEDING: "Are you breastfeeding?"      N/A  8. HORMONES: "Are you taking any hormone medications, prescription or OTC?" (e.g., birth control pills, estrogen)      Birth control insert  9. BLOOD THINNERS: "Do you take any blood thinners?" (e.g., Coumadin/warfarin, Pradaxa/dabigatran, aspirin)      Baby ASA in the AM  10. CAUSE: "What do you think is causing the bleeding?" (e.g., recent gyn surgery, recent gyn procedure; known bleeding disorder, cervical cancer, polycystic ovarian disease, fibroids)          Constipation   11. HEMODYNAMIC STATUS: "Are you weak or feeling lightheaded?" If Yes, ask: "Can you stand and walk normally?"        Denies   12.  OTHER SYMPTOMS: "What other symptoms are you having with the bleeding?" (e.g., passed tissue, vaginal discharge, fever, menstrual-type cramps)        Cramping, RLQ pain    Protocols used: VAGINAL BLEEDING - ABNORMAL-ADULT-AH, VAGINAL BLEEDING - POSTMENOPAUSAL-ADULT-AH

## 2023-09-27 NOTE — TELEPHONE ENCOUNTER
Regarding: Severe abdominal pain/ heavy bleeding  ----- Message from Lori Person sent at 9/27/2023  7:10 AM EDT -----  Patient calling in to let the nurse know that she has call block and will try to remove it or if possible to call back from a land line number

## 2023-09-27 NOTE — TELEPHONE ENCOUNTER
Regarding: Severe abdominal pain/ heavy bleeding  ----- Message from Debra Villalba sent at 9/27/2023  6:56 AM EDT -----  "I haven't had a period since 10/2012. Last night I had intense cramping. I went to the bathroom and saw a whole bunch of blood so I put a tampon on. 2 hrs later I took ibuprofen and Tylenol and I was still bleeding heavily every 2 hrs. It feels like someone is walking on my uterus in [facility]. "

## 2023-09-28 ENCOUNTER — TELEPHONE (OUTPATIENT)
Dept: OBGYN CLINIC | Facility: CLINIC | Age: 43
End: 2023-09-28

## 2023-09-28 NOTE — TELEPHONE ENCOUNTER
Placed call to patient, scheduled for tomorrow 9/29/23 with Enrike Pandey in our [place] ([place]) office for ED f/u for vaginal bleeding.

## 2023-09-28 NOTE — TELEPHONE ENCOUNTER
Pt was seen at the ED for vaginal bleeding and abdominal pain, was sent home and told to call her OBGYN to schedule an apt. Pt is currently in pain and would like an apt asap.

## 2023-09-29 ENCOUNTER — OFFICE VISIT (OUTPATIENT)
Dept: OBGYN CLINIC | Facility: CLINIC | Age: 43
End: 2023-09-29
Payer: COMMERCIAL

## 2023-09-29 VITALS
HEIGHT: 67 IN | BODY MASS INDEX: 34.53 KG/M2 | SYSTOLIC BLOOD PRESSURE: 138 MMHG | WEIGHT: 220 LBS | DIASTOLIC BLOOD PRESSURE: 82 MMHG

## 2023-09-29 DIAGNOSIS — N89.8 VAGINAL DISCHARGE: Primary | ICD-10-CM

## 2023-09-29 DIAGNOSIS — R10.2 PELVIC PAIN: ICD-10-CM

## 2023-09-29 PROCEDURE — 99214 OFFICE O/P EST MOD 30 MIN: CPT

## 2023-09-29 RX ORDER — METRONIDAZOLE 500 MG/1
500 TABLET ORAL EVERY 12 HOURS SCHEDULED
Qty: 14 TABLET | Refills: 0 | Status: SHIPPED | OUTPATIENT
Start: 2023-09-29 | End: 2023-10-06

## 2023-09-29 RX ORDER — NAPROXEN 500 MG/1
500 TABLET ORAL
Qty: 30 TABLET | Refills: 0 | Status: SHIPPED | OUTPATIENT
Start: 2023-09-29

## 2023-09-29 NOTE — ED ATTENDING ATTESTATION
9/27/2023  I, Paramjit Benavidez MD, saw and evaluated the patient. I have discussed the patient with the resident/non-physician practitioner and agree with the resident's/non-physician practitioner's findings, Plan of Care, and MDM as documented in the resident's/non-physician practitioner's note, except where noted. All available labs and Radiology studies were reviewed. I was present for key portions of any procedure(s) performed by the resident/non-physician practitioner and I was immediately available to provide assistance. At this point I agree with the current assessment done in the Emergency Department. I have conducted an independent evaluation of this patient a history and physical is as follows:    Patient is 38-year-old female who presents to the emergency department for evaluation of vaginal bleeding. History of IUD use with need for removal via hysteroscopy 4 to 5 years ago and subsequent Nexplanon use since. She reports last menses was greater than 10 years ago. Abruptly at midnight she developed severe pelvic cramping followed by heavy vaginal bleeding. She was changing for plus tampon every couple of hours appreciating passage of clots as well as bright red blood. Pain maximal in the right upper quadrant. This initially felt as though someone was stepping on the area with a stiletto. At this time vaginal bleeding has lessened. She describes it as a darker red to brown in the pain much milder. She experiences frequent constipation a milder aching sensation in the right lower abdomen over the last few days. Upon her own palpation she appreciated some firmness and suspected this was from stool buildup. She has subsequently passed a good amount of stool without lessening of the pain. Today she has felt fatigued. Over the last week she generally felt a bit off.   She describes processing things more slowly than usual.  No fevers, nasal congestion, cough, chest discomfort, dyspnea or urinary symptoms. She has not appreciated any new bleeding from inappropriate sites such as gingival, epistaxis, easy bruising, hematuria or blood in stools. She notes that multiple family members have read suggestion as to what causing her discomfort and irregular bleeding. She was in touch with her OB/GYN office and referred into the ED. On exam she appears mildly uncomfortable. Mucous membranes are moist.  No pallor nor icterus. Heart sounds regular. Lungs clear to auscultation bilaterally. No CVA tenderness. Abdomen soft with normal active bowel sounds. Mild to moderately tender in the suprapubic and right lower quadrant region without guarding. Pelvic deferred. Differential diagnosis fluids but is not limited to abnormal urine bleeding secondary to hormone fluctuation/possible perimenopausal state, pregnancy related complications endometrial hyperplasia, endometrial CA, fibroids +/- involution of same, ovarian cyst +/- rupture +/- torsion, ureteral colic, UTI, diverticulitis, colitis, constipation/intestinal cramping. Pregnancy complications very unlikely as patient self reports having not participated in intercourse over the last year. For years she has had discomfort upon attempt with intercourse. She has seen her GYN for this. ED Course     Lab notable for normal hemoglobin despite blood loss. Urinalysis results unremarkable. These are not at all suggestive of UTI. Pelvic ultrasound does not identify free fluid or ovarian cysts. Small fibroid is present. CT scan pursued to look for non-GYN pathology which might be contributing to symptoms. Appendix was visualized and normal.  No acute abnormality. Patient may continue with acetaminophen and NSAID use for discomfort. She is arranging close follow-up with OB/GYN.     Critical Care Time  Procedures

## 2023-09-29 NOTE — PROGRESS NOTES
Assessment/Plan:     Problem List Items Addressed This Visit    None  Visit Diagnoses     Vaginal discharge    -  Primary    Relevant Medications    metroNIDAZOLE (FLAGYL) 500 mg tablet    Other Relevant Orders    Genital Comprehensive Culture    Pelvic pain        Relevant Medications    metroNIDAZOLE (FLAGYL) 500 mg tablet    naproxen (EC NAPROSYN) 500 MG EC tablet    Other Relevant Orders    Urine culture          1. Genital and urine culture collected to rule out infection. 2. Reviewed HPI and physical exam with on-call provider, Dr. Cherise Hernandez, to discuss plan of care. Patient declines going to ED. 3. Agreed POC with Dr. Cherise Hernandez for preemptive treatment of vaginitis with Metronidazole 500 mg BID x 7 days. Pain recommendation is NSAID; Naproxen BID-TID q 6-8 hours with food for pain relief. Recommended utilization of staggered tylenol for adjunct pain relief. 4. Advised patient to report to ED with any worsening symptoms (including dizziness/vertigo), SOB, chest pain, fever, chills, diarrhea, or return of extensive vaginal bleeding. 5. RTO in 1-2 weeks for follow-up. Subjective:      Patient ID: Zaida Dickerson is a 38 y.o. female. Patient presents to the office for ED follow-up after one-episode of vaginal bleeding that occurred on Tues, 9/27 around midnight with large gushes. She reports that within 6 hours she went through 4 Super+ tampons. +RLQ/pelvic pain. 9-10/10. She went to the ED; blood work was normal including CBC/CMP/lipase/hcg urine/urinalysis. CT scan and TVUS showed tiny calcifications along with 14mm posterior intramural body fibroid and focal areas of shadowing from the endometrium. Once discharged from ED, she reports bleeding gradually lessened but then changed to black, foul odor discharge with severe pelvic cramping. She reports the black discharge was gelatinous  and smelled like 'road-kill'. She reports excessive diarrhea and bloating since ED visit following pelvic pain.  Denies urinary urgency, dysuria, and frequency. +urinary hesitancy. She denies fever and chills. Tramadol was given in ED, which relieved pain and she has used a muscle relaxant for relief of pain at night. She has history of IUD placed in Oct 2012. Last reported menstrual period was in 10/2012. Removed in 2017 due to embedment into the fundus of the uterus. Removal surgery done by Dr. Tanya Kwan, who reported it was intact at time of removal. Nexplanon placed in 2019 and then removed and reinserted in 2022. She reports since IUD was removed, she has experienced pain with IC; was seen at pelvic PT but no relief. Denies any sexual intercourse in over a year and only with . Denies history of STD/STI. She reports history of excruciating periods prior to utilization of birth control. She is currently experiencing vertigo, dizziness, and pelvic pain in the office. Pelvic pain currently is 7/10. She reports the pain is not constant but comes in waves and is primarily, located to the right. ROS pertinent to HPI. The following portions of the patient's history were reviewed and updated as appropriate:   She  has a past medical history of Constipation, COVID-19 (01/05/2022), Depression, Eczema, Epilepsy ([address]) (11/15/2013), GERD (gastroesophageal reflux disease), Grand mal convulsion ([address]), Hypertension, Insomnia, Medial meniscus tear (01/12/2015), Migraine, PONV (postoperative nausea and vomiting), Primary generalized epilepsy ([address]) (12/06/2011), Psoriasis, Seizures ([address]), Shingles, Shoulder dislocation, right, sequela (05/01/2018), Sinusitis, Sleep apnea, and Tinnitus.   She   Patient Active Problem List    Diagnosis Date Noted   • Gastroesophageal reflux disease without esophagitis 09/26/2022   • Polyarthralgia 09/26/2022   • S/P cervical discectomy 02/08/2021   • Cervical spinal stenosis 02/02/2021   • Cervical radiculopathy 12/01/2020   • Essential hypertension 10/15/2020   • Constipation 10/24/2018   • Lateral epicondylitis of right elbow 2018   • Essential hypertriglyceridemia 2017   • Insomnia 2017   • Obstructive sleep apnea on CPAP 2017   • Obesity 10/08/2013     She  has a past surgical history that includes LASIK; Hysteroscopy; DILATION AND CURETTAGE OF UTERUS WITH HYSTEROSOCPY (N/A, 2017); LAPAROSCOPY; REMOVAL OF INTRAUTERINE DEVICE (IUD); pr colonoscopy flx dx w/collj spec when pfrmd (N/A, 2018);  section ( and ); Dilation and curettage of uterus; EGD; Colonoscopy; and pr vertebral corpectomy ant dcmprn cervical 1 seg (Bilateral, 2021). Her family history includes Brain cancer (age of onset: 64) in her maternal grandmother; Diabetes in her family and father; Hyperlipidemia in her father; Hypertension in her father; Migraines in her family; No Known Problems in her daughter, mother, sister, and son. She  reports that she quit smoking about 25 years ago. Her smoking use included cigarettes. She started smoking about 26 years ago. She has a 6.25 pack-year smoking history. She has never used smokeless tobacco. She reports current alcohol use. She reports that she does not use drugs.   Current Outpatient Medications   Medication Sig Dispense Refill   • acetaminophen (TYLENOL) 325 mg tablet Take 650 mg by mouth every 6 (six) hours as needed for mild pain     • albuterol (ProAir HFA) 90 mcg/act inhaler Inhale 2 puffs every 6 (six) hours as needed for shortness of breath 18 g 0   • cholecalciferol (VITAMIN D3) 1,000 units tablet Take 5,000 Units by mouth daily       • Etonogestrel (NEXPLANON SC) Inject under the skin Located Left upper inner arm     • hydrochlorothiazide (HYDRODIURIL) 25 mg tablet Take 1 tablet (25 mg total) by mouth daily 90 tablet 2   • magnesium 30 MG tablet Take 500 mg by mouth 2 (two) times a day       • Multiple Vitamins-Minerals (MULTIVITAL-M) TABS Take by mouth     • NON FORMULARY daily at bedtime Takes Valarien Root that's 1 PO QHS for Sleep     • Omega 3-6-9 Fatty Acids (OMEGA 3-6-9 PO) Take by mouth     • omeprazole (PriLOSEC) 20 mg delayed release capsule Take 1 capsule (20 mg total) by mouth daily (Patient taking differently: Take 20 mg by mouth if needed) 90 capsule 1   • QUEtiapine (SEROquel) 25 mg tablet Take 3 tabs daily 270 tablet 3   • TiZANidine (ZANAFLEX) 4 MG capsule Take 1 capsule (4 mg total) by mouth 3 (three) times a day as needed for muscle spasms 90 capsule 3   • traZODone (DESYREL) 50 mg tablet Take 1 tablet (50 mg total) by mouth daily at bedtime as needed for sleep or depression 90 tablet 3   • clotrimazole-betamethasone (LOTRISONE) 1-0.05 % cream Apply topically 2 (two) times a day (Patient not taking: Reported on 9/29/2023) 30 g 0   • metroNIDAZOLE (FLAGYL) 500 mg tablet Take 1 tablet (500 mg total) by mouth every 12 (twelve) hours for 7 days 14 tablet 0   • naproxen (EC NAPROSYN) 500 MG EC tablet Take 1 tablet (500 mg total) by mouth 3 (three) times a day with meals 30 tablet 0   • tacrolimus (PROTOPIC) 0.1 % ointment Apply topically 2 (two) times a day (Patient not taking: Reported on 9/29/2023) 60 g 5     No current facility-administered medications for this visit.      Current Outpatient Medications on File Prior to Visit   Medication Sig   • acetaminophen (TYLENOL) 325 mg tablet Take 650 mg by mouth every 6 (six) hours as needed for mild pain   • albuterol (ProAir HFA) 90 mcg/act inhaler Inhale 2 puffs every 6 (six) hours as needed for shortness of breath   • cholecalciferol (VITAMIN D3) 1,000 units tablet Take 5,000 Units by mouth daily     • Etonogestrel (NEXPLANON SC) Inject under the skin Located Left upper inner arm   • hydrochlorothiazide (HYDRODIURIL) 25 mg tablet Take 1 tablet (25 mg total) by mouth daily   • magnesium 30 MG tablet Take 500 mg by mouth 2 (two) times a day     • Multiple Vitamins-Minerals (MULTIVITAL-M) TABS Take by mouth   • NON FORMULARY daily at bedtime Takes Valarien Root that's 1 PO QHS for Sleep   • Omega 3-6-9 Fatty Acids (OMEGA 3-6-9 PO) Take by mouth   • omeprazole (PriLOSEC) 20 mg delayed release capsule Take 1 capsule (20 mg total) by mouth daily (Patient taking differently: Take 20 mg by mouth if needed)   • QUEtiapine (SEROquel) 25 mg tablet Take 3 tabs daily   • TiZANidine (ZANAFLEX) 4 MG capsule Take 1 capsule (4 mg total) by mouth 3 (three) times a day as needed for muscle spasms   • traZODone (DESYREL) 50 mg tablet Take 1 tablet (50 mg total) by mouth daily at bedtime as needed for sleep or depression   • clotrimazole-betamethasone (LOTRISONE) 1-0.05 % cream Apply topically 2 (two) times a day (Patient not taking: Reported on 9/29/2023)   • tacrolimus (PROTOPIC) 0.1 % ointment Apply topically 2 (two) times a day (Patient not taking: Reported on 9/29/2023)     No current facility-administered medications on file prior to visit. She is allergic to gluten meal - food allergy and lactose - food allergy. .    Review of Systems      Objective:      /82 (BP Location: Left arm, Patient Position: Sitting, Cuff Size: Large)   Ht 5' 7" (1.702 m)   Wt 99.8 kg (220 lb)   LMP 10/01/2012 (Approximate) Comment: nexplanon 2022  BMI 34.46 kg/m²          Physical Exam  Vitals and nursing note reviewed. Constitutional:       General: She is in acute distress. Appearance: Normal appearance. She is well-developed and normal weight. She is ill-appearing. HENT:      Head: Normocephalic and atraumatic. Eyes:      Conjunctiva/sclera: Conjunctivae normal.   Cardiovascular:      Rate and Rhythm: Normal rate and regular rhythm. Heart sounds: Normal heart sounds. Pulmonary:      Effort: Pulmonary effort is normal.      Breath sounds: Normal breath sounds. Chest:   Breasts:     Breasts are symmetrical.      Right: Normal. No inverted nipple, mass, nipple discharge, skin change or tenderness. Left: Normal. No inverted nipple, mass, nipple discharge, skin change or tenderness. Abdominal:      General: Abdomen is flat. There is no distension. Palpations: Abdomen is soft. There is no mass. Tenderness: There is abdominal tenderness. Genitourinary:     General: Normal vulva. Exam position: Lithotomy position. Pubic Area: No rash or pubic lice. Labia:         Right: No rash or tenderness. Left: No rash or tenderness. Urethra: No urethral pain. Vagina: Vaginal discharge present. No erythema, tenderness or bleeding. Cervix: No cervical motion tenderness, discharge, friability, lesion, erythema or cervical bleeding. Uterus: Normal. Not deviated, not enlarged, not tender and no uterine prolapse. Adnexa: Left adnexa normal.        Right: Tenderness present. No mass. Left: No mass or tenderness. Comments: Thick, curd-like brown discharge noted in posterior vagina. Negative CMT. Mild mid pelvis pain noted on bimanual exam. +pain with grimace and guarding noted with bimanual exam of R adnexa/pelvis. Musculoskeletal:         General: No tenderness. Normal range of motion. Cervical back: Normal range of motion. Right lower leg: No edema. Left lower leg: No edema. Lymphadenopathy:      Upper Body:      Right upper body: No supraclavicular or axillary adenopathy. Left upper body: No supraclavicular or axillary adenopathy. Lower Body: No right inguinal adenopathy. No left inguinal adenopathy. Skin:     General: Skin is warm and dry. Neurological:      Mental Status: She is alert and oriented to person, place, and time. Motor: No weakness. Comments: Reported dizziness   Psychiatric:         Mood and Affect: Mood normal.         Behavior: Behavior normal.         Thought Content:  Thought content normal.         Judgment: Judgment normal.

## 2023-10-10 ENCOUNTER — OFFICE VISIT (OUTPATIENT)
Dept: FAMILY MEDICINE CLINIC | Facility: CLINIC | Age: 43
End: 2023-10-10
Payer: COMMERCIAL

## 2023-10-10 VITALS
OXYGEN SATURATION: 98 % | BODY MASS INDEX: 34.97 KG/M2 | WEIGHT: 222.8 LBS | HEART RATE: 102 BPM | RESPIRATION RATE: 17 BRPM | HEIGHT: 67 IN | SYSTOLIC BLOOD PRESSURE: 142 MMHG | TEMPERATURE: 98 F | DIASTOLIC BLOOD PRESSURE: 82 MMHG

## 2023-10-10 DIAGNOSIS — K59.00 CONSTIPATION, UNSPECIFIED CONSTIPATION TYPE: Primary | ICD-10-CM

## 2023-10-10 DIAGNOSIS — Z23 FLU VACCINE NEED: ICD-10-CM

## 2023-10-10 DIAGNOSIS — I10 ESSENTIAL HYPERTENSION: ICD-10-CM

## 2023-10-10 DIAGNOSIS — M54.12 CERVICAL RADICULOPATHY: ICD-10-CM

## 2023-10-10 DIAGNOSIS — K21.9 GASTROESOPHAGEAL REFLUX DISEASE WITHOUT ESOPHAGITIS: ICD-10-CM

## 2023-10-10 DIAGNOSIS — G47.33 OBSTRUCTIVE SLEEP APNEA ON CPAP: ICD-10-CM

## 2023-10-10 PROCEDURE — 90686 IIV4 VACC NO PRSV 0.5 ML IM: CPT

## 2023-10-10 PROCEDURE — 90471 IMMUNIZATION ADMIN: CPT

## 2023-10-10 PROCEDURE — 99214 OFFICE O/P EST MOD 30 MIN: CPT | Performed by: FAMILY MEDICINE

## 2023-10-10 RX ORDER — LOSARTAN POTASSIUM 50 MG/1
50 TABLET ORAL DAILY
Qty: 90 TABLET | Refills: 0 | Status: SHIPPED | OUTPATIENT
Start: 2023-10-10

## 2023-10-10 RX ORDER — OMEPRAZOLE 40 MG/1
40 CAPSULE, DELAYED RELEASE ORAL DAILY
Qty: 90 CAPSULE | Refills: 0 | Status: SHIPPED | OUTPATIENT
Start: 2023-10-10

## 2023-10-10 NOTE — PROGRESS NOTES
Name: Jeanne Calix      : 1980      MRN: [id number]  Encounter Provider: Rtiu Still MD  Encounter Date: 10/10/2023   Encounter department: [facility]     1. Constipation, unspecified constipation type  Assessment & Plan:  Has BM every 1-3 days   To take miralax and probiotics daily       Orders:  -     Ambulatory Referral to Gastroenterology; Future    2. Obstructive sleep apnea on CPAP  Assessment & Plan:  Continue CPAP qhs      3. Cervical radiculopathy  Assessment & Plan:  Muscle relaxer prn   No issues recently       4. Flu vaccine need  -     influenza vaccine, quadrivalent, 0.5 mL, preservative-free, for adult and pediatric patients 6 mos+ (AFLURIA, FLUARIX, FLULAVAL, FLUZONE)    5. Gastroesophageal reflux disease without esophagitis  Assessment & Plan:  EGD  showed gastric erosion   To start omeprazole 40 mg daily   To get EGD again with GI   Referral given today    Orders:  -     Ambulatory Referral to Gastroenterology; Future  -     omeprazole (PriLOSEC) 40 MG capsule; Take 1 capsule (40 mg total) by mouth daily    6. Essential hypertension  Assessment & Plan:  Not controlled   Added losartan today    Orders:  -     losartan (COZAAR) 50 mg tablet; Take 1 tablet (50 mg total) by mouth daily  -     CBC and differential  -     Comprehensive metabolic panel  -     Lipid panel        Depression Screening and Follow-up Plan: Patient's depression screening was positive with a PHQ-2 score of 3. Their PHQ-9 score was 6. Depression likely due to other medical condition. Will treat underlying condition. Subjective    here for follow up  Went to ER 2 weeks ago for vaginal bleeding     Hypertension  This is a chronic problem. The problem has been waxing and waning since onset. The problem is uncontrolled.  Pertinent negatives include no anxiety, blurred vision, chest pain, headaches, malaise/fatigue, neck pain, orthopnea, palpitations, peripheral edema, PND, shortness of breath or sweats. There is no history of kidney disease or CAD/MI. Review of Systems   Constitutional: Negative. Negative for malaise/fatigue. Eyes: Negative for blurred vision. Respiratory: Negative for shortness of breath. Cardiovascular: Negative for chest pain, palpitations, orthopnea and PND. Musculoskeletal: Negative for neck pain. Neurological: Negative for headaches.        Current Outpatient Medications on File Prior to Visit   Medication Sig   • acetaminophen (TYLENOL) 325 mg tablet Take 650 mg by mouth every 6 (six) hours as needed for mild pain   • albuterol (ProAir HFA) 90 mcg/act inhaler Inhale 2 puffs every 6 (six) hours as needed for shortness of breath   • cholecalciferol (VITAMIN D3) 1,000 units tablet Take 5,000 Units by mouth daily     • Etonogestrel (NEXPLANON SC) Inject under the skin Located Left upper inner arm   • hydrochlorothiazide (HYDRODIURIL) 25 mg tablet Take 1 tablet (25 mg total) by mouth daily   • magnesium 30 MG tablet Take 500 mg by mouth 2 (two) times a day     • Multiple Vitamins-Minerals (MULTIVITAL-M) TABS Take by mouth   • naproxen (EC NAPROSYN) 500 MG EC tablet Take 1 tablet (500 mg total) by mouth 3 (three) times a day with meals   • NON FORMULARY daily at bedtime Takes Valarien Root that's 1 PO QHS for Sleep   • Omega 3-6-9 Fatty Acids (OMEGA 3-6-9 PO) Take by mouth   • QUEtiapine (SEROquel) 25 mg tablet Take 3 tabs daily   • TiZANidine (ZANAFLEX) 4 MG capsule Take 1 capsule (4 mg total) by mouth 3 (three) times a day as needed for muscle spasms   • traZODone (DESYREL) 50 mg tablet Take 1 tablet (50 mg total) by mouth daily at bedtime as needed for sleep or depression   • [DISCONTINUED] omeprazole (PriLOSEC) 20 mg delayed release capsule Take 1 capsule (20 mg total) by mouth daily (Patient taking differently: Take 20 mg by mouth if needed)   • clotrimazole-betamethasone (LOTRISONE) 1-0.05 % cream Apply topically 2 (two) times a day (Patient not taking: Reported on 9/29/2023)   • tacrolimus (PROTOPIC) 0.1 % ointment Apply topically 2 (two) times a day (Patient not taking: Reported on 9/29/2023)       Objective     /82 (BP Location: Left arm, Patient Position: Sitting, Cuff Size: Standard)   Pulse 102   Temp 98 °F (36.7 °C) (Tympanic)   Resp 17   Ht 5' 7" (1.702 m)   Wt 101 kg (222 lb 12.8 oz)   LMP 10/01/2012 (Approximate) Comment: nexplanon 2022  SpO2 98%   BMI 34.90 kg/m²     Physical Exam  Vitals and nursing note reviewed. Constitutional:       Appearance: Normal appearance. HENT:      Head: Normocephalic and atraumatic. Right Ear: Tympanic membrane normal.      Left Ear: Tympanic membrane normal.      Nose: Nose normal.      Mouth/Throat:      Mouth: Mucous membranes are moist.   Eyes:      Pupils: Pupils are equal, round, and reactive to light. Cardiovascular:      Rate and Rhythm: Normal rate and regular rhythm. Pulses: Normal pulses. Heart sounds: Normal heart sounds. Pulmonary:      Effort: Pulmonary effort is normal.      Breath sounds: Normal breath sounds. Musculoskeletal:         General: Normal range of motion. Cervical back: Normal range of motion and neck supple. Neurological:      General: No focal deficit present. Mental Status: She is alert and oriented to person, place, and time.        Donovan Avila MD

## 2023-10-10 NOTE — ASSESSMENT & PLAN NOTE
EGD 2018 showed gastric erosion   To start omeprazole 40 mg daily   To get EGD again with GI   Referral given today

## 2023-10-11 ENCOUNTER — CONSULT (OUTPATIENT)
Dept: GASTROENTEROLOGY | Facility: AMBULARY SURGERY CENTER | Age: 43
End: 2023-10-11
Payer: COMMERCIAL

## 2023-10-11 VITALS
HEIGHT: 67 IN | OXYGEN SATURATION: 98 % | SYSTOLIC BLOOD PRESSURE: 142 MMHG | HEART RATE: 92 BPM | WEIGHT: 223.6 LBS | BODY MASS INDEX: 35.09 KG/M2 | DIASTOLIC BLOOD PRESSURE: 90 MMHG

## 2023-10-11 DIAGNOSIS — K21.9 GASTROESOPHAGEAL REFLUX DISEASE WITHOUT ESOPHAGITIS: ICD-10-CM

## 2023-10-11 DIAGNOSIS — R10.31 RIGHT LOWER QUADRANT ABDOMINAL PAIN: Primary | ICD-10-CM

## 2023-10-11 DIAGNOSIS — K59.00 CONSTIPATION, UNSPECIFIED CONSTIPATION TYPE: ICD-10-CM

## 2023-10-11 PROCEDURE — 99204 OFFICE O/P NEW MOD 45 MIN: CPT | Performed by: INTERNAL MEDICINE

## 2023-10-11 RX ORDER — LINACLOTIDE 145 UG/1
145 CAPSULE, GELATIN COATED ORAL DAILY
Qty: 90 CAPSULE | Refills: 3 | Status: SHIPPED | OUTPATIENT
Start: 2023-10-11

## 2023-10-11 RX ORDER — POLYETHYLENE GLYCOL 3350, SODIUM SULFATE ANHYDROUS, SODIUM BICARBONATE, SODIUM CHLORIDE, POTASSIUM CHLORIDE 236; 22.74; 6.74; 5.86; 2.97 G/4L; G/4L; G/4L; G/4L; G/4L
4000 POWDER, FOR SOLUTION ORAL ONCE
Qty: 4000 ML | Refills: 0 | Status: SHIPPED | OUTPATIENT
Start: 2023-10-11 | End: 2023-10-18

## 2023-10-11 NOTE — H&P (VIEW-ONLY)
[facility] - Outpatient Consultation  Roselia Mayer 38 y.o. female MRN: [id number]  Encounter: [id number]      PCP: Rogelio Nunez MD  Referring: Rogelio Nunez MD  [address],  [address] [place] [address]      ASSESSMENT AND PLAN:      1. Right lower quadrant abdominal pain  2. Constipation, unspecified constipation type  Discussed multifactorial pathophysiology of chronic idiopathic constipation  Therapeutic failure of over-the-counter colonic stimulants, stool softeners, bulking agents/psyllium fiber, polyethylene glycol  Recommend osmotic laxative-we will prescribe Linzess,  Will plan for bidirectional endoscopic evaluation to evaluate for organic pathology contributing to her change in bowel habits  - Ambulatory Referral to Gastroenterology  - Colonoscopy; Future  - EGD; Future  - polyethylene glycol (Golytely) 4000 mL solution; Take 4,000 mL by mouth once for 1 dose Take 4000 mL by mouth once for 1 dose. Use as directed  Dispense: 4000 mL; Refill: 0  - linaCLOtide (Linzess) 145 MCG CAPS; Take 1 capsule (145 mcg total) by mouth in the morning  Dispense: 90 capsule; Refill: 3    3. Gastroesophageal reflux disease without esophagitis  Continue omeprazole  Discussed anti-reflux measures, and offered handout detailing, including weight loss, avoidance of lying down after meals, recognize/avoid trigger foods, and elevating the head of bed. - Ambulatory Referral to Gastroenterology  - Colonoscopy; Future  - EGD; Future  - polyethylene glycol (Golytely) 4000 mL solution; Take 4,000 mL by mouth once for 1 dose Take 4000 mL by mouth once for 1 dose. Use as directed  Dispense: 4000 mL; Refill: 0      ______________________________________________________________________    CC:  Chief Complaint   Patient presents with    Advice Only     Patient states she is has chronic constipation. Patient states she has pain in the abdomen.          HPI:      Patient is a 49-year-old female who is seen for constipation and right lower quadrant abdominal pain. She has GERD, HTN, KHADAR on CPAP, cervical radiculopathy s/p cervical discectomy, lateral epicondylitis of the right elbow, hypertriglyceridemia, polyarthralgias, BMI of 35. At baseline she has bowel movements every 2 to 3 days, 6 weeks ago she noted a change in bowel habits. She is now having bowel movements once every 6 days associated with painful and hard to pass stools and incomplete evacuation. She has associated right lower quadrant abdominal pain which is stabbing in nature, and associated with abdominal bloating. She has been using MiraLAX 1 capful daily in her coffee, and had to use over-the-counter magnesium citrate as needed to help stimulate a bowel movement. In addition she has reflux and epigastric burning which is associated after meals. She is on omeprazole 40 mg daily she has additionally started Activia and Kefir without significant benefit. She recognizes gluten and lactose intolerance. She was recently seen in the ER for the symptoms,  CT abdomen/pelvis September 2023-no enteric contrast-moderate stool burden      REVIEW OF SYSTEMS:    CONSTITUTIONAL: Denies any fever, chills, rigors, and weight loss. HEENT: No earache or tinnitus. Denies hearing loss or visual disturbances. CARDIOVASCULAR: No chest pain or palpitations. RESPIRATORY: Denies any cough, hemoptysis, shortness of breath or dyspnea on exertion. GASTROINTESTINAL: As noted in the History of Present Illness. GENITOURINARY: No problems with urination. Denies any hematuria or dysuria. NEUROLOGIC: No dizziness or vertigo, denies headaches. MUSCULOSKELETAL: Denies any muscle or joint pain. SKIN: Denies skin rashes or itching. ENDOCRINE: Denies excessive thirst. Denies intolerance to heat or cold. PSYCHOSOCIAL: Denies depression or anxiety. Denies any recent memory loss.        Historical Information   Past Medical History:   Diagnosis Date Constipation     COVID-19 2022    Depression     Eczema     Epilepsy ([address]) 11/15/2013    Description: well controlled with sleep    GERD (gastroesophageal reflux disease)     Grand mal convulsion ([address])     X 2 last episode -Sleep deprivation-Resolved with Cpap    Hypertension     Insomnia     Medial meniscus tear 2015    Migraine     PONV (postoperative nausea and vomiting)     Primary generalized epilepsy ([address]) 2011    Psoriasis     Seizures ([address])     Shingles     Shoulder dislocation, right, sequela 2018    Sinusitis     Sleep apnea     uses cpap    Tinnitus      Past Surgical History:   Procedure Laterality Date     SECTION   and     COLONOSCOPY      DILATION AND CURETTAGE OF UTERUS      DILATION AND CURETTAGE OF UTERUS WITH HYSTEROSOCPY N/A 2017    Procedure: D&C; IUD REMOVAL;  Surgeon: Javon Garcia MD;  Location: [facility];  Service: Gynecology    EGD      HYSTEROSCOPY      LAPAROSCOPY      (Diagnostic)    LASIK      UT COLONOSCOPY FLX DX W/COLLJ SPEC WHEN PFRMD N/A 2018    Procedure: EGD AND COLONOSCOPY;  Surgeon: Jackson Mike MD;  Location: [facility]; Service: Gastroenterology    UT VERTEBRAL CORPECTOMY ANT DCMPRN CERVICAL 1 SEG Bilateral 2021    Procedure: CORPECTOMY SPINE CERVICAL ANTERIOR: C4-7 ACDF with C6 /hemicorpectomy, C4-7 instrumentation and fusion (neuromonitoring);   Surgeon: Delilah Leonard MD;  Location: [facility];  Service: Neurosurgery    REMOVAL OF INTRAUTERINE DEVICE (IUD)       Social History   Social History     Substance and Sexual Activity   Alcohol Use Yes    Comment: occassionally     Social History     Substance and Sexual Activity   Drug Use Never     Social History     Tobacco Use   Smoking Status Former    Packs/day: 0.25    Years: 25.00    Total pack years: 6.25    Types: Cigarettes    Start date: 0    Quit date:     Years since quittin.7   Smokeless Tobacco Never   Tobacco Comments    Former smoker per [employer]     Family History   Problem Relation Age of Onset    No Known Problems Mother     Diabetes Father         Diabetes Mellitus    Hyperlipidemia Father     Hypertension Father     No Known Problems Sister     No Known Problems Daughter     Brain cancer Maternal Grandmother 64    No Known Problems Son     Migraines Family     Diabetes Family         Type 2 Diabetes Mellitus    Breast cancer Neg Hx        Meds/Allergies       Current Outpatient Medications:     acetaminophen (TYLENOL) 325 mg tablet    albuterol (ProAir HFA) 90 mcg/act inhaler    cholecalciferol (VITAMIN D3) 1,000 units tablet    Etonogestrel (NEXPLANON SC)    hydrochlorothiazide (HYDRODIURIL) 25 mg tablet    losartan (COZAAR) 50 mg tablet    magnesium 30 MG tablet    Multiple Vitamins-Minerals (MULTIVITAL-M) TABS    naproxen (EC NAPROSYN) 500 MG EC tablet    NON FORMULARY    Omega 3-6-9 Fatty Acids (OMEGA 3-6-9 PO)    omeprazole (PriLOSEC) 40 MG capsule    QUEtiapine (SEROquel) 25 mg tablet    TiZANidine (ZANAFLEX) 4 MG capsule    traZODone (DESYREL) 50 mg tablet    clotrimazole-betamethasone (LOTRISONE) 1-0.05 % cream    tacrolimus (PROTOPIC) 0.1 % ointment    Allergies   Allergen Reactions    Gluten Meal - Food Allergy GI Intolerance     " Highly Sensitivity"    Lactose - Food Allergy GI Intolerance     " Highly Sensitive"           Objective     Blood pressure 142/90, pulse 92, height 5' 7" (1.702 m), weight 101 kg (223 lb 9.6 oz), last menstrual period 10/01/2012, SpO2 98 %. Body mass index is 35.02 kg/m². PHYSICAL EXAM:      General Appearance:   Alert, cooperative, no distress   HEENT:   Normocephalic, atraumatic, anicteric. Neck:  Supple, symmetrical, trachea midline   Lungs:   Clear to auscultation bilaterally; no rales, rhonchi or wheezing; respirations unlabored    Heart[de-identified]   Regular rate and rhythm; no murmur, rub, or gallop.    Abdomen:   Soft, lower quadrant tenderness to palpation, non-distended; normal bowel sounds; no masses, no organomegaly    Genitalia:   Deferred    Rectal:   Deferred    Extremities:  No cyanosis, clubbing or edema    Pulses:  2+ and symmetric    Skin:  No jaundice, rashes, or lesions    Lymph nodes:  No palpable cervical lymphadenopathy        Lab Results:     Lab Results   Component Value Date    WBC 9.61 09/27/2023    HGB 14.8 09/27/2023    HCT 43.8 09/27/2023    MCV 90 09/27/2023     09/27/2023       Lab Results   Component Value Date     10/16/2013    K 3.7 09/27/2023    CL 98 09/27/2023    CO2 30 09/27/2023    ANIONGAP 8 10/16/2013    BUN 16 09/27/2023    CREATININE 0.81 09/27/2023    GLUCOSE 94 10/16/2013    GLUF 113 (H) 04/14/2023    CALCIUM 10.0 09/27/2023    AST 15 09/27/2023    ALT 17 09/27/2023    ALKPHOS 59 09/27/2023    PROT 8.1 10/16/2013    BILITOT 0.75 10/16/2013    EGFR 88 09/27/2023       Lab Results   Component Value Date    INR 1.01 01/13/2021    PROTIME 13.4 01/13/2021         Radiology Results:   CT abdomen pelvis with contrast    Result Date: 9/27/2023  Narrative: CT ABDOMEN AND PELVIS WITH IV CONTRAST INDICATION:   RLQ abdominal pain (Age >= 14y) Severe R lower abd pain, 2 m difficulty emptying bladder, heavy vaginal bleeding over night. "Pt is a 38year old female who presents to ED for evaluation of vaginal bleeding and abdominal pain. Pt states that last evening she developed some RLQ abdominal pain and then around midnight, she had onset of heavy vaginal bleeding with clots. She reports soaking through a super tampon every 2 hours, but states the clots have decreased this afternoon and is just having bright red bleeding. She describes the pain in her RLQ as sharp and like she is "is being stepped on by a stilletto". Pain was rated 8-9/10 at worst, but is currently rated 3-4/10. "  "She states that her last menstrual period was October 2012 when she had an IUD placed.  But she  had to have the IUD surgically removed around 2019 due to it becoming imbedded into the uterine wall and she was then switched to Nexplenon for contraception. " COMPARISON: Pelvic ultrasound from earlier today. TECHNIQUE:  CT examination of the abdomen and pelvis was performed. Multiplanar 2D reformatted images were created from the source data. This examination, like all CT scans performed in the [facility], was performed utilizing techniques to minimize radiation dose exposure, including the use of iterative reconstruction and automated exposure control. Radiation dose length product (DLP) for this visit:  789 mGy-cm IV Contrast:  100 mL of iohexol (OMNIPAQUE) Enteric Contrast:  Enteric contrast was not administered. FINDINGS: ABDOMEN LOWER CHEST:  No clinically significant abnormality identified in the visualized lower chest. LIVER/BILIARY TREE: One or more subcentimeter sharply circumscribed low-density hepatic lesion(s) are noted, too small to accurately characterize, but statistically most likely to represent subcentimeter hepatic cysts. No suspicious solid hepatic lesion  is identified. Hepatic contours are normal.  No biliary dilatation. GALLBLADDER:  No calcified gallstones. No pericholecystic inflammatory change. SPLEEN:  Unremarkable. PANCREAS:  Unremarkable. ADRENAL GLANDS:  Unremarkable. KIDNEYS/URETERS: Scattered 3 mm and smaller cysts. No hydronephrosis or perinephric collection. STOMACH AND BOWEL: Moderate colonic stool burden. APPENDIX:  No findings to suggest appendicitis. ABDOMINOPELVIC CAVITY:  No ascites. No pneumoperitoneum. No lymphadenopathy. VESSELS:  Unremarkable for patient's age. PELVIS REPRODUCTIVE ORGANS: Posterior corpus fibroid better seen on the ultrasound. No retained IUD fragment. Tiny calcifications adjacent to the endometrium likely responsible for the shadowing seen on the ultrasound. URINARY BLADDER:  Unremarkable. ABDOMINAL WALL/INGUINAL REGIONS:  Unremarkable. OSSEOUS STRUCTURES:  No acute fracture or destructive osseous lesion. Impression: No acute findings. No retained IUD fragment. Tiny uterine calcifications adjacent 2 the endometrium, likely the cause of the shadowing seen on the ultrasound. Workstation performed: [id number]     US pelvis complete w transvaginal    Result Date: 9/27/2023  Narrative: PELVIC ULTRASOUND, COMPLETE INDICATION:  The patient is 38years old. Vaginal bleeding w/RLQ pain. COMPARISON: None TECHNIQUE:   Transabdominal pelvic ultrasound was performed in sagittal and transverse planes with a curvilinear transducer. Additional transvaginal imaging was performed to better evaluate the endometrium and ovaries. Imaging included volumetric sweeps as well as traditional still imaging technique. FINDINGS: UTERUS: The uterus is anteverted in position, measuring 9.3 x 4.8 x 6.2 cm. 14 mm posterior body intramural fibroid. The cervix appears within normal limits. ENDOMETRIUM: The endometrial echo complex has an AP caliber of 4.0 mm. The endometrium is mildly distorted by the fibroid. Diffusely increased echogenicity of the endometrium with scattered areas of shadowing likely to represent endometrial calcifications; similar appearance to the 2017 study. An IUD fragment is not excluded. OVARIES/ADNEXA: Right ovary:  2.4 x 2.3 x 3.3 cm. 9.6 mL. Left ovary:  2.7 x 1.6 x 1.5 cm. 3.5 mL. Ovarian Doppler flow is within normal limits. No suspicious ovarian or adnexal abnormality. OTHER: No free fluid or loculated fluid collections. Impression: No acute findings. Focal areas of shadowing from the endometrium, likely calcifications. An IUD fragment is not excluded. Workstation performed: [id number]       Portions of the record may have been created with voice recognition software. Occasional wrong word or "sound a like" substitutions may have occurred due to the inherent limitations of voice recognition software. Read the chart carefully and recognize, using context, where substitutions have occurred.

## 2023-10-11 NOTE — PROGRESS NOTES
[facility] - Outpatient Consultation  Prem Bishop 38 y.o. female MRN: [id number]  Encounter: [id number]      PCP: Dana Rossi MD  Referring: Dana Rossi MD  [address],  [address] [place] [address]      ASSESSMENT AND PLAN:      1. Right lower quadrant abdominal pain  2. Constipation, unspecified constipation type  Discussed multifactorial pathophysiology of chronic idiopathic constipation  Therapeutic failure of over-the-counter colonic stimulants, stool softeners, bulking agents/psyllium fiber, polyethylene glycol  Recommend osmotic laxative-we will prescribe Linzess,  Will plan for bidirectional endoscopic evaluation to evaluate for organic pathology contributing to her change in bowel habits  - Ambulatory Referral to Gastroenterology  - Colonoscopy; Future  - EGD; Future  - polyethylene glycol (Golytely) 4000 mL solution; Take 4,000 mL by mouth once for 1 dose Take 4000 mL by mouth once for 1 dose. Use as directed  Dispense: 4000 mL; Refill: 0  - linaCLOtide (Linzess) 145 MCG CAPS; Take 1 capsule (145 mcg total) by mouth in the morning  Dispense: 90 capsule; Refill: 3    3. Gastroesophageal reflux disease without esophagitis  Continue omeprazole  Discussed anti-reflux measures, and offered handout detailing, including weight loss, avoidance of lying down after meals, recognize/avoid trigger foods, and elevating the head of bed. - Ambulatory Referral to Gastroenterology  - Colonoscopy; Future  - EGD; Future  - polyethylene glycol (Golytely) 4000 mL solution; Take 4,000 mL by mouth once for 1 dose Take 4000 mL by mouth once for 1 dose. Use as directed  Dispense: 4000 mL; Refill: 0      ______________________________________________________________________    CC:  Chief Complaint   Patient presents with    Advice Only     Patient states she is has chronic constipation. Patient states she has pain in the abdomen.          HPI:      Patient is a 75-year-old female who is seen for constipation and right lower quadrant abdominal pain. She has GERD, HTN, KHADAR on CPAP, cervical radiculopathy s/p cervical discectomy, lateral epicondylitis of the right elbow, hypertriglyceridemia, polyarthralgias, BMI of 35. At baseline she has bowel movements every 2 to 3 days, 6 weeks ago she noted a change in bowel habits. She is now having bowel movements once every 6 days associated with painful and hard to pass stools and incomplete evacuation. She has associated right lower quadrant abdominal pain which is stabbing in nature, and associated with abdominal bloating. She has been using MiraLAX 1 capful daily in her coffee, and had to use over-the-counter magnesium citrate as needed to help stimulate a bowel movement. In addition she has reflux and epigastric burning which is associated after meals. She is on omeprazole 40 mg daily she has additionally started Activia and Kefir without significant benefit. She recognizes gluten and lactose intolerance. She was recently seen in the ER for the symptoms,  CT abdomen/pelvis September 2023-no enteric contrast-moderate stool burden      REVIEW OF SYSTEMS:    CONSTITUTIONAL: Denies any fever, chills, rigors, and weight loss. HEENT: No earache or tinnitus. Denies hearing loss or visual disturbances. CARDIOVASCULAR: No chest pain or palpitations. RESPIRATORY: Denies any cough, hemoptysis, shortness of breath or dyspnea on exertion. GASTROINTESTINAL: As noted in the History of Present Illness. GENITOURINARY: No problems with urination. Denies any hematuria or dysuria. NEUROLOGIC: No dizziness or vertigo, denies headaches. MUSCULOSKELETAL: Denies any muscle or joint pain. SKIN: Denies skin rashes or itching. ENDOCRINE: Denies excessive thirst. Denies intolerance to heat or cold. PSYCHOSOCIAL: Denies depression or anxiety. Denies any recent memory loss.        Historical Information   Past Medical History:   Diagnosis Date Constipation     COVID-19 2022    Depression     Eczema     Epilepsy ([address]) 11/15/2013    Description: well controlled with sleep    GERD (gastroesophageal reflux disease)     Grand mal convulsion ([address])     X 2 last episode -Sleep deprivation-Resolved with Cpap    Hypertension     Insomnia     Medial meniscus tear 2015    Migraine     PONV (postoperative nausea and vomiting)     Primary generalized epilepsy ([address]) 2011    Psoriasis     Seizures ([address])     Shingles     Shoulder dislocation, right, sequela 2018    Sinusitis     Sleep apnea     uses cpap    Tinnitus      Past Surgical History:   Procedure Laterality Date     SECTION   and     COLONOSCOPY      DILATION AND CURETTAGE OF UTERUS      DILATION AND CURETTAGE OF UTERUS WITH HYSTEROSOCPY N/A 2017    Procedure: D&C; IUD REMOVAL;  Surgeon: Sjai Fontana MD;  Location: [facility];  Service: Gynecology    EGD      HYSTEROSCOPY      LAPAROSCOPY      (Diagnostic)    LASIK      KY COLONOSCOPY FLX DX W/COLLJ SPEC WHEN PFRMD N/A 2018    Procedure: EGD AND COLONOSCOPY;  Surgeon: Elena Cast MD;  Location: [facility]; Service: Gastroenterology    KY VERTEBRAL CORPECTOMY ANT DCMPRN CERVICAL 1 SEG Bilateral 2021    Procedure: CORPECTOMY SPINE CERVICAL ANTERIOR: C4-7 ACDF with C6 /hemicorpectomy, C4-7 instrumentation and fusion (neuromonitoring);   Surgeon: Abel Sow MD;  Location: [facility];  Service: Neurosurgery    REMOVAL OF INTRAUTERINE DEVICE (IUD)       Social History   Social History     Substance and Sexual Activity   Alcohol Use Yes    Comment: occassionally     Social History     Substance and Sexual Activity   Drug Use Never     Social History     Tobacco Use   Smoking Status Former    Packs/day: 0.25    Years: 25.00    Total pack years: 6.25    Types: Cigarettes    Start date: 0    Quit date:     Years since quittin.7   Smokeless Tobacco Never   Tobacco Comments    Former smoker per [employer]     Family History   Problem Relation Age of Onset    No Known Problems Mother     Diabetes Father         Diabetes Mellitus    Hyperlipidemia Father     Hypertension Father     No Known Problems Sister     No Known Problems Daughter     Brain cancer Maternal Grandmother 64    No Known Problems Son     Migraines Family     Diabetes Family         Type 2 Diabetes Mellitus    Breast cancer Neg Hx        Meds/Allergies       Current Outpatient Medications:     acetaminophen (TYLENOL) 325 mg tablet    albuterol (ProAir HFA) 90 mcg/act inhaler    cholecalciferol (VITAMIN D3) 1,000 units tablet    Etonogestrel (NEXPLANON SC)    hydrochlorothiazide (HYDRODIURIL) 25 mg tablet    losartan (COZAAR) 50 mg tablet    magnesium 30 MG tablet    Multiple Vitamins-Minerals (MULTIVITAL-M) TABS    naproxen (EC NAPROSYN) 500 MG EC tablet    NON FORMULARY    Omega 3-6-9 Fatty Acids (OMEGA 3-6-9 PO)    omeprazole (PriLOSEC) 40 MG capsule    QUEtiapine (SEROquel) 25 mg tablet    TiZANidine (ZANAFLEX) 4 MG capsule    traZODone (DESYREL) 50 mg tablet    clotrimazole-betamethasone (LOTRISONE) 1-0.05 % cream    tacrolimus (PROTOPIC) 0.1 % ointment    Allergies   Allergen Reactions    Gluten Meal - Food Allergy GI Intolerance     " Highly Sensitivity"    Lactose - Food Allergy GI Intolerance     " Highly Sensitive"           Objective     Blood pressure 142/90, pulse 92, height 5' 7" (1.702 m), weight 101 kg (223 lb 9.6 oz), last menstrual period 10/01/2012, SpO2 98 %. Body mass index is 35.02 kg/m². PHYSICAL EXAM:      General Appearance:   Alert, cooperative, no distress   HEENT:   Normocephalic, atraumatic, anicteric. Neck:  Supple, symmetrical, trachea midline   Lungs:   Clear to auscultation bilaterally; no rales, rhonchi or wheezing; respirations unlabored    Heart[de-identified]   Regular rate and rhythm; no murmur, rub, or gallop.    Abdomen:   Soft, lower quadrant tenderness to palpation, non-distended; normal bowel sounds; no masses, no organomegaly    Genitalia:   Deferred    Rectal:   Deferred    Extremities:  No cyanosis, clubbing or edema    Pulses:  2+ and symmetric    Skin:  No jaundice, rashes, or lesions    Lymph nodes:  No palpable cervical lymphadenopathy        Lab Results:     Lab Results   Component Value Date    WBC 9.61 09/27/2023    HGB 14.8 09/27/2023    HCT 43.8 09/27/2023    MCV 90 09/27/2023     09/27/2023       Lab Results   Component Value Date     10/16/2013    K 3.7 09/27/2023    CL 98 09/27/2023    CO2 30 09/27/2023    ANIONGAP 8 10/16/2013    BUN 16 09/27/2023    CREATININE 0.81 09/27/2023    GLUCOSE 94 10/16/2013    GLUF 113 (H) 04/14/2023    CALCIUM 10.0 09/27/2023    AST 15 09/27/2023    ALT 17 09/27/2023    ALKPHOS 59 09/27/2023    PROT 8.1 10/16/2013    BILITOT 0.75 10/16/2013    EGFR 88 09/27/2023       Lab Results   Component Value Date    INR 1.01 01/13/2021    PROTIME 13.4 01/13/2021         Radiology Results:   CT abdomen pelvis with contrast    Result Date: 9/27/2023  Narrative: CT ABDOMEN AND PELVIS WITH IV CONTRAST INDICATION:   RLQ abdominal pain (Age >= 14y) Severe R lower abd pain, 2 m difficulty emptying bladder, heavy vaginal bleeding over night. "Pt is a 38year old female who presents to ED for evaluation of vaginal bleeding and abdominal pain. Pt states that last evening she developed some RLQ abdominal pain and then around midnight, she had onset of heavy vaginal bleeding with clots. She reports soaking through a super tampon every 2 hours, but states the clots have decreased this afternoon and is just having bright red bleeding. She describes the pain in her RLQ as sharp and like she is "is being stepped on by a stilletto". Pain was rated 8-9/10 at worst, but is currently rated 3-4/10. "  "She states that her last menstrual period was October 2012 when she had an IUD placed.  But she  had to have the IUD surgically removed around 2019 due to it becoming imbedded into the uterine wall and she was then switched to Nexplenon for contraception. " COMPARISON: Pelvic ultrasound from earlier today. TECHNIQUE:  CT examination of the abdomen and pelvis was performed. Multiplanar 2D reformatted images were created from the source data. This examination, like all CT scans performed in the [facility], was performed utilizing techniques to minimize radiation dose exposure, including the use of iterative reconstruction and automated exposure control. Radiation dose length product (DLP) for this visit:  789 mGy-cm IV Contrast:  100 mL of iohexol (OMNIPAQUE) Enteric Contrast:  Enteric contrast was not administered. FINDINGS: ABDOMEN LOWER CHEST:  No clinically significant abnormality identified in the visualized lower chest. LIVER/BILIARY TREE: One or more subcentimeter sharply circumscribed low-density hepatic lesion(s) are noted, too small to accurately characterize, but statistically most likely to represent subcentimeter hepatic cysts. No suspicious solid hepatic lesion  is identified. Hepatic contours are normal.  No biliary dilatation. GALLBLADDER:  No calcified gallstones. No pericholecystic inflammatory change. SPLEEN:  Unremarkable. PANCREAS:  Unremarkable. ADRENAL GLANDS:  Unremarkable. KIDNEYS/URETERS: Scattered 3 mm and smaller cysts. No hydronephrosis or perinephric collection. STOMACH AND BOWEL: Moderate colonic stool burden. APPENDIX:  No findings to suggest appendicitis. ABDOMINOPELVIC CAVITY:  No ascites. No pneumoperitoneum. No lymphadenopathy. VESSELS:  Unremarkable for patient's age. PELVIS REPRODUCTIVE ORGANS: Posterior corpus fibroid better seen on the ultrasound. No retained IUD fragment. Tiny calcifications adjacent to the endometrium likely responsible for the shadowing seen on the ultrasound. URINARY BLADDER:  Unremarkable. ABDOMINAL WALL/INGUINAL REGIONS:  Unremarkable. OSSEOUS STRUCTURES:  No acute fracture or destructive osseous lesion. Impression: No acute findings. No retained IUD fragment. Tiny uterine calcifications adjacent 2 the endometrium, likely the cause of the shadowing seen on the ultrasound. Workstation performed: [id number]     US pelvis complete w transvaginal    Result Date: 9/27/2023  Narrative: PELVIC ULTRASOUND, COMPLETE INDICATION:  The patient is 38years old. Vaginal bleeding w/RLQ pain. COMPARISON: None TECHNIQUE:   Transabdominal pelvic ultrasound was performed in sagittal and transverse planes with a curvilinear transducer. Additional transvaginal imaging was performed to better evaluate the endometrium and ovaries. Imaging included volumetric sweeps as well as traditional still imaging technique. FINDINGS: UTERUS: The uterus is anteverted in position, measuring 9.3 x 4.8 x 6.2 cm. 14 mm posterior body intramural fibroid. The cervix appears within normal limits. ENDOMETRIUM: The endometrial echo complex has an AP caliber of 4.0 mm. The endometrium is mildly distorted by the fibroid. Diffusely increased echogenicity of the endometrium with scattered areas of shadowing likely to represent endometrial calcifications; similar appearance to the 2017 study. An IUD fragment is not excluded. OVARIES/ADNEXA: Right ovary:  2.4 x 2.3 x 3.3 cm. 9.6 mL. Left ovary:  2.7 x 1.6 x 1.5 cm. 3.5 mL. Ovarian Doppler flow is within normal limits. No suspicious ovarian or adnexal abnormality. OTHER: No free fluid or loculated fluid collections. Impression: No acute findings. Focal areas of shadowing from the endometrium, likely calcifications. An IUD fragment is not excluded. Workstation performed: [id number]       Portions of the record may have been created with voice recognition software. Occasional wrong word or "sound a like" substitutions may have occurred due to the inherent limitations of voice recognition software. Read the chart carefully and recognize, using context, where substitutions have occurred.

## 2023-10-11 NOTE — PATIENT INSTRUCTIONS
Constipation   WHAT YOU NEED TO KNOW:   Constipation means you have hard, dry bowel movements, or you go longer than usual between bowel movements. Medicines:   Medicine  such as a laxative may help relax and loosen your intestines to help you have a bowel movement. These medications are safe and help your bowels to move regularly to improve your symptoms. Take your medicine as directed. Contact your healthcare provider if you think your medicine is not helping or if you have side effects. Tell him of her if you are allergic to any medicine. Keep a list of the medicines, vitamins, and herbs you take. Include the amounts, and when and why you take them. Bring the list or the pill bottles to follow-up visits. Carry your medicine list with you in case of an emergency. Prevent constipation:   Drink liquids as directed. You may need to drink extra liquids to help soften and move your bowels. Ask how much liquid to drink each day and which liquids are best for you. Eat high-fiber foods. This may help decrease constipation by adding bulk to your bowel movements. High-fiber foods include fruit, vegetables, whole-grain breads and cereals, and beans. Your healthcare provider or dietitian can help you create a high-fiber meal plan. Your provider may also recommend a fiber supplement if you cannot get enough fiber from food. Exercise regularly. Regular physical activity can help stimulate your intestines. Walking is a good exercise to prevent or relieve constipation. Ask which exercises are best for you. Talk to your healthcare provider about your medicines. Certain medicines, such as opioids, can cause constipation. Your provider may be able to make medicine changes. For example, he or she may change the kind of medicine, or change when you take it. Follow up with your doctor as directed:  Write down your questions so you remember to ask them during your visits.    © Copyright [employer] 2021 Information is for [employer] use only and may not be sold, redistributed or otherwise used for commercial purposes. All illustrations and images included in CareNotes® are the copyrighted property of [employer] or [address] [place] [address]  The above information is an  only. It is not intended as medical advice for individual conditions or treatments. Talk to your doctor, nurse or pharmacist before following any medical regimen to see if it is safe and effective for you. What is constipation? Constipation happens when fecal material (stool) moves through the large bowel (colon) too slowly. The fluid portion of the stool is absorbed back into the body, so the stool becomes hard and dry. This makes it difficult to pass the stool. What causes constipation? Poor nutrition, inadequate sleep, limited exercise, anxiety, emotional stress and age may cause constipation. Certain disease also can cause constipation, and are usually associated with a sudden change in bowel habits, pain, weight loss, fatigue or bloody stools. Contact your doctor if you experience these symptoms. Some medications cause constipation - talk to your doctor if you think your medications are causing constipation. Can eating more fiber help with constipation? Yes. Fiber is the part of plant food that is not digested. There are two kinds of fiber: soluble and insoluble. Soluble fiber gives stool bulk. Foods that are good sources of soluble fiber include apples, bananas, barley, oats, and beans. Insoluble fiber helps speed up the transit of food in the digestive tract and helps prevent constipation. Good sources of insoluble fiber include whole grains, most vegetables, wheat bran, and legumes. Foods that have fiber contain both soluble and insoluble fibers. A good goal for dietary fiber is a total of about 20 to 30 grams each day. GUIDELINES TO TREAT CONSTIPATION    Nutrition  Eat three meals each day.  Do not skip meals. Gradually increase the amount of high-fiber foods in your diet. Choose more whole grain breads, cereals and rice. Select more raw fruits and vegetables -- eat the peel, if appropriate. Read food labels and look for the "dietary fiber" content of foods. Good sources have 2 grams of fiber or more. Drink six to eight glasses of water each day. Limit highly refined and processed foods. Exercise and Sleep  Exercise regularly. Try to do weight-bearing exercise, such as walking, three or more times each week. Go to sleep at a regular time each night. Make sure you get enough sleep. Stress and Anxiety  Try to limit stress in your life. Go for a short walk when you feel anxiety or stress increasing. [id number] [facility] specialists have reviewed this information. It is for educational purposes only and is not intended to replace the advice of your doctor or other health care provider. We encourage you to discuss any questions or concerns you may have with your provider. You have been prescribed Linzess (linaclotide) for treatment of your constipation +/- abdominal pain. Jeaneth Manny comes in 3 dosages: 72 mcg, 145 mcg, and 290 mcg daily. I have prescribed the 145 mcg daily dosing for you and sent it to your pharmacy. I would like you to start this medication and take it daily for up to 1 week. If you bowel movements become more regular, great! Continue this dose and give me a call for a refill of this dose. If you have diarrhea with this dose, cut down to taking 1 pill (145 mcg) every other day. This means you would do well with the 72 mcg daily dosing. Give me a call to prescribe this dose. If you have continued constipation/abdominal pain with the 145 mcg daily dosing as prescribed, take 2 tabs per day (145 mcg x 2 = 290 mcg). Give me a call to prescribe this dose.       Scheduled date of EGD/colonoscopy (as of today):  10/18/23  Physician performing EGD/colonoscopy:   Elijah  Location of EGD/colonoscopy:  [facility] [address]   Desired bowel prep reviewed with patient: Golytely   Instructions reviewed with patient by:  Joaquin Woodall   Clearances:  N/A

## 2023-10-12 ENCOUNTER — OFFICE VISIT (OUTPATIENT)
Dept: OBGYN CLINIC | Facility: CLINIC | Age: 43
End: 2023-10-12
Payer: COMMERCIAL

## 2023-10-12 VITALS
WEIGHT: 221 LBS | BODY MASS INDEX: 34.69 KG/M2 | DIASTOLIC BLOOD PRESSURE: 72 MMHG | SYSTOLIC BLOOD PRESSURE: 118 MMHG | HEIGHT: 67 IN

## 2023-10-12 DIAGNOSIS — G89.29 CHRONIC PELVIC PAIN IN FEMALE: Primary | ICD-10-CM

## 2023-10-12 DIAGNOSIS — R10.2 CHRONIC PELVIC PAIN IN FEMALE: Primary | ICD-10-CM

## 2023-10-12 PROCEDURE — 99214 OFFICE O/P EST MOD 30 MIN: CPT | Performed by: STUDENT IN AN ORGANIZED HEALTH CARE EDUCATION/TRAINING PROGRAM

## 2023-10-12 NOTE — PROGRESS NOTES
[facility]  Follow up pelvic pain and bleeding  Stacey Reynolds MD  10/12/23      Assessment/Plan:  Mathew Almodovar is a 39 yo [id number] with Nexplanon for contraception who is improved last visit- no longer bleeding or having excruciating pelvic pain but is still having a malodorous discharge- complete vaginitis panel was completed today and I reviewed if this was negative recommend trial of 1 week boric acid suppositories. In regards to her pelvic pain I encouraged her to complete GI work up first and see how she is feeling after that. I would also recommend urology referral for bowel complaints to rule out interstitial cystitis. If all of this would be negative consideration for starting an SSRI or pregabalin could be trialed to help with chronic pain syndromes. Mathew Almodovar will follow up in 3 months and we will call her with results. Subjective:      Patient ID: Rashaun Maxwell is a 38 y.o. female. HPI  Mathew Almodovar is a 39 yo [id number] with Nexplanon for contraception presenting for follow up form ED- Mathew Almodovar has suffered with irregular menses and dysmenorrhea and chronic pelvic pain for a long time- she was recently seen in ED and in office following menses and was diagnosed with BV and was treated with Flagyl. Patient today states that she no longer is having any vaginal bleeding and pain has improved but continues to have a vaginal odor that "smells like death" that concerns her for infection- discharge is a thicker yellow/ white and mucoid. She suffers from chronic constipation for which she is seeing GI and there is plan for her to start Linzess and undergo colonoscopy and EGD per patient. She in the past has trial pelvic floor physical therapy which she did not feel helped her pelvic pain and notes a lot of pelvic trauma over the years including a traumatic birth of her son, miscarriages, 2  sections, ruptured cysts and a surgery for IUD removal that was imbedded.        The following portions of the patient's history were reviewed and updated as appropriate: allergies, current medications, past family history, past medical history, past social history, past surgical history, and problem list.    Review of Systems    Per HPI    Objective:    /72 (BP Location: Left arm, Patient Position: Sitting, Cuff Size: Large)   Ht 5' 7" (1.702 m)   Wt 100 kg (221 lb)   LMP 10/01/2012 (Approximate) Comment: nexplanon 2022  BMI 34.61 kg/m²      Physical Exam  Vitals reviewed. Constitutional:       General: She is not in acute distress. Appearance: Normal appearance. She is obese. She is not ill-appearing or diaphoretic. Cardiovascular:      Rate and Rhythm: Normal rate. Pulmonary:      Effort: Pulmonary effort is normal. No respiratory distress. Abdominal:      Palpations: Abdomen is soft. Tenderness: There is abdominal tenderness. There is no guarding or rebound. Genitourinary:     Comments: Vulva normal without lesions, on speculum exam vagina and cervix appear normal a thick white/ yellow discharge is noted, no lesions. On exam no CMT with anteverted uterus- remains to have tenderness throughout pelvis. Musculoskeletal:      Right lower leg: No edema. Left lower leg: No edema. Skin:     General: Skin is warm and dry. Neurological:      Mental Status: She is alert and oriented to person, place, and time.    Psychiatric:         Mood and Affect: Mood normal.         Behavior: Behavior normal.

## 2023-10-13 LAB
BV BACTERIA RRNA VAG QL NAA+PROBE: NEGATIVE
C GLABRATA RNA VAG QL NAA+PROBE: NOT DETECTED
C TRACH RRNA SPEC QL NAA+PROBE: NOT DETECTED
CANDIDA RRNA VAG QL PROBE: NOT DETECTED
N GONORRHOEA RRNA SPEC QL NAA+PROBE: NOT DETECTED
T VAGINALIS RRNA SPEC QL NAA+PROBE: NOT DETECTED

## 2023-10-17 ENCOUNTER — ANESTHESIA EVENT (OUTPATIENT)
Dept: ANESTHESIOLOGY | Facility: HOSPITAL | Age: 43
End: 2023-10-17

## 2023-10-17 ENCOUNTER — ANESTHESIA (OUTPATIENT)
Dept: ANESTHESIOLOGY | Facility: HOSPITAL | Age: 43
End: 2023-10-17

## 2023-10-17 RX ORDER — SODIUM CHLORIDE, SODIUM LACTATE, POTASSIUM CHLORIDE, CALCIUM CHLORIDE 600; 310; 30; 20 MG/100ML; MG/100ML; MG/100ML; MG/100ML
75 INJECTION, SOLUTION INTRAVENOUS CONTINUOUS
Status: CANCELLED | OUTPATIENT
Start: 2023-10-17

## 2023-10-18 ENCOUNTER — HOSPITAL ENCOUNTER (OUTPATIENT)
Dept: GASTROENTEROLOGY | Facility: AMBULARY SURGERY CENTER | Age: 43
Setting detail: OUTPATIENT SURGERY
Discharge: HOME | End: 2023-10-18
Attending: INTERNAL MEDICINE
Payer: COMMERCIAL

## 2023-10-18 ENCOUNTER — ANESTHESIA EVENT (OUTPATIENT)
Dept: GASTROENTEROLOGY | Facility: AMBULARY SURGERY CENTER | Age: 43
End: 2023-10-18

## 2023-10-18 ENCOUNTER — ANESTHESIA (OUTPATIENT)
Dept: GASTROENTEROLOGY | Facility: AMBULARY SURGERY CENTER | Age: 43
End: 2023-10-18

## 2023-10-18 VITALS
WEIGHT: 220.46 LBS | DIASTOLIC BLOOD PRESSURE: 74 MMHG | BODY MASS INDEX: 34.6 KG/M2 | HEART RATE: 72 BPM | OXYGEN SATURATION: 100 % | RESPIRATION RATE: 15 BRPM | TEMPERATURE: 99 F | HEIGHT: 67 IN | SYSTOLIC BLOOD PRESSURE: 120 MMHG

## 2023-10-18 DIAGNOSIS — R10.31 RIGHT LOWER QUADRANT ABDOMINAL PAIN: ICD-10-CM

## 2023-10-18 DIAGNOSIS — K59.00 CONSTIPATION, UNSPECIFIED CONSTIPATION TYPE: ICD-10-CM

## 2023-10-18 DIAGNOSIS — K21.9 GASTROESOPHAGEAL REFLUX DISEASE WITHOUT ESOPHAGITIS: ICD-10-CM

## 2023-10-18 PROBLEM — R11.2 PONV (POSTOPERATIVE NAUSEA AND VOMITING): Status: ACTIVE | Noted: 2023-10-18

## 2023-10-18 PROBLEM — Z98.890 PONV (POSTOPERATIVE NAUSEA AND VOMITING): Status: ACTIVE | Noted: 2023-10-18

## 2023-10-18 PROBLEM — Z87.898 HISTORY OF SEIZURE: Status: ACTIVE | Noted: 2023-10-18

## 2023-10-18 LAB
EXT PREGNANCY TEST URINE: NEGATIVE
EXT. CONTROL: NORMAL

## 2023-10-18 PROCEDURE — 88305 TISSUE EXAM BY PATHOLOGIST: CPT | Performed by: PATHOLOGY

## 2023-10-18 PROCEDURE — 81025 URINE PREGNANCY TEST: CPT | Performed by: ANESTHESIOLOGY

## 2023-10-18 PROCEDURE — 43239 EGD BIOPSY SINGLE/MULTIPLE: CPT | Performed by: INTERNAL MEDICINE

## 2023-10-18 PROCEDURE — 45378 DIAGNOSTIC COLONOSCOPY: CPT | Performed by: INTERNAL MEDICINE

## 2023-10-18 RX ORDER — SODIUM CHLORIDE, SODIUM LACTATE, POTASSIUM CHLORIDE, CALCIUM CHLORIDE 600; 310; 30; 20 MG/100ML; MG/100ML; MG/100ML; MG/100ML
75 INJECTION, SOLUTION INTRAVENOUS CONTINUOUS
Status: DISCONTINUED | OUTPATIENT
Start: 2023-10-18 | End: 2023-10-22 | Stop reason: HOSPADM

## 2023-10-18 RX ORDER — PROPOFOL 10 MG/ML
INJECTION, EMULSION INTRAVENOUS AS NEEDED
Status: DISCONTINUED | OUTPATIENT
Start: 2023-10-18 | End: 2023-10-18

## 2023-10-18 RX ORDER — LIDOCAINE HYDROCHLORIDE 10 MG/ML
INJECTION, SOLUTION EPIDURAL; INFILTRATION; INTRACAUDAL; PERINEURAL AS NEEDED
Status: DISCONTINUED | OUTPATIENT
Start: 2023-10-18 | End: 2023-10-18

## 2023-10-18 RX ADMIN — PROPOFOL 40 MG: 10 INJECTION, EMULSION INTRAVENOUS at 14:33

## 2023-10-18 RX ADMIN — SODIUM CHLORIDE, SODIUM LACTATE, POTASSIUM CHLORIDE, AND CALCIUM CHLORIDE 75 ML/HR: .6; .31; .03; .02 INJECTION, SOLUTION INTRAVENOUS at 13:56

## 2023-10-18 RX ADMIN — PROPOFOL 180 MG: 10 INJECTION, EMULSION INTRAVENOUS at 14:29

## 2023-10-18 RX ADMIN — LIDOCAINE HYDROCHLORIDE 50 MG: 10 INJECTION, SOLUTION EPIDURAL; INFILTRATION; INTRACAUDAL; PERINEURAL at 14:29

## 2023-10-18 RX ADMIN — PROPOFOL 40 MG: 10 INJECTION, EMULSION INTRAVENOUS at 14:51

## 2023-10-18 RX ADMIN — PROPOFOL 30 MG: 10 INJECTION, EMULSION INTRAVENOUS at 14:31

## 2023-10-18 RX ADMIN — PROPOFOL 150 MCG/KG/MIN: 10 INJECTION, EMULSION INTRAVENOUS at 14:30

## 2023-10-18 NOTE — ANESTHESIA PREPROCEDURE EVALUATION
Procedure:  COLONOSCOPY  EGD    Relevant Problems   ANESTHESIA   (+) PONV (postoperative nausea and vomiting)      CARDIO   (+) Essential hypertension   (+) Essential hypertriglyceridemia      GI/HEPATIC   (+) Gastroesophageal reflux disease without esophagitis      MUSCULOSKELETAL  Cervical hardware   (+) Lateral epicondylitis of right elbow      PULMONARY   (+) Obstructive sleep apnea on CPAP      Other   (+) History of seizure        Physical Exam    Airway    Mallampati score: III  TM Distance: >3 FB  Neck ROM: full     Dental       Cardiovascular  Rhythm: regular, Rate: normal    Pulmonary   Breath sounds clear to auscultation    Other Findings        Anesthesia Plan  ASA Score- 2     Anesthesia Type- IV sedation with anesthesia with ASA Monitors. Additional Monitors:     Airway Plan:            Plan Factors-    Chart reviewed. Patient is not a current smoker. Induction- intravenous. Postoperative Plan- Plan for postoperative opioid use. Informed Consent- Anesthetic plan and risks discussed with patient. I personally reviewed this patient with the CRNA. Discussed and agreed on the Anesthesia Plan with the CRNA. Jasmin Servin

## 2023-10-18 NOTE — ANESTHESIA POSTPROCEDURE EVALUATION
Post-Op Assessment Note    CV Status:  Stable  Pain Score: 0    Pain management: adequate     Mental Status:  Sleepy   Hydration Status:  Euvolemic and stable   PONV Controlled:  Controlled   Airway Patency:  Patent      Post Op Vitals Reviewed: Yes      Staff: CRNA         No notable events documented.     /57 (10/18/23 1505)    Temp      Pulse 91 (10/18/23 1505)   Resp 20 (10/18/23 1505)    SpO2 95 % (10/18/23 1505)

## 2023-10-18 NOTE — INTERVAL H&P NOTE
H&P reviewed. After examining the patient I find no changes in the patients condition since the H&P had been written.     Vitals:    10/18/23 1343   BP: 127/76   Pulse: 93   Resp: 18   Temp: 99 °F (37.2 °C)   SpO2: 99%

## 2023-10-23 PROCEDURE — 88305 TISSUE EXAM BY PATHOLOGIST: CPT | Performed by: PATHOLOGY

## 2023-11-08 ENCOUNTER — OFFICE VISIT (OUTPATIENT)
Dept: FAMILY MEDICINE CLINIC | Facility: CLINIC | Age: 43
End: 2023-11-08
Payer: COMMERCIAL

## 2023-11-08 VITALS
OXYGEN SATURATION: 98 % | WEIGHT: 217.2 LBS | DIASTOLIC BLOOD PRESSURE: 77 MMHG | TEMPERATURE: 98.6 F | BODY MASS INDEX: 34.09 KG/M2 | HEIGHT: 67 IN | HEART RATE: 82 BPM | RESPIRATION RATE: 16 BRPM | SYSTOLIC BLOOD PRESSURE: 124 MMHG

## 2023-11-08 DIAGNOSIS — R42 POSTURAL DIZZINESS: ICD-10-CM

## 2023-11-08 DIAGNOSIS — B34.9 VIRAL ILLNESS: Primary | ICD-10-CM

## 2023-11-08 DIAGNOSIS — M62.830 MUSCLE SPASM OF BACK: ICD-10-CM

## 2023-11-08 PROCEDURE — 99214 OFFICE O/P EST MOD 30 MIN: CPT | Performed by: NURSE PRACTITIONER

## 2023-11-08 PROCEDURE — 87636 SARSCOV2 & INF A&B AMP PRB: CPT | Performed by: NURSE PRACTITIONER

## 2023-11-08 NOTE — ASSESSMENT & PLAN NOTE
Pt has been experiencing positional dizziness for a few months, exacerbated currently by sinus congestion/viral illness.  Pt has not received treatment for this in the past, referred to PT for vestibular therapy

## 2023-11-08 NOTE — ASSESSMENT & PLAN NOTE
Symptoms consistent with a viral infection. She is afebrile in the office, appears ill. Lungs clear, vitals wnl. COVID/Flu pcr sent, will follow up pending results. Patient educated on supportive care and symptom management. Encouraged use of Flonase, hydration, vitamins.

## 2023-11-08 NOTE — ASSESSMENT & PLAN NOTE
History of chronic back pain. Pt takes tizanidine at night as needed. No red flag symptoms or exam findings. Continue tizanidine and heat prn. Would like to schedule visit with pcp to discuss her symptoms.

## 2023-11-08 NOTE — PROGRESS NOTES
Name: Juliana Lara      : 1980      MRN: [id number]  Encounter Provider: ADA Cain  Encounter Date: 2023   Encounter department: [facility]     1. Viral illness  Assessment & Plan:  Symptoms consistent with a viral infection. She is afebrile in the office, appears ill. Lungs clear, vitals wnl. COVID/Flu pcr sent, will follow up pending results. Patient educated on supportive care and symptom management. Encouraged use of Flonase, hydration, vitamins. Orders:  -     Covid/Flu- Office Collect    2. Postural dizziness  Assessment & Plan:  Pt has been experiencing positional dizziness for a few months, exacerbated currently by sinus congestion/viral illness. Pt has not received treatment for this in the past, referred to PT for vestibular therapy    Orders:  -     Ambulatory Referral to Physical Therapy; Future    3. Muscle spasm of back  Assessment & Plan:  History of chronic back pain. Pt takes tizanidine at night as needed. No red flag symptoms or exam findings. Continue tizanidine and heat prn. Would like to schedule visit with pcp to discuss her symptoms. Subjective      Pt is a 38 y.o. y/o female who is seen today because she is not feeling well. Symptoms started last night, she thought she had a cold coming on. She took cold medication last night. She also has low back pain that radiates and she took a muscle relaxer. She does have history of chronic back pain and back surgery. She says no position is comfortable and pain is worse than usual.  She used heat and icy hot last night. She has been feeling dizzy for a couple of months; some days symptoms are worse than others, she was very dizzy this morning and feels like everything is spinning. She fell down this morning trying to walk. She feels very weak. She says she used her neti pot this morning and got a lot of drainage out of her nose.   She also "hacked up" some stuff from her chest and that made her feel better. Her ears feel "cloudy" but no pain. She says her balance here today is better than it was this morning. Turning or moving too fast makes her feel more dizzy. She says she has had the flu before, she feels like this is the flu. Her home test for covid was negative. She denies n/v/d, her appetite is not good today. She feels very fatigued. Her eyes are bothered by the lights. Denies headache. No fever, she feels freezing but her head feels hot. Review of Systems   Constitutional:  Positive for appetite change and fatigue. Negative for chills and fever. HENT:  Positive for congestion, postnasal drip and rhinorrhea. Negative for ear pain and sinus pressure. Respiratory:  Negative for cough, shortness of breath and wheezing. Cardiovascular:  Negative for chest pain, palpitations and leg swelling. Gastrointestinal:  Negative for abdominal pain, diarrhea, nausea and vomiting. Genitourinary:  Negative for enuresis. Musculoskeletal:  Positive for back pain. Negative for myalgias. Neurological:  Positive for dizziness. Negative for weakness, light-headedness, numbness and headaches. Hematological:  Negative for adenopathy. Psychiatric/Behavioral:  Negative for sleep disturbance.         Current Outpatient Medications on File Prior to Visit   Medication Sig    acetaminophen (TYLENOL) 325 mg tablet Take 650 mg by mouth every 6 (six) hours as needed for mild pain    albuterol (ProAir HFA) 90 mcg/act inhaler Inhale 2 puffs every 6 (six) hours as needed for shortness of breath    cholecalciferol (VITAMIN D3) 1,000 units tablet Take 5,000 Units by mouth daily      Etonogestrel (NEXPLANON SC) Inject under the skin Located Left upper inner arm    hydrochlorothiazide (HYDRODIURIL) 25 mg tablet Take 1 tablet (25 mg total) by mouth daily    linaCLOtide (Linzess) 145 MCG CAPS Take 1 capsule (145 mcg total) by mouth in the morning    losartan (COZAAR) 50 mg tablet Take 1 tablet (50 mg total) by mouth daily    magnesium 30 MG tablet Take 500 mg by mouth 2 (two) times a day      Multiple Vitamins-Minerals (MULTIVITAL-M) TABS Take by mouth    naproxen (EC NAPROSYN) 500 MG EC tablet Take 1 tablet (500 mg total) by mouth 3 (three) times a day with meals    NON FORMULARY daily at bedtime Takes Valarien Root that's 1 PO QHS for Sleep    Omega 3-6-9 Fatty Acids (OMEGA 3-6-9 PO) Take by mouth    omeprazole (PriLOSEC) 40 MG capsule Take 1 capsule (40 mg total) by mouth daily    QUEtiapine (SEROquel) 25 mg tablet Take 3 tabs daily    TiZANidine (ZANAFLEX) 4 MG capsule Take 1 capsule (4 mg total) by mouth 3 (three) times a day as needed for muscle spasms    traZODone (DESYREL) 50 mg tablet Take 1 tablet (50 mg total) by mouth daily at bedtime as needed for sleep or depression    clotrimazole-betamethasone (LOTRISONE) 1-0.05 % cream Apply topically 2 (two) times a day (Patient not taking: Reported on 9/29/2023)    tacrolimus (PROTOPIC) 0.1 % ointment Apply topically 2 (two) times a day (Patient not taking: Reported on 9/29/2023)       Objective     /77   Pulse 82   Temp 98.6 °F (37 °C)   Resp 16   Ht 5' 7" (1.702 m)   Wt 98.5 kg (217 lb 3.2 oz)   SpO2 98%   BMI 34.02 kg/m²     Physical Exam  Vitals reviewed. Constitutional:       General: She is awake. She is not in acute distress. Appearance: Normal appearance. She is well-developed and well-groomed. She is ill-appearing. HENT:      Right Ear: Hearing, tympanic membrane, ear canal and external ear normal. No middle ear effusion. Tympanic membrane is not bulging. Left Ear: Hearing, tympanic membrane, ear canal and external ear normal.  No middle ear effusion. Tympanic membrane is not bulging. Nose: No mucosal edema, congestion or rhinorrhea. Right Turbinates: Swollen. Left Turbinates: Swollen. Mouth/Throat:      Lips: Pink.       Mouth: Mucous membranes are moist. Mucous membranes are not dry. Pharynx: No pharyngeal swelling, oropharyngeal exudate or posterior oropharyngeal erythema. Tonsils: No tonsillar exudate or tonsillar abscesses. 1+ on the right. 1+ on the left. Eyes:      Conjunctiva/sclera: Conjunctivae normal.   Cardiovascular:      Rate and Rhythm: Normal rate and regular rhythm. Heart sounds: Normal heart sounds. No murmur heard. Pulmonary:      Effort: Pulmonary effort is normal. No accessory muscle usage or respiratory distress. Breath sounds: Normal breath sounds. No decreased breath sounds, wheezing, rhonchi or rales. Musculoskeletal:      Comments: Gets back pain with R hip flexion   Lymphadenopathy:      Head:      Right side of head: No submental, submandibular, tonsillar, preauricular, posterior auricular or occipital adenopathy. Left side of head: No submental, submandibular, tonsillar, preauricular, posterior auricular or occipital adenopathy. Cervical: No cervical adenopathy. Skin:     General: Skin is warm and dry. Neurological:      Mental Status: She is alert and oriented to person, place, and time. Psychiatric:         Attention and Perception: Attention normal.         Mood and Affect: Mood normal.         Speech: Speech normal.         Behavior: Behavior normal. Behavior is cooperative. Thought Content:  Thought content normal.         Cognition and Memory: Cognition normal.         Judgment: Judgment normal.       ADA Vivar

## 2023-11-09 LAB
FLUAV RNA RESP QL NAA+PROBE: NEGATIVE
FLUBV RNA RESP QL NAA+PROBE: NEGATIVE
SARS-COV-2 RNA RESP QL NAA+PROBE: NEGATIVE

## 2023-11-10 ENCOUNTER — OFFICE VISIT (OUTPATIENT)
Dept: FAMILY MEDICINE CLINIC | Facility: CLINIC | Age: 43
End: 2023-11-10
Payer: COMMERCIAL

## 2023-11-10 VITALS
HEIGHT: 67 IN | WEIGHT: 215 LBS | RESPIRATION RATE: 16 BRPM | TEMPERATURE: 99 F | BODY MASS INDEX: 33.74 KG/M2 | HEART RATE: 94 BPM | DIASTOLIC BLOOD PRESSURE: 78 MMHG | SYSTOLIC BLOOD PRESSURE: 130 MMHG | OXYGEN SATURATION: 98 %

## 2023-11-10 DIAGNOSIS — R26.89 BALANCE PROBLEM: Primary | ICD-10-CM

## 2023-11-10 DIAGNOSIS — R41.3 MEMORY CHANGES: ICD-10-CM

## 2023-11-10 DIAGNOSIS — K21.9 GASTROESOPHAGEAL REFLUX DISEASE WITHOUT ESOPHAGITIS: ICD-10-CM

## 2023-11-10 DIAGNOSIS — R42 VERTIGO: ICD-10-CM

## 2023-11-10 DIAGNOSIS — M54.16 LUMBAR RADICULOPATHY: ICD-10-CM

## 2023-11-10 DIAGNOSIS — I10 ESSENTIAL HYPERTENSION: ICD-10-CM

## 2023-11-10 DIAGNOSIS — H60.392 OTHER INFECTIVE ACUTE OTITIS EXTERNA OF LEFT EAR: ICD-10-CM

## 2023-11-10 DIAGNOSIS — R39.89 URINE TROUBLES: ICD-10-CM

## 2023-11-10 DIAGNOSIS — M48.02 CERVICAL SPINAL STENOSIS: ICD-10-CM

## 2023-11-10 PROBLEM — Z98.890 PONV (POSTOPERATIVE NAUSEA AND VOMITING): Status: RESOLVED | Noted: 2023-10-18 | Resolved: 2023-11-10

## 2023-11-10 PROBLEM — R11.2 PONV (POSTOPERATIVE NAUSEA AND VOMITING): Status: RESOLVED | Noted: 2023-10-18 | Resolved: 2023-11-10

## 2023-11-10 PROBLEM — B34.9 VIRAL ILLNESS: Status: RESOLVED | Noted: 2023-11-08 | Resolved: 2023-11-10

## 2023-11-10 PROCEDURE — 99214 OFFICE O/P EST MOD 30 MIN: CPT | Performed by: FAMILY MEDICINE

## 2023-11-10 RX ORDER — CIPROFLOXACIN AND DEXAMETHASONE 3; 1 MG/ML; MG/ML
4 SUSPENSION/ DROPS AURICULAR (OTIC) 2 TIMES DAILY
Qty: 7.5 ML | Refills: 0 | Status: SHIPPED | OUTPATIENT
Start: 2023-11-10 | End: 2023-11-17

## 2023-11-10 NOTE — ASSESSMENT & PLAN NOTE
Alignment is unremarkable. There is mild ventral osteophytosis at L2-L3 and L3-L4. There is disc space narrowing at L3-L4. Findings are new since the prior study.    Worsening   Ordered MRI lumbar spine

## 2023-11-10 NOTE — PROGRESS NOTES
Name: Laura Mccormick      : 1980      MRN: [id number]  Encounter Provider: Trisha Torres MD  Encounter Date: 11/10/2023   Encounter department: [facility]     1. Balance problem  -     Ambulatory referral to Neurology  -     Vitamin B12; Future  -     MRI lumbar spine wo contrast; Future; Expected date: 11/10/2023    2. Gastroesophageal reflux disease without esophagitis  Assessment & Plan:  Recent EGD on 10.2023 showed chronic inflammation in esophagus  Continue omeprazole as directed      3. Vertigo  -     Ambulatory referral to Neurology    4. Memory changes  -     Ambulatory referral to Neurology  -     Vitamin B12; Future    5. Lumbar radiculopathy  Assessment & Plan:  Alignment is unremarkable. There is mild ventral osteophytosis at L2-L3 and L3-L4. There is disc space narrowing at L3-L4. Findings are new since the prior study. Worsening   Ordered MRI lumbar spine     Orders:  -     MRI lumbar spine wo contrast; Future; Expected date: 11/10/2023  -     Ambulatory referral to Spine & Pain Management; Future    6. Cervical spinal stenosis  -     Ambulatory referral to Spine & Pain Management; Future    7. Other infective acute otitis externa of left ear  -     ciprofloxacin-dexamethasone (CIPRODEX) otic suspension; Administer 4 drops into the left ear 2 (two) times a day for 7 days    8. Essential hypertension  Assessment & Plan:  stable             Subjective   Back Pain (Patient being seen for Back pain that radiates to her right arm-spasms getting worse)  Difficulty Urinating (Patient being seen for possible UTI x 4-5 months)  Vertigo issues for the last 1 year   Worsening for the last a couple of weeks   Off balance on and off   Falling at times   Memory issues on and off as well   Left ear pain x 3 days   Trouble urinating at times     Back Pain  This is a chronic problem. The current episode started more than 1 year ago.  The problem has been waxing and waning since onset. The pain is present in the lumbar spine and thoracic spine. The symptoms are aggravated by bending, coughing and standing. Associated symptoms include numbness, paresis, paresthesias and weakness. Pertinent negatives include no abdominal pain, bladder incontinence, bowel incontinence, chest pain, dysuria, fever, headaches, leg pain, pelvic pain, perianal numbness, tingling or weight loss. The treatment provided mild relief. Review of Systems   Constitutional: Negative. Negative for fever and weight loss. HENT: Negative. Cardiovascular:  Negative for chest pain. Gastrointestinal:  Negative for abdominal pain and bowel incontinence. Genitourinary:  Negative for bladder incontinence, dysuria and pelvic pain. Musculoskeletal:  Positive for back pain. Neurological:  Positive for weakness, numbness and paresthesias. Negative for tingling and headaches.        Past Medical History:   Diagnosis Date    Constipation     COVID-19 2022    Depression     Eczema     Epilepsy ([address]) 11/15/2013    Description: well controlled with sleep    GERD (gastroesophageal reflux disease)     Grand mal convulsion ([address])     X 2 last episode -Sleep deprivation-Resolved with Cpap    Hypertension     Insomnia     Medial meniscus tear 2015    Migraine     PONV (postoperative nausea and vomiting)     Primary generalized epilepsy ([address]) 2011    Psoriasis     Seizures ([address])     Shingles     Shoulder dislocation, right, sequela 2018    Sinusitis     Sleep apnea     uses cpap    Tinnitus      Past Surgical History:   Procedure Laterality Date     SECTION   and     COLONOSCOPY      DILATION AND CURETTAGE OF UTERUS      DILATION AND CURETTAGE OF UTERUS WITH HYSTEROSOCPY N/A 2017    Procedure: D&C; IUD REMOVAL;  Surgeon: Karina Martínez MD;  Location: [facility];  Service: Gynecology    EGD      HYSTEROSCOPY      LAPAROSCOPY      (Diagnostic)    LASIK      KY COLONOSCOPY FLX DX W/COLLJ SPEC WHEN PFRMD N/A 2018    Procedure: EGD AND COLONOSCOPY;  Surgeon: Bernardo Kim MD;  Location: [facility]; Service: Gastroenterology    OK VERTEBRAL CORPECTOMY ANT DCMPRN CERVICAL 1 SEG Bilateral 2021    Procedure: CORPECTOMY SPINE CERVICAL ANTERIOR: C4-7 ACDF with C6 /hemicorpectomy, C4-7 instrumentation and fusion (neuromonitoring);   Surgeon: Kelsie Zuñiga MD;  Location: [facility];  Service: Neurosurgery    REMOVAL OF INTRAUTERINE DEVICE (IUD)       Family History   Problem Relation Age of Onset    No Known Problems Mother     Diabetes Father         Diabetes Mellitus    Hyperlipidemia Father     Hypertension Father     No Known Problems Sister     No Known Problems Daughter     Brain cancer Maternal Grandmother 64    No Known Problems Son     Migraines Family     Diabetes Family         Type 2 Diabetes Mellitus    Breast cancer Neg Hx      Social History     Socioeconomic History    Marital status: /Civil Union     Spouse name: None    Number of children: None    Years of education: None    Highest education level: None   Occupational History    None   Tobacco Use    Smoking status: Former     Packs/day: 0.25     Years: 25.00     Total pack years: 6.25     Types: Cigarettes     Start date: 0     Quit date:      Years since quittin.8    Smokeless tobacco: Never    Tobacco comments:     Former smoker per [employer]   Vaping Use    Vaping Use: Never used   Substance and Sexual Activity    Alcohol use: Yes     Comment: occassionally    Drug use: Never    Sexual activity: Not Currently     Partners: Male     Birth control/protection: Implant     Comment: 15 - Nexplanon inserted   Other Topics Concern    None   Social History Narrative    Denied:  Exercising Regularly     Social Determinants of Health     Financial Resource Strain: Not on file   Food Insecurity: Not on file   Transportation Needs: Not on file   Physical Activity: Not on file Stress: Not on file   Social Connections: Not on file   Intimate Partner Violence: Not on file   Housing Stability: Not on file     Current Outpatient Medications on File Prior to Visit   Medication Sig    acetaminophen (TYLENOL) 325 mg tablet Take 650 mg by mouth every 6 (six) hours as needed for mild pain    albuterol (ProAir HFA) 90 mcg/act inhaler Inhale 2 puffs every 6 (six) hours as needed for shortness of breath    cholecalciferol (VITAMIN D3) 1,000 units tablet Take 5,000 Units by mouth daily      Etonogestrel (NEXPLANON SC) Inject under the skin Located Left upper inner arm    hydrochlorothiazide (HYDRODIURIL) 25 mg tablet Take 1 tablet (25 mg total) by mouth daily    linaCLOtide (Linzess) 145 MCG CAPS Take 1 capsule (145 mcg total) by mouth in the morning    losartan (COZAAR) 50 mg tablet Take 1 tablet (50 mg total) by mouth daily    magnesium 30 MG tablet Take 500 mg by mouth 2 (two) times a day      Multiple Vitamins-Minerals (MULTIVITAL-M) TABS Take by mouth    naproxen (EC NAPROSYN) 500 MG EC tablet Take 1 tablet (500 mg total) by mouth 3 (three) times a day with meals    NON FORMULARY daily at bedtime Takes Valarien Root that's 1 PO QHS for Sleep    Omega 3-6-9 Fatty Acids (OMEGA 3-6-9 PO) Take by mouth    omeprazole (PriLOSEC) 40 MG capsule Take 1 capsule (40 mg total) by mouth daily    QUEtiapine (SEROquel) 25 mg tablet Take 3 tabs daily    TiZANidine (ZANAFLEX) 4 MG capsule Take 1 capsule (4 mg total) by mouth 3 (three) times a day as needed for muscle spasms    traZODone (DESYREL) 50 mg tablet Take 1 tablet (50 mg total) by mouth daily at bedtime as needed for sleep or depression    clotrimazole-betamethasone (LOTRISONE) 1-0.05 % cream Apply topically 2 (two) times a day (Patient not taking: Reported on 9/29/2023)    tacrolimus (PROTOPIC) 0.1 % ointment Apply topically 2 (two) times a day (Patient not taking: Reported on 9/29/2023)     Allergies   Allergen Reactions    Gluten Meal - Food Allergy GI Intolerance     " Highly Sensitivity"    Lactose - Food Allergy GI Intolerance     " Highly Sensitive"     Immunization History   Administered Date(s) Administered    COVID-19 PFIZER VACCINE 0.3 ML IM 04/30/2021, 05/21/2021    INFLUENZA 09/09/2021    Influenza, injectable, quadrivalent, preservative free 0.5 mL 09/26/2022, 10/10/2023    Tdap 03/25/2022       Objective     /78 (BP Location: Left arm, Patient Position: Sitting, Cuff Size: Standard)   Pulse 94   Temp 99 °F (37.2 °C) (Tympanic)   Resp 16   Ht 5' 7" (1.702 m)   Wt 97.5 kg (215 lb)   SpO2 98%   BMI 33.67 kg/m²     Physical Exam  Constitutional:       Appearance: Normal appearance. HENT:      Right Ear: No tenderness. There is no impacted cerumen. Left Ear: Tenderness present. Cardiovascular:      Rate and Rhythm: Normal rate and regular rhythm. Pulses: Normal pulses. Heart sounds: Normal heart sounds. Pulmonary:      Effort: Pulmonary effort is normal.      Breath sounds: Normal breath sounds. Musculoskeletal:         General: No swelling, tenderness, deformity or signs of injury. Neurological:      Mental Status: She is alert.        Melinda Gutierrez MD

## 2023-11-28 ENCOUNTER — OFFICE VISIT (OUTPATIENT)
Dept: PAIN MEDICINE | Facility: CLINIC | Age: 43
End: 2023-11-28
Payer: COMMERCIAL

## 2023-11-28 ENCOUNTER — APPOINTMENT (OUTPATIENT)
Dept: RADIOLOGY | Facility: CLINIC | Age: 43
End: 2023-11-28
Payer: COMMERCIAL

## 2023-11-28 VITALS
BODY MASS INDEX: 33.99 KG/M2 | DIASTOLIC BLOOD PRESSURE: 75 MMHG | SYSTOLIC BLOOD PRESSURE: 128 MMHG | WEIGHT: 217 LBS | HEART RATE: 87 BPM

## 2023-11-28 DIAGNOSIS — M47.816 LUMBAR SPONDYLOSIS: ICD-10-CM

## 2023-11-28 DIAGNOSIS — M51.16 LUMBAR DISC DISEASE WITH RADICULOPATHY: ICD-10-CM

## 2023-11-28 DIAGNOSIS — R29.6 FALLS FREQUENTLY: ICD-10-CM

## 2023-11-28 DIAGNOSIS — M51.16 LUMBAR DISC DISEASE WITH RADICULOPATHY: Primary | ICD-10-CM

## 2023-11-28 PROCEDURE — 72100 X-RAY EXAM L-S SPINE 2/3 VWS: CPT

## 2023-11-28 PROCEDURE — 99214 OFFICE O/P EST MOD 30 MIN: CPT | Performed by: PHYSICAL MEDICINE & REHABILITATION

## 2023-11-28 NOTE — PROGRESS NOTES
Pain Medicine Follow-Up Note    Assessment:  1. Lumbar disc disease with radiculopathy    2. Falls frequently    3. Lumbar spondylosis        Plan:  Ms. Lidia Delgado is a pleasant 52-year-old female who presents to [facility] for follow-up and reevaluation. Since last being seen she has undergone cervical surgery with Dr. Chayito Calzada and more recently [place]. She reports since that incident experiencing worsening low back pain with radiating symptoms into the right worse than left lower extremity. Also states she is feeling weak in the bilateral hips and has had multiple near falls since. Previously attempted physical therapy and continues with both physical therapy guided and physician guided home core exercises at least 3 days/week for greater than 6 weeks with no significant improvements in her pain. At this time we will order updated diagnostic imaging of the lumbar spine with lumbar x-ray and MRI without contrast to better identify disc and spine pathology that may be contributing to her falls and lumbar radicular symptoms into bilateral lower extremities right worse than left-sided. If diagnostic imaging matches clinical presentation would consider interventional approaches but for now we will await MRI. All questions answered, patient is agreeable with plan. History of Present Illness:    Bonny Sue is a 37 y.o. female who presents to [address] for interval re-evaluation of the above stated pain complaints. The patient has a past medical and chronic pain history as outlined in the assessment section. Patient presents for follow-up and reevaluation but has not been seen for nearly 2 years.   Today she reports low back and bilateral buttock pain currently rated 3-10 out of 10 that is described as a constant burning, dull ache, cramping, shooting, numbness, pins and needle sensation that is worse in the evening. Denies any significant inciting event or recent trauma. Presents today for follow-up and reevaluation. Other than as stated above, the patient denies any interval changes in medications, medical condition, mental condition, symptoms, or allergies since the last office visit. Review of Systems:    Review of Systems   Constitutional:  Negative for unexpected weight change. HENT:  Negative for ear pain. Eyes:  Negative for visual disturbance. Respiratory:  Negative for shortness of breath and wheezing. Gastrointestinal:  Negative for abdominal pain. Musculoskeletal:  Positive for back pain and gait problem. Joint stiffness and Decreased ROM   Neurological:  Positive for weakness. Negative for numbness. Psychiatric/Behavioral:  Positive for dysphoric mood and sleep disturbance. Negative for decreased concentration. The patient is nervous/anxious.           Past Medical History:   Diagnosis Date    Constipation     COVID-19 2022    Depression     Eczema     Epilepsy ([address]) 11/15/2013    Description: well controlled with sleep    GERD (gastroesophageal reflux disease)     Grand mal convulsion ([address])     X 2 last episode -Sleep deprivation-Resolved with Cpap    Hypertension     Insomnia     Medial meniscus tear 2015    Migraine     PONV (postoperative nausea and vomiting)     Primary generalized epilepsy ([address]) 2011    Psoriasis     Seizures ([address])     Shingles     Shoulder dislocation, right, sequela 2018    Sinusitis     Sleep apnea     uses cpap    Tinnitus        Past Surgical History:   Procedure Laterality Date     SECTION   and     COLONOSCOPY      DILATION AND CURETTAGE OF UTERUS      DILATION AND CURETTAGE OF UTERUS WITH HYSTEROSOCPY N/A 2017    Procedure: D&C; IUD REMOVAL;  Surgeon: Christiano Irby MD;  [facility];  Service: Gynecology    EGD      HYSTEROSCOPY      LAPAROSCOPY      (Diagnostic)    LASIK      [place] COLONOSCOPY FLX DX W/COLLJ SPEC WHEN PFRMD N/A 2018    Procedure: EGD AND COLONOSCOPY;  Surgeon: Rekha Hernandez MD;  [facility]: [facility]; Service: Gastroenterology    ID VERTEBRAL CORPECTOMY ANT DCMPRN CERVICAL 1 SEG Bilateral 2021    Procedure: CORPECTOMY SPINE CERVICAL ANTERIOR: C4-7 ACDF with C6 /hemicorpectomy, C4-7 instrumentation and fusion (neuromonitoring);   Surgeon: Arthur Pablo MD;  [facility];  Service: Neurosurgery    REMOVAL OF INTRAUTERINE DEVICE (IUD)         Family History   Problem Relation Age of Onset    No Known Problems Mother     Diabetes Father         Diabetes Mellitus    Hyperlipidemia Father     Hypertension Father     No Known Problems Sister     No Known Problems Daughter     Brain cancer Maternal Grandmother 64    No Known Problems Son     Migraines Family     Diabetes Family         Type 2 Diabetes Mellitus    Breast cancer Neg Hx        Social History     Occupational History    Not on file   Tobacco Use    Smoking status: Former     Packs/day: 0.25     Years: 25.00     Total pack years: 6.25     Types: Cigarettes     Start date: 0     Quit date:      Years since quittin.9    Smokeless tobacco: Never    Tobacco comments:     Former smoker per [employer]   Vaping Use    Vaping Use: Never used   Substance and Sexual Activity    Alcohol use: Yes     Comment: occassionally    Drug use: Never    Sexual activity: Not Currently     Partners: Male     Birth control/protection: Implant     Comment: 15 - Nexplanon inserted         Current Outpatient Medications:     acetaminophen (TYLENOL) 325 mg tablet, Take 650 mg by mouth every 6 (six) hours as needed for mild pain, Disp: , Rfl:     albuterol (ProAir HFA) 90 mcg/act inhaler, Inhale 2 puffs every 6 (six) hours as needed for shortness of breath, Disp: 18 g, Rfl: 0    cholecalciferol (VITAMIN D3) 1,000 units tablet, Take 5,000 Units by mouth daily  , Disp: , Rfl:     Etonogestrel (NEXPLANON SC), Inject under the skin Located Left upper inner arm, Disp: , Rfl:     hydrochlorothiazide (HYDRODIURIL) 25 mg tablet, Take 1 tablet (25 mg total) by mouth daily, Disp: 90 tablet, Rfl: 2    linaCLOtide (Linzess) 145 MCG CAPS, Take 1 capsule (145 mcg total) by mouth in the morning, Disp: 90 capsule, Rfl: 3    losartan (COZAAR) 50 mg tablet, Take 1 tablet (50 mg total) by mouth daily, Disp: 90 tablet, Rfl: 0    magnesium 30 MG tablet, Take 500 mg by mouth 2 (two) times a day  , Disp: , Rfl:     Multiple Vitamins-Minerals (MULTIVITAL-M) TABS, Take by mouth, Disp: , Rfl:     naproxen (EC NAPROSYN) 500 MG EC tablet, Take 1 tablet (500 mg total) by mouth 3 (three) times a day with meals, Disp: 30 tablet, Rfl: 0    NON FORMULARY, daily at bedtime Takes Valarien Root that's 1 PO QHS for Sleep, Disp: , Rfl:     Omega 3-6-9 Fatty Acids (OMEGA 3-6-9 PO), Take by mouth, Disp: , Rfl:     omeprazole (PriLOSEC) 40 MG capsule, Take 1 capsule (40 mg total) by mouth daily, Disp: 90 capsule, Rfl: 0    QUEtiapine (SEROquel) 25 mg tablet, Take 3 tabs daily, Disp: 270 tablet, Rfl: 3    TiZANidine (ZANAFLEX) 4 MG capsule, Take 1 capsule (4 mg total) by mouth 3 (three) times a day as needed for muscle spasms, Disp: 90 capsule, Rfl: 3    traZODone (DESYREL) 50 mg tablet, Take 1 tablet (50 mg total) by mouth daily at bedtime as needed for sleep or depression, Disp: 90 tablet, Rfl: 3    ciprofloxacin-dexamethasone (CIPRODEX) otic suspension, Administer 4 drops into the left ear 2 (two) times a day for 7 days, Disp: 7.5 mL, Rfl: 0    clotrimazole-betamethasone (LOTRISONE) 1-0.05 % cream, Apply topically 2 (two) times a day (Patient not taking: Reported on 9/29/2023), Disp: 30 g, Rfl: 0    tacrolimus (PROTOPIC) 0.1 % ointment, Apply topically 2 (two) times a day (Patient not taking: Reported on 9/29/2023), Disp: 60 g, Rfl: 5    Allergies   Allergen Reactions    Gluten Meal - Food Allergy GI Intolerance     " Highly Sensitivity"    Lactose - Food Allergy GI Intolerance     " Highly Sensitive"       Physical Exam:    /75   Pulse 87   Wt 98.4 kg (217 lb)   BMI 33.99 kg/m²     Constitutional:normal, well developed, well nourished, alert, in no distress and non-toxic and no overt pain behavior.   Eyes:anicteric  HEENT:grossly intact  Neck:supple, symmetric, trachea midline and no masses   Pulmonary:even and unlabored  Cardiovascular:No edema or pitting edema present  Skin:Normal without rashes or lesions and well hydrated  Psychiatric:Mood and affect appropriate  Neurologic:Cranial Nerves II-XII grossly intact  Musculoskeletal:antalgic, tenderness to palpation bilateral lumbar paraspinals, decreased active and passive range of motion with lumbar flexion and extension limited by pain, MMT 5-5 bilateral lower extremities except for right hip flexion 4 out of 5 and left hip flexion 4+ out of 5, sensation decreased to light touch in patchy distribution right lower extremity, positive straight leg raise in the supine position radicular pain into the right leg, DTRs within normal limits      Imaging  X-ray lumbar spine 2 or 3 views    (Results Pending)   MRI lumbar spine without contrast    (Results Pending)         Orders Placed This Encounter   Procedures    X-ray lumbar spine 2 or 3 views    MRI lumbar spine without contrast

## 2023-11-29 ENCOUNTER — CONSULT (OUTPATIENT)
Dept: UROLOGY | Facility: MEDICAL CENTER | Age: 43
End: 2023-11-29
Payer: COMMERCIAL

## 2023-11-29 ENCOUNTER — TELEPHONE (OUTPATIENT)
Dept: UROLOGY | Facility: MEDICAL CENTER | Age: 43
End: 2023-11-29

## 2023-11-29 VITALS
HEIGHT: 67 IN | BODY MASS INDEX: 34.06 KG/M2 | SYSTOLIC BLOOD PRESSURE: 110 MMHG | WEIGHT: 217 LBS | DIASTOLIC BLOOD PRESSURE: 80 MMHG | HEART RATE: 101 BPM | OXYGEN SATURATION: 98 %

## 2023-11-29 DIAGNOSIS — R39.11 URINARY HESITANCY: Primary | ICD-10-CM

## 2023-11-29 DIAGNOSIS — N94.10 DYSPAREUNIA IN FEMALE: ICD-10-CM

## 2023-11-29 DIAGNOSIS — M62.89 PELVIC FLOOR TENSION: ICD-10-CM

## 2023-11-29 LAB
BACTERIA UR QL AUTO: ABNORMAL /HPF
BILIRUB UR QL STRIP: NEGATIVE
CLARITY UR: CLEAR
COLOR UR: ABNORMAL
GLUCOSE UR STRIP-MCNC: NEGATIVE MG/DL
HGB UR QL STRIP.AUTO: ABNORMAL
KETONES UR STRIP-MCNC: NEGATIVE MG/DL
LEUKOCYTE ESTERASE UR QL STRIP: NEGATIVE
NITRITE UR QL STRIP: NEGATIVE
NON-SQ EPI CELLS URNS QL MICRO: ABNORMAL /HPF
PH UR STRIP.AUTO: 7 [PH]
POST-VOID RESIDUAL VOLUME, ML POC: 37 ML
PROT UR STRIP-MCNC: NEGATIVE MG/DL
RBC #/AREA URNS AUTO: ABNORMAL /HPF
SL AMB  POCT GLUCOSE, UA: ABNORMAL
SL AMB LEUKOCYTE ESTERASE,UA: ABNORMAL
SL AMB POCT BILIRUBIN,UA: ABNORMAL
SL AMB POCT BLOOD,UA: ABNORMAL
SL AMB POCT CLARITY,UA: CLEAR
SL AMB POCT COLOR,UA: YELLOW
SL AMB POCT KETONES,UA: ABNORMAL
SL AMB POCT NITRITE,UA: ABNORMAL
SL AMB POCT PH,UA: 7
SL AMB POCT SPECIFIC GRAVITY,UA: 1.02
SL AMB POCT URINE PROTEIN: ABNORMAL
SL AMB POCT UROBILINOGEN: 0.2
SP GR UR STRIP.AUTO: 1.02 (ref 1–1.03)
UROBILINOGEN UR STRIP-ACNC: <2 MG/DL
WBC #/AREA URNS AUTO: ABNORMAL /HPF

## 2023-11-29 PROCEDURE — 99204 OFFICE O/P NEW MOD 45 MIN: CPT | Performed by: STUDENT IN AN ORGANIZED HEALTH CARE EDUCATION/TRAINING PROGRAM

## 2023-11-29 PROCEDURE — 87086 URINE CULTURE/COLONY COUNT: CPT | Performed by: STUDENT IN AN ORGANIZED HEALTH CARE EDUCATION/TRAINING PROGRAM

## 2023-11-29 PROCEDURE — 51798 US URINE CAPACITY MEASURE: CPT | Performed by: STUDENT IN AN ORGANIZED HEALTH CARE EDUCATION/TRAINING PROGRAM

## 2023-11-29 PROCEDURE — 81003 URINALYSIS AUTO W/O SCOPE: CPT | Performed by: STUDENT IN AN ORGANIZED HEALTH CARE EDUCATION/TRAINING PROGRAM

## 2023-11-29 PROCEDURE — 81001 URINALYSIS AUTO W/SCOPE: CPT | Performed by: STUDENT IN AN ORGANIZED HEALTH CARE EDUCATION/TRAINING PROGRAM

## 2023-11-29 NOTE — TELEPHONE ENCOUNTER
pATIENT needs cystoscopy appt with Dr. Teagan Feliciano after 1/24/24 urodynamics testing. Schedule not available yet.

## 2023-11-29 NOTE — PROGRESS NOTES
Urology Ambulatory Consult   Name: Alana Marrero  : 1980  MRN: [id number]  Date of Service: 2023    Reason for Consultation:   Chief Complaint   Patient presents with    Difficulty Urinating     Problem List Items Addressed This Visit    None  Visit Diagnoses       Urinary hesitancy    -  Primary    Relevant Orders    POCT Measure PVR (Completed)    POCT urine dip auto non-scope (Completed)    Urinalysis with microscopic    Urine culture    Pelvic floor tension        Dyspareunia in female                Assessment:  Urinary hesitancy-the patient reports difficulty voiding requiring adjusting her position to get urine out. She thankfully is emptying her bladder well. In the context of also having dyspareunia it sounds like she may have some pelvic floor dysfunction. I recommend that we evaluate with urodynamics and cystoscopy. She may benefit from biofeedback however she would like to do 1 step at a time. Will hold off on referral to [facility] for physical therapy until we have evaluated with urodynamics and cystoscopy. Female sexual dysfunction-the patient has not been able to have intercourse since she had her IUD surgically removed. She has significant dyspareunia that they have not been able to overcome with pelvic floor PT or different lubricants. She does still have normal sex drive. We discussed referral to a sexual medicine specialist.  We will reconsider this at a later date after we have completed more of the diagnostic evaluation. Plan:  Urine sent for analysis/micro and culture  Obtain urodynamics  Return to clinic for cystoscopy  Will consider biofeedback with pelvic floor therapy or referral to a sexual medicine specialist pending results of the above. History of Present Illness  Alana Marrero is a 37 y.o. female presenting for evaluation of urinary symptoms.      She reports having difficulty emptying her bladder requiring leaning and positioning to get the urine out.  Hx of significant constipation. Took a week and a colonoscopy to clear out the colon. Now on a gluten-free diet and taking Linzess and having daily BMs now. Symptoms started several months to a year ago. Saw PCP and urine was tested and was negative for infection an hematuria. Has had 3 miscarriages, 2 emergency C sections, surgical removal of an embedded IUD, diagnostic laparoscopy. Ever since the IUD was removed has had dyspareunia. Went to pelvic floor PT and was taught kegels. Every time she tries to have sex it is extremely painful. Has tried taking muscle relaxers and alcohol to calm her and nothing works. Sex drive is still normal.   Denies gross hematuria, history of recurrent UTIs or kidney stones. Had a CT abdomen and pelvis 9/27/2023 which did not demonstrate and  pathology. I independently reviewed the images. There is a punctate left renal stone, no ureteral stones or hydronephrosis. No significant smoking history. She is currently on your period. Has had two seizures in the past but otherwise no neurologic diagnoses. 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      Past medical history:  Past Medical History:   Diagnosis Date    Constipation     COVID-19 01/05/2022    Depression     Eczema     Epilepsy ([address]) 11/15/2013    Description: well controlled with sleep    GERD (gastroesophageal reflux disease)     Grand mal convulsion ([address])     X 2 last episode 2009-Sleep deprivation-Resolved with Cpap    Hypertension     Insomnia     Medial meniscus tear 01/12/2015    Migraine     PONV (postoperative nausea and vomiting)     Primary generalized epilepsy ([address]) 2011    Psoriasis     Seizures ([address])     Shingles     Shoulder dislocation, right, sequela 2018    Sinusitis     Sleep apnea     uses cpap    Tinnitus      Past surgical history  Past Surgical History:   Procedure Laterality Date     SECTION   and     COLONOSCOPY      DILATION AND CURETTAGE OF UTERUS      DILATION AND CURETTAGE OF UTERUS WITH HYSTEROSOCPY N/A 2017    Procedure: D&C; IUD REMOVAL;  Surgeon: Ruchi Zuñiga MD;  [facility];  Service: Gynecology    EGD      HYSTEROSCOPY      LAPAROSCOPY      (Diagnostic)    LASIK      [place] COLONOSCOPY FLX DX W/COLLJ SPEC WHEN PFRMD N/A 2018    Procedure: EGD AND COLONOSCOPY;  Surgeon: Alexis Carmen MD;  [facility]; Service: Gastroenterology    WV VERTEBRAL CORPECTOMY ANT DCMPRN CERVICAL 1 SEG Bilateral 2021    Procedure: CORPECTOMY SPINE CERVICAL ANTERIOR: C4-7 ACDF with C6 /hemicorpectomy, C4-7 instrumentation and fusion (neuromonitoring);   Surgeon: Moon Hurd MD;  [facility];  Service: Neurosurgery    REMOVAL OF INTRAUTERINE DEVICE (IUD)         Current medications:  Current Outpatient Medications   Medication Sig Dispense Refill    cholecalciferol (VITAMIN D3) 1,000 units tablet Take 5,000 Units by mouth daily        Etonogestrel (NEXPLANON SC) Inject under the skin Located Left upper inner arm      hydrochlorothiazide (HYDRODIURIL) 25 mg tablet Take 1 tablet (25 mg total) by mouth daily 90 tablet 2    linaCLOtide (Linzess) 145 MCG CAPS Take 1 capsule (145 mcg total) by mouth in the morning 90 capsule 3    losartan (COZAAR) 50 mg tablet Take 1 tablet (50 mg total) by mouth daily 90 tablet 0    magnesium 30 MG tablet Take 500 mg by mouth 2 (two) times a day        Multiple Vitamins-Minerals (MULTIVITAL-M) TABS Take by mouth      Omega 3-6-9 Fatty Acids (OMEGA 3-6-9 PO) Take by mouth      omeprazole (PriLOSEC) 40 MG capsule Take 1 capsule (40 mg total) by mouth daily 90 capsule 0    QUEtiapine (SEROquel) 25 mg tablet Take 3 tabs daily 270 tablet 3    TiZANidine (ZANAFLEX) 4 MG capsule Take 1 capsule (4 mg total) by mouth 3 (three) times a day as needed for muscle spasms 90 capsule 3    traZODone (DESYREL) 50 mg tablet Take 1 tablet (50 mg total) by mouth daily at bedtime as needed for sleep or depression 90 tablet 3    acetaminophen (TYLENOL) 325 mg tablet Take 650 mg by mouth every 6 (six) hours as needed for mild pain (Patient not taking: Reported on 2023)      albuterol (ProAir HFA) 90 mcg/act inhaler Inhale 2 puffs every 6 (six) hours as needed for shortness of breath 18 g 0    ciprofloxacin-dexamethasone (CIPRODEX) otic suspension Administer 4 drops into the left ear 2 (two) times a day for 7 days 7.5 mL 0    clotrimazole-betamethasone (LOTRISONE) 1-0.05 % cream Apply topically 2 (two) times a day 30 g 0    naproxen (EC NAPROSYN) 500 MG EC tablet Take 1 tablet (500 mg total) by mouth 3 (three) times a day with meals 30 tablet 0    NON FORMULARY daily at bedtime Takes Valarien Root that's 1 PO QHS for Sleep (Patient not taking: Reported on 2023)      tacrolimus (PROTOPIC) 0.1 % ointment Apply topically 2 (two) times a day 60 g 5     No current facility-administered medications for this visit. Allergies:   Allergies   Allergen Reactions    Gluten Meal - Food Allergy GI Intolerance     " Highly Sensitivity"    Lactose - Food Allergy GI Intolerance     " Highly Sensitive"       Social history:  Social History     Socioeconomic History    Marital status: /Civil Union     Spouse name: None    Number of children: None    Years of education: None    Highest education level: None   Occupational History    None   Tobacco Use    Smoking status: Former     Packs/day: 0.25     Years: 25.00     Total pack years: 6.25     Types: Cigarettes     Start date:      Quit date:      Years since quittin.9    Smokeless tobacco: Never    Tobacco comments:     Former smoker per [employer] Vaping Use    Vaping Use: Never used   Substance and Sexual Activity    Alcohol use: Yes     Comment: occassionally    Drug use: Never    Sexual activity: Not Currently     Partners: Male     Birth control/protection: Implant     Comment: 11/30/17 - Nexplanon inserted   Other Topics Concern    None   Social History Narrative    Denied:  Exercising Regularly     Social Determinants of Health     Financial Resource Strain: Not on file   Food Insecurity: Not on file   Transportation Needs: Not on file   Physical Activity: Not on file   Stress: Not on file   Social Connections: Not on file   Intimate Partner Violence: Not on file   Housing Stability: Not on file       Family history:  Family History   Problem Relation Age of Onset    No Known Problems Mother     Diabetes Father         Diabetes Mellitus    Hyperlipidemia Father     Hypertension Father     No Known Problems Sister     No Known Problems Daughter     Brain cancer Maternal Grandmother 64    No Known Problems Son     Migraines Family     Diabetes Family         Type 2 Diabetes Mellitus    Breast cancer Neg Hx        Review of systems:  Pertinent positives and negatives in HPI    Physical exam:  /80 (BP Location: Left arm, Patient Position: Sitting, Cuff Size: Standard)   Pulse 101   Ht 5' 7" (1.702 m)   Wt 98.4 kg (217 lb)   SpO2 98%   BMI 33.99 kg/m²   General:  Healthy appearing female in no acute distress. Head:  Normocephalic, atraumatic. Eyes: no scleral icterus  ENMT: Nares patent, moist mucous membranes  Cardiovascular:  Regular rate  Respiratory:  Patient has unlabored respirations.    Abdomen:  Abdomen nondistended  Extremities: Normal ROM, no deformities    Labs:  CBC:   Lab Results   Component Value Date    WBC 9.61 09/27/2023    HGB 14.8 09/27/2023    HCT 43.8 09/27/2023    MCV 90 09/27/2023     09/27/2023       BMP:   Lab Results   Component Value Date    GLUCOSE 94 10/16/2013    CALCIUM 10.0 09/27/2023     10/16/2013    K 3.7 09/27/2023    CO2 30 09/27/2023    CL 98 09/27/2023    BUN 16 09/27/2023    CREATININE 0.81 09/27/2023     Imaging:  CT ABDOMEN AND PELVIS WITH IV CONTRAST     INDICATION:   RLQ abdominal pain (Age >= 14y)  Severe R lower abd pain, 2 m difficulty emptying bladder, heavy vaginal bleeding over night. "Pt is a 38year old female who presents to ED for evaluation of vaginal bleeding and abdominal pain. Pt states that last evening she developed some RLQ   abdominal pain and then around midnight, she had onset of heavy vaginal bleeding with clots. She reports soaking through a super tampon every 2 hours, but states the clots have decreased this afternoon and is just having bright red bleeding. She   describes the pain in her RLQ as sharp and like she is "is being stepped on by a stilletto". Pain was rated 8-9/10 at worst, but is currently rated 3-4/10. "  "She states that her last menstrual period was October 2012 when she had an IUD placed. But she   had to have the IUD surgically removed around 2019 due to it becoming imbedded into the uterine wall and she was then switched to Nexplenon for contraception. "     COMPARISON: Pelvic ultrasound from earlier today. TECHNIQUE:  CT examination of the abdomen and pelvis was performed. Multiplanar 2D reformatted images were created from the source data. This examination, like all CT scans performed in the [facility], was performed utilizing techniques to minimize radiation dose exposure, including the use of iterative reconstruction and automated exposure control. Radiation dose length   product (DLP) for this visit:  789 mGy-cm     IV Contrast:  100 mL of iohexol (OMNIPAQUE)  Enteric Contrast:  Enteric contrast was not administered.      FINDINGS:     ABDOMEN     LOWER CHEST:  No clinically significant abnormality identified in the visualized lower chest.     LIVER/BILIARY TREE: One or more subcentimeter sharply circumscribed low-density hepatic lesion(s) are noted, too small to accurately characterize, but statistically most likely to represent subcentimeter hepatic cysts. No suspicious solid hepatic lesion   is identified. Hepatic contours are normal.  No biliary dilatation. GALLBLADDER:  No calcified gallstones. No pericholecystic inflammatory change. SPLEEN:  Unremarkable. PANCREAS:  Unremarkable. ADRENAL GLANDS:  Unremarkable. KIDNEYS/URETERS: Scattered 3 mm and smaller cysts. No hydronephrosis or perinephric collection. STOMACH AND BOWEL: Moderate colonic stool burden. APPENDIX:  No findings to suggest appendicitis. ABDOMINOPELVIC CAVITY:  No ascites. No pneumoperitoneum. No lymphadenopathy. VESSELS:  Unremarkable for patient's age. PELVIS     REPRODUCTIVE ORGANS: Posterior corpus fibroid better seen on the ultrasound. No retained IUD fragment. Tiny calcifications adjacent to the endometrium likely responsible for the shadowing seen on the ultrasound. URINARY BLADDER:  Unremarkable. ABDOMINAL WALL/INGUINAL REGIONS:  Unremarkable. OSSEOUS STRUCTURES:  No acute fracture or destructive osseous lesion. IMPRESSION:     No acute findings. No retained IUD fragment. Tiny uterine calcifications adjacent 2 the endometrium, likely the cause of the shadowing seen on the ultrasound. Verenice Piña MD  [facility]    Portions of the above record have been created with voice recognition software. Occasional wrong word or "sound alike" substitution may have occurred due to the inherent limitations of voice recognition software. Please read the chart carefully and recognize, using context, where substitution may have occurred. For further clarification, please contact me directly.

## 2023-12-01 LAB — BACTERIA UR CULT: NORMAL

## 2023-12-04 ENCOUNTER — EVALUATION (OUTPATIENT)
Dept: PHYSICAL THERAPY | Facility: CLINIC | Age: 43
End: 2023-12-04
Payer: COMMERCIAL

## 2023-12-04 DIAGNOSIS — R42 POSTURAL DIZZINESS: ICD-10-CM

## 2023-12-04 PROCEDURE — 97162 PT EVAL MOD COMPLEX 30 MIN: CPT | Performed by: PHYSICAL THERAPIST

## 2023-12-04 PROCEDURE — 97112 NEUROMUSCULAR REEDUCATION: CPT | Performed by: PHYSICAL THERAPIST

## 2023-12-04 NOTE — PROGRESS NOTES
PT EVALUATION    Today's date: 23  Patient name: Laura Mccormick  : 1980  MRN: [id number]  Referring provider: LEXX Sawyer  Dx:   1. Postural dizziness        ASSESSMENT:  Laura Mccormick is a 37 y.o. female who presents with signs and symptoms consistent of acute on chronic vertigo. Clinical examination demonstrated intact cranial nerves, negative neurological testing, and asymptomatic VBI testing. Casey Crystal was performed and demonstrated positive symptom provocation without nystagmus with R posterior canal testing indicating potential involvement of R canalithiasis. CRT was performed 1x with improvement in symptoms. Secondary to time restraint and positive testing on first test, further assessment of contralateral side and horizontal canal was not performed. Upon vestibular testing patient demonstrated symptom provocation and delayed gaze stability with smooth pursuits, horizontal saccades, and convergence. Based on both positional and vestibular testing, patient's symptoms may be related to both BPPV and vesibular hypofunction. Will re-test positional vertigo next visit and perform further testing if indicated. Overall,  patient presents with ambulatory dysfunction, balance dysfunction, and dizziness with head turns . Due to these impairments, Patient has difficulty performing a/iadls, recreational activities, and work-related activities. Patient would benefit from skilled physical therapy to address the impairments, improve their level of function, and to improve their overall quality of life. Impairments:    abnormal gait   imbalance   Postural dysfunction   Positional dizziness  VOR impairment   Dizziness with head turns     Prognosis:  Good  Positive and negative prognostic indicator(s):  pain >3 months and multiple comorbidities    Goals:    Short Term Goals: to be achieved by 4 weeks  1) Patient to be independent with basic HEP.   2) Decrease dizziness to 3/10 at its worst.  3) Patient will have an improvement in FGA by FRANTZ of 6 pts indicating improvements with functional mobility and imbalance. Long Term Goals: to be achieved by discharge  1) FOTO equal to or greater than target score indicating improvements with overall function. 2) Patient will demonstrate normalized vestibular testing in order to return to normal daily activities. 3) Patient will have little to no dizziness with positional changes and fast head movements in order to participate in daily activities. Planned interventions:  balance and weight bearing training, canalith repositioning techniques, and vestibulo-ocular motor training    Duration in visits:  8-12  Frequency: 1-2 visits per week  Duration in weeks:  6-8    History of Current Injury: Patient notes that a few years ago she had similar symptoms to what she is having now. Patient notes that she went to therapy for her crystals but it didn't really help she continued to have lingering symptoms. Patient notes that she finally got in with an ENT and got an MRI that showed her sinuses were impacted. Patient notes that once she started treatment for that to clear them out she started to feel better. Patient notes since having covid she has been having constant congestion and just not feeling like her self. Patient notes that she has good and days with her imbalance and some mild dizziness with head turns. Patient notes that about a month ago she got up from bed she fell over. Patient notes that she was crawling and kept falling over. Patient notes that she just couldn't stand without assistance. Patient notes that was a vertigo sensation and the worst its ever been. Patient notes that she had light sensitivity that day as well. Patient notes that the intensity of the dizziness since then has not happened but she continues to have take it slow and will have problems with her balance vision.  Patient notes that most of the time her symptoms can be described as a delayed vision catch up. This has been going on for a year. Patient notes that she has some involuntary muscle twitches in her face. Patient notes that with increase stress her symptoms can be worse. 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 Objective     Concurrent Complaints  Positive for tinnitus (random bouts (muffled feeling with high pitched ring)), visual change (only with head turns), aural fullness and poor concentration. Negative for headaches, nausea/motion sickness, hearing loss, memory loss and peripheral neuropathy  Neuro Exam:     Dizziness  Positive for vertigo, rocking or swaying and diplopia. Negative for disequilibrium, oscillopsia, motion sickness, light-headedness and floating or swimming. Exacerbating factors  Positive for rolling in bed, turning head and supine to/from sitting. Negative for looking up, walking, optokinetic movement and walking in busy environment. Symptoms   Intensity at best: 1/10 and 2/10  Intensity at worst: 5/10 and 6/10  Average intensity: 3/10 and 4/10    Headaches   Patient reports headaches: No.     Cervical exam   Ligament Laxity Testing   Alar ligament: WNL  Sharp Nerissa:  WNL  Modified VBI   Left: asymptomatic  Right: asymptomatic    Oculomotor exam   Oculomotor ROM: WNL  Resting nystagmus: not present   Gaze holding nystagmus: not present left  and not present right  Smooth pursuits: within normal limits  Vertical saccades: normal  Horizontal saccades: hypometric   Convergence: abnormal (Right eye converges before left eye)  Left normal: Time restraint. Test next session. Right normal: Time restraint. Test next session.     Positional testing   [place]-Hallpike   Right posterior canal: symptomatic  Alyssa-Hallpike comment: nystagmus absent          Precautions:  chronic pain, migraines, depression/anxiety, history of shingles, epilepsy with hx of seizures       Manuals                                                                 Neuro Re-Ed             CRT              VORx1             VOR CX horizontal and vertical              Self ball toss with gaze tracking                                                     Ther Ex                                                                                                                     Ther Activity                                       Gait Training             Lateral foam beam walking              Forward beam walking              Walking with head turns              Walking with behind passes             Modalities

## 2023-12-06 ENCOUNTER — TELEPHONE (OUTPATIENT)
Dept: RADIOLOGY | Facility: CLINIC | Age: 43
End: 2023-12-06

## 2023-12-06 NOTE — TELEPHONE ENCOUNTER
Patient is scheduled 2/20/24 at 10 AM for cystoscopy appt with Dr. Tena Gonzales as well as to review urodynamics testing which will be done 1/24/24.     Patient aware

## 2023-12-06 NOTE — TELEPHONE ENCOUNTER
Yrn Rasmussen  [facility]  The prior authorization request for this study has not been approved. You can schedule a peer to peer by calling Juan Manuel Ariadna [phone] Case# [id number] with the deadline date of patient scheduled for 12/12. The insurance determined the following:    [ ] Does not meet Medical Necessity  [ ] No prior imaging  [ ] PT or conservative treatment incomplete  [ ] Missing Labs  [ ] Frequency  [X] Facility  - MRI was approved but location was denied    Patient needs to use a free standing facility. She can call her insurance and they can provide her with locations. See info above if you would like to do a p2p to get [address] approved. If you choose not to complete a peer to peer then please reply to this message to let us know. Please notify your patient and contact central scheduling by calling [phone] to cancel the appointment and cancel the order in Epic.

## 2023-12-07 ENCOUNTER — OFFICE VISIT (OUTPATIENT)
Dept: PHYSICAL THERAPY | Facility: CLINIC | Age: 43
End: 2023-12-07
Payer: COMMERCIAL

## 2023-12-07 DIAGNOSIS — R42 POSTURAL DIZZINESS: Primary | ICD-10-CM

## 2023-12-07 PROCEDURE — 97112 NEUROMUSCULAR REEDUCATION: CPT | Performed by: PHYSICAL THERAPIST

## 2023-12-07 NOTE — PROGRESS NOTES
Daily Note     Today's date: 2023  Patient name: Juliana Lara  : 1980  MRN: [id number]  Referring provider: Ramon Campos*  Dx:   Encounter Diagnosis     ICD-10-CM    1. Postural dizziness  R42                  Subjective: the frequency of symptoms have increase over the last year. She tends to have more symptoms when bending forward, sitting up in bed and horizontal arm movements. Objective: See treatment diary below  No symptoms with 180 bpm vertical VOR  40 bmp horizontal VOR with symptoms  40 bmp with horziontal VOR Cx but follows it smoother than vertical VOR CX  60 bmp with vertical VOR Cx but more felt harder to track, like the eyes were jumping      Assessment: Tolerated treatment well. Patient exhibited good technique with therapeutic exercises and would benefit from continued PT. Updated HEP to include VOR and VOR cx activities, horizontal      Plan: Progress treatment as tolerated.        Precautions:  chronic pain, migraines, depression/anxiety, history of shingles, epilepsy with hx of seizures       Manuals                                                                 Neuro Re-Ed             CRT              VORx1 40 bpm 20x, vertical 180 no symptoms            VOR CX horizontal and vertical  60 bpm verticle            Self ball toss with gaze tracking  In upward arc 20            Basketball bounce to floor 20            Two targets with horizontal eye movements 20                                                                Ther Ex                                                                                                                     Ther Activity                                       Gait Training             Lateral foam beam walking              Forward beam walking              Walking with head turns              Walking with behind passes             Modalities

## 2023-12-11 ENCOUNTER — TELEPHONE (OUTPATIENT)
Dept: PAIN MEDICINE | Facility: CLINIC | Age: 43
End: 2023-12-11

## 2023-12-11 NOTE — TELEPHONE ENCOUNTER
Caller: Den Daniels    Doctor: Manish Phan    Reason for call: Requesting MRI script to be faxed to new site location  [facility]  Fax#: [phone]  Authorization #: [id number]  Approved today 12/11/23-6/8/24    Call back#: [phone]    Script E Faxed successfully.

## 2023-12-13 ENCOUNTER — TELEPHONE (OUTPATIENT)
Age: 43
End: 2023-12-13

## 2023-12-13 NOTE — TELEPHONE ENCOUNTER
Caller: Mitul     Doctor/Office: Dr Albania Richey     #: [phone]      What needs to be faxed: Referral    ATTN to: [id number]    Fax#: [phone]      Documents were successfully e-faxed

## 2023-12-15 ENCOUNTER — OFFICE VISIT (OUTPATIENT)
Dept: FAMILY MEDICINE CLINIC | Facility: CLINIC | Age: 43
End: 2023-12-15
Payer: COMMERCIAL

## 2023-12-15 VITALS
OXYGEN SATURATION: 98 % | WEIGHT: 220.8 LBS | HEIGHT: 67 IN | SYSTOLIC BLOOD PRESSURE: 138 MMHG | RESPIRATION RATE: 16 BRPM | TEMPERATURE: 97.3 F | DIASTOLIC BLOOD PRESSURE: 85 MMHG | BODY MASS INDEX: 34.65 KG/M2 | HEART RATE: 91 BPM

## 2023-12-15 DIAGNOSIS — J01.90 ACUTE NON-RECURRENT SINUSITIS, UNSPECIFIED LOCATION: Primary | ICD-10-CM

## 2023-12-15 PROCEDURE — 99214 OFFICE O/P EST MOD 30 MIN: CPT | Performed by: NURSE PRACTITIONER

## 2023-12-15 RX ORDER — METHYLPREDNISOLONE 4 MG/1
TABLET ORAL
Qty: 21 EACH | Refills: 0 | Status: SHIPPED | OUTPATIENT
Start: 2023-12-15

## 2023-12-15 RX ORDER — AMOXICILLIN AND CLAVULANATE POTASSIUM 875; 125 MG/1; MG/1
1 TABLET, FILM COATED ORAL EVERY 12 HOURS SCHEDULED
Qty: 14 TABLET | Refills: 0 | Status: SHIPPED | OUTPATIENT
Start: 2023-12-15 | End: 2023-12-22

## 2023-12-15 NOTE — PROGRESS NOTES
Name: Reji Ontiveros      : 1980      MRN: [id number]  Encounter Provider: ADA To  Encounter Date: 12/15/2023   Encounter department: [facility]     1. Acute non-recurrent sinusitis, unspecified location  Assessment & Plan:  Symptomatic since October, sudden worsening this AM.  Does not want to test for covid. Need to get pt off afrin, would like to start by having her take medrol dose pack to see if this helps alleviate her congestion. Will also send prescription for augmentin to have available should her symptoms worsen or continue on the weekend. Pt instructed to call for reevaluation if sx worsen or persist.      Orders:  -     methylPREDNISolone 4 MG tablet therapy pack; Use as directed on package  -     amoxicillin-clavulanate (AUGMENTIN) 875-125 mg per tablet; Take 1 tablet by mouth every 12 (twelve) hours for 7 days           Subjective      Pt is a 37 y.o. y/o female who is seen today for evaluation of uri symptoms. Past medical history of luli, HTN, insomnia, constipation. She has had mild congestion since October. She states she woke up this morning with a lot of sinus congestion and B ear pressure. No sore throat, she has been blowing her nose constantly. She is taking afrin twice a day since October. No fever, chills, body aches, fatigue. Review of Systems   Constitutional:  Negative for appetite change, chills, fatigue and fever. HENT:  Positive for congestion, ear pain, rhinorrhea and sinus pressure. Negative for postnasal drip, sinus pain and sore throat. Respiratory:  Negative for cough and shortness of breath.         Current Outpatient Medications on File Prior to Visit   Medication Sig    albuterol (ProAir HFA) 90 mcg/act inhaler Inhale 2 puffs every 6 (six) hours as needed for shortness of breath    cholecalciferol (VITAMIN D3) 1,000 units tablet Take 5,000 Units by mouth daily clotrimazole-betamethasone (LOTRISONE) 1-0.05 % cream Apply topically 2 (two) times a day    Etonogestrel (NEXPLANON SC) Inject under the skin Located Left upper inner arm    hydrochlorothiazide (HYDRODIURIL) 25 mg tablet Take 1 tablet (25 mg total) by mouth daily    linaCLOtide (Linzess) 145 MCG CAPS Take 1 capsule (145 mcg total) by mouth in the morning    losartan (COZAAR) 50 mg tablet Take 1 tablet (50 mg total) by mouth daily    magnesium 30 MG tablet Take 500 mg by mouth 2 (two) times a day      Multiple Vitamins-Minerals (MULTIVITAL-M) TABS Take by mouth    naproxen (EC NAPROSYN) 500 MG EC tablet Take 1 tablet (500 mg total) by mouth 3 (three) times a day with meals    Omega 3-6-9 Fatty Acids (OMEGA 3-6-9 PO) Take by mouth    omeprazole (PriLOSEC) 40 MG capsule Take 1 capsule (40 mg total) by mouth daily    QUEtiapine (SEROquel) 25 mg tablet Take 3 tabs daily    tacrolimus (PROTOPIC) 0.1 % ointment Apply topically 2 (two) times a day    TiZANidine (ZANAFLEX) 4 MG capsule Take 1 capsule (4 mg total) by mouth 3 (three) times a day as needed for muscle spasms    traZODone (DESYREL) 50 mg tablet Take 1 tablet (50 mg total) by mouth daily at bedtime as needed for sleep or depression    acetaminophen (TYLENOL) 325 mg tablet Take 650 mg by mouth every 6 (six) hours as needed for mild pain (Patient not taking: Reported on 11/29/2023)    ciprofloxacin-dexamethasone (CIPRODEX) otic suspension Administer 4 drops into the left ear 2 (two) times a day for 7 days    NON FORMULARY daily at bedtime Takes Valarien Root that's 1 PO QHS for Sleep (Patient not taking: Reported on 11/29/2023)       Objective     /85 (BP Location: Left arm)   Pulse 91   Temp (!) 97.3 °F (36.3 °C) (Temporal)   Resp 16   Ht 5' 7" (1.702 m)   Wt 100 kg (220 lb 12.8 oz)   SpO2 98%   BMI 34.58 kg/m²     Physical Exam  Vitals reviewed. Constitutional:       General: She is awake. She is not in acute distress.      Appearance: Normal appearance. She is well-developed and well-groomed. She is not ill-appearing. HENT:      Head: Normocephalic. Right Ear: Hearing, ear canal and external ear normal. No middle ear effusion. Tympanic membrane is retracted. Tympanic membrane is not erythematous or bulging. Left Ear: Hearing, ear canal and external ear normal.  No middle ear effusion. Tympanic membrane is retracted. Tympanic membrane is not erythematous or bulging. Nose: Mucosal edema, congestion and rhinorrhea present. Rhinorrhea is clear. Right Turbinates: Swollen. Left Turbinates: Swollen. Mouth/Throat:      Lips: Pink. Mouth: Mucous membranes are not dry. Pharynx: No oropharyngeal exudate or posterior oropharyngeal erythema. Tonsils: No tonsillar abscesses. 2+ on the right. 2+ on the left. Eyes:      Conjunctiva/sclera: Conjunctivae normal.   Cardiovascular:      Rate and Rhythm: Normal rate and regular rhythm. Heart sounds: Normal heart sounds. No murmur heard. Pulmonary:      Effort: Pulmonary effort is normal. No accessory muscle usage or respiratory distress. Breath sounds: Normal breath sounds. No decreased breath sounds, wheezing, rhonchi or rales. Lymphadenopathy:      Head:      Right side of head: No submental, submandibular, tonsillar, preauricular, posterior auricular or occipital adenopathy. Left side of head: No submental, submandibular, tonsillar, preauricular, posterior auricular or occipital adenopathy. Cervical: No cervical adenopathy. Skin:     General: Skin is warm and dry. Neurological:      Mental Status: She is alert and oriented to person, place, and time. Psychiatric:         Attention and Perception: Attention normal.         Mood and Affect: Mood normal.         Speech: Speech normal.         Behavior: Behavior normal. Behavior is cooperative. Thought Content:  Thought content normal.         Judgment: Judgment normal.       Kwame Kaplan Edith Melchor

## 2023-12-15 NOTE — ASSESSMENT & PLAN NOTE
Symptomatic since October, sudden worsening this AM.  Does not want to test for covid. Need to get pt off afrin, would like to start by having her take medrol dose pack to see if this helps alleviate her congestion. Will also send prescription for augmentin to have available should her symptoms worsen or continue on the weekend.   Pt instructed to call for reevaluation if sx worsen or persist.

## 2023-12-21 ENCOUNTER — OFFICE VISIT (OUTPATIENT)
Dept: PHYSICAL THERAPY | Facility: CLINIC | Age: 43
End: 2023-12-21
Payer: COMMERCIAL

## 2023-12-21 DIAGNOSIS — R42 POSTURAL DIZZINESS: Primary | ICD-10-CM

## 2023-12-21 PROCEDURE — 97112 NEUROMUSCULAR REEDUCATION: CPT | Performed by: PHYSICAL THERAPIST

## 2023-12-21 NOTE — PROGRESS NOTES
"Daily Note     Today's date: 2023  Patient name: Marisel Olivas  : 1980  MRN: [id number]  Referring provider: Marcella Naranjo C*  Dx:   Encounter Diagnosis     ICD-10-CM    1. Postural dizziness  R42           Start Time: 730  Stop Time: 0815  Total time in clinic (min): 45 minutes  Subjective: Patient notes that she feels a little better but she continues to feel off. Patient notes that she has been doing the VOR exercises at home but she has been having a tough time. Patient notes that she has been having less \"swaying imbalance\" when she wakes up in the morning.     Objective: See treatment diary below  R Lincoln-Hallpike: vertiginous symptoms no nystagmus     Assessment: Tolerated treatment well. Alyssa Hallpike was performed and demonstrated involvement of the R posterior canal with provocation of vertiginous dizziness however no nystagmus which is consistent with IE. CRT was performed 2x with mild improvement in symptoms. Updated HEP with Mainor this date in order to see if consistent positional treatment decreases symptoms. Will see patient for follow up in 2 weeks based on patient's schedule.  Patient exhibited good technique with therapeutic exercises and would benefit from continued PT.        Plan: Progress treatment as tolerated.       Precautions:  chronic pain, migraines, depression/anxiety, history of shingles, epilepsy with hx of seizures       Manuals                                                                Neuro Re-Ed             CRT   R Li 1x     R Eply 1x            VORx1 40 bpm 20x, vertical 180 no symptoms            VOR CX horizontal and vertical  60 bpm verticle            Self ball toss with gaze tracking  In upward arc 20            Basketball bounce to floor 20            Two targets with horizontal eye movements 20                                                                Ther Ex                                                                   "                                                   Ther Activity                                       Gait Training             Lateral foam beam walking              Forward beam walking              Walking with head turns              Walking with behind passes             Modalities

## 2023-12-27 ENCOUNTER — TELEPHONE (OUTPATIENT)
Dept: PAIN MEDICINE | Facility: CLINIC | Age: 43
End: 2023-12-27

## 2023-12-27 NOTE — TELEPHONE ENCOUNTER
S/W pt and advised of the same, pt verbalized understanding, denies blood thinners and would like to proceed with BL L5-S1 TFESI.  Nurse advised pt SPA procedure  to call pt and schedule.  Pt appreciative of call.

## 2023-12-27 NOTE — TELEPHONE ENCOUNTER
Caller: Patient     Doctor/Office: Hodan    Call regarding :  Returning call from nurse      Call was transferred to: Triage Nurse

## 2023-12-27 NOTE — TELEPHONE ENCOUNTER
----- Message from Jeff Pettit DO sent at 12/27/2023  9:52 AM EST -----  Clinical please notify patient of MRI lumbar spine results demonstrating multilevel disc bulging and foraminal narrowing most notable at L5-S1 likely contributing to the radicular pain to bilateral lower extremities  Would proceed with bilateral L5-S1 TFESI under fluoroscopy guidance if patient is interested and amenable please schedule  Thank you  ----- Message -----  From: Jaclyn, Transcription Incoming  Sent: 12/22/2023   9:33 PM EST  To: Jeff Pettit DO

## 2023-12-31 DIAGNOSIS — I10 ESSENTIAL HYPERTENSION: ICD-10-CM

## 2024-01-02 ENCOUNTER — TELEPHONE (OUTPATIENT)
Dept: OBGYN CLINIC | Facility: HOSPITAL | Age: 44
End: 2024-01-02

## 2024-01-02 RX ORDER — LOSARTAN POTASSIUM 50 MG/1
50 TABLET ORAL DAILY
Qty: 90 TABLET | Refills: 0 | Status: SHIPPED | OUTPATIENT
Start: 2024-01-02

## 2024-01-02 NOTE — TELEPHONE ENCOUNTER
Scheduled patient for TFESI 01/31/2024  Patient denies RX blood thinners/ NSAIDS  Nothing to eat or drink 1 hour prior to procedure  Needs to arrange transportation  Proper clothing for procedure  No vaccines 2 weeks prior or after procedure  If ill or place on antibiotics, please call to reschedule

## 2024-01-02 NOTE — TELEPHONE ENCOUNTER
Caller: Patient    Doctor/Office: [facility]/ [facility]hon    Call regarding :  Calling Amie back to schedule procedure.      Call was transferred to: Amie Henderson

## 2024-01-05 ENCOUNTER — OFFICE VISIT (OUTPATIENT)
Dept: PHYSICAL THERAPY | Facility: CLINIC | Age: 44
End: 2024-01-05
Payer: COMMERCIAL

## 2024-01-05 DIAGNOSIS — R42 POSTURAL DIZZINESS: Primary | ICD-10-CM

## 2024-01-05 PROCEDURE — 97112 NEUROMUSCULAR REEDUCATION: CPT | Performed by: PHYSICAL THERAPIST

## 2024-01-05 NOTE — PROGRESS NOTES
Daily Note     Today's date: 2024  Patient name: Marisel Olivas  : 1980  MRN: [id number]  Referring provider: Marcella Naranjo C*  Dx:   Encounter Diagnosis     ICD-10-CM    1. Postural dizziness  R42             Start Time: 1355  Stop Time: 1430  Total time in clinic (min): 35 minutes  Subjective: Patient notes that she has been feeling better and having improvements since last session. Patient notes that she traveled to [place] and she had no air sickness despite the flight being really jerky. Patient notes that she usually feels awful on planes. Patient also notes that she was able to  the ocean and look at that the waves without dizziness which she was unable to do this past summer. Patient reports that she is also able to bend over and move things around the  without dizziness. Able to get out of bed without dizziness. Patient notes continued trouble with tennis and basketball exercises with gaze stability. Patient notes that looking at the R side is the hardest and more symptoms ad head pressure       Objective: See treatment diary below    R Alyssa-Hallpike: vertiginous symptoms no nystagmus       Assessment: Patient tolerated treatment well.  [place] Hallpike was performed and demonstrated involvement of the R posterior canal with provocation of vertiginous dizziness however no nystagmus which is consistent with IE. CRT was performed 3x with improvement in symptoms. Patient had negative testing post treatment with no symptom provocation with eah testing positions. Will see patient for follow up in 6 weeks as she continues with comprehensive HEP. Discussed with patient to email or call the office with questions of if symptoms increased before follow up. Will continue to progress as able. Patient exhibited good technique with therapeutic exercises and would benefit from continued PT.        Plan: Progress treatment as tolerated.       Precautions:  chronic pain, migraines,  depression/anxiety, history of shingles, epilepsy with hx of seizures       Manuals 12/7 12/21 1/5                                                              Neuro Re-Ed             CRT   R Li 1x     R Eply 1x  R Li 1x     R Eply 2x           VORx1 40 bpm 20x, vertical 180 no symptoms            VOR CX horizontal and vertical  60 bpm verticle            Self ball toss with gaze tracking  In upward arc 20            Basketball bounce to floor 20            Two targets with horizontal eye movements 20                                                                Ther Ex                                                                                                                     Ther Activity                                       Gait Training             Lateral foam beam walking              Forward beam walking              Walking with head turns              Walking with behind passes             Modalities

## 2024-01-10 ENCOUNTER — TELEPHONE (OUTPATIENT)
Dept: NEUROLOGY | Facility: CLINIC | Age: 44
End: 2024-01-10

## 2024-01-10 NOTE — TELEPHONE ENCOUNTER
Called patient left voicemail to confirm appointment with provider on 1/11/24 at [facility].

## 2024-01-11 ENCOUNTER — CONSULT (OUTPATIENT)
Dept: NEUROLOGY | Facility: CLINIC | Age: 44
End: 2024-01-11
Payer: COMMERCIAL

## 2024-01-11 VITALS
WEIGHT: 220 LBS | HEIGHT: 67 IN | DIASTOLIC BLOOD PRESSURE: 69 MMHG | BODY MASS INDEX: 34.53 KG/M2 | SYSTOLIC BLOOD PRESSURE: 140 MMHG | HEART RATE: 67 BPM

## 2024-01-11 DIAGNOSIS — M48.00 SPINAL STENOSIS, UNSPECIFIED SPINAL REGION: ICD-10-CM

## 2024-01-11 DIAGNOSIS — R41.3 AMNESIA/MEMORY DISORDER: ICD-10-CM

## 2024-01-11 DIAGNOSIS — G62.9 NEUROPATHY: Primary | ICD-10-CM

## 2024-01-11 DIAGNOSIS — M54.10 RADICULAR PAIN: ICD-10-CM

## 2024-01-11 PROCEDURE — 99204 OFFICE O/P NEW MOD 45 MIN: CPT | Performed by: PSYCHIATRY & NEUROLOGY

## 2024-01-11 NOTE — PROGRESS NOTES
Neurology Consult     Patient ID: Marisel Olivas is a 43 y.o. female.    Subjective:    HPI  43 y.o. right handed female patient who presents for evaluation of eval and tx for exacerbation of chronic intermittent lower back pain, intermittent RUE sensory loss, memory difficulties.  Pertinent PMHx: Cervical radiculopathy, cervical spinal stenosis s/p cervical discectomy, myofascial pain syndrome, DJD of C-spine, anxiety on Seroquel.   Her neurologic complaints are detailed below:     Regarding neck pain: Has had for many years, s/p cervical discectomy with Dr. Porter.  Now reports intermittent paresthesias that originate in neck and radiate down RUE when she lies flat.  No obvious triggers except for lying down. Prior NCS/EMG of BUE (2021)- Bilateral CTS, Bilateral chronic C6-7 radiculopathy, low ulnar sensory amplitude on the right is of questionable significance in the absence of other findings.      Regarding lower back pain+ paresthesias: Has paresthesias in BLE that originate in the lower back and radiate down one or both BLE. Exacerbated by rotational hip movements.  Also reports having had chronic back pain for many years. No dysphagia,dysarthria, difficulty speaking. No change in bowel/bladder.     -Of note, patient follows with pain medicine on a regular basis for the above. Per review of pain management's records, patient's overall symptomology, imaging and exam consistent with mechanical neck/back pain secondary to repetitive st/sp injury of discogenic origin, exacerbated by hx fo sx c/s fusion and compensation for numerous extremity injuries     Regarding memory problems: Initially noticed after COVID infection about 2 years ago.  Reports having a severe infection due to COVID-19 infection that required hospitalization.  Since then, she has been experiencing difficulty with concentration, slower processing of thoughts as compared to prior and requiring frequent reminders.  Patient tells me she has a PhD  in education and is able to communicate in multiple languages.  For her, this represents a significant change from her baseline. No hx of ADHD/learning disability (reading, spelling, foreign languages).  No recent history of head trauma.      · Repeating questions?  No  · Forgetting names of family or friends?  No  · Word Finding Difficulty: Rarely  · Language: Intact, no changes  · Difficulty remembering new information?  Yes  · Difficulty concentrating?  Yes  · Getting lost in familiar places?  No  · Forgetting daily routine?  No  · Forgetting driving directions?  No  · Has left appliances on and forgotten?  No    Patient able to manage following ADLs:  · Driving?  Yes  · Fiances?  Yes  · Showering/Bathing: Yes  · Cleaning/Cooking: yes  · Dressing/ Eating: Yes    No recent mood changes, but she does have anxiety and takes Seroquel for it. No decline of interest in previously enjoyed activities. No visual hallucinations. No auditory hallucinations. No urinary Incontinence.    No sleeping difficulty/snoring/acting out dreams noted.  No slowed movements, freezing gait, gait changes, recent falls, tremors noted.  Currently Lives with family.  Alcohol intake: Minimal. No drug use reported.   Family hx significant for ADD, dementia in some relatives.    Otherwise, displays appropriate  social norms, empathy/humor. Executive function preserved. No reported hyperphagia.     The following portions of the patient's history were reviewed and updated as appropriate: allergies, current medications, past family history, past medical history, past social history, past surgical history and problem list.      Past Medical History:   Diagnosis Date    Constipation     COVID-19 01/05/2022    Depression     Eczema     Epilepsy (HCC) 11/15/2013    Description: well controlled with sleep    GERD (gastroesophageal reflux disease)     Grand mal convulsion (HCC)     X 2 last episode 2009-Sleep deprivation-Resolved with Cpap     "Hypertension     Insomnia     Medial meniscus tear 01/12/2015    Migraine     PONV (postoperative nausea and vomiting)     Primary generalized epilepsy (HCC) 12/06/2011    Psoriasis     Seizures (HCC)     Shingles     Shoulder dislocation, right, sequela 05/01/2018    Sinusitis     Sleep apnea     uses cpap    Tinnitus      Current Outpatient Medications   Medication Instructions    acetaminophen (TYLENOL) 650 mg, Oral, Every 6 hours PRN    albuterol (ProAir HFA) 90 mcg/act inhaler 2 puffs, Inhalation, Every 6 hours PRN    cholecalciferol (VITAMIN D3) 5,000 Units, Oral, Daily    ciprofloxacin-dexamethasone (CIPRODEX) otic suspension 4 drops, Left Ear, 2 times daily    clotrimazole-betamethasone (LOTRISONE) 1-0.05 % cream Topical, 2 times daily    Etonogestrel (NEXPLANON SC) Subcutaneous, Located Left upper inner arm     hydrochlorothiazide (HYDRODIURIL) 25 mg, Oral, Daily    Linzess 145 mcg, Oral, Daily    losartan (COZAAR) 50 mg, Oral, Daily    magnesium 500 mg, Oral, 2 times daily    methylPREDNISolone 4 MG tablet therapy pack Use as directed on package    Multiple Vitamins-Minerals (MULTIVITAL-M) TABS Oral    naproxen (EC NAPROSYN) 500 mg, Oral, 3 times daily with meals    NON FORMULARY Daily at bedtime    Omega 3-6-9 Fatty Acids (OMEGA 3-6-9 PO) Oral    omeprazole (PRILOSEC) 40 mg, Oral, Daily    QUEtiapine (SEROquel) 25 mg tablet Take 3 tabs daily    tacrolimus (PROTOPIC) 0.1 % ointment Topical, 2 times daily    TiZANidine (ZANAFLEX) 4 mg, Oral, 3 times daily PRN    traZODone (DESYREL) 50 mg, Oral, Daily at bedtime PRN     Objective:  Blood pressure 140/69, pulse 67, height 5' 7\" (1.702 m), weight 99.8 kg (220 lb).    Physical Exam  Vitals reviewed  General Examination: No distress, cooperative. Obese.     Pulm: Normal effort.    Neurological Exam  Normal neck flexion and ROM.  AAOx3. Speech fluent without errors. Normal naming and repetition. Follows cross-body commands.  Pupils equal, briskly reactive. VFF. " EOMI. No nystagmus. Face symmetric. No dysarthria. Uvula midline. Tongue midline.  Normal tone and bulk throughout.  Muscle strength testing by the MRC scale:   Right  Left  Site  Right  Left  Site    5 5 S Ab: Shoulder Abductors  5  5  HF: Hip Flexors    5 5 EF: Elbow Flexors  5  5 KF: Knee Flexors    5  5  EE: Elbow Extensors  5  5  KE: Knee Extensors    5  5  WE: Wrist Extensors  5  5 DR: Dorsi Flexors    5  5 FF: Finger Flexors  5  5 PF: Plantar Flexors      No drift or orbiting. No abnormal movements.   Symmetric LT sensation t/o.  Symmetrically diminished pinprick, temperature sensation up to ankles bilaterally.  Reflexes:    RJ BJ TJ KJ AJ Hendrix's   Right 1+ 1+ 1+ 1+ 1+ Not present   Left 1+ 1+ 1+ 1+ 1+ Not present     Intact FNF b/l.  Gait: Casual gait is narrow based and symmetric w/ appropriate arm swing, step height, and stride length.     MMSE: 30/30       ROS:  Review of Systems  See HPI.     Assessment/Plan  # Memory problems  43 y.o. R handed patient presents for evaluation of memory deficits. Initially noticed after COVID infection about 2 years ago. Reports having a severe infection due to COVID-19 infection that required hospitalization.  Since then, she has been experiencing difficulty with concentration, slower processing of thoughts as compared to prior and requiring frequent reminders.  Patient tells me she has a PhD in education and is able to communicate in multiple languages.  For her, this represents a significant client from her baseline.   MMSE  in office today: 30/30     Impression:  Patient's memory issues are mostly attention deficits indicative of pseudodementia in setting of underlying anxiety superimposed on sequale of COVID infection.  There are some studies that indicate memory complications as a result of moderate to severe cases of COVID-19 infection due to persistent viral inflammation. Patient's symptoms are not causing significant impaired functionality at this point. Does  have great insight into her lack of deficits. Overall, neuro degenerative process less likely at this point.    Plan:  -NeuroQuant MR brain scan + contrast  -Referral to neuropsych  -Labwork: TSH, B1, B6, B12, folate, vitamin D  -Encouraged continuing to stay active physically, mentally and socially  -Referral to OT (cognitive therapy)  -Contact PCP prior to starting over the counter medications. Avoid OTC medications that can affect cognition including Benadryl, Tyelnol PM, etc.  -Follow up with specialist and PCP periodically for management of your chronic conditions and preventative testing.  -Return in about 6 months (around 7/11/2024).    # Neck pain, paresthesias    Has had for many years, s/p cervical discectomy with Dr. Porter.  Now reports intermittent paresthesias that originate in neck and radiate down RUE when she lies flat.  No obvious triggers except for lying down. Prior NCS/EMG of BUE (2021)- Bilateral CTS, Bilateral chronic C6-7 radiculopathy, low ulnar sensory amplitude on the right is of questionable significance in the absence of other findings.      See plan below.     # Lower back pain, paresthesias   Has paresthesias in BLE that originate in the lower back and radiate down one or both BLE. Exacerbated by rotational hip movements.  Also reports having had chronic back pain for many years. No dysphagia,dysarthria, difficulty speaking. No change in bowel/bladder. Pt follows with pain medicine on a regular basis for the above. Per review of pain management's records, patient's overall symptomology, imaging and exam consistent with mechanical neck/back pain secondary to repetitive st/sp injury of discogenic origin, exacerbated by hx fo sx c/s fusion and compensation for numerous extremity injuries     Plan:  -MR T-spine + contrast  -NCV, EMG RUE, RLE to evaluate for radiculopathy, focal neuropathy +/- myelopathy.   -Recommended split use for CTS if symptoms are bothersome.    -NSGY referral   -Will  defer further C-spine imaging to NSGY team.   -F/u with pain medicine regularly.     Thank you for involving me in the care of your patient.  If you have any additional questions or would like to discuss further, please feel free to contact me.    EDDIE Le.  PGY-3, Neurology     Staffed w/ Dr. Hampton.

## 2024-01-11 NOTE — PATIENT INSTRUCTIONS
Obtain labwork.  MRI brain, MRI T-spine  Follow up with Neuropsych for formal evaluation.  Obtain EMG study.  Follow up with Neurosurgery.   Return in about 6 months (around 7/11/2024).

## 2024-01-12 ENCOUNTER — TELEPHONE (OUTPATIENT)
Dept: NEUROLOGY | Facility: CLINIC | Age: 44
End: 2024-01-12

## 2024-01-12 NOTE — TELEPHONE ENCOUNTER
Patients Insurance has denied MRI's requested by Dr. Rock, due to location not in network.    Insurance provided patient with location insurance is in network with.'    Please send scripts to location below:    [facility]  [address] [place] [address]  [facility]  [place] [address]  P:  [phone]  F: [phone]   ,DirectAddress_Unknown ,[email or web address],DirectAddress_Unknown,DirectAddress_Unknown ,[email or web address],DirectAddress_Unknown,DirectAddress_Unknown,[email or web address]

## 2024-01-17 ENCOUNTER — OFFICE VISIT (OUTPATIENT)
Dept: NEUROSURGERY | Facility: CLINIC | Age: 44
End: 2024-01-17
Payer: COMMERCIAL

## 2024-01-17 ENCOUNTER — APPOINTMENT (OUTPATIENT)
Dept: LAB | Facility: CLINIC | Age: 44
End: 2024-01-17
Payer: COMMERCIAL

## 2024-01-17 VITALS
RESPIRATION RATE: 16 BRPM | SYSTOLIC BLOOD PRESSURE: 122 MMHG | WEIGHT: 220 LBS | OXYGEN SATURATION: 99 % | HEART RATE: 98 BPM | DIASTOLIC BLOOD PRESSURE: 80 MMHG | TEMPERATURE: 98.1 F | BODY MASS INDEX: 34.53 KG/M2 | HEIGHT: 67 IN

## 2024-01-17 DIAGNOSIS — M54.16 LUMBAR RADICULOPATHY: Primary | ICD-10-CM

## 2024-01-17 DIAGNOSIS — R41.3 AMNESIA/MEMORY DISORDER: ICD-10-CM

## 2024-01-17 DIAGNOSIS — M48.00 SPINAL STENOSIS, UNSPECIFIED SPINAL REGION: ICD-10-CM

## 2024-01-17 DIAGNOSIS — R26.89 BALANCE PROBLEM: ICD-10-CM

## 2024-01-17 DIAGNOSIS — E87.6 HYPOKALEMIA: Primary | ICD-10-CM

## 2024-01-17 DIAGNOSIS — M54.10 RADICULAR PAIN: ICD-10-CM

## 2024-01-17 DIAGNOSIS — R41.3 MEMORY CHANGES: ICD-10-CM

## 2024-01-17 DIAGNOSIS — G62.9 NEUROPATHY: ICD-10-CM

## 2024-01-17 LAB
25(OH)D3 SERPL-MCNC: 36 NG/ML (ref 30–100)
ALBUMIN SERPL BCP-MCNC: 4.5 G/DL (ref 3.5–5)
ALP SERPL-CCNC: 55 U/L (ref 34–104)
ALT SERPL W P-5'-P-CCNC: 19 U/L (ref 7–52)
ANION GAP SERPL CALCULATED.3IONS-SCNC: 8 MMOL/L
AST SERPL W P-5'-P-CCNC: 16 U/L (ref 13–39)
BASOPHILS # BLD AUTO: 0.09 THOUSANDS/ÂΜL (ref 0–0.1)
BASOPHILS NFR BLD AUTO: 1 % (ref 0–1)
BILIRUB SERPL-MCNC: 0.81 MG/DL (ref 0.2–1)
BUN SERPL-MCNC: 13 MG/DL (ref 5–25)
CALCIUM SERPL-MCNC: 9.7 MG/DL (ref 8.4–10.2)
CHLORIDE SERPL-SCNC: 102 MMOL/L (ref 96–108)
CHOLEST SERPL-MCNC: 218 MG/DL
CO2 SERPL-SCNC: 28 MMOL/L (ref 21–32)
CREAT SERPL-MCNC: 0.71 MG/DL (ref 0.6–1.3)
EOSINOPHIL # BLD AUTO: 0.25 THOUSAND/ÂΜL (ref 0–0.61)
EOSINOPHIL NFR BLD AUTO: 3 % (ref 0–6)
ERYTHROCYTE [DISTWIDTH] IN BLOOD BY AUTOMATED COUNT: 13.2 % (ref 11.6–15.1)
FOLATE SERPL-MCNC: 21.6 NG/ML
GFR SERPL CREATININE-BSD FRML MDRD: 103 ML/MIN/1.73SQ M
GLUCOSE P FAST SERPL-MCNC: 105 MG/DL (ref 65–99)
HCT VFR BLD AUTO: 42.1 % (ref 34.8–46.1)
HDLC SERPL-MCNC: 34 MG/DL
HGB BLD-MCNC: 14.3 G/DL (ref 11.5–15.4)
IMM GRANULOCYTES # BLD AUTO: 0.04 THOUSAND/UL (ref 0–0.2)
IMM GRANULOCYTES NFR BLD AUTO: 1 % (ref 0–2)
LYMPHOCYTES # BLD AUTO: 2.14 THOUSANDS/ÂΜL (ref 0.6–4.47)
LYMPHOCYTES NFR BLD AUTO: 28 % (ref 14–44)
MCH RBC QN AUTO: 30.4 PG (ref 26.8–34.3)
MCHC RBC AUTO-ENTMCNC: 34 G/DL (ref 31.4–37.4)
MCV RBC AUTO: 90 FL (ref 82–98)
MONOCYTES # BLD AUTO: 0.48 THOUSAND/ÂΜL (ref 0.17–1.22)
MONOCYTES NFR BLD AUTO: 6 % (ref 4–12)
NEUTROPHILS # BLD AUTO: 4.65 THOUSANDS/ÂΜL (ref 1.85–7.62)
NEUTS SEG NFR BLD AUTO: 61 % (ref 43–75)
NONHDLC SERPL-MCNC: 184 MG/DL
NRBC BLD AUTO-RTO: 0 /100 WBCS
PLATELET # BLD AUTO: 290 THOUSANDS/UL (ref 149–390)
PMV BLD AUTO: 9.4 FL (ref 8.9–12.7)
POTASSIUM SERPL-SCNC: 3.4 MMOL/L (ref 3.5–5.3)
PROT SERPL-MCNC: 7.5 G/DL (ref 6.4–8.4)
RBC # BLD AUTO: 4.7 MILLION/UL (ref 3.81–5.12)
SODIUM SERPL-SCNC: 138 MMOL/L (ref 135–147)
TRIGL SERPL-MCNC: 441 MG/DL
TSH SERPL DL<=0.05 MIU/L-ACNC: 0.94 UIU/ML (ref 0.45–4.5)
VIT B12 SERPL-MCNC: 457 PG/ML (ref 180–914)
WBC # BLD AUTO: 7.65 THOUSAND/UL (ref 4.31–10.16)

## 2024-01-17 PROCEDURE — 86255 FLUORESCENT ANTIBODY SCREEN: CPT

## 2024-01-17 PROCEDURE — 82306 VITAMIN D 25 HYDROXY: CPT

## 2024-01-17 PROCEDURE — 86341 ISLET CELL ANTIBODY: CPT

## 2024-01-17 PROCEDURE — 99215 OFFICE O/P EST HI 40 MIN: CPT | Performed by: PHYSICIAN ASSISTANT

## 2024-01-17 PROCEDURE — 84207 ASSAY OF VITAMIN B-6: CPT

## 2024-01-17 PROCEDURE — 36415 COLL VENOUS BLD VENIPUNCTURE: CPT

## 2024-01-17 PROCEDURE — 86051 AQUAPORIN-4 ANTB ELISA: CPT

## 2024-01-17 PROCEDURE — 82607 VITAMIN B-12: CPT

## 2024-01-17 PROCEDURE — 86596 VOLTAGE-GTD CA CHNL ANTB EA: CPT

## 2024-01-17 PROCEDURE — 84443 ASSAY THYROID STIM HORMONE: CPT

## 2024-01-17 PROCEDURE — 82746 ASSAY OF FOLIC ACID SERUM: CPT

## 2024-01-17 PROCEDURE — 84425 ASSAY OF VITAMIN B-1: CPT

## 2024-01-17 RX ORDER — POTASSIUM CHLORIDE 750 MG/1
10 CAPSULE, EXTENDED RELEASE ORAL
Qty: 10 CAPSULE | Refills: 0 | Status: SHIPPED | OUTPATIENT
Start: 2024-01-17

## 2024-01-17 NOTE — PROGRESS NOTES
Neurosurgery Office Note  Marisel Olivas 43 y.o. female MRN: [id number]      Assessment/Plan     Patient is a 43 yr sold pleasant woman with Hx of C4-7 ACDF in 2021 BY Dr. Porter. She is here referred by Dr Cabrera for ongoing progressive lower back pain with bilateral lower extremity radiculopathy that became worse in the past 2 to 3 months.  Patient reports this pain is very intense, mostly aggravated with movement, when she gets out of car, going up and down stairs, bending and during house chore activities.  She noticed her left leg radiculopathy worse than right side, occasional tingling sensation.  Pain sometimes spasmodic in the calves.  She tried ibuprofen, Tylenol and other muscle relaxers not much change.  No bowel/bladder dysfunction or saddle anesthesia.  Occasional limping gait.  Patient saw pain management, Dr. Pettit and had MRI of lumbar spine which demonstrates disc degeneration and bridging.  Left foraminal and extraforaminal disc protrusions at L3-4.  Foraminal disc bulge prominence at L5-S1.  Patient is scheduled to have injections on 31st January 2024. She did PT but no much improvement, rather worsens her symptoms.    Exam-A&OX3. Laura. Strength 5/5 bilaterally, except slight left quads weakness 4+/5  Sensation to LT intact bilaterally. DTR 2+ no clonus bilaterally. Tenderness in the lumbar spine on palpation    Hx, Pex and images reviewed with the patient. Mx plan discussed. Advised continue follow up with pain Mx, possible KIESHA. Will get CD  of Lumbar and thoracic spine MRI to upload and review the images. Follow up after conservative Mx and with completion of the remaining thoracic images. All questions and concerns were answered to patient's satisfaction. Patient expressed her understandings and agreed with the plan.     Plan:  Advised to continue conservative Mx, Scheduled to have injection 31st Jan  Patient to bring CD of her Thoracic and Lumbar MRI at her next visit  Advised fall  "precaution, avoid lifting heavy objects, excessive bending or twisting  F/U after images results  Call with question or concern.    I have spent a total time of 45 minutes on 01/17/24 in caring for this patient including Diagnostic results, Prognosis, Risks and benefits of tx options, Instructions for management, Patient and family education, Importance of tx compliance, Risk factor reductions, Impressions, Counseling / Coordination of care, Documenting in the medical record, Reviewing / ordering tests, medicine, procedures  , and Obtaining or reviewing history  .    C/C: \" Here for evaluation of Lower back pain with Bilat LE radiculopathy\"    History of Present Illness     Patient is a 43 yrs old pleasant woman with PMHx of  GERD, HTN, KHADAR on CPAP, Seizure, Obesity, insomnia, postural dizziness and s/p C4-7 ACDF in 2021 by . Patient complains of lower back pain with bilateral, L>R LE radiculopathy, occasional tingling in the legs,Pain is spasmodic and sometime shooting. Slight left leg weakness, but able to walk. Pain worse with certain activities, bending, laundry, vacuuming, etc. Patient noticed her symptoms got worse in the past 2-3 months. She also claims right side shoulder blade pain with intermittent RUE radiculopathy, that slightly improved after roughly one month RUE radiculopathy. Patient tried gabapentin, Tylenol, muscle relaxer but without much improvement. No B/B related to her back pain or saddle anaesthesia.     Patient denies Hx of DM, CHF, Stroke, bleeding disorder or taking anticoagulant meds.   Denies Hx of smoking cigarettes.     Back Pain  Associated symptoms include dysuria, numbness (bi/leg/feet) and weakness (bi/legs giving out X one time, weakness).       REVIEW OF SYSTEMS  ROS personally reviewed and updated as follows:  Review of Systems   HENT: Negative.     Eyes: Negative.    Respiratory: Negative.     Cardiovascular: Negative.    Gastrointestinal: Negative.    Endocrine: " Negative.    Genitourinary:  Positive for dysuria.        Seeing a urologist   Musculoskeletal:  Positive for arthralgias (hips), back pain (LBP into hips, alternating legs with shooting pain to feet) and myalgias (bi/legs).   Neurological:  Positive for weakness (bi/legs giving out X one time, weakness) and numbness (bi/leg/feet).       ROS obtained by DELORIS Guido. See HPI.     Meds/Allergies     Current Outpatient Medications   Medication Sig Dispense Refill    acetaminophen (TYLENOL) 325 mg tablet Take 650 mg by mouth every 6 (six) hours as needed for mild pain      albuterol (ProAir HFA) 90 mcg/act inhaler Inhale 2 puffs every 6 (six) hours as needed for shortness of breath 18 g 0    cholecalciferol (VITAMIN D3) 1,000 units tablet Take 5,000 Units by mouth daily        ciprofloxacin-dexamethasone (CIPRODEX) otic suspension Administer 4 drops into the left ear 2 (two) times a day for 7 days (Patient not taking: Reported on 1/11/2024) 7.5 mL 0    clotrimazole-betamethasone (LOTRISONE) 1-0.05 % cream Apply topically 2 (two) times a day (Patient not taking: Reported on 1/11/2024) 30 g 0    Etonogestrel (NEXPLANON SC) Inject under the skin Located Left upper inner arm      hydrochlorothiazide (HYDRODIURIL) 25 mg tablet Take 1 tablet (25 mg total) by mouth daily 90 tablet 2    linaCLOtide (Linzess) 145 MCG CAPS Take 1 capsule (145 mcg total) by mouth in the morning 90 capsule 3    losartan (COZAAR) 50 mg tablet TAKE 1 TABLET BY MOUTH EVERY DAY 90 tablet 0    magnesium 30 MG tablet Take 500 mg by mouth 2 (two) times a day        methylPREDNISolone 4 MG tablet therapy pack Use as directed on package (Patient not taking: Reported on 1/11/2024) 21 each 0    Multiple Vitamins-Minerals (MULTIVITAL-M) TABS Take by mouth      naproxen (EC NAPROSYN) 500 MG EC tablet Take 1 tablet (500 mg total) by mouth 3 (three) times a day with meals (Patient not taking: Reported on 1/11/2024) 30 tablet 0    NON FORMULARY daily at bedtime  "Takes Valarien Root that's 1 PO QHS for Sleep (Patient not taking: Reported on 2023)      Omega 3-6-9 Fatty Acids (OMEGA 3-6-9 PO) Take by mouth      omeprazole (PriLOSEC) 40 MG capsule Take 1 capsule (40 mg total) by mouth daily 90 capsule 0    QUEtiapine (SEROquel) 25 mg tablet Take 3 tabs daily 270 tablet 3    tacrolimus (PROTOPIC) 0.1 % ointment Apply topically 2 (two) times a day (Patient not taking: Reported on 2024) 60 g 5    TiZANidine (ZANAFLEX) 4 MG capsule Take 1 capsule (4 mg total) by mouth 3 (three) times a day as needed for muscle spasms 90 capsule 3    traZODone (DESYREL) 50 mg tablet Take 1 tablet (50 mg total) by mouth daily at bedtime as needed for sleep or depression 90 tablet 3     No current facility-administered medications for this visit.       Allergies   Allergen Reactions    Gluten Meal - Food Allergy GI Intolerance     \" Highly Sensitivity\"    Lactose - Food Allergy GI Intolerance     \" Highly Sensitive\"       PAST HISTORY    Past Medical History:   Diagnosis Date    Constipation     COVID-19 2022    Depression     Eczema     Epilepsy (HCC) 11/15/2013    Description: well controlled with sleep    GERD (gastroesophageal reflux disease)     Grand mal convulsion (HCC)     X 2 last episode -Sleep deprivation-Resolved with Cpap    Hypertension     Insomnia     Medial meniscus tear 2015    Migraine     PONV (postoperative nausea and vomiting)     Primary generalized epilepsy (HCC) 2011    Psoriasis     Seizures (HCC)     Shingles     Shoulder dislocation, right, sequela 2018    Sinusitis     Sleep apnea     uses cpap    Tinnitus        Past Surgical History:   Procedure Laterality Date     SECTION   and     COLONOSCOPY      DILATION AND CURETTAGE OF UTERUS      DILATION AND CURETTAGE OF UTERUS WITH HYSTEROSOCPY N/A 2017    Procedure: D&C; IUD REMOVAL;  Surgeon: Abeba Blackburn MD;  Location: [facility];  Service: Gynecology    EGD      " HYSTEROSCOPY      LAPAROSCOPY      (Diagnostic)    LASIK      GA COLONOSCOPY FLX DX W/COLLJ SPEC WHEN PFRMD N/A 2018    Procedure: EGD AND COLONOSCOPY;  Surgeon: Jagdish Katz MD;  Location: [facility];  Service: Gastroenterology    GA VERTEBRAL CORPECTOMY ANT DCMPRN CERVICAL 1 SEG Bilateral 2021    Procedure: CORPECTOMY SPINE CERVICAL ANTERIOR: C4-7 ACDF with C6 /hemicorpectomy, C4-7 instrumentation and fusion (neuromonitoring);  Surgeon: Raquel Porter MD;  Location: [facility];  Service: Neurosurgery    REMOVAL OF INTRAUTERINE DEVICE (IUD)         Social History     Tobacco Use    Smoking status: Former     Current packs/day: 0.00     Average packs/day: 0.3 packs/day for 25.0 years (6.3 ttl pk-yrs)     Types: Cigarettes     Start date:      Quit date:      Years since quittin.0    Smokeless tobacco: Never    Tobacco comments:     Former smoker per [employer]   Vaping Use    Vaping status: Never Used   Substance Use Topics    Alcohol use: Yes     Comment: occassionally    Drug use: Never       Family History   Problem Relation Age of Onset    No Known Problems Mother     Diabetes Father         Diabetes Mellitus    Hyperlipidemia Father     Hypertension Father     No Known Problems Sister     No Known Problems Daughter     Brain cancer Maternal Grandmother 56    No Known Problems Son     Migraines Family     Diabetes Family         Type 2 Diabetes Mellitus    Breast cancer Neg Hx          Above history personally reviewed.       EXAM    Vitals:There were no vitals taken for this visit.,There is no height or weight on file to calculate BMI.     Physical Exam  Constitutional:       Appearance: She is obese.   HENT:      Head: Normocephalic and atraumatic.   Pulmonary:      Effort: Pulmonary effort is normal.   Musculoskeletal:         General: Tenderness present.      Cervical back: Normal range of motion.   Neurological:      General: No focal deficit present.      Mental Status: She is  alert.      GCS: GCS eye subscore is 4. GCS verbal subscore is 5. GCS motor subscore is 6.      Cranial Nerves: Cranial nerves 2-12 are intact.      Sensory: Sensation is intact.      Motor: Weakness present.      Coordination: Finger-Nose-Finger Test normal.      Gait: Gait abnormal.      Deep Tendon Reflexes: Reflexes are normal and symmetric.      Reflex Scores:       Tricep reflexes are 2+ on the right side and 2+ on the left side.       Bicep reflexes are 2+ on the right side and 2+ on the left side.       Brachioradialis reflexes are 2+ on the right side and 2+ on the left side.       Patellar reflexes are 2+ on the right side and 2+ on the left side.       Achilles reflexes are 2+ on the right side and 2+ on the left side.  Psychiatric:         Speech: Speech normal.         Neurologic Exam     Mental Status   Speech: speech is normal   Level of consciousness: alert    Cranial Nerves   Cranial nerves II through XII intact.     CN XI   CN XI normal.     Motor Exam   Muscle bulk: normal  Overall muscle tone: normal  Right arm tone: normal  Left arm tone: normal  Right arm pronator drift: absent  Left arm pronator drift: absent  Right leg tone: normal  Left leg tone: normal    Sensory Exam   Light touch normal.     Gait, Coordination, and Reflexes     Coordination   Finger to nose coordination: normal    Reflexes   Right brachioradialis: 2+  Left brachioradialis: 2+  Right biceps: 2+  Left biceps: 2+  Right triceps: 2+  Left triceps: 2+  Right patellar: 2+  Left patellar: 2+  Right achilles: 2+  Left achilles: 2+  Right : 2+  Left : 2+  Right Hendrix: absent  Left Hendrix: absent  Right ankle clonus: absent  Left ankle clonus: absent        MEDICAL DECISION MAKING    Imaging Studies:     No results found.    I have personally reviewed pertinent reports.   and I have personally reviewed pertinent films in PACS

## 2024-01-19 LAB
AMPAR1 AB SER QL IF: NEGATIVE
AMPAR2 AB SER QL IF: NEGATIVE
AMPHIPHYSIN AB SER QL: NEGATIVE
AQP4 H2O CHANNEL AB SERPL IA-ACNC: NEGATIVE
CASPR2 IGG SERPL QL IF: NEGATIVE
CV2 IGG SER-ACNC: NEGATIVE
DPPX IGG SERPL QL IF: NEGATIVE
GABABR AB SER QL IF: NEGATIVE
GAD65 IGG+IGM SER IA-SCNC: NEGATIVE NMOL/L
GLIAL NUC TYPE 1 AB SER QL IF: NEGATIVE
HU AB SER QL IF: NEGATIVE
HU2 AB SER QL IF: NEGATIVE
HU3 AB SER QL: NEGATIVE
IGLON5 IGG SER QL IF: NEGATIVE
INTERPRETATION: NEGATIVE
IP3R 1 IGG SER QL IF: NEGATIVE
LGI1 IGG SERPL QL IF: NEGATIVE
MA+TA AB SER QL: NEGATIVE
MGLUR1 IGG SER QL IF: NEGATIVE
NMDAR1 AB SER QL IF: NEGATIVE
PCA-1 AB SER QL IF: NEGATIVE
PCA-2 AB SER QL IF: NEGATIVE
PCA-TR AB SER QL IF: NEGATIVE
PCA-TR AB SER QL IF: NEGATIVE
VGCC AB SER-SCNC: <1 PMOL/L (ref 0–30)
VIT B6 SERPL-MCNC: 21.4 UG/L (ref 3.4–65.2)
ZIC4 AB SER QL: NEGATIVE

## 2024-01-21 LAB — VIT B1 BLD-SCNC: 114.4 NMOL/L (ref 66.5–200)

## 2024-01-23 ENCOUNTER — TELEPHONE (OUTPATIENT)
Dept: PSYCHIATRY | Facility: CLINIC | Age: 44
End: 2024-01-23

## 2024-01-23 NOTE — TELEPHONE ENCOUNTER
Marisel Olivas  requested a call back to discuss scheduling an appt for Neuropsychology.Patient has a referral..    They can be reached at P# [phone].       Thank you.

## 2024-01-24 ENCOUNTER — PROCEDURE VISIT (OUTPATIENT)
Dept: UROLOGY | Facility: CLINIC | Age: 44
End: 2024-01-24
Payer: COMMERCIAL

## 2024-01-24 VITALS
SYSTOLIC BLOOD PRESSURE: 114 MMHG | OXYGEN SATURATION: 99 % | HEART RATE: 101 BPM | RESPIRATION RATE: 19 BRPM | HEIGHT: 67 IN | BODY MASS INDEX: 34.53 KG/M2 | DIASTOLIC BLOOD PRESSURE: 80 MMHG | WEIGHT: 220 LBS

## 2024-01-24 DIAGNOSIS — R39.11 URINARY HESITANCY: Primary | ICD-10-CM

## 2024-01-24 DIAGNOSIS — N36.44 DETRUSOR SPHINCTER DYSSYNERGIA: ICD-10-CM

## 2024-01-24 LAB
SL AMB  POCT GLUCOSE, UA: ABNORMAL
SL AMB LEUKOCYTE ESTERASE,UA: NEGATIVE
SL AMB POCT BILIRUBIN,UA: ABNORMAL
SL AMB POCT BLOOD,UA: ABNORMAL
SL AMB POCT CLARITY,UA: CLEAR
SL AMB POCT COLOR,UA: YELLOW
SL AMB POCT KETONES,UA: ABNORMAL
SL AMB POCT NITRITE,UA: ABNORMAL
SL AMB POCT PH,UA: 8
SL AMB POCT SPECIFIC GRAVITY,UA: 1.02
SL AMB POCT URINE PROTEIN: ABNORMAL
SL AMB POCT UROBILINOGEN: 0.2

## 2024-01-24 PROCEDURE — 81002 URINALYSIS NONAUTO W/O SCOPE: CPT

## 2024-01-24 PROCEDURE — 51728 CYSTOMETROGRAM W/VP: CPT

## 2024-01-24 PROCEDURE — 51784 ANAL/URINARY MUSCLE STUDY: CPT

## 2024-01-24 PROCEDURE — 51797 INTRAABDOMINAL PRESSURE TEST: CPT

## 2024-01-24 NOTE — PROGRESS NOTES
"Cc:  \"It takes me forever to pee.  I feel like I never empty my bladder\"    Rare leakage with sneeze with full bladder  Denies pain /burning with voiding     CMG:       Position:  sitting           Fill sensation:     12 ml,    pdet -5 cmH2O       First urge:          300 ml,   pdet 8 cmH2O          Normal urge:     300 ml,   pdet 8 cmH2O           Must urge:         not reached     Permission to void:         300  ml,     pdet  3 cmH2O         Max pdet during void    40 cm H2O       Voided volume:     334ml    Bladder stability:   stable              Compliance:  normal         Sphincter function  No ARNALDO provoked at 100, 200 and 300 ml    EMG activity:       Normal during filling,        abnormal during voiding    Comments:   Normal sensation, stable bladder   Able to void without straining with pdet of 40 cmH2O   After flow stopped, patient still felt she had an urge to void and pdet maggie to 97 cmH2O, did not void further but when pdet decreased, she then felt like she was empty.    Hesitancy of 12 sec  before able to void   + EMG activity during void    Urodynamics    Date/Time: 1/24/2024 9:30 AM    Performed by: Debra Zavala RN  Authorized by: Roscoe Andino MD  Universal Protocol:  Consent: Verbal consent obtained. Written consent obtained.  Risks and benefits: risks, benefits and alternatives were discussed  Patient understanding: patient states understanding of the procedure being performed  Patient consent: the patient's understanding of the procedure matches consent given  Procedure consent: procedure consent matches procedure scheduled  Patient identity confirmed: verbally with patient  Procedure - Urodynamics:  Procedure details: CMG and EMG      Voiding Pressure Study: Yes    Intra-abdominal Voiding Pressure Study: Yes    Post-procedure:     Patient tolerance: Patient tolerated procedure well with no immediate complications                "

## 2024-01-25 ENCOUNTER — TELEPHONE (OUTPATIENT)
Dept: PSYCHIATRY | Facility: CLINIC | Age: 44
End: 2024-01-25

## 2024-01-30 ENCOUNTER — TELEPHONE (OUTPATIENT)
Dept: NEUROSURGERY | Facility: CLINIC | Age: 44
End: 2024-01-30

## 2024-01-30 ENCOUNTER — TELEPHONE (OUTPATIENT)
Age: 44
End: 2024-01-30

## 2024-01-30 ENCOUNTER — OFFICE VISIT (OUTPATIENT)
Dept: GASTROENTEROLOGY | Facility: AMBULARY SURGERY CENTER | Age: 44
End: 2024-01-30
Payer: COMMERCIAL

## 2024-01-30 VITALS
WEIGHT: 220.6 LBS | HEIGHT: 67 IN | HEART RATE: 93 BPM | BODY MASS INDEX: 34.62 KG/M2 | SYSTOLIC BLOOD PRESSURE: 126 MMHG | DIASTOLIC BLOOD PRESSURE: 78 MMHG | OXYGEN SATURATION: 98 %

## 2024-01-30 DIAGNOSIS — K21.9 GASTROESOPHAGEAL REFLUX DISEASE WITHOUT ESOPHAGITIS: Primary | ICD-10-CM

## 2024-01-30 DIAGNOSIS — K58.1 IRRITABLE BOWEL SYNDROME WITH CONSTIPATION: ICD-10-CM

## 2024-01-30 PROCEDURE — 99214 OFFICE O/P EST MOD 30 MIN: CPT | Performed by: INTERNAL MEDICINE

## 2024-01-30 NOTE — TELEPHONE ENCOUNTER
Caller: Patient    Doctor: Hodan    Reason for call: Pt got a letter in the mail saying her procedure was denied for tomorrow. I advised the pt that there is a letter scanned in saying it was approved. Pt very appreciative.    Call back#: [phone]

## 2024-01-30 NOTE — PROGRESS NOTES
"[facility] - Outpatient Follow-up Note  Marisel Olivas 43 y.o. female MRN: [id number]  Encounter: [id number]          ASSESSMENT AND PLAN:      1. Gastroesophageal reflux disease without esophagitis  Chronic symptoms > 12 months  EGD October 2023-unremarkable, biopsies negative  Continue omeprazole if in symptom relief  Emphasize antireflux measures    2. Irritable bowel syndrome with constipation  Chronic symptoms > 12 months  CT and colonoscopy unremarkable  - controlled with linzess, but with recent exacerbation  -encouraged avoidance of processed food, discussed Mediterranean diet  - continue linzess 145 mcg  ______________________________________________________________________    SUBJECTIVE:      Patient is a 43-year-old female who sees me in follow-up for GERD, right lower quadrant abdominal pain with constipation consistent with IBS-C.  She is moving her bowels daily with Linzess 145.  She does continue to have abdominal bloating, and mild abdominal pain intermittently following exposure to food intolerances, and preservatives including gluten, lactose, quick oats that she describes.  These symptoms are less frequent, less severe.  Her upper GI symptoms are controlled with omeprazole.    EGD October 2023-normal  Colonoscopy October 2023-normal, recall 10 years  CT abdomen pelvis September 2023-images reviewed on PACS by me, no acute findings      I personally reviewed external procedure reports.     Objective     Blood pressure 126/78, pulse 93, height 5' 7\" (1.702 m), weight 100 kg (220 lb 9.6 oz), SpO2 98%. Body mass index is 34.55 kg/m².      PHYSICAL EXAM:      General Appearance:   Alert, cooperative, no distress   HEENT:   Normocephalic, atraumatic, anicteric.     Neck:  Supple, symmetrical, trachea midline   Lungs:   Equal chest rise and unlabored breathing, normal effort, no coughing.    Heart::   No visualized JVD.   Abdomen:   Soft, non-tender, non-distended; normal " bowel sounds; no masses, no organomegaly    Rectal:   Deferred    Extremities:  No cyanosis, clubbing or edema    Pulses:  2+ and symmetric    Skin:  No jaundice, rashes, or lesions      Lab Results:   Lab Results   Component Value Date    WBC 7.65 01/17/2024    HGB 14.3 01/17/2024    HCT 42.1 01/17/2024    MCV 90 01/17/2024     01/17/2024       Lab Results   Component Value Date     10/16/2013    SODIUM 138 01/17/2024    K 3.4 (L) 01/17/2024     01/17/2024    CO2 28 01/17/2024    ANIONGAP 8 10/16/2013    AGAP 8 01/17/2024    BUN 13 01/17/2024    CREATININE 0.71 01/17/2024    GLUC 94 09/27/2023    GLUF 105 (H) 01/17/2024    CALCIUM 9.7 01/17/2024    AST 16 01/17/2024    ALT 19 01/17/2024    ALKPHOS 55 01/17/2024    PROT 8.1 10/16/2013    TP 7.5 01/17/2024    BILITOT 0.75 10/16/2013    TBILI 0.81 01/17/2024    EGFR 103 01/17/2024     I reviewed relevant labs    Radiology Results:   CT abdomen/pelvis September 2023-normal    I personally reviewed relevant images in PACS.

## 2024-01-31 ENCOUNTER — HOSPITAL ENCOUNTER (OUTPATIENT)
Facility: AMBULARY SURGERY CENTER | Age: 44
Setting detail: OUTPATIENT SURGERY
Discharge: HOME | End: 2024-01-31
Attending: PHYSICAL MEDICINE & REHABILITATION | Admitting: PHYSICAL MEDICINE & REHABILITATION
Payer: COMMERCIAL

## 2024-01-31 ENCOUNTER — TELEPHONE (OUTPATIENT)
Dept: RADIOLOGY | Facility: CLINIC | Age: 44
End: 2024-01-31

## 2024-01-31 ENCOUNTER — APPOINTMENT (OUTPATIENT)
Dept: RADIOLOGY | Facility: HOSPITAL | Age: 44
End: 2024-01-31
Payer: COMMERCIAL

## 2024-01-31 VITALS
TEMPERATURE: 97.5 F | RESPIRATION RATE: 21 BRPM | OXYGEN SATURATION: 100 % | HEART RATE: 83 BPM | DIASTOLIC BLOOD PRESSURE: 69 MMHG | SYSTOLIC BLOOD PRESSURE: 106 MMHG

## 2024-01-31 PROCEDURE — 64483 NJX AA&/STRD TFRM EPI L/S 1: CPT | Performed by: PHYSICAL MEDICINE & REHABILITATION

## 2024-01-31 PROCEDURE — NC001 PR NO CHARGE: Performed by: PHYSICAL MEDICINE & REHABILITATION

## 2024-01-31 RX ORDER — LIDOCAINE HYDROCHLORIDE 10 MG/ML
INJECTION, SOLUTION EPIDURAL; INFILTRATION; INTRACAUDAL; PERINEURAL AS NEEDED
Status: DISCONTINUED | OUTPATIENT
Start: 2024-01-31 | End: 2024-01-31 | Stop reason: HOSPADM

## 2024-01-31 RX ORDER — METHYLPREDNISOLONE ACETATE 40 MG/ML
INJECTION, SUSPENSION INTRA-ARTICULAR; INTRALESIONAL; INTRAMUSCULAR; SOFT TISSUE AS NEEDED
Status: DISCONTINUED | OUTPATIENT
Start: 2024-01-31 | End: 2024-01-31 | Stop reason: HOSPADM

## 2024-01-31 RX ORDER — BUPIVACAINE HYDROCHLORIDE 2.5 MG/ML
INJECTION, SOLUTION EPIDURAL; INFILTRATION; INTRACAUDAL AS NEEDED
Status: DISCONTINUED | OUTPATIENT
Start: 2024-01-31 | End: 2024-01-31 | Stop reason: HOSPADM

## 2024-01-31 NOTE — DISCHARGE INSTRUCTIONS
Epidural Steroid Injection   WHAT YOU NEED TO KNOW:   An epidural steroid injection (KIESHA) is a procedure to inject steroid medicine into the epidural space. The epidural space is between your spinal cord and vertebrae. Steroids reduce inflammation and fluid buildup in your spine that may be causing pain. You may be given pain medicine along with the steroids.          ACTIVITY  Do not drive or operate machinery today.  No strenuous activity today - bending, lifting, etc.  You may resume normal activites starting tomorrow - start slowly and as tolerated.  You may shower today, but no tub baths or hot tubs.  You may have numbness for several hours from the local anesthetic. Please use caution and common sense, especially with weight-bearing activities.    CARE OF THE INJECTION SITE  If you have soreness or pain, apply ice to the area today (20 minutes on/20 minutes off).  Starting tomorrow, you may use warm, moist heat or ice if needed.  You may have an increase or change in your discomfort for 36-48 hours after your treatment.  Apply ice and continue with any pain medication you have been prescribed.  Notify the [facility] if you have any of the following: redness, drainage, swelling, headache, stiff neck or fever above 100°F.    SPECIAL INSTRUCTIONS  Our office will contact you in approximately 7 days for a progress report.    MEDICATIONS  Continue to take all routine medications.  Our office may have instructed you to hold some medications.    As no general anesthesia was used in today's procedure, you should not experience any side effects related to anesthesia.     If you are diabetic, the steroids used in today's injection may temporarily increase your blood sugar levels after the first few days after your injection. Please keep a close eye on your sugars and alert the doctor who manages your diabetes if your sugars are significantly high from your baseline or you are symptomatic.     If you have a  problem specifically related to your procedure, please call our office at [phone].  Problems not related to your procedure should be directed to your primary care physician.

## 2024-01-31 NOTE — TELEPHONE ENCOUNTER
----- Message from Jeff Pettit DO sent at 1/31/2024 10:05 AM EST -----  Regarding: FW: t-spine w/wo 1/30/24  Also need official radiology read impression report  Please obtain  Thank you  ----- Message -----  From: Rut Morales  Sent: 1/30/2024   3:59 PM EST  To: Jeff Pettit DO  Subject: t-spine w/wo 1/30/24                             Dr. Pettit,   Patient dropped off disk today, please see under imaging.      Thank you.    Rut

## 2024-01-31 NOTE — OP NOTE
OPERATIVE REPORT  PATIENT NAME: Marisel Olivas    :  1980  MRN: [id number]  Pt Location: [facility] MINOR/PAIN ROOM 01    SURGERY DATE: 2024    Surgeons and Role:     * Jeff Pettit DO - Primary    Preop Diagnosis:  Intervertebral disc disorder with radiculopathy of lumbosacral region [M51.17]    Post-Op Diagnosis Codes:     * Intervertebral disc disorder with radiculopathy of lumbosacral region [M51.17]    Procedure(s):  Bilateral - BILATERAL L5-S1 TRANSFORAMINAL EPIDURAL STEROID INJECTION    Indication: Leg pain  Preoperative diagnosis: Lumbar radiculitis  Postoperative diagnosis: Lumbar radiculitis  Procedure: Fluoroscopically-guided bilateral L5-S1 transforaminal epidural steroid injection under fluoroscopy  EBL: none  Specimens: not applicable  After discussing the risks, benefits, and alternatives to the procedure, the patient expressed understanding and wished to proceed. The patient was brought to the fluoroscopy suite and placed in the prone position. A procedural pause was conducted to verify: correct patient identity, procedure to be performed and as applicable, correct side and site, correct patient position, and availability of implants, special equipment and special requirements. After identifying the right L5 pedicle fluoroscopically with an oblique view, the skin was sterilely prepped and draped in the usual fashion using Chloraprep skin prep. The skin and subcutaneous tissues were anesthetized with 1% lidocaine. A 5 inch 22-gauge  spinal needle was then advanced under fluoroscopic guidance to the neural foramen. Appropriate foraminal depth was determined with a lateral fluoroscopic view, and AP visualization confirmed needle positioning at approximately the 6 o'clock position relative to the pedicle. After negative aspiration, Omnipaque 240 contrast was injected using live fluoroscopy confirming appropriate transforaminal spread without evidence of intravascular or intrathecal  uptake.  Next, a 1.5 ml solution consisting of 20mg depomedrol and 0.25% bupivacaine was injected slowly and incrementally into the epidural space. Following the injection the needle was withdrawn slightly and flushed with lidocaine as it was fully extracted.  The procedure was then repeated in the exact same way on the opposite side at the same level.  The patient tolerated the procedure well and there were no apparent complications. The patient did not develop any new neurologic deficits. After appropriate observation, the patient was dismissed from the clinic in good condition under their own power.          SIGNATURE: Jeff Pettit,   DATE: January 31, 2024  TIME: 10:34 AM

## 2024-01-31 NOTE — H&P
"History of Present Illness: The patient is a 43 y.o. female who presents with complaints of low back pain    Past Medical History:   Diagnosis Date    Constipation     COVID-19 2022    Depression     Eczema     Epilepsy (HCC) 11/15/2013    Description: well controlled with sleep    GERD (gastroesophageal reflux disease)     Grand mal convulsion (HCC)     X 2 last episode -Sleep deprivation-Resolved with Cpap    Hypertension     Insomnia     Medial meniscus tear 2015    Migraine     PONV (postoperative nausea and vomiting)     Primary generalized epilepsy (HCC) 2011    Psoriasis     Seizures ([facility])     Shingles     Shoulder dislocation, right, sequela 2018    Sinusitis     Sleep apnea     uses cpap    Tinnitus        Past Surgical History:   Procedure Laterality Date     SECTION   and     COLONOSCOPY      DILATION AND CURETTAGE OF UTERUS      DILATION AND CURETTAGE OF UTERUS WITH HYSTEROSOCPY N/A 2017    Procedure: D&C; IUD REMOVAL;  Surgeon: Abeba Blackburn MD;  Location: [facility];  Service: Gynecology    EGD      HYSTEROSCOPY      LAPAROSCOPY      (Diagnostic)    LASIK      AL COLONOSCOPY FLX DX W/COLLJ SPEC WHEN PFRMD N/A 2018    Procedure: EGD AND COLONOSCOPY;  Surgeon: Jagdish Katz MD;  Location: [facility];  Service: Gastroenterology    AL VERTEBRAL CORPECTOMY ANT DCMPRN CERVICAL 1 SEG Bilateral 2021    Procedure: CORPECTOMY SPINE CERVICAL ANTERIOR: C4-7 ACDF with C6 /hemicorpectomy, C4-7 instrumentation and fusion (neuromonitoring);  Surgeon: Raquel Porter MD;  Location: [facility];  Service: Neurosurgery    REMOVAL OF INTRAUTERINE DEVICE (IUD)         No current facility-administered medications for this encounter.    Allergies   Allergen Reactions    Gluten Meal - Food Allergy GI Intolerance     \" Highly Sensitivity\"    Lactose - Food Allergy GI Intolerance     \" Highly Sensitive\"       Physical Exam:   Vitals:    24 1017   BP: 119/79 "   Pulse: 85   Resp: 18   Temp: 97.5 °F (36.4 °C)     General: Awake, Alert, Oriented x 3, Mood and affect appropriate  Respiratory: Respirations even and unlabored  Cardiovascular: Peripheral pulses intact; no edema  Musculoskeletal Exam: Tenderness palpation bilateral lumbar paraspinals    ASA Score: 2    Patient/Chart Verification  Patient ID Verified: Verbal, Armband  ID Band Applied: Yes  Consents Confirmed: Procedural  H&P( within 30 days) Verified: Yes  Interval H&P(within 24 hr) Complete (required for Outpatients and Surgery Admit only): Yes  Beta Blocker given : N/A  Pre-op Lab/Test Results Available: N/A  Pregnancy Lab Collected: N/A comment  Does Patient Have a Prosthetic Device/Implant: Yes    Assessment: Lumbar radiculopathy    Plan: Bilateral L5-S1 TFESI

## 2024-02-02 NOTE — TELEPHONE ENCOUNTER
S/w the patient and reviewed. She stated she is feeling so much better after the injection. She stated she is sleeping better and she is not feeling the stabbing pain anymore. She wanted to thank you. She stated now she is starting c/ LBP. She does not have a F/U scheduled and I am not sure if she was evaluated for that. What do you think KW? Please advise. thanks

## 2024-02-02 NOTE — TELEPHONE ENCOUNTER
MRI thoracic spine demonstrating central disc protrusion at T10-T11 and mild central canal narrowing otherwise very minimal findings  Hopefully she is getting relief from the transforaminal epidural steroid injection  Thank you

## 2024-02-07 ENCOUNTER — OFFICE VISIT (OUTPATIENT)
Dept: NEUROSURGERY | Facility: CLINIC | Age: 44
End: 2024-02-07
Payer: COMMERCIAL

## 2024-02-07 ENCOUNTER — HOSPITAL ENCOUNTER (OUTPATIENT)
Dept: RADIOLOGY | Facility: HOSPITAL | Age: 44
Discharge: HOME | End: 2024-02-07
Payer: COMMERCIAL

## 2024-02-07 ENCOUNTER — TELEPHONE (OUTPATIENT)
Dept: PAIN MEDICINE | Facility: CLINIC | Age: 44
End: 2024-02-07

## 2024-02-07 ENCOUNTER — OFFICE VISIT (OUTPATIENT)
Dept: PAIN MEDICINE | Facility: CLINIC | Age: 44
End: 2024-02-07
Payer: COMMERCIAL

## 2024-02-07 VITALS
SYSTOLIC BLOOD PRESSURE: 134 MMHG | WEIGHT: 220 LBS | HEIGHT: 67 IN | BODY MASS INDEX: 34.53 KG/M2 | HEART RATE: 95 BPM | DIASTOLIC BLOOD PRESSURE: 90 MMHG

## 2024-02-07 VITALS
HEART RATE: 91 BPM | HEIGHT: 67 IN | OXYGEN SATURATION: 99 % | TEMPERATURE: 98.6 F | DIASTOLIC BLOOD PRESSURE: 84 MMHG | SYSTOLIC BLOOD PRESSURE: 130 MMHG | BODY MASS INDEX: 34.53 KG/M2 | RESPIRATION RATE: 16 BRPM | WEIGHT: 220 LBS

## 2024-02-07 DIAGNOSIS — M79.18 MYOFASCIAL PAIN SYNDROME: ICD-10-CM

## 2024-02-07 DIAGNOSIS — M25.511 CHRONIC RIGHT SHOULDER PAIN: ICD-10-CM

## 2024-02-07 DIAGNOSIS — M54.12 CERVICAL RADICULOPATHY: ICD-10-CM

## 2024-02-07 DIAGNOSIS — G89.29 CHRONIC RIGHT SHOULDER PAIN: ICD-10-CM

## 2024-02-07 DIAGNOSIS — M54.16 LUMBAR RADICULOPATHY: ICD-10-CM

## 2024-02-07 DIAGNOSIS — G89.4 CHRONIC PAIN SYNDROME: Primary | ICD-10-CM

## 2024-02-07 DIAGNOSIS — M54.12 RADICULOPATHY, CERVICAL: Primary | ICD-10-CM

## 2024-02-07 PROCEDURE — 99214 OFFICE O/P EST MOD 30 MIN: CPT

## 2024-02-07 PROCEDURE — 72050 X-RAY EXAM NECK SPINE 4/5VWS: CPT

## 2024-02-07 PROCEDURE — 73030 X-RAY EXAM OF SHOULDER: CPT

## 2024-02-07 PROCEDURE — 99215 OFFICE O/P EST HI 40 MIN: CPT | Performed by: PHYSICIAN ASSISTANT

## 2024-02-07 NOTE — PROGRESS NOTES
Assessment:  1. Chronic pain syndrome    2. Chronic right shoulder pain    3. Myofascial pain syndrome    4. Cervical radiculopathy    5. Lumbar radiculopathy        Plan:  The patient is a 43-year-old female ith a history of chronic pain secondary to low back pain, lumbar intervertebral disc disorder with radiculopathy, lumbar spondylosis, neck pain, cervical radiculopathy and history of ACDF done in 2021 by Dr. Porter who  presents to the office with improved bilateral low back pain after undergoing the bilateral L5-S1 TFESI and worsening neck pain and intermittent numbness and pins and needle sensation into the right arm and right shoulder pain.    On exam, the patient is tender with palpation over right thoracic paraspinal with trigger points palpable.  I did instruct patient we can perform trigger point injections to help release the spasm.  Patient would like to proceed.  I instructed our  will schedule her.  Complete risks and benefits including bleeding, infection, tissue reaction, nerve injury and allergic reaction were discussed.  The approach was demonstrated using models and literature was provided.  Verbal and written consent was obtained.    I will also x-ray her right shoulder as well as her cervical spine for further evaluation.  I did instruct patient I will call once I receive the results.  Patient does have a follow-up appointment with neurosurgery later this afternoon.     My impressions and treatment recommendations were discussed in detail with the patient who verbalized understanding and had no further questions.  Discharge instructions were provided. I personally saw and examined the patient and I agree with the above discussed plan of care.    Orders Placed This Encounter   Procedures    XR shoulder 2+ vw right     Standing Status:   Future     Number of Occurrences:   1     Standing Expiration Date:   2/7/2028     Scheduling Instructions:      Bring along any outside films relating  to this procedure.           Order Specific Question:   Is the patient pregnant?     Answer:   No    US guidance     Right thoracic paraspinal TPI     Standing Status:   Future     Standing Expiration Date:   2/7/2028     Scheduling Instructions:      No prep required.        Please bring your insurance cards and a form of photo ID.  Bring your order/script for the test if from a non - [facility]'s provider.        Arrive 15 minutes prior to your appointment time to register.            To schedule this appointment, please contact Central Scheduling at [phone].           Order Specific Question:   Is the patient pregnant?     Answer:   No     Order Specific Question:   What is the patient's sedation requirement? If Medication for Claustrophobia is selected, order medication at this point.     Answer:   No Sedation    X-ray cervical spine complete 4 or 5 vw     Standing Status:   Future     Number of Occurrences:   1     Standing Expiration Date:   2/7/2028     Scheduling Instructions:      Bring along any outside films relating to this procedure.           Order Specific Question:   Is the patient pregnant?     Answer:   No     No orders of the defined types were placed in this encounter.      History of Present Illness:  Marisel Olivas is a 43 y.o. female with a history of chronic pain secondary to low back pain, lumbar intervertebral disc disorder with radiculopathy, lumbar spondylosis, neck pain, cervical radiculopathy and history of ACDF done in 2021 by Dr. Porter.  She was last seen on 1/31/2024 where she underwent a bilateral L5-S1 TFESI which has provided her significant relief of her low back pain.  She presents to the office with improved bilateral low back pain after undergoing the bilateral L5-S1 TFESI and worsening neck pain and intermittent numbness and pins and needle sensation into the right arm and right shoulder pain.    She states her pain is intermittent but worse in the evening.  She rates  the quality of her pain is burning, dull/aching and is currently rating her pain a 1/10 on a numeric scale.    Current pain medications include tizanidine 4 mg as needed for muscle spasms and naproxen as needed which is helpful.    I have personally reviewed and/or updated the patient's past medical history, past surgical history, family history, social history, current medications, allergies, and vital signs today.     Review of Systems   Musculoskeletal:  Positive for back pain.       Patient Active Problem List   Diagnosis    Essential hypertriglyceridemia    Insomnia    Obesity    Obstructive sleep apnea on CPAP    Lateral epicondylitis of right elbow    Irritable bowel syndrome with constipation    Essential hypertension    Cervical radiculopathy    Cervical spinal stenosis    S/P cervical discectomy    Muscle spasm of back    Gastroesophageal reflux disease without esophagitis    Polyarthralgia    History of seizure    Postural dizziness    Lumbar radiculopathy    Acute non-recurrent sinusitis       Past Medical History:   Diagnosis Date    Constipation     COVID-19 2022    Depression     Eczema     Epilepsy ([facility]) 11/15/2013    Description: well controlled with sleep    GERD (gastroesophageal reflux disease)     Grand mal convulsion (HCC)     X 2 last episode -Sleep deprivation-Resolved with Cpap    Hypertension     Insomnia     Medial meniscus tear 2015    Migraine     PONV (postoperative nausea and vomiting)     Primary generalized epilepsy (HCC) 2011    Psoriasis     Seizures ([facility])     Shingles     Shoulder dislocation, right, sequela 2018    Sinusitis     Sleep apnea     uses cpap    Tinnitus        Past Surgical History:   Procedure Laterality Date     SECTION   and     COLONOSCOPY      DILATION AND CURETTAGE OF UTERUS      DILATION AND CURETTAGE OF UTERUS WITH HYSTEROSOCPY N/A 2017    Procedure: D&C; IUD REMOVAL;  Surgeon: Abeba Blackburn MD;  Location: [facility];  Service: Gynecology    EGD      EPIDURAL BLOCK INJECTION Bilateral 2024    Procedure: BILATERAL L5-S1 TRANSFORAMINAL EPIDURAL STEROID INJECTION;  Surgeon: Jeff Pettit DO;  Location: [facility] MAIN OR;  Service: Pain Management     HYSTEROSCOPY      LAPAROSCOPY      (Diagnostic)    LASIK      WY COLONOSCOPY FLX DX W/COLLJ SPEC WHEN PFRMD N/A 2018    Procedure: EGD AND COLONOSCOPY;  Surgeon: Jagdish Katz MD;  Location: [facility]  GI LAB;  Service: Gastroenterology    WY VERTEBRAL CORPECTOMY ANT DCMPRN CERVICAL 1 SEG Bilateral 2021    Procedure: CORPECTOMY SPINE CERVICAL ANTERIOR: C4-7 ACDF with C6 /hemicorpectomy, C4-7 instrumentation and fusion (neuromonitoring);  Surgeon: Raquel Porter MD;  Location: [facility];  Service: Neurosurgery    REMOVAL OF INTRAUTERINE DEVICE (IUD)         Family History   Problem Relation Age of Onset    No Known Problems Mother     Diabetes Father         Diabetes Mellitus    Hyperlipidemia Father     Hypertension Father     No Known Problems Sister     No Known Problems Daughter     Brain cancer Maternal Grandmother 56    No Known Problems Son     Migraines Family     Diabetes Family         Type 2 Diabetes Mellitus    Breast cancer Neg Hx        Social History     Occupational History    Not on file   Tobacco Use    Smoking status: Former     Current packs/day: 0.00     Average packs/day: 0.3 packs/day for 25.0 years (6.3 ttl pk-yrs)     Types: Cigarettes     Start date:      Quit date:      Years since quittin.1    Smokeless tobacco: Never    Tobacco comments:     Former smoker per [employer]   Vaping Use    Vaping status: Never Used   Substance and Sexual Activity    Alcohol use: Yes     Comment: occassionally    Drug use: Never    Sexual activity: Not Currently     Partners: Male     Birth control/protection: Implant     Comment: 15 - Nexplanon inserted       Current Outpatient Medications on File Prior to Visit   Medication Sig     acetaminophen (TYLENOL) 325 mg tablet Take 650 mg by mouth every 6 (six) hours as needed for mild pain    albuterol (ProAir HFA) 90 mcg/act inhaler Inhale 2 puffs every 6 (six) hours as needed for shortness of breath    cholecalciferol (VITAMIN D3) 1,000 units tablet Take 5,000 Units by mouth daily      Etonogestrel (NEXPLANON SC) Inject under the skin Located Left upper inner arm    hydrochlorothiazide (HYDRODIURIL) 25 mg tablet Take 1 tablet (25 mg total) by mouth daily    linaCLOtide (Linzess) 145 MCG CAPS Take 1 capsule (145 mcg total) by mouth in the morning    losartan (COZAAR) 50 mg tablet TAKE 1 TABLET BY MOUTH EVERY DAY    magnesium 30 MG tablet Take 500 mg by mouth 2 (two) times a day      Multiple Vitamins-Minerals (MULTIVITAL-M) TABS Take by mouth    naproxen (EC NAPROSYN) 500 MG EC tablet Take 1 tablet (500 mg total) by mouth 3 (three) times a day with meals    Omega 3-6-9 Fatty Acids (OMEGA 3-6-9 PO) Take by mouth    omeprazole (PriLOSEC) 40 MG capsule Take 1 capsule (40 mg total) by mouth daily (Patient not taking: Reported on 2/7/2024)    potassium chloride (MICRO-K) 10 MEQ CR capsule Take 1 capsule (10 mEq total) by mouth daily in the early morning    QUEtiapine (SEROquel) 25 mg tablet Take 3 tabs daily    TiZANidine (ZANAFLEX) 4 MG capsule Take 1 capsule (4 mg total) by mouth 3 (three) times a day as needed for muscle spasms    traZODone (DESYREL) 50 mg tablet Take 1 tablet (50 mg total) by mouth daily at bedtime as needed for sleep or depression    ciprofloxacin-dexamethasone (CIPRODEX) otic suspension Administer 4 drops into the left ear 2 (two) times a day for 7 days (Patient not taking: Reported on 1/11/2024)    clotrimazole-betamethasone (LOTRISONE) 1-0.05 % cream Apply topically 2 (two) times a day (Patient not taking: Reported on 1/11/2024)    methylPREDNISolone 4 MG tablet therapy pack Use as directed on package (Patient not taking: Reported on 1/11/2024)    NON FORMULARY daily at bedtime  "Takes Valarien Root that's 1 PO QHS for Sleep (Patient not taking: Reported on 11/29/2023)    tacrolimus (PROTOPIC) 0.1 % ointment Apply topically 2 (two) times a day (Patient not taking: Reported on 1/11/2024)     No current facility-administered medications on file prior to visit.       Allergies   Allergen Reactions    Gluten Meal - Food Allergy GI Intolerance     \" Highly Sensitivity\"    Lactose - Food Allergy GI Intolerance     \" Highly Sensitive\"       Physical Exam:    /90   Pulse 95   Ht 5' 7\" (1.702 m)   Wt 99.8 kg (220 lb)   BMI 34.46 kg/m²     Constitutional:normal, well developed, well nourished, alert, in no distress and non-toxic and no overt pain behavior.  Eyes:anicteric  HEENT:grossly intact  Neck:supple, symmetric, trachea midline and no masses   Pulmonary:even and unlabored  Cardiovascular:No edema or pitting edema present  Skin:Normal without rashes or lesions and well hydrated  Psychiatric:Mood and affect appropriate  Neurologic:Cranial Nerves II-XII grossly intact  Musculoskeletal:normal    Cervical Spine Exam    Appearance:  Normal lordosis  Palpation/Tenderness:  right cervical paraspinal tenderness  right trapezium tenderness  trigger points palpable  Sensory:  no sensory deficits noted  Range of Motion:  Flexion:  Minimally limited  with pain  Extension:  Minimally limited  with pain  Rotation - Left:  Minimally limited  with pain  Rotation - Right:  Minimally limited  with pain  Motor Strength:  Left Arm Flexion  5/5  Left Arm Extension  5/5  Right Arm Flexion  5/5  Right Arm Extension  5/5  Left    5/5  Right   5/5      Imaging     "

## 2024-02-07 NOTE — PROGRESS NOTES
Neurosurgery Office Note  Marisel Olivas 43 y.o. female MRN: [id number]      Assessment/Plan     Patient is a 43 yrs old woman status post C4 7 ACDF in 2021 by Dr. Porter, was seen last time for right lower back pain with bilateral, left more than right leg radiculopathy. She was advised to continue follow up with pain Mx and  she had bilateral L5-S1 transforaminal epidural injection and noticed remarkable improvement and pain now improved, without radiculopathy in the leg, and denies back pain.  Now she complains of progressive paresthesia in the right upper extremity and also jabbing pain in her right side shoulder blade region.  Pain is worse with lying flat and also bending forward.  Denies intrinsic   weakness, except some  weakness and difficulty holding objects when she has radicular  pain and numbness and paresthesia.  Denies any gait issues or tendency fall.  No bowel or bladder dysfunction.     MRI of the thoracic spine some degenerative changes otherwise no remarkable disc herniation or malalignment of thoracic spine.    Lumbar MRI demonstrates multilevel degenerative disease, with more left L3-4 far lateral disc protrusion    Exam-patient alert and oriented x 3.  Moves all extremities.  Strength is 5/5 bilaterally including , interosseous and elbow flexion extension.  Sensation to light touch intact throughout. DTR 2+.  No clonus and Divya's negative bilaterally.  Gait is stable on heel-to-toe walking.  Tenderness noted on palpation of posterior medial scapular region.     Hx, exam and images reviewed with the patient.  Management plan discussed.  Patient feeling better with her lower back pain and lower extremity radiculopathy after KIESHA.  However, she started feeling progressive right upper extremity paresthesia and radicular pain that was ongoing for the past 2 months. Patient's symptoms could be shoulder issue, advised to see Orthopedics for possible right shoulder problem. However,  "shoulder issues doesn't usually cause UE radiculopathy.  Given Hx of previous surgery, I would recommend MRI of Cx spine w/wo contrast to evaluate any progression of DJD and nerve impingement in the lower cervical spine. Patient is scheduled to see Dr Pettit next week. Advised to continue doing ROM exercise, fall precaution, avoid strenuous activities. F/U after images results. Call with question or concern. Questions and concerns were answered    Plan:  MRI of cervical spine with and without contrast  BUN and creatinine  Advised to continue follow-up with pain management for possible Cx spine injection  Advised to continue range of motion exercise, and physical therapy  Follow-up after image results  Call with question or concern        I have spent a total time of 45 minutes on 02/07/24 in caring for this patient including Diagnostic results, Prognosis, Risks and benefits of tx options, Instructions for management, Patient and family education, Importance of tx compliance, Risk factor reductions, Impressions, Counseling / Coordination of care, Documenting in the medical record, Reviewing / ordering tests, medicine, procedures  , and Obtaining or reviewing history  .          Chief Complaint   Patient presents with    Follow-up       C/C: \" Here today for clinical follow up and to go over her Lumbar spine MRI results\"    HPI  See Detailed Discussion Above.    REVIEW OF SYSTEMS  ROS personally reviewed and updated as follows:  Review of Systems   HENT: Negative.     Eyes: Negative.    Respiratory: Negative.     Cardiovascular: Negative.    Gastrointestinal: Negative.    Endocrine: Negative.    Genitourinary:  Positive for dysuria.        Seeing a urologist   Musculoskeletal:  Positive for arthralgias (hips), back pain (LBP into hips, alternating legs with shooting pain to feet) and myalgias (bi/legs).        KIESHA in lower back 1/31/24 FU with PM 2/7 prior to appt -Burning/throbbing pain gone from back since KIESHA in " lower back  Does PT at home   Cervical KIESHA scheduled 2/15/24  Cervical- when she las flat R arm will go numb after 15 min. Drops items, household tasks make her R arm go numb.   Certain movement causes muscle spasms in the middle of her back.   Shooting pain that causes arm to constrict/ contracts and she can't relax arm        Neurological:  Positive for weakness and numbness (bi/leg/feet).       ROS obtained by MA. Hay. See HPI.     Meds/Allergies     Current Outpatient Medications   Medication Sig Dispense Refill    acetaminophen (TYLENOL) 325 mg tablet Take 650 mg by mouth every 6 (six) hours as needed for mild pain      albuterol (ProAir HFA) 90 mcg/act inhaler Inhale 2 puffs every 6 (six) hours as needed for shortness of breath 18 g 0    cholecalciferol (VITAMIN D3) 1,000 units tablet Take 5,000 Units by mouth daily        Etonogestrel (NEXPLANON SC) Inject under the skin Located Left upper inner arm      hydrochlorothiazide (HYDRODIURIL) 25 mg tablet Take 1 tablet (25 mg total) by mouth daily 90 tablet 2    linaCLOtide (Linzess) 145 MCG CAPS Take 1 capsule (145 mcg total) by mouth in the morning 90 capsule 3    losartan (COZAAR) 50 mg tablet TAKE 1 TABLET BY MOUTH EVERY DAY 90 tablet 0    magnesium 30 MG tablet Take 500 mg by mouth 2 (two) times a day        Multiple Vitamins-Minerals (MULTIVITAL-M) TABS Take by mouth      naproxen (EC NAPROSYN) 500 MG EC tablet Take 1 tablet (500 mg total) by mouth 3 (three) times a day with meals 30 tablet 0    Omega 3-6-9 Fatty Acids (OMEGA 3-6-9 PO) Take by mouth      potassium chloride (MICRO-K) 10 MEQ CR capsule Take 1 capsule (10 mEq total) by mouth daily in the early morning 10 capsule 0    QUEtiapine (SEROquel) 25 mg tablet Take 3 tabs daily 270 tablet 3    TiZANidine (ZANAFLEX) 4 MG capsule Take 1 capsule (4 mg total) by mouth 3 (three) times a day as needed for muscle spasms 90 capsule 3    traZODone (DESYREL) 50 mg tablet Take 1 tablet (50 mg total) by mouth  "daily at bedtime as needed for sleep or depression 90 tablet 3    ciprofloxacin-dexamethasone (CIPRODEX) otic suspension Administer 4 drops into the left ear 2 (two) times a day for 7 days (Patient not taking: Reported on 2024) 7.5 mL 0    clotrimazole-betamethasone (LOTRISONE) 1-0.05 % cream Apply topically 2 (two) times a day (Patient not taking: Reported on 2024) 30 g 0    methylPREDNISolone 4 MG tablet therapy pack Use as directed on package (Patient not taking: Reported on 2024) 21 each 0    NON FORMULARY daily at bedtime Takes Valarien Root that's 1 PO QHS for Sleep (Patient not taking: Reported on 2023)      omeprazole (PriLOSEC) 40 MG capsule Take 1 capsule (40 mg total) by mouth daily (Patient not taking: Reported on 2024) 90 capsule 0    tacrolimus (PROTOPIC) 0.1 % ointment Apply topically 2 (two) times a day (Patient not taking: Reported on 2024) 60 g 5     No current facility-administered medications for this visit.       Allergies   Allergen Reactions    Gluten Meal - Food Allergy GI Intolerance     \" Highly Sensitivity\"    Lactose - Food Allergy GI Intolerance     \" Highly Sensitive\"       PAST HISTORY    Past Medical History:   Diagnosis Date    Constipation     COVID-19 2022    Depression     Eczema     Epilepsy ([facility]) 11/15/2013    Description: well controlled with sleep    GERD (gastroesophageal reflux disease)     Grand mal convulsion ([facility])     X 2 last episode -Sleep deprivation-Resolved with Cpap    Hypertension     Insomnia     Medial meniscus tear 2015    Migraine     PONV (postoperative nausea and vomiting)     Primary generalized epilepsy (HCC) 2011    Psoriasis     Seizures ([facility])     Shingles     Shoulder dislocation, right, sequela 2018    Sinusitis     Sleep apnea     uses cpap    Tinnitus        Past Surgical History:   Procedure Laterality Date     SECTION   and     COLONOSCOPY      DILATION AND CURETTAGE OF UTERUS "      DILATION AND CURETTAGE OF UTERUS WITH HYSTEROSOCPY N/A 2017    Procedure: D&C; IUD REMOVAL;  Surgeon: Abeba Blackburn MD;  [facility];  Service: Gynecology    EGD      EPIDURAL BLOCK INJECTION Bilateral 2024    Procedure: BILATERAL L5-S1 TRANSFORAMINAL EPIDURAL STEROID INJECTION;  Surgeon: Jeff Pettit DO;  Location: [facility] MAIN OR;  Service: Pain Management     HYSTEROSCOPY      LAPAROSCOPY      (Diagnostic)    LASIK      AL COLONOSCOPY FLX DX W/COLLJ SPEC WHEN PFRMD N/A 2018    Procedure: EGD AND COLONOSCOPY;  Surgeon: Jagdish Katz MD;  [facility]: [facility];  Service: Gastroenterology    AL VERTEBRAL CORPECTOMY ANT DCMPRN CERVICAL 1 SEG Bilateral 2021    Procedure: CORPECTOMY SPINE CERVICAL ANTERIOR: C4-7 ACDF with C6 /hemicorpectomy, C4-7 instrumentation and fusion (neuromonitoring);  Surgeon: Raquel Porter MD;  [facility];  Service: Neurosurgery    REMOVAL OF INTRAUTERINE DEVICE (IUD)         Social History     Tobacco Use    Smoking status: Former     Current packs/day: 0.00     Average packs/day: 0.3 packs/day for 25.0 years (6.3 ttl pk-yrs)     Types: Cigarettes     Start date:      Quit date:      Years since quittin.1    Smokeless tobacco: Never    Tobacco comments:     Former smoker per [employer]   Vaping Use    Vaping status: Never Used   Substance Use Topics    Alcohol use: Yes     Comment: occassionally    Drug use: Never       Family History   Problem Relation Age of Onset    No Known Problems Mother     Diabetes Father         Diabetes Mellitus    Hyperlipidemia Father     Hypertension Father     No Known Problems Sister     No Known Problems Daughter     Brain cancer Maternal Grandmother 56    No Known Problems Son     Migraines Family     Diabetes Family         Type 2 Diabetes Mellitus    Breast cancer Neg Hx          Above history personally reviewed.       EXAM    Vitals:Blood pressure 130/84, pulse 91, temperature 98.6 °F (37  "°C), temperature source Temporal, resp. rate 16, height 5' 7\" (1.702 m), weight 99.8 kg (220 lb), SpO2 99%.,Body mass index is 34.46 kg/m².     Physical Exam  Constitutional:       Appearance: She is obese.   HENT:      Head: Normocephalic and atraumatic.   Eyes:      Pupils: Pupils are equal, round, and reactive to light.   Pulmonary:      Effort: Pulmonary effort is normal.   Musculoskeletal:         General: Normal range of motion.      Cervical back: Tenderness present.   Neurological:      General: No focal deficit present.      Mental Status: She is alert and oriented to person, place, and time.      GCS: GCS eye subscore is 4. GCS verbal subscore is 5. GCS motor subscore is 6.      Cranial Nerves: Cranial nerves 2-12 are intact.      Sensory: Sensation is intact.      Motor: Motor function is intact.      Coordination: Finger-Nose-Finger Test normal.      Deep Tendon Reflexes: Reflexes are normal and symmetric.      Reflex Scores:       Tricep reflexes are 2+ on the right side and 2+ on the left side.       Bicep reflexes are 2+ on the right side and 2+ on the left side.       Brachioradialis reflexes are 2+ on the right side and 2+ on the left side.       Patellar reflexes are 2+ on the right side and 2+ on the left side.       Achilles reflexes are 2+ on the right side and 2+ on the left side.  Psychiatric:         Speech: Speech normal.         Neurologic Exam     Mental Status   Oriented to person, place, and time.   Speech: speech is normal   Level of consciousness: alert    Cranial Nerves   Cranial nerves II through XII intact.     CN III, IV, VI   Pupils are equal, round, and reactive to light.  Nystagmus: none     CN XI   CN XI normal.     Motor Exam   Muscle bulk: normal  Overall muscle tone: normal  Right arm tone: normal  Left arm tone: normal  Right arm pronator drift: absent  Left arm pronator drift: absent  Right leg tone: normal  Left leg tone: normal    Sensory Exam   Light touch normal. "     Gait, Coordination, and Reflexes     Coordination   Finger to nose coordination: normal    Reflexes   Right brachioradialis: 2+  Left brachioradialis: 2+  Right biceps: 2+  Left biceps: 2+  Right triceps: 2+  Left triceps: 2+  Right patellar: 2+  Left patellar: 2+  Right achilles: 2+  Left achilles: 2+  Right : 2+  Left : 2+  Right Hendrix: absent  Left Hendrix: absent  Right ankle clonus: absent  Left ankle clonus: absent        MEDICAL DECISION MAKING    Imaging Studies:     X-ray cervical spine complete 4 or 5 vw    Result Date: 2/7/2024  Narrative: CERVICAL SPINE INDICATION:   Radiculopathy, cervical region. COMPARISON: None available VIEWS:  XR SPINE CERVICAL COMPLETE 4 OR 5 VW NON INJURY Images: 5 FINDINGS: No fracture. Normal alignment without subluxation. C4-C7 ACDF with intervertebral spacers without hardware complication. The neuroforamina are patent. The prevertebral soft tissues are within normal limits.  The lung apices are clear.     Impression: No acute osseous abnormality. Postsurgical changes as above. Electronically signed: 02/07/2024 01:15 PM Roscoe Tabares MPH,MD    [place] spine and pain procedure    Result Date: 1/31/2024  Narrative: A spine and pain study was performed by the Department of Spine and Pain. Please refer to the report for the official interpretation. The images are stored for archival purposes only. Study images were not formally reviewed by the Radiology Department.    MRI thoracic spine w wo contrast    Result Date: 1/30/2024  Narrative: [id number]      I have personally reviewed pertinent reports.   and I have personally reviewed pertinent films in PACS

## 2024-02-12 ENCOUNTER — OFFICE VISIT (OUTPATIENT)
Dept: FAMILY MEDICINE CLINIC | Facility: CLINIC | Age: 44
End: 2024-02-12
Payer: COMMERCIAL

## 2024-02-12 ENCOUNTER — TELEPHONE (OUTPATIENT)
Dept: FAMILY MEDICINE CLINIC | Facility: CLINIC | Age: 44
End: 2024-02-12

## 2024-02-12 ENCOUNTER — EVALUATION (OUTPATIENT)
Dept: OCCUPATIONAL THERAPY | Facility: MEDICAL CENTER | Age: 44
End: 2024-02-12
Payer: COMMERCIAL

## 2024-02-12 VITALS
SYSTOLIC BLOOD PRESSURE: 110 MMHG | HEART RATE: 94 BPM | DIASTOLIC BLOOD PRESSURE: 86 MMHG | HEIGHT: 67 IN | WEIGHT: 220 LBS | BODY MASS INDEX: 34.53 KG/M2 | OXYGEN SATURATION: 100 % | TEMPERATURE: 98.7 F | RESPIRATION RATE: 16 BRPM

## 2024-02-12 DIAGNOSIS — I10 ESSENTIAL HYPERTENSION: ICD-10-CM

## 2024-02-12 DIAGNOSIS — G47.33 OBSTRUCTIVE SLEEP APNEA ON CPAP: ICD-10-CM

## 2024-02-12 DIAGNOSIS — M54.12 CERVICAL RADICULOPATHY: Primary | ICD-10-CM

## 2024-02-12 DIAGNOSIS — Z12.31 ENCOUNTER FOR SCREENING MAMMOGRAM FOR BREAST CANCER: ICD-10-CM

## 2024-02-12 DIAGNOSIS — R41.3 AMNESIA/MEMORY DISORDER: Primary | ICD-10-CM

## 2024-02-12 DIAGNOSIS — F51.01 PRIMARY INSOMNIA: ICD-10-CM

## 2024-02-12 DIAGNOSIS — K58.1 IRRITABLE BOWEL SYNDROME WITH CONSTIPATION: ICD-10-CM

## 2024-02-12 DIAGNOSIS — M54.16 LUMBAR RADICULOPATHY: ICD-10-CM

## 2024-02-12 DIAGNOSIS — K21.9 GASTROESOPHAGEAL REFLUX DISEASE WITHOUT ESOPHAGITIS: ICD-10-CM

## 2024-02-12 DIAGNOSIS — E78.1 ESSENTIAL HYPERTRIGLYCERIDEMIA: Primary | ICD-10-CM

## 2024-02-12 DIAGNOSIS — M62.830 MUSCLE SPASM OF BACK: ICD-10-CM

## 2024-02-12 PROBLEM — J01.90 ACUTE NON-RECURRENT SINUSITIS: Status: RESOLVED | Noted: 2023-12-15 | Resolved: 2024-02-12

## 2024-02-12 PROBLEM — M48.02 CERVICAL SPINAL STENOSIS: Status: RESOLVED | Noted: 2021-02-02 | Resolved: 2024-02-12

## 2024-02-12 PROCEDURE — 97166 OT EVAL MOD COMPLEX 45 MIN: CPT

## 2024-02-12 PROCEDURE — 99214 OFFICE O/P EST MOD 30 MIN: CPT | Performed by: FAMILY MEDICINE

## 2024-02-12 PROCEDURE — 96125 COGNITIVE TEST BY HC PRO: CPT

## 2024-02-12 NOTE — PROGRESS NOTES
Name: Marisel Olivas      : 1980      MRN: [id number]  Encounter Provider: Ebony Wade MD  Encounter Date: 2024   Encounter department: [facility]    Assessment & Plan     1. Cervical radiculopathy  Assessment & Plan:  history of ACDF done in 2021 by Dr. Porter   Getting MRI done ordered by neurosurgery       2. Encounter for screening mammogram for breast cancer  -     Mammo screening bilateral w 3d & cad; Future; Expected date: 2024    3. Gastroesophageal reflux disease without esophagitis  Assessment & Plan:  Stable on omeprazole prn      4. Essential hypertension  Assessment & Plan:  Stable on current meds    Orders:  -     Basic metabolic panel; Future    5. Lumbar radiculopathy  Assessment & Plan:    Stable   Sees pain management       6. Muscle spasm of back  Assessment & Plan:  Getting injection with pain management      7. Obstructive sleep apnea on CPAP  Assessment & Plan:  On CPAP qHS      8. Irritable bowel syndrome with constipation  Assessment & Plan:  Stable on Linzess      9. Primary insomnia  Assessment & Plan:  Doing well on seroquel and trazodone             Subjective     HPI  Here for follow up  Her vertigo has been better with Physical therapy   Seen by neurosurgery last week and scheduled for MRI cervical spine     Review of Systems   Constitutional: Negative.    HENT: Negative.     Respiratory: Negative.     Cardiovascular: Negative.    Endocrine: Negative.    Genitourinary: Negative.    Musculoskeletal:  Positive for arthralgias, back pain, myalgias and neck pain.   Allergic/Immunologic: Negative.    Hematological: Negative.    Psychiatric/Behavioral: Negative.         Past Medical History:   Diagnosis Date   • Constipation    • COVID-19 2022   • Depression    • Eczema    • Epilepsy (HCC) 11/15/2013    Description: well controlled with sleep   • GERD (gastroesophageal reflux disease)    • Grand mal convulsion (HCC)     X 2 last episode  -Sleep deprivation-Resolved with Cpap   • Hypertension    • Insomnia    • Medial meniscus tear 2015   • Migraine    • PONV (postoperative nausea and vomiting)    • Primary generalized epilepsy (HCC) 2011   • Psoriasis    • Seizures (HCC)    • Shingles    • Shoulder dislocation, right, sequela 2018   • Sinusitis    • Sleep apnea     uses cpap   • Tinnitus      Past Surgical History:   Procedure Laterality Date   •  SECTION   and    • COLONOSCOPY     • DILATION AND CURETTAGE OF UTERUS     • DILATION AND CURETTAGE OF UTERUS WITH HYSTEROSOCPY N/A 2017    Procedure: D&C; IUD REMOVAL;  Surgeon: Abeba Blackburn MD;  [facility];  Service: Gynecology   • EGD     • EPIDURAL BLOCK INJECTION Bilateral 2024    Procedure: BILATERAL L5-S1 TRANSFORAMINAL EPIDURAL STEROID INJECTION;  Surgeon: Jeff Pettit DO;  Location: [facility] MAIN OR;  Service: Pain Management    • HYSTEROSCOPY     • LAPAROSCOPY      (Diagnostic)   • LASIK     • MO COLONOSCOPY FLX DX W/COLLJ SPEC WHEN PFRMD N/A 2018    Procedure: EGD AND COLONOSCOPY;  Surgeon: Jagdish Katz MD;  [facility];  Service: Gastroenterology   • MO VERTEBRAL CORPECTOMY ANT DCMPRN CERVICAL 1 SEG Bilateral 2021    Procedure: CORPECTOMY SPINE CERVICAL ANTERIOR: C4-7 ACDF with C6 /hemicorpectomy, C4-7 instrumentation and fusion (neuromonitoring);  Surgeon: Raquel Porter MD;  [facility];  Service: Neurosurgery   • REMOVAL OF INTRAUTERINE DEVICE (IUD)       Family History   Problem Relation Age of Onset   • No Known Problems Mother    • Diabetes Father         Diabetes Mellitus   • Hyperlipidemia Father    • Hypertension Father    • No Known Problems Sister    • No Known Problems Daughter    • Brain cancer Maternal Grandmother 56   • No Known Problems Son    • Migraines Family    • Diabetes Family         Type 2 Diabetes Mellitus   • Breast cancer Neg Hx      Social History     Socioeconomic History    • Marital status: /Civil Union     Spouse name: None   • Number of children: None   • Years of education: None   • Highest education level: None   Occupational History   • None   Tobacco Use   • Smoking status: Former     Current packs/day: 0.00     Average packs/day: 0.3 packs/day for 25.0 years (6.3 ttl pk-yrs)     Types: Cigarettes     Start date:      Quit date:      Years since quittin.1   • Smokeless tobacco: Never   • Tobacco comments:     Former smoker per [employer]   Vaping Use   • Vaping status: Never Used   Substance and Sexual Activity   • Alcohol use: Yes     Comment: occassionally   • Drug use: Never   • Sexual activity: Not Currently     Partners: Male     Birth control/protection: Implant     Comment: 15 - Nexplanon inserted   Other Topics Concern   • None   Social History Narrative    Denied:  Exercising Regularly     Social Determinants of Health     Financial Resource Strain: Not on file   Food Insecurity: Not on file   Transportation Needs: Not on file   Physical Activity: Not on file   Stress: Not on file   Social Connections: Not on file   Intimate Partner Violence: Not on file   Housing Stability: Not on file     Current Outpatient Medications on File Prior to Visit   Medication Sig   • acetaminophen (TYLENOL) 325 mg tablet Take 650 mg by mouth every 6 (six) hours as needed for mild pain   • albuterol (ProAir HFA) 90 mcg/act inhaler Inhale 2 puffs every 6 (six) hours as needed for shortness of breath   • cholecalciferol (VITAMIN D3) 1,000 units tablet Take 5,000 Units by mouth daily     • Etonogestrel (NEXPLANON SC) Inject under the skin Located Left upper inner arm   • hydrochlorothiazide (HYDRODIURIL) 25 mg tablet Take 1 tablet (25 mg total) by mouth daily   • linaCLOtide (Linzess) 145 MCG CAPS Take 1 capsule (145 mcg total) by mouth in the morning   • losartan (COZAAR) 50 mg tablet TAKE 1 TABLET BY MOUTH EVERY DAY   • magnesium 30 MG tablet Take 500 mg by mouth  "2 (two) times a day     • Multiple Vitamins-Minerals (MULTIVITAL-M) TABS Take by mouth   • naproxen (EC NAPROSYN) 500 MG EC tablet Take 1 tablet (500 mg total) by mouth 3 (three) times a day with meals   • Omega 3-6-9 Fatty Acids (OMEGA 3-6-9 PO) Take by mouth   • omeprazole (PriLOSEC) 40 MG capsule Take 1 capsule (40 mg total) by mouth daily   • QUEtiapine (SEROquel) 25 mg tablet Take 3 tabs daily   • TiZANidine (ZANAFLEX) 4 MG capsule Take 1 capsule (4 mg total) by mouth 3 (three) times a day as needed for muscle spasms   • traZODone (DESYREL) 50 mg tablet Take 1 tablet (50 mg total) by mouth daily at bedtime as needed for sleep or depression   • clotrimazole-betamethasone (LOTRISONE) 1-0.05 % cream Apply topically 2 (two) times a day (Patient not taking: Reported on 1/11/2024)   • NON FORMULARY daily at bedtime Takes Valarien Root that's 1 PO QHS for Sleep (Patient not taking: Reported on 11/29/2023)   • [DISCONTINUED] ciprofloxacin-dexamethasone (CIPRODEX) otic suspension Administer 4 drops into the left ear 2 (two) times a day for 7 days (Patient not taking: Reported on 1/11/2024)   • [DISCONTINUED] methylPREDNISolone 4 MG tablet therapy pack Use as directed on package (Patient not taking: Reported on 1/11/2024)   • [DISCONTINUED] potassium chloride (MICRO-K) 10 MEQ CR capsule Take 1 capsule (10 mEq total) by mouth daily in the early morning (Patient not taking: Reported on 2/12/2024)   • [DISCONTINUED] tacrolimus (PROTOPIC) 0.1 % ointment Apply topically 2 (two) times a day (Patient not taking: Reported on 1/11/2024)     Allergies   Allergen Reactions   • Gluten Meal - Food Allergy GI Intolerance     \" Highly Sensitivity\"   • Lactose - Food Allergy GI Intolerance     \" Highly Sensitive\"     Immunization History   Administered Date(s) Administered   • COVID-19 PFIZER VACCINE 0.3 ML IM 04/30/2021, 04/30/2021, 05/21/2021, 05/21/2021   • INFLUENZA 09/09/2021, 09/09/2021, 09/26/2022, 10/10/2023   • Influenza, " "injectable, quadrivalent, preservative free 0.5 mL 09/26/2022, 10/10/2023   • Tdap 03/25/2022, 03/25/2022       Objective     /86 (BP Location: Left arm, Patient Position: Sitting, Cuff Size: Standard)   Pulse 94   Temp 98.7 °F (37.1 °C) (Tympanic)   Resp 16   Ht 5' 7\" (1.702 m)   Wt 99.8 kg (220 lb)   SpO2 100%   BMI 34.46 kg/m²     Physical Exam  Vitals and nursing note reviewed.   Constitutional:       Appearance: Normal appearance.   Cardiovascular:      Rate and Rhythm: Normal rate and regular rhythm.      Pulses: Normal pulses.      Heart sounds: Normal heart sounds.   Neurological:      General: No focal deficit present.      Mental Status: She is alert and oriented to person, place, and time.   Psychiatric:         Mood and Affect: Mood normal.         Behavior: Behavior normal.       Ebony Wade MD    "

## 2024-02-12 NOTE — PROGRESS NOTES
"  OCCUPATIONAL THERAPY INITIAL EVALUATION:      2/12/2024  Marisel Olivas  1980  [id number]  Rick Rock MD   Diagnosis ICD-10-CM Associated Orders   1. Amnesia/memory disorder  R41.3 Ambulatory Referral to Occupational Therapy          POC START DATE: 2/12/2024  POC END DATE: 5/6/2024  FREQUENCY: 1-2x/wk for 12 weeks    Assessment/Plan    SKILLED ANALYSIS:    Pt presents to OT evaluation on 2/12/2024 secondary to memory deficits. Pt reports that she attributes memory deficits beginning January 2022 after high fever with COVID-19 infection. Pt reports that brain fog and memory deficits have been declining since. Pt works two jobs and feels her memory deficits are impacting her performance. RBANS was administered today to assess patient's baseline. Pt overall scored within the average range compared to age norms. Pt scores within the low average range for immediate recall, visual perception, and delayed memory recall which warrants OT services to improve upon skills. Pt scored within the very superior and high average range for language and attention.  Pt would benefit from OT treatment services to address memory recall deficits. Pt will be seen for OT services 1-2x/wk for 12 weeks. POC was reviewed with the pt, pt understands and agrees with POC.     SUBJECTIVE:    Pt presents to OT evaluation on 2/12/2024 secondary to memory deficits over the last 1.5 years. Pt reports that she has an experience with vertigo, has received vestibular therapy in [place]. Pt had COVID 2022 New Years Day with a high fever. \"My brain is just foggy. I used to be able to speak four languages, run three [employer]. I have my Doctorate.\" \"I have a lot of brain fog during conversations. 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 Now I have to read the same sentence all over again.\" \"My memory has gotten worse since 2022.\" " "Follows up with Neurology: Dr. Rock. Lives with  and two kids.    History of speaking four languages.    OT PROFILE:    ADLs: Normal.    IADLS: Still driving. 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    Sleep: Previously had sleep study.    Pt's Goal \"I wonder to under why this is happening.\"    PAIN:    4/10  Assessments  The Repeatable Battery for the Assessment of Neuropsychological Status (RBANS) is a brief, individually-administered assessment which measures attention, language, visuospatial/constructional abilities, and immediate & delayed memory. The RBANS is intended for use with adolescents to adults, ages 12 to 89 years. The following results were obtained during the administration of the assessment.    Form: A    Cognitive Domain/Subtest: Index Score: Percentile Rank: Classification: IE: Status:   IMMEDIATE MEMORY 87 18%ile LOW AVERAGE          1. List Learning (25/40)          2. Story Memory (15 /24)           VISUOSPATIAL/  CONSTRUCTIONAL 84 15%ile LOW AVERAGE          3. Figure Copy (16/20)          4. Line Orientation (17/20)           LANGUAGE 132 98%ile VERY SUPERIOR          5. Picture Naming (10/10)          6. Semantic Fluency (39/40)           ATTENTION 103 53%ile HIGH AVERAGE          7. Digit Span (13/16)          8. Coding (44/89)           DELAYED MEMORY 89 24%ile LOW AVERAGE          9. List Recall (4/10)          10. List Recognition (20/20)          11. Story Recall (6/12)          12. Figure Recall (11/20)       Sum of Index Scores:  495      Total Score:  98      Percentile: 45%ile      Classification: AVERAGE        “IE” indicates the scores from the initial evaluation (2/12/2024). Form: A    SHORT TERM GOALS:      Pt will demo G recall of 75% of verbal/written information utilizing memory strategy of choice for improved STM/delayed memory     Pt will demo G carryover of use of internal/external memory strategy " aides for improved recall of daily events, with 75 % accuracy     Pt will increase immediate list memory recall being able to recall > 27/40 items on RBANS     Pt will increase immediate story memory recall being able to recall >17/24 on RBANs    Pt will increase delayed list memory recall being able to recall >6/10 on RBANS    Pt will increase delayed story memory recall being able to recall >8/12 on RBANS    Pt will maintain attention to task for 30 minutes in semi modal environment for baseline performance,  improved role performance and to improve learning and to simulate return to IADLs and complete cooking/cleaning tasks.     Pt will demo ability to participate in dual tasking/divided attention task with 75% accuracy in multimodal environment to simulate return to life and work roles    Pt will demo ability to alternate attention between 2 tasks with cog loading and 75% accuracy with G retention of task directions in multimodal environment to simulate return to life and work  roles     LONG TERM GOALS    Pt will demo G recall of 75% of verbal/written information utilizing memory strategy of choice for improved STM/delayed memory     Pt will demo G carryover of use of internal/external memory strategy aides for improved recall of daily events, with 75 % accuracy     Pt will increase immediate list memory recall being able to recall > 29/40 items on RBANS     Pt will increase immediate story memory recall being able to recall >19/24 on RBANs    Pt will increase delayed list memory recall being able to recall >8/10 on RBANS    Pt will increase delayed story memory recall being able to recall >10/12 on RBANS  Pt will maintain attention to task for 45 minutes in semi modal environment for baseline performance,  improved role performance and to improve learning and to simulate return to IADLs and complete cooking/cleaning tasks.     Pt will demo ability to participate in dual tasking/divided attention task with 75%  accuracy in multimodal environment to simulate return to life and work roles    Pt will demo ability to alternate attention between 2 tasks with cog loading and 75% accuracy with G retention of task directions in multimodal environment to simulate return to life and work  roles     TIME SPENT  60 min for administration, documentation, interpretation, scoring adn POC development RBANS    PLANNED THERAPY INTERVENTIONS:  Internal and external memory aides  Hypersensitivity strategies education  Multi-modal environment  Sustained/alternating/divided attention  Temporal Awareness: Organize the Hour activities  Memory and mental manipulation  Auditory processing with immediate recall  Memory retention with immediate and delayed recall  Edu on cog/vision apps

## 2024-02-15 ENCOUNTER — PROCEDURE VISIT (OUTPATIENT)
Dept: PAIN MEDICINE | Facility: CLINIC | Age: 44
End: 2024-02-15
Payer: COMMERCIAL

## 2024-02-15 VITALS — HEART RATE: 104 BPM | DIASTOLIC BLOOD PRESSURE: 86 MMHG | SYSTOLIC BLOOD PRESSURE: 146 MMHG

## 2024-02-15 DIAGNOSIS — M79.18 MYOFASCIAL PAIN SYNDROME: Primary | ICD-10-CM

## 2024-02-15 PROCEDURE — 20552 NJX 1/MLT TRIGGER POINT 1/2: CPT | Performed by: PHYSICAL MEDICINE & REHABILITATION

## 2024-02-15 PROCEDURE — 76942 ECHO GUIDE FOR BIOPSY: CPT | Performed by: PHYSICAL MEDICINE & REHABILITATION

## 2024-02-15 RX ORDER — LIDOCAINE HYDROCHLORIDE 10 MG/ML
5 INJECTION, SOLUTION INFILTRATION; PERINEURAL ONCE
Status: COMPLETED | OUTPATIENT
Start: 2024-02-15 | End: 2024-02-15

## 2024-02-15 RX ORDER — METHYLPREDNISOLONE ACETATE 40 MG/ML
40 INJECTION, SUSPENSION INTRA-ARTICULAR; INTRALESIONAL; INTRAMUSCULAR; SOFT TISSUE ONCE
Status: COMPLETED | OUTPATIENT
Start: 2024-02-15 | End: 2024-02-15

## 2024-02-15 RX ADMIN — METHYLPREDNISOLONE ACETATE 40 MG: 40 INJECTION, SUSPENSION INTRA-ARTICULAR; INTRALESIONAL; INTRAMUSCULAR; SOFT TISSUE at 15:26

## 2024-02-15 RX ADMIN — LIDOCAINE HYDROCHLORIDE 5 ML: 10 INJECTION, SOLUTION INFILTRATION; PERINEURAL at 15:25

## 2024-02-15 NOTE — PROGRESS NOTES
Indication:  Muscular pain  Preprocedure diagnosis:  1.  Myofascial pain syndrome  Postprocedure diagnosis:  1.  Myofascial pain syndrome     Procedure:  Ultrasound-guided right cervical thoracic paraspinal muscle trigger point injection(s).     After discussing the risks, benefits, and alternatives to the procedure, the patient expressed understanding and wished to proceed.  The patient was brought to the procedure suite and placed in the prone position.  A procedural pause was conducted to verify:  correct patient identity, procedure to be performed and as applicable, correct side and site, correct patient position, and availability of implants, special equipment or special requirements.  A simple surgical tray was used.  Prior to the procedure, the right-sided cervical thoracic paraspinal musculature was examined with a 12 MHz linear transducer to visualize the targeted trigger point and determine the optimal needle path. Following this, the area was prepared with a ChloraPrep scrub, then re-examined using the same transducer, a sterile ultrasound transducer cover, and sterile ultrasound transducer gel.  Thereafter, using ultrasound guidance, a 2.5-inch 25-gauge needle was advanced into the target trigger points. After visualization of the tip and negative aspiration for blood, a mixture of 1% lidocaine with 40 mg/mL Depo-Medrol was injected into the target trigger points. Following the injection, the needle was withdrawn.  The patient tolerated the procedure well and there were no apparent complications.  After an appropriate amount of observation, the patient was dismissed from the clinic in good condition under their own power.

## 2024-02-20 ENCOUNTER — APPOINTMENT (OUTPATIENT)
Dept: OCCUPATIONAL THERAPY | Facility: MEDICAL CENTER | Age: 44
End: 2024-02-20
Payer: COMMERCIAL

## 2024-02-21 ENCOUNTER — OFFICE VISIT (OUTPATIENT)
Dept: FAMILY MEDICINE CLINIC | Facility: CLINIC | Age: 44
End: 2024-02-21
Payer: COMMERCIAL

## 2024-02-21 VITALS
OXYGEN SATURATION: 98 % | DIASTOLIC BLOOD PRESSURE: 80 MMHG | SYSTOLIC BLOOD PRESSURE: 130 MMHG | RESPIRATION RATE: 18 BRPM | TEMPERATURE: 99 F | HEART RATE: 113 BPM | BODY MASS INDEX: 34.46 KG/M2 | HEIGHT: 67 IN

## 2024-02-21 DIAGNOSIS — J20.9 ACUTE BRONCHITIS, UNSPECIFIED ORGANISM: Primary | ICD-10-CM

## 2024-02-21 DIAGNOSIS — K59.00 CONSTIPATION, UNSPECIFIED CONSTIPATION TYPE: ICD-10-CM

## 2024-02-21 DIAGNOSIS — J45.909 BRONCHITIS, ALLERGIC, UNSPECIFIED ASTHMA SEVERITY, UNCOMPLICATED: ICD-10-CM

## 2024-02-21 DIAGNOSIS — R10.31 RIGHT LOWER QUADRANT ABDOMINAL PAIN: ICD-10-CM

## 2024-02-21 PROCEDURE — 99213 OFFICE O/P EST LOW 20 MIN: CPT | Performed by: FAMILY MEDICINE

## 2024-02-21 RX ORDER — LINACLOTIDE 145 UG/1
145 CAPSULE, GELATIN COATED ORAL DAILY
Qty: 90 CAPSULE | Refills: 3 | Status: SHIPPED | OUTPATIENT
Start: 2024-02-21

## 2024-02-21 RX ORDER — PREDNISONE 10 MG/1
TABLET ORAL
Qty: 20 TABLET | Refills: 0 | Status: SHIPPED | OUTPATIENT
Start: 2024-02-21

## 2024-02-21 RX ORDER — ALBUTEROL SULFATE 90 UG/1
2 AEROSOL, METERED RESPIRATORY (INHALATION) EVERY 6 HOURS PRN
Qty: 18 G | Refills: 0 | Status: SHIPPED | OUTPATIENT
Start: 2024-02-21

## 2024-02-21 RX ORDER — AZITHROMYCIN 250 MG/1
TABLET, FILM COATED ORAL
Qty: 6 TABLET | Refills: 0 | Status: SHIPPED | OUTPATIENT
Start: 2024-02-21 | End: 2024-02-25

## 2024-02-21 NOTE — PROGRESS NOTES
Name: Marisel Olivas      : 1980      MRN: [id number]  Encounter Provider: Ebony Wade MD  Encounter Date: 2024   Encounter department: [facility]    Assessment & Plan     1. Acute bronchitis, unspecified organism  -     azithromycin (ZITHROMAX) 250 mg tablet; Take 2 tablets today then 1 tablet daily x 4 days  -     predniSONE 10 mg tablet; 4 tabs x 2 days, 3 tabs x 2 days, 2 tabs x 2 days, 1 tab x 2 days    2. Bronchitis, allergic, unspecified asthma severity, uncomplicated  -     albuterol (ProAir HFA) 90 mcg/act inhaler; Inhale 2 puffs every 6 (six) hours as needed for shortness of breath    3. Constipation, unspecified constipation type  -     linaCLOtide (Linzess) 145 MCG CAPS; Take 1 capsule (145 mcg total) by mouth in the morning    4. Right lower quadrant abdominal pain  -     linaCLOtide (Linzess) 145 MCG CAPS; Take 1 capsule (145 mcg total) by mouth in the morning           Subjective   Negative covid test today       Wheezing  The current episode started in the past 7 days. The problem is moderate. Associated symptoms include chest pressure, coughing, fatigue and wheezing. Pertinent negatives include no chest pain, dizziness, hoarseness of voice, leg swelling, orthopnea, palpitations, rhinorrhea, sore throat, stridor or sweats. Past treatments include nothing. There is no history of allergies, bronchiolitis, congenital heart disease, DVT or GERD.   Fatigue  Associated symptoms include coughing and fatigue. Pertinent negatives include no chest pain or sore throat.     Review of Systems   Constitutional:  Positive for fatigue.   HENT:  Negative for hoarse voice, rhinorrhea and sore throat.    Respiratory:  Positive for cough and wheezing. Negative for stridor.    Cardiovascular:  Negative for chest pain, palpitations, orthopnea and leg swelling.   Neurological:  Negative for dizziness.       Past Medical History:   Diagnosis Date    Constipation     COVID-19 2022     Depression     Eczema     Epilepsy (HCC) 11/15/2013    Description: well controlled with sleep    GERD (gastroesophageal reflux disease)     Grand mal convulsion (HCC)     X 2 last episode -Sleep deprivation-Resolved with Cpap    Hypertension     Insomnia     Medial meniscus tear 2015    Migraine     PONV (postoperative nausea and vomiting)     Primary generalized epilepsy (HCC) 2011    Psoriasis     Seizures ([facility])     Shingles     Shoulder dislocation, right, sequela 2018    Sinusitis     Sleep apnea     uses cpap    Tinnitus      Past Surgical History:   Procedure Laterality Date     SECTION   and     COLONOSCOPY      DILATION AND CURETTAGE OF UTERUS      DILATION AND CURETTAGE OF UTERUS WITH HYSTEROSOCPY N/A 2017    Procedure: D&C; IUD REMOVAL;  Surgeon: Abeba Blackburn MD;  Location: [facility];  Service: Gynecology    EGD      EPIDURAL BLOCK INJECTION Bilateral 2024    Procedure: BILATERAL L5-S1 TRANSFORAMINAL EPIDURAL STEROID INJECTION;  Surgeon: Jeff Pettit DO;  Location: [facility] MAIN OR;  Service: Pain Management     HYSTEROSCOPY      LAPAROSCOPY      (Diagnostic)    LASIK      NV COLONOSCOPY FLX DX W/COLLJ SPEC WHEN PFRMD N/A 2018    Procedure: EGD AND COLONOSCOPY;  Surgeon: Jagdish Katz MD;  Location: [facility];  Service: Gastroenterology    NV VERTEBRAL CORPECTOMY ANT DCMPRN CERVICAL 1 SEG Bilateral 2021    Procedure: CORPECTOMY SPINE CERVICAL ANTERIOR: C4-7 ACDF with C6 /hemicorpectomy, C4-7 instrumentation and fusion (neuromonitoring);  Surgeon: Raquel Porter MD;  Location: [facility];  Service: Neurosurgery    REMOVAL OF INTRAUTERINE DEVICE (IUD)       Family History   Problem Relation Age of Onset    No Known Problems Mother     Diabetes Father         Diabetes Mellitus    Hyperlipidemia Father     Hypertension Father     No Known Problems Sister     No Known Problems Daughter     Brain cancer Maternal Grandmother 56    No Known  Problems Son     Migraines Family     Diabetes Family         Type 2 Diabetes Mellitus    Breast cancer Neg Hx      Social History     Socioeconomic History    Marital status: /Civil Union     Spouse name: None    Number of children: None    Years of education: None    Highest education level: None   Occupational History    None   Tobacco Use    Smoking status: Former     Current packs/day: 0.00     Average packs/day: 0.3 packs/day for 25.0 years (6.3 ttl pk-yrs)     Types: Cigarettes     Start date:      Quit date:      Years since quittin.1    Smokeless tobacco: Never    Tobacco comments:     Former smoker per [employer]   Vaping Use    Vaping status: Never Used   Substance and Sexual Activity    Alcohol use: Yes     Comment: occassionally    Drug use: Never    Sexual activity: Not Currently     Partners: Male     Birth control/protection: Implant     Comment: 15 - Nexplanon inserted   Other Topics Concern    None   Social History Narrative    Denied:  Exercising Regularly     Social Determinants of Health     Financial Resource Strain: Not on file   Food Insecurity: Not on file   Transportation Needs: Not on file   Physical Activity: Not on file   Stress: Not on file   Social Connections: Not on file   Intimate Partner Violence: Not on file   Housing Stability: Not on file     Current Outpatient Medications on File Prior to Visit   Medication Sig    acetaminophen (TYLENOL) 325 mg tablet Take 650 mg by mouth every 6 (six) hours as needed for mild pain    cholecalciferol (VITAMIN D3) 1,000 units tablet Take 5,000 Units by mouth daily      Etonogestrel (NEXPLANON SC) Inject under the skin Located Left upper inner arm    hydrochlorothiazide (HYDRODIURIL) 25 mg tablet Take 1 tablet (25 mg total) by mouth daily    losartan (COZAAR) 50 mg tablet TAKE 1 TABLET BY MOUTH EVERY DAY    magnesium 30 MG tablet Take 500 mg by mouth 2 (two) times a day      Multiple Vitamins-Minerals (MULTIVITAL-M) TABS  "Take by mouth    naproxen (EC NAPROSYN) 500 MG EC tablet Take 1 tablet (500 mg total) by mouth 3 (three) times a day with meals    NON FORMULARY daily at bedtime Takes Valarien Root that's 1 PO QHS for Sleep    Omega 3-6-9 Fatty Acids (OMEGA 3-6-9 PO) Take by mouth    omeprazole (PriLOSEC) 40 MG capsule Take 1 capsule (40 mg total) by mouth daily    QUEtiapine (SEROquel) 25 mg tablet Take 3 tabs daily    TiZANidine (ZANAFLEX) 4 MG capsule Take 1 capsule (4 mg total) by mouth 3 (three) times a day as needed for muscle spasms    traZODone (DESYREL) 50 mg tablet Take 1 tablet (50 mg total) by mouth daily at bedtime as needed for sleep or depression    [DISCONTINUED] albuterol (ProAir HFA) 90 mcg/act inhaler Inhale 2 puffs every 6 (six) hours as needed for shortness of breath    [DISCONTINUED] linaCLOtide (Linzess) 145 MCG CAPS Take 1 capsule (145 mcg total) by mouth in the morning    clotrimazole-betamethasone (LOTRISONE) 1-0.05 % cream Apply topically 2 (two) times a day (Patient not taking: Reported on 1/11/2024)     Allergies   Allergen Reactions    Gluten Meal - Food Allergy GI Intolerance     \" Highly Sensitivity\"    Lactose - Food Allergy GI Intolerance     \" Highly Sensitive\"     Immunization History   Administered Date(s) Administered    COVID-19 PFIZER VACCINE 0.3 ML IM 04/30/2021, 04/30/2021, 05/21/2021, 05/21/2021    INFLUENZA 09/09/2021, 09/09/2021, 09/26/2022, 10/10/2023    Influenza, injectable, quadrivalent, preservative free 0.5 mL 09/26/2022, 10/10/2023    Tdap 03/25/2022, 03/25/2022       Objective     /80 (BP Location: Left arm, Patient Position: Sitting, Cuff Size: Standard)   Pulse (!) 113   Temp 99 °F (37.2 °C) (Tympanic)   Resp 18   Ht 5' 7\" (1.702 m)   SpO2 98%   BMI 34.46 kg/m²     Physical Exam  Constitutional:       Appearance: Normal appearance.   HENT:      Right Ear: Tympanic membrane normal.      Left Ear: Tympanic membrane normal.      Nose: Congestion present. No rhinorrhea. "   Cardiovascular:      Rate and Rhythm: Normal rate and regular rhythm.      Pulses: Normal pulses.   Pulmonary:      Breath sounds: Wheezing present.   Neurological:      Mental Status: She is alert.       Ebony Wade MD

## 2024-02-22 ENCOUNTER — OFFICE VISIT (OUTPATIENT)
Dept: OCCUPATIONAL THERAPY | Facility: MEDICAL CENTER | Age: 44
End: 2024-02-22
Payer: COMMERCIAL

## 2024-02-22 ENCOUNTER — TELEPHONE (OUTPATIENT)
Dept: PAIN MEDICINE | Facility: CLINIC | Age: 44
End: 2024-02-22

## 2024-02-22 DIAGNOSIS — R41.3 AMNESIA/MEMORY DISORDER: Primary | ICD-10-CM

## 2024-02-22 PROCEDURE — 97530 THERAPEUTIC ACTIVITIES: CPT

## 2024-02-22 NOTE — PROGRESS NOTES
"Occupational Therapy Daily Note:    Today's date: 2024  Patient name: Marisel Olivas  : 1980  MRN: [id number]  Referring provider: Rick Rock MD  Dx:   Encounter Diagnosis   Name Primary?    Amnesia/memory disorder Yes       Eval/Re-Eval POC Expires Auth #/ Referral # Total Visits Start Date Expiration Date Extension Info Visits Limitation                                                                 1 2 3 4 5 6          7 8 9 10 11 12           13 14 15 16 17 18           19 20 21 22 23 24           25 26 27 28 29 30             POC START DATE: 2024  POC END DATE: 2024  PN DUE: 3/12/2024  FREQUENCY: 1-2x/wk for 12 weeks    Subjective: \"Sometimes I find it hard to memorize a grocery list.\"    Objective:     Therapeutic Activity -     Immediate Memory, Working Memory, Delayed Memory  Compensatory Memory Strategies were reviewed  Time: 5 minutes    Immediate Memory, Working Memory, Delayed Memory  Pizza Recall  Immediate - 8/10  Delayed -  810  Time: 10 minutes    Immediate Memory, Working Memory, Sustained Attention  Tanagram Puzzle Recall  Completed: x 6  Accuracy: 75%  Required Re-checks and Min vc  Time: 30 minutes    Assessment: Tolerated treatment well. Pt was reviewed previous initial evaluation results and understands current POC. Pt demonstrated increased frustration when provided with memory recall Tanagram Puzzle. Patient would benefit from continued skilled OT.    Plan: Continued skilled OT per POC.     Treatment conducted by student supervised and directed by Yoanna Sexton MS, OTR/L, CSRS, ALMA, DCAT. I agree with all treatment methods performed during this session by JITENDRA Anthony. Student was directly supervised by me during the entirety of session. Patient consent given to be treated by student.     "

## 2024-02-26 ENCOUNTER — OFFICE VISIT (OUTPATIENT)
Dept: OCCUPATIONAL THERAPY | Facility: MEDICAL CENTER | Age: 44
End: 2024-02-26
Payer: COMMERCIAL

## 2024-02-26 DIAGNOSIS — R41.3 AMNESIA/MEMORY DISORDER: Primary | ICD-10-CM

## 2024-02-26 PROCEDURE — 97530 THERAPEUTIC ACTIVITIES: CPT

## 2024-02-26 NOTE — PROGRESS NOTES
"Occupational Therapy Daily Note:    Today's date: 2024  Patient name: Marisel Olivas  : 1980  MRN: [id number]  Referring provider: Rick Rock MD  Dx:   Encounter Diagnosis   Name Primary?    Amnesia/memory disorder Yes       Eval/Re-Eval POC Expires Auth #/ Referral # Total Visits Start Date Expiration Date Extension Info Visits Limitation                                                                 1 2 3 4 5 6         7 8 9 10 11 12           13 14 15 16 17 18           19 20 21 22 23 24           25 26 27 28 29 30             POC START DATE: 2024  POC END DATE: 2024  PN DUE: 3/12/2024  FREQUENCY: 1-2x/wk for 12 weeks    Subjective: \"I find it difficult to focus when my associations make me start thinking of other things.\"    Objective:     Therapeutic Activity -     Immediate Memory, Working Memory, Delayed Memory, Problem-Solving  Word Formations  Immediate Memory -   Trial 1:   Trial 2:   Delayed Memory -   Time: 10 minutes    Immediate Memory, Working Memory, Divided Attention  Number Recall & Brain Box  Trial 1:   Trial 2:   Trial 3:   Time: 20 minutes    Assessment: Tolerated treatment well. Pt is able to create associations for improved memory recall, but indicates that with associations she frequently gets off task. Pt shows increased frustration with activities in which she indicates previously doing better in before injury. Pt accuracy decreased due to only recalling previous associations. Patient would benefit from continued skilled OT.    Plan: Continued skilled OT per POC.    Treatment conducted by student supervised and directed by Yoanna Sexton MS, OTR/L, CSRS, ALMA, DCAT. I agree with all treatment methods performed during this session by JITENDRA Anthony. Student was directly supervised by me during the entirety of session. Patient consent given to be treated by student.    "

## 2024-02-27 ENCOUNTER — OFFICE VISIT (OUTPATIENT)
Dept: NEUROSURGERY | Facility: CLINIC | Age: 44
End: 2024-02-27
Payer: COMMERCIAL

## 2024-02-27 VITALS
BODY MASS INDEX: 34.53 KG/M2 | SYSTOLIC BLOOD PRESSURE: 136 MMHG | DIASTOLIC BLOOD PRESSURE: 78 MMHG | WEIGHT: 220 LBS | OXYGEN SATURATION: 99 % | HEIGHT: 67 IN | TEMPERATURE: 98.2 F | HEART RATE: 85 BPM | RESPIRATION RATE: 14 BRPM

## 2024-02-27 DIAGNOSIS — M54.12 CERVICAL RADICULOPATHY: Primary | ICD-10-CM

## 2024-02-27 PROCEDURE — 99213 OFFICE O/P EST LOW 20 MIN: CPT | Performed by: PHYSICIAN ASSISTANT

## 2024-02-27 NOTE — PROGRESS NOTES
Neurosurgery Office Note  Marisel Olivas 43 y.o. female MRN: [id number]      Assessment/Plan     Cervical radiculopathy  H/o C4-7 ACDF with Dr. Porter 2/2021  Return of symptoms including weakness, pain, sensation changes in the R arm     Imaging:   Mri cervical spine 2/15/2024: Postoperative changes of the cervical spine.  No significant stenosis other than mild residual bony ridging at the postsurgical sites.  Small noncompressive disc bulge at C3-4.    Plan:  Continue to monitor symptoms  MRI imaging reviewed with patient in room  Postsurgical changes with appropriate decompression  No evidence of hardware failure or adjacent segment disease  Does not show any significant pathology to correlate with patient's symptoms  Recommend continued conservative management including medication and therapy  Follow up with pain management   Could consider epidural injection for right arm symptoms given persistence s/p trigger point injection and radicular nature if indicated per their recommendations  Follow up with neurosurgery as needed. No surgical pathology warranting further intervention currently. Contact as needed       There are no diagnoses linked to this encounter.      I have spent a total time of 25 minutes on 02/27/24 in caring for this patient including Diagnostic results, Prognosis, Risks and benefits of tx options, Instructions for management, Patient and family education, Importance of tx compliance, Risk factor reductions, Impressions, Counseling / Coordination of care, Documenting in the medical record, Reviewing / ordering tests, medicine, procedures  , and Obtaining or reviewing history  .      CHIEF COMPLAINT    Chief Complaint   Patient presents with    Follow-up     MRI LSP done 12/22/23 & Tspine done 1/30/24        HISTORY    History of Present Illness     43 y.o. year old female who presents the outpatient neurosurgical office for follow-up to review her MRI of the cervical spine.  Patient is known  to the neurosurgical service for her recent evaluation and the beginning of February for complaints similar to that which she was having prior to her last surgery.  Patient is status post C4-7 ACDF with Dr. Porter in February 2021.  She received several years of relief after her surgery.  Starting 6 to 8 months ago she began to have similar symptoms to prior.  She was having paresthesias numbness and tingling radiating down with associated pain into the forearm with a cramping sensation and searing pain out through her ring finger.  She states this pain only happens a few times a month and is exacerbated with doing certain activities such as washing dishes or carrying laundry basket.  On a day-to-day basis that does not happen.  When the pain is significant and severe she does report some associated weakness and dropping objects with the right hand.  It is all transient and does resolve spontaneously with some time.  She was recently complaining of focal pinpoint tenderness between her spine and her scapula which she underwent trigger point injection with the pain management team and has had significant relief of her discomfort.  She also continues to report no significant low back pain status post KIESHA in January.        See Discussion    REVIEW OF SYSTEMS    Review of Systems   HENT: Negative.     Eyes: Negative.    Respiratory: Negative.     Cardiovascular: Negative.    Gastrointestinal: Negative.    Endocrine: Negative.    Genitourinary:  Positive for dysuria.        Seeing a urologist   Musculoskeletal:  Positive for arthralgias (hips) and myalgias (bi/legs). Negative for back pain (LBP into hips, alternating legs with shooting pain to feet) and neck pain.          MRI LSP done 12/22/23 & Tspine done 1/30/24 under pacs   INJ SCD FOR 1/31/24  muscle trigger point injection(s). DONE 2/15/24    tizanidine PRN       Neurological:  Positive for weakness (with pins and needles) and numbness (right arm and hand).   All  other systems reviewed and are negative.      ROS obtained by MA. Reviewed. See HPI.     Meds/Allergies     Current Outpatient Medications   Medication Sig Dispense Refill    acetaminophen (TYLENOL) 325 mg tablet Take 650 mg by mouth every 6 (six) hours as needed for mild pain      albuterol (ProAir HFA) 90 mcg/act inhaler Inhale 2 puffs every 6 (six) hours as needed for shortness of breath 18 g 0    cholecalciferol (VITAMIN D3) 1,000 units tablet Take 5,000 Units by mouth daily        Etonogestrel (NEXPLANON SC) Inject under the skin Located Left upper inner arm      linaCLOtide (Linzess) 145 MCG CAPS Take 1 capsule (145 mcg total) by mouth in the morning 90 capsule 3    losartan (COZAAR) 50 mg tablet TAKE 1 TABLET BY MOUTH EVERY DAY 90 tablet 0    magnesium 30 MG tablet Take 500 mg by mouth 2 (two) times a day        Multiple Vitamins-Minerals (MULTIVITAL-M) TABS Take by mouth      Omega 3-6-9 Fatty Acids (OMEGA 3-6-9 PO) Take by mouth      omeprazole (PriLOSEC) 40 MG capsule Take 1 capsule (40 mg total) by mouth daily (Patient taking differently: Take 40 mg by mouth if needed) 90 capsule 0    predniSONE 10 mg tablet 4 tabs x 2 days, 3 tabs x 2 days, 2 tabs x 2 days, 1 tab x 2 days 20 tablet 0    QUEtiapine (SEROquel) 25 mg tablet Take 3 tabs daily 270 tablet 3    TiZANidine (ZANAFLEX) 4 MG capsule Take 1 capsule (4 mg total) by mouth 3 (three) times a day as needed for muscle spasms 90 capsule 3    traZODone (DESYREL) 50 mg tablet Take 1 tablet (50 mg total) by mouth daily at bedtime as needed for sleep or depression 90 tablet 3    clotrimazole-betamethasone (LOTRISONE) 1-0.05 % cream Apply topically 2 (two) times a day (Patient not taking: Reported on 1/11/2024) 30 g 0    hydrochlorothiazide (HYDRODIURIL) 25 mg tablet Take 1 tablet (25 mg total) by mouth daily (Patient not taking: Reported on 2/27/2024) 90 tablet 2    naproxen (EC NAPROSYN) 500 MG EC tablet Take 1 tablet (500 mg total) by mouth 3 (three) times a  "day with meals (Patient not taking: Reported on 2024) 30 tablet 0    NON FORMULARY daily at bedtime Takes Valarien Root that's 1 PO QHS for Sleep (Patient not taking: Reported on 2024)       No current facility-administered medications for this visit.       Allergies   Allergen Reactions    Gluten Meal - Food Allergy GI Intolerance     \" Highly Sensitivity\"    Lactose - Food Allergy GI Intolerance     \" Highly Sensitive\"       PAST HISTORY    Past Medical History:   Diagnosis Date    Constipation     COVID-19 2022    Depression     Eczema     Epilepsy (HCC) 11/15/2013    Description: well controlled with sleep    GERD (gastroesophageal reflux disease)     Grand mal convulsion (HCC)     X 2 last episode -Sleep deprivation-Resolved with Cpap    Hypertension     Insomnia     Medial meniscus tear 2015    Migraine     PONV (postoperative nausea and vomiting)     Primary generalized epilepsy (HCC) 2011    Psoriasis     Seizures ([facility])     Shingles     Shoulder dislocation, right, sequela 2018    Sinusitis     Sleep apnea     uses cpap    Tinnitus        Past Surgical History:   Procedure Laterality Date     SECTION   and     COLONOSCOPY      DILATION AND CURETTAGE OF UTERUS      DILATION AND CURETTAGE OF UTERUS WITH HYSTEROSOCPY N/A 2017    Procedure: D&C; IUD REMOVAL;  Surgeon: Abeba Blackburn MD;  Location: [facility];  Service: Gynecology    EGD      EPIDURAL BLOCK INJECTION Bilateral 2024    Procedure: BILATERAL L5-S1 TRANSFORAMINAL EPIDURAL STEROID INJECTION;  Surgeon: Jeff Ptetit DO;  Location: [facility] MAIN OR;  Service: Pain Management     HYSTEROSCOPY      LAPAROSCOPY      (Diagnostic)    LASIK      WY COLONOSCOPY FLX DX W/COLLJ SPEC WHEN PFRMD N/A 2018    Procedure: EGD AND COLONOSCOPY;  Surgeon: Jagdish Katz MD;  Location: [facility];  Service: Gastroenterology    WY VERTEBRAL CORPECTOMY ANT DCMPRN CERVICAL 1 SEG Bilateral 2021    " "Procedure: CORPECTOMY SPINE CERVICAL ANTERIOR: C4-7 ACDF with C6 /hemicorpectomy, C4-7 instrumentation and fusion (neuromonitoring);  Surgeon: Raquel Porter MD;  Location: [facility];  Service: Neurosurgery    REMOVAL OF INTRAUTERINE DEVICE (IUD)         Social History     Tobacco Use    Smoking status: Former     Current packs/day: 0.00     Average packs/day: 0.3 packs/day for 25.0 years (6.3 ttl pk-yrs)     Types: Cigarettes     Start date:      Quit date:      Years since quittin.1    Smokeless tobacco: Never    Tobacco comments:     Former smoker per [employer]   Vaping Use    Vaping status: Never Used   Substance Use Topics    Alcohol use: Yes     Comment: occassionally    Drug use: Never       Family History   Problem Relation Age of Onset    No Known Problems Mother     Diabetes Father         Diabetes Mellitus    Hyperlipidemia Father     Hypertension Father     No Known Problems Sister     No Known Problems Daughter     Brain cancer Maternal Grandmother 56    No Known Problems Son     Migraines Family     Diabetes Family         Type 2 Diabetes Mellitus    Breast cancer Neg Hx          Above history personally reviewed.       EXAM    Vitals:Blood pressure 136/78, pulse 85, temperature 98.2 °F (36.8 °C), temperature source Temporal, resp. rate 14, height 5' 7\" (1.702 m), weight 99.8 kg (220 lb), SpO2 99%.,Body mass index is 34.46 kg/m².     Physical Exam  Constitutional:       Appearance: Normal appearance.   HENT:      Head: Atraumatic.   Eyes:      Extraocular Movements: EOM normal.   Musculoskeletal:         General: No tenderness. Normal range of motion.      Cervical back: Normal range of motion and neck supple. No tenderness.   Neurological:      Mental Status: She is alert and oriented to person, place, and time.      Sensory: Sensory deficit present.      Motor: Motor strength is normal.No weakness.   Psychiatric:         Mood and Affect: Mood normal.         Speech: Speech normal.         " Behavior: Behavior normal.         Neurologic Exam     Mental Status   Oriented to person, place, and time.   Attention: normal.   Speech: speech is normal   Level of consciousness: alert  Knowledge: good.   Normal comprehension.     Cranial Nerves     CN III, IV, VI   Extraocular motions are normal.   Upgaze: normal  Downgaze: normal    CN VII   Facial expression full, symmetric.     CN VIII   CN VIII normal.   Hearing: intact    Motor Exam   Muscle bulk: normal  Right arm tone: normal  Left arm tone: normal  Right leg tone: normal  Left leg tone: normal    Strength   Strength 5/5 throughout.     Sensory Exam   Left arm light touch: normal  Right leg light touch: normal  Left leg light touch: normal  Diminished LT in various regions of the RUE compared to the L particularly over the forearm and hand with associated paresthesias      Gait, Coordination, and Reflexes     Tremor   Resting tremor: absent  Action tremor: absent    Reflexes   Right Hendrix: absent  Left Hendrix: absent    MEDICAL DECISION MAKING    Imaging Studies:     MRI cervical spine w wo contrast    Result Date: 2/15/2024  Narrative: [id number]    XR shoulder 2+ vw right    Result Date: 2/7/2024  Narrative: RIGHT SHOULDER INDICATION:   Pain in right shoulder. Other chronic pain. COMPARISON:  Comparison made with previous examination(s) dated (DX) 28-Nov-2023,(SR) 27-Sep-2023,(CT) 27-Sep-2023,(DX) 21-Oct-2022,(RF) 28-Feb-2022. VIEWS:  XR SHOULDER 2+ VW RIGHT Images: 3 FINDINGS: There is no acute fracture or dislocation. Mild osteoarthritis acromioclavicular joint. No lytic or blastic osseous lesion. Soft tissues are unremarkable.     Impression: No acute osseous abnormality. Electronically signed: 02/07/2024 10:59 PM Eveline Felton MD    X-ray cervical spine complete 4 or 5 vw    Result Date: 2/7/2024  Narrative: CERVICAL SPINE INDICATION:   Radiculopathy, cervical region. COMPARISON: None available  VIEWS:  XR SPINE CERVICAL COMPLETE 4 OR 5 VW NON INJURY Images: 5 FINDINGS: No fracture. Normal alignment without subluxation. C4-C7 ACDF with intervertebral spacers without hardware complication. The neuroforamina are patent. The prevertebral soft tissues are within normal limits.  The lung apices are clear.     Impression: No acute osseous abnormality. Postsurgical changes as above. Electronically signed: 02/07/2024 01:15 PM Roscoe Tabares MPH,MD    [place] spine and pain procedure    Result Date: 1/31/2024  Narrative: A spine and pain study was performed by the Department of Spine and Pain. Please refer to the report for the official interpretation. The images are stored for archival purposes only. Study images were not formally reviewed by the Radiology Department.    MRI thoracic spine w wo contrast    Result Date: 1/30/2024  Narrative: [id number]      I have personally reviewed pertinent reports.   and I have personally reviewed pertinent films in PACS

## 2024-02-27 NOTE — ASSESSMENT & PLAN NOTE
H/o C4-7 ACDF with Dr. Porter 2/2021  Return of symptoms including weakness, pain, sensation changes in the R arm     Imaging:   Mri cervical spine 2/15/2024: Postoperative changes of the cervical spine.  No significant stenosis other than mild residual bony ridging at the postsurgical sites.  Small noncompressive disc bulge at C3-4.    Plan:  Continue to monitor symptoms  MRI imaging reviewed with patient in room  Postsurgical changes with appropriate decompression  No evidence of hardware failure or adjacent segment disease  Does not show any significant pathology to correlate with patient's symptoms  Recommend continued conservative management including medication and therapy  Follow up with pain management   Could consider epidural injection for right arm symptoms given persistence s/p trigger point injection and radicular nature if indicated per their recommendations  Follow up with neurosurgery as needed. No surgical pathology warranting further intervention currently. Contact as needed

## 2024-02-28 ENCOUNTER — OFFICE VISIT (OUTPATIENT)
Dept: OCCUPATIONAL THERAPY | Facility: MEDICAL CENTER | Age: 44
End: 2024-02-28
Payer: COMMERCIAL

## 2024-02-28 DIAGNOSIS — R41.3 AMNESIA/MEMORY DISORDER: Primary | ICD-10-CM

## 2024-02-28 PROCEDURE — 97530 THERAPEUTIC ACTIVITIES: CPT

## 2024-02-28 NOTE — PROGRESS NOTES
"Occupational Therapy Daily Note:    Today's date: 2024  Patient name: Marisel Olivas  : 1980  MRN: [id number]  Referring provider: Rick Rock MD  Dx:   Encounter Diagnosis   Name Primary?    Amnesia/memory disorder Yes         Eval/Re-Eval POC Expires Auth #/ Referral # Total Visits Start Date Expiration Date Extension Info Visits Limitation                                                                 1 2 3 4 5 6        7 8 9 10 11 12           13 14 15 16 17 18           19 20 21 22 23 24           25 26 27 28 29 30             POC START DATE: 2024  POC END DATE: 2024  PN DUE: 3/12/2024  FREQUENCY: 1-2x/wk for 12 weeks    Subjective: \"I just got back some scans from neurosurgery and they showed that my cervical spine is almost normal and my doctor thinks that there is no more need for surgery and just some arm injections.\"    Objective:     Therapeutic Activity -     Immediate Memory, Working Memory  Chunking words  Immediate -    Time: 15 minutes    Immediate Memory, Working Memory, Sustained Attention  [employer] (SAM) Board  Completed: x 5  Accuracy: 60%  Time: 30 minutes    Assessment: Tolerated treatment well. Pt had increased difficulty with visuospatial tasks as her accuracy declines compared to immediate memory tasks. When pt becomes frustrated she has trouble asking for assistance and required rechecks. It is also noticeable as frustration increases accuracy of visuospatial task declines. Patient would benefit from continued skilled OT.    Plan: Continued skilled OT per POC.    Treatment conducted by student supervised and directed by Yoanna Sexton MS, OTR/L, CSRS, ALMA, DCAT. I agree with all treatment methods performed during this session by JITENDRA Anthony. Student was directly supervised by me during the entirety of session. Patient consent given to be treated by student.    "

## 2024-02-29 ENCOUNTER — TELEPHONE (OUTPATIENT)
Dept: UROLOGY | Facility: CLINIC | Age: 44
End: 2024-02-29

## 2024-03-05 NOTE — TELEPHONE ENCOUNTER
Pt called in with % improvement and pain level 0/10    No burning and no pain.  Pt still has pins and needles.    Pt stated she had tried to call back several times and either the calls were dropped or she was on hold for a long wait time.

## 2024-03-05 NOTE — TELEPHONE ENCOUNTER
--JOSELYNI--  Pt so happy that the pain is relieved and has been on gabapentin in the past but has only intermittent paresthesias that she does not want to be on constant medication.  Per pt she can tolerate the paresthesias and will call back if changes her mind.  Per pt only feels these when doing the dishes and bending her head forward or lying on her back for several minutes.    Pt wanted KW to know that good news is she does not need surgery they felt it was all inflammation that the procedure helped very well.    Pt aware that this can be repeated if her pain comes back and to reach out if needed.    --please see previous pt very happy with KW

## 2024-03-05 NOTE — TELEPHONE ENCOUNTER
Glad she is not currently experiencing the pain, however, in regards to the paresthesias may trial membrane stabilizing agents including gabapentin, Lyrica, Cymbalta  If she has not tried any of the above I will place the order  Please advise  Thank you

## 2024-03-11 ENCOUNTER — OFFICE VISIT (OUTPATIENT)
Dept: OCCUPATIONAL THERAPY | Facility: MEDICAL CENTER | Age: 44
End: 2024-03-11
Payer: COMMERCIAL

## 2024-03-11 DIAGNOSIS — R41.3 AMNESIA/MEMORY DISORDER: Primary | ICD-10-CM

## 2024-03-11 PROCEDURE — 97530 THERAPEUTIC ACTIVITIES: CPT

## 2024-03-11 NOTE — PROGRESS NOTES
"Occupational Therapy Daily Note:    Today's date: 3/11/2024  Patient name: Marisel Olivas  : 1980  MRN: [id number]  Referring provider: Rick Rock MD  Dx:   No diagnosis found.        Eval/Re-Eval POC Expires Auth #/ Referral # Total Visits Start Date Expiration Date Extension Info Visits Limitation                                                                 1 2 3 4 5 6   2/12 2/22 2/26 2/28 3/11    7 8 9 10 11 12           13 14 15 16 17 18           19 20 21 22 23 24           25 26 27 28 29 30             POC START DATE: 2024  POC END DATE: 2024  PN DUE: 3/12/2024  FREQUENCY: 1-2x/wk for 12 weeks    Subjective: \"....................\"    Objective:     Therapeutic Activity -     Immediate Memory, Working Memory, Delayed Memory  Visual Logic Crossword  Immediate Memory -    Delayed Memory -   Time:  minutes    Immediate Memory, Working Memory, Divided Attention  TV Listing & Brain Box   Trial 1: 10/10 1 vc  Trial 2: 10/10  Trial 3: 13/15  Trial 4:  13/15  Trial 5:  Trial 6:   Trial 7:  Time:  minutes    Get a  on Patterns  Completed:   Accuracy:   Time:  minutes    Immediate Memory, Working Memory, Sustained Attention  Digit Backward Recall  Completed:   Accuracy:  Time:  minutes    Assessment: Tolerated treatment well. ................ Patient would benefit from continued skilled OT.    Plan: Continued skilled OT per POC.    Treatment conducted by student supervised and directed by Yoanna Sexton MS, OTR/L, CSRS, ALMA, DCAT. I agree with all treatment methods performed during this session by JITENDRA Anthony. Student was directly supervised by me during the entirety of session. Patient consent given to be treated by student.    "

## 2024-03-11 NOTE — PROGRESS NOTES
"Occupational Therapy Daily Note:    Today's date: 3/11/2024  Patient name: Marisel Olivas  : 1980  MRN: [id number]  Referring provider: Rick Rock MD  Dx:   No diagnosis found.        Eval/Re-Eval POC Expires Auth #/ Referral # Total Visits Start Date Expiration Date Extension Info Visits Limitation                                                                 1 2 3 4 5 6   2/12 2/22 2/26 2/28 3/11    7 8 9 10 11 12           13 14 15 16 17 18           19 20 21 22 23 24           25 26 27 28 29 30             POC START DATE: 2024  POC END DATE: 2024  PN DUE: 3/12/2024  FREQUENCY: 1-2x/wk for 12 weeks    Subjective: \"I feel like occupational therapy has helped with remembering daily tasks.\"    Objective:     Therapeutic Activity -     Immediate Memory, Working Memory, Delayed Memory  Visual Logic Crossword  Immediate Memory -    Delayed Memory -   Time: 15 minutes    Immediate Memory, Working Memory, Divided Attention  TV Listing & Brain Box   Trial 1: 10/10 1 vc  Trial 2: 10/10  Trial 3: 13/15  Trial 4: 13/15  Time: 30 minutes      Assessment: Tolerated treatment well. Pt demonstrates increased independence with divided attention and working memory tasks. Pt does indicate having frustration with tasks that require her to recall events and times. Patient would benefit from continued skilled OT.    Plan: Continued skilled OT per POC.    Treatment conducted by student supervised and directed by Yoanna Sexton, MS, OTR/L, CSRS, ALMA, DCAT. I agree with all treatment methods performed during this session by JITENDRA Anthony. Student was directly supervised by me during the entirety of session. Patient consent given to be treated by student.    "

## 2024-03-25 ENCOUNTER — EVALUATION (OUTPATIENT)
Dept: OCCUPATIONAL THERAPY | Facility: MEDICAL CENTER | Age: 44
End: 2024-03-25
Payer: COMMERCIAL

## 2024-03-25 DIAGNOSIS — R41.3 AMNESIA/MEMORY DISORDER: Primary | ICD-10-CM

## 2024-03-25 PROCEDURE — 96125 COGNITIVE TEST BY HC PRO: CPT

## 2024-03-25 NOTE — PROGRESS NOTES
OCCUPATIONAL THERAPY RE-EVALUATION:      2/12/2024  Marisel Olivas  1980  [id number]  Rick Rock MD   Diagnosis ICD-10-CM Associated Orders   1. Amnesia/memory disorder  R41.3           Eval/Re-Eval POC Expires Auth #/ Referral # Total Visits Start Date Expiration Date Extension Info Visits Limitation                                                                                 1 2 3 4 5 6   2/12 2/22 2/26 2/28 3/11 3/25    7 8 9 10 11 12                 13 14 15 16 17 18                 19 20 21 22 23 24                 25 26 27 28 29 30                     POC START DATE: 2/12/2024  POC END DATE: 5/6/2024  FREQUENCY: 1-2x/wk for 12 weeks    Assessment/Plan    SKILLED ANALYSIS:    Pt presents to OT evaluation on 2/12/2024 secondary to memory deficits. Pt reports that she attributes memory deficits beginning January 2022 after high fever with COVID-19 infection. Pt reports that brain fog and memory deficits have been declining since. Pt works two jobs and feels her memory deficits are impacting her performance. RBANS was administered today to assess patient's baseline. Pt overall scored within the average range compared to age norms. Pt scores within the low average range for immediate recall, visual perception, and delayed memory recall which warrants OT services to improve upon skills. Pt scored within the very superior and high average range for language and attention.  Pt would benefit from OT treatment services to address memory recall deficits. Pt will be seen for OT services 1-2x/wk for 12 weeks. POC was reviewed with the pt, pt understands and agrees with POC.     Pt presents to OT re-evaluation on 3/25/2024 secondary to memory deficits. Pt reports that she is improving and just recently ordered some brain games. Pt reports she still feels like her brain is not what it used to be; and further indicates that she loses focus easily, but has seen the improvements in her ability to visualize  "what she is trying to recall.  During today's re-evaluation the RBANS was administered today to assess patient's cognitive abilities. Pt overall scored within the superior range compared to age norms. Pt scores within the average range for immediate recall; but high average for her language, attention, and delayed memory. Pt scored within the  superior range for visuospatial/constructional skills.  Due to the results of the RBANs administered today, the results will be discussed with the pt and complete a full discharge after the session.     SUBJECTIVE:    Pt presents to OT evaluation on 2/12/2024 secondary to memory deficits over the last 1.5 years. Pt reports that she has an experience with vertigo, has received vestibular therapy in [place]. Pt had COVID 2022 New Years Day with a high fever. \"My brain is just foggy. I used to be able to speak four languages, run three [employer]. I have my Doctorate.\" \"I have a lot of brain fog during conversations. 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 Now I have to read the same sentence all over again.\" \"My memory has gotten worse since 2022.\" Follows up with Neurology: Dr. Rock. Lives with  and two kids.    History of speaking four languages.    Pt presents to OT re-evaluation on 3/25/2024 secondary to memory deficits. Pt reports that she is improving and just recently ordered some brain games. Pt reports saying \"I still feel like my brain is not what it used to be\" she further continues to indicate that she loses focus easily, but has seen the improvements in her ability to visualize what she is trying to recall.    OT PROFILE:    ADLs: Normal.    IADLS: Still driving. Medication management independently     Work: Runs two [employer]: . A lot of paperwork.    Sleep: Previously had sleep study.    Pt's Goal \"I wonder to under why this is " "happening.\" - MET    PAIN:    4/10  Assessments  The Repeatable Battery for the Assessment of Neuropsychological Status (RBANS) is a brief, individually-administered assessment which measures attention, language, visuospatial/constructional abilities, and immediate & delayed memory. The RBANS is intended for use with adolescents to adults, ages 12 to 89 years. The following results were obtained during the administration of the assessment.    Form: B    Cognitive Domain/Subtest: Index Score: Percentile Rank: Classification: IE: Status:   IMMEDIATE MEMORY 100 50%ile AVERAGE 87         1. List Learning (32/40)          2. Story Memory (15 /24)           VISUOSPATIAL/  CONSTRUCTIONAL 126 95%ile SUPERIOR 84         3. Figure Copy (20/20)          4. Line Orientation (20/20)           LANGUAGE 110 75%ile HIGH AVERAGE 132         5. Picture Naming (10/10)          6. Semantic Fluency (27/40)           ATTENTION 118 89%ile HIGH AVERAGE 103         7. Digit Span (14/16)          8. Coding (57/89)           DELAYED MEMORY 118 89%ile HIGH AVERAGE 89         9. List Recall (8/10)          10. List Recognition (20/20)          11. Story Recall (10/12)          12. Figure Recall (20/20)       Sum of Index Scores:  572  495    Total Score:  121      Percentile: 92%ile      Classification: SUPERIOR        “IE” indicates the scores from the initial evaluation (3/25/2024). Form: B    SHORT TERM GOALS:      Pt will demo G recall of 75% of verbal/written information utilizing memory strategy of choice for improved STM/delayed memory - MET    Pt will demo G carryover of use of internal/external memory strategy aides for improved recall of daily events, with 75 % accuracy - MET    Pt will increase immediate list memory recall being able to recall > 27/40 items on RBANS - MET    Pt will increase immediate story memory recall being able to recall >17/24 on RBANs - NOT MET    Pt will increase delayed list memory recall being able to recall " >6/10 on RBANS - MET    Pt will increase delayed story memory recall being able to recall >8/12 on RBANS - MET    Pt will maintain attention to task for 30 minutes in semi modal environment for baseline performance,  improved role performance and to improve learning and to simulate return to IADLs and complete cooking/cleaning tasks. - MET    Pt will demo ability to participate in dual tasking/divided attention task with 75% accuracy in multimodal environment to simulate return to life and work roles - MET    Pt will demo ability to alternate attention between 2 tasks with cog loading and 75% accuracy with G retention of task directions in multimodal environment to simulate return to life and work  roles - MET    LONG TERM GOALS    Pt will demo G recall of 75% of verbal/written information utilizing memory strategy of choice for improved STM/delayed memory - MET    Pt will demo G carryover of use of internal/external memory strategy aides for improved recall of daily events, with 75 % accuracy - MET    Pt will increase immediate list memory recall being able to recall > 29/40 items on RBANS - MET    Pt will increase immediate story memory recall being able to recall >19/24 on RBANs - NOT MET    Pt will increase delayed list memory recall being able to recall >8/10 on RBANS- MET    Pt will increase delayed story memory recall being able to recall >10/12 on RBANS - MET    Pt will maintain attention to task for 45 minutes in semi modal environment for baseline performance,  improved role performance and to improve learning and to simulate return to IADLs and complete cooking/cleaning tasks. - MET    Pt will demo ability to participate in dual tasking/divided attention task with 75% accuracy in multimodal environment to simulate return to life and work roles - MET    Pt will demo ability to alternate attention between 2 tasks with cog loading and 75% accuracy with G retention of task directions in multimodal environment  to simulate return to life and work  roles - MET    TIME SPENT  90 min for administration, documentation, interpretation, scoring adn POC development RBANS    PLANNED THERAPY INTERVENTIONS:  Internal and external memory aides  Hypersensitivity strategies education  Multi-modal environment  Sustained/alternating/divided attention  Temporal Awareness: Organize the Hour activities  Memory and mental manipulation  Auditory processing with immediate recall  Memory retention with immediate and delayed recall  Edu on cog/vision apps

## 2024-03-27 DIAGNOSIS — I10 ESSENTIAL HYPERTENSION: ICD-10-CM

## 2024-03-27 RX ORDER — LOSARTAN POTASSIUM 50 MG/1
50 TABLET ORAL DAILY
Qty: 90 TABLET | Refills: 0 | Status: SHIPPED | OUTPATIENT
Start: 2024-03-27

## 2024-04-03 ENCOUNTER — OFFICE VISIT (OUTPATIENT)
Dept: OCCUPATIONAL THERAPY | Facility: MEDICAL CENTER | Age: 44
End: 2024-04-03
Payer: COMMERCIAL

## 2024-04-03 DIAGNOSIS — R41.3 AMNESIA/MEMORY DISORDER: Primary | ICD-10-CM

## 2024-04-03 PROCEDURE — 97530 THERAPEUTIC ACTIVITIES: CPT

## 2024-04-03 NOTE — PROGRESS NOTES
"Occupational Therapy Daily Note:    Today's date: 4/3/2024  Patient name: Marisel Olivas  : 1980  MRN: [id number]  Referring provider: Rick Rock MD  Dx:   Encounter Diagnosis   Name Primary?    Amnesia/memory disorder Yes       Eval/Re-Eval POC Expires Auth #/ Referral # Total Visits Start Date Expiration Date Extension Info Visits Limitation                                                                 1 2 3 4 5 6   2/12 2/22 2/26 2/28 3/11 3/25   7 8 9 10 11 12   4/3        13 14 15 16 17 18           19 20 21 22 23 24           25 26 27 28 29 30             POC START DATE: 2024  POC END DATE: 2024  PN DUE: 3/12/2024  FREQUENCY: 1-2x/wk for 12 weeks    Subjective: \"It has been a long week.\"    Objective:     Therapeutic Activity -     Review of RBANS and provided pt with HEP recommendations to address patients memory concerns.  Time: 15 minutes    Assessment: Tolerated treatment well. Reviewed pt's RBANS results from re-evaluation and compared to IE. Pt demonstrated understanding for results. Provided with pt recommendations to continue further cognitive exercises at home. Pt will be discharged at this time. No further OT warranted.    Plan: Pt will be discharged at this time. No further OT warranted.  "

## 2024-05-03 ENCOUNTER — PROCEDURE VISIT (OUTPATIENT)
Dept: UROLOGY | Facility: CLINIC | Age: 44
End: 2024-05-03
Payer: COMMERCIAL

## 2024-05-03 VITALS
SYSTOLIC BLOOD PRESSURE: 110 MMHG | OXYGEN SATURATION: 99 % | DIASTOLIC BLOOD PRESSURE: 80 MMHG | HEIGHT: 67 IN | WEIGHT: 220 LBS | HEART RATE: 87 BPM | BODY MASS INDEX: 34.53 KG/M2

## 2024-05-03 DIAGNOSIS — R39.11 URINARY HESITANCY: Primary | ICD-10-CM

## 2024-05-03 DIAGNOSIS — M62.89 PELVIC FLOOR TENSION: ICD-10-CM

## 2024-05-03 PROCEDURE — 52000 CYSTOURETHROSCOPY: CPT | Performed by: UROLOGY

## 2024-05-03 NOTE — PROGRESS NOTES
Cystoscopy     Date/Time  5/3/2024 1:00 PM     Performed by  Andrey Gifford MD   Authorized by  Andrey Gifford MD     Universal Protocol:  Consent: Written consent obtained.  Risks and benefits: risks, benefits and alternatives were discussed  Consent given by: patient  Patient identity confirmed: verbally with patient      Procedure Details:  Procedure type: cystoscopy    Patient tolerance: Patient tolerated the procedure well with no immediate complications    Additional Procedure Details: No prolapse.  No urethral stricture.  Posterior wall bladder has a mass pushing in on it just above the trigone.  About 5 cm in diameter.  No tumors or stones.  No significant trabeculation.    Plan: See additional note from today.

## 2024-05-03 NOTE — PATIENT INSTRUCTIONS
Seek further evaluation from gynecology regarding Nexplanon, mass behind bladder, and possibility of hysterectomy.

## 2024-05-03 NOTE — PROGRESS NOTES
Marisel Olivas is a(n) 43 y.o. female. , :  1980    Subjective     Assessment:  There were no encounter diagnoses.  43-year-old woman with urinary pressure.  Feels like she has to go but cannot.  Seems to empty okay.  I think this is more of a sensory thing.  Cystoscopy today shows that there is something pressing on the posterior wall of the bladder.  Possibly fibroid.  Pelvic examination really shows nothing on the anterior vaginal wall so must be higher up behind the bladder.  Not eager to repeat pelvic floor muscle retraining.  Did this before.  Did have an episode of massive bleeding vaginally less than a year ago.  Is on Nexplanon.    Plan:  Advised to discuss Nexplanon with gynecology.  Discuss mass behind the bladder.  Discuss whether hysterectomy is indicated.  Really is no medication that is going to fix her bladder symptoms.  I do not think this is some version of interstitial cystitis.  She definitely has a lot of dyspareunia with prior surgeries prior difficult C-sections prior PCI to me.  This is a very complex situation.     History  Hesitancy. UDS :   Normal sensation, stable bladder. Able to void without straining with pdet of 40 cmH2O.  After flow stopped, patient still felt she had an urge to void and pdet maggie to 97 cmH2O, did not void further but when pdet decreased, she then felt like she was empty. Hesitancy of 12 sec  before able to void.  + EMG activity during void.    Prior Visits  23 [address]  43 y.o. female presenting for evaluation of urinary symptoms.   She reports having difficulty emptying her bladder requiring leaning and positioning to get the urine out.  Hx of significant constipation. Took a week and a colonoscopy to clear out the colon. Now on a gluten-free diet and taking Linzess and having daily BMs now.   Symptoms started several months to a year ago. Saw PCP and urine was tested and was negative for infection an hematuria.   Has had 3 miscarriages, 2  "emergency C sections, surgical removal of an embedded IUD, diagnostic laparoscopy.  Ever since the IUD was removed has had dyspareunia. Went to pelvic floor PT and was taught kegels. Every time she tries to have sex it is extremely painful. Has tried taking muscle relaxers and alcohol to calm her and nothing works. Sex drive is still normal.   Denies gross hematuria, history of recurrent UTIs or kidney stones.  Had a CT abdomen and pelvis 9/27/2023 which did not demonstrate and  pathology. I independently reviewed the images. There is a punctate left renal stone, no ureteral stones or hydronephrosis.  No significant smoking history.  She is currently on your period.   Has had two seizures in the past but otherwise no neurologic diagnoses.     5/3/2024 [facility]  Here after UDS for cystoscopy.  Findings suggestive of a mass behind the bladder.  The bladder itself does not have tumor or redness.  Urethra looked normal.  No significant cystocele.  Her constipation is much improved with Linzess.  Does not believe this is related to her constipation.  Symptoms of bladder pressure still persist.    Review of Systems    No results found for: \"PSA\"  No results found for: \"TESTOSTERONE\"  No components found for: \"CR\"  No results found for: \"HBA1C\"    Objective     /80 (BP Location: Left arm, Patient Position: Sitting, Cuff Size: Large)   Pulse 87   Ht 5' 7\" (1.702 m)   Wt 99.8 kg (220 lb)   SpO2 99%   BMI 34.46 kg/m²     Physical Exam      Andrey Gifford [facility]  "

## 2024-05-19 DIAGNOSIS — F51.01 PRIMARY INSOMNIA: ICD-10-CM

## 2024-05-20 RX ORDER — QUETIAPINE FUMARATE 25 MG/1
TABLET, FILM COATED ORAL
Qty: 270 TABLET | Refills: 3 | Status: SHIPPED | OUTPATIENT
Start: 2024-05-20

## 2024-06-04 ENCOUNTER — TELEPHONE (OUTPATIENT)
Dept: PAIN MEDICINE | Facility: CLINIC | Age: 44
End: 2024-06-04

## 2024-06-04 ENCOUNTER — TELEPHONE (OUTPATIENT)
Age: 44
End: 2024-06-04

## 2024-06-04 NOTE — TELEPHONE ENCOUNTER
RN s/w pt regarding previous.  Pt had a Bilat L5-S1 TFESI on 1/31 had 85% or greater releif up to one week ago and now her burning pain Rt >Lt is completely back and is causing a 7-9/10 pain same as previous.  NOthing helping advil/tylenol ice/heat rest PT exercises yoga and topical medications.    Pt to be going to a family reunion in [place] on 6/22.    Anyway to repeat this procedure prior to that time? (pt will go to any office available)

## 2024-06-04 NOTE — TELEPHONE ENCOUNTER
Please call pt to make appt for this time thank you.  Per pt she does not take any prescription blood thinners, she is not currently ill, not pregnant and has not had any vaccines in the past two weeks

## 2024-06-04 NOTE — TELEPHONE ENCOUNTER
Caller: Marisel pt    Doctor: Hodan    Reason for call: pt calling to schedule repeat injections at the base of neck & shoulder & also the  lower lumbar injection (more severe pain in the lumbar region) pain 7/10.  Difficulty moving, sitting and getting in and out of car pain jumps to a 9/10    Call back#: [phone]

## 2024-06-11 ENCOUNTER — TELEPHONE (OUTPATIENT)
Dept: PAIN MEDICINE | Facility: CLINIC | Age: 44
End: 2024-06-11

## 2024-06-11 NOTE — TELEPHONE ENCOUNTER
L/m advising patient that authorization was still pending for her injection. I offered her another appointment next week at the [facility]. Provided call back number to discuss further. If patient calls back please transfer to either myself or Vishal Arriaza.

## 2024-06-12 NOTE — TELEPHONE ENCOUNTER
Caller: Marisel     Doctor: Hodan     Reason for call: Patient returning call states she can come in 06/20 for procedure please call back.    Call back#: [phone]

## 2024-06-12 NOTE — TELEPHONE ENCOUNTER
S/w patient, offered her this Monday 06/17 at 4pm at the [facility]. Patient verbalized understanding and was appreciative of call.

## 2024-06-18 ENCOUNTER — OFFICE VISIT (OUTPATIENT)
Dept: FAMILY MEDICINE CLINIC | Facility: CLINIC | Age: 44
End: 2024-06-18
Payer: COMMERCIAL

## 2024-06-18 VITALS
HEIGHT: 67 IN | SYSTOLIC BLOOD PRESSURE: 126 MMHG | WEIGHT: 220 LBS | DIASTOLIC BLOOD PRESSURE: 78 MMHG | RESPIRATION RATE: 17 BRPM | BODY MASS INDEX: 34.53 KG/M2 | TEMPERATURE: 98.7 F | OXYGEN SATURATION: 98 % | HEART RATE: 77 BPM

## 2024-06-18 DIAGNOSIS — Z00.00 ANNUAL PHYSICAL EXAM: Primary | ICD-10-CM

## 2024-06-18 DIAGNOSIS — I10 ESSENTIAL HYPERTENSION: ICD-10-CM

## 2024-06-18 DIAGNOSIS — L23.7 POISON IVY: ICD-10-CM

## 2024-06-18 DIAGNOSIS — M54.16 LUMBAR RADICULOPATHY: ICD-10-CM

## 2024-06-18 DIAGNOSIS — G47.33 OBSTRUCTIVE SLEEP APNEA ON CPAP: ICD-10-CM

## 2024-06-18 DIAGNOSIS — R39.11 URINARY HESITANCY: ICD-10-CM

## 2024-06-18 PROBLEM — M77.11 LATERAL EPICONDYLITIS OF RIGHT ELBOW: Status: RESOLVED | Noted: 2018-05-01 | Resolved: 2024-06-18

## 2024-06-18 PROCEDURE — 99214 OFFICE O/P EST MOD 30 MIN: CPT | Performed by: FAMILY MEDICINE

## 2024-06-18 PROCEDURE — 99396 PREV VISIT EST AGE 40-64: CPT | Performed by: FAMILY MEDICINE

## 2024-06-18 RX ORDER — PREDNISONE 10 MG/1
TABLET ORAL
Qty: 20 TABLET | Refills: 0 | Status: SHIPPED | OUTPATIENT
Start: 2024-06-18

## 2024-06-18 NOTE — PROGRESS NOTES
Adult Annual Physical  Name: Marisel Olivas      : 1980      MRN: [id number]  Encounter Provider: Ebony Wade MD  Encounter Date: 2024   Encounter department: [facility]    Assessment & Plan   1. Annual physical exam  2. Obstructive sleep apnea on CPAP  Assessment & Plan:  Continue CPAP machine as directed  3. Essential hypertension  Assessment & Plan:  Stable off of all meds  Continue diet and exercise   4. Lumbar radiculopathy  Assessment & Plan:  Getting injection with pain management this week   Going to [place] after  5. Urinary hesitancy  Assessment & Plan:  Still having issues   All urology and GYN work ups were normal     6. Poison ivy  -     predniSONE 10 mg tablet; 4 tabs x 2 days, 3 tabs x 2 days, 2 tabs x 2 days, 1 tab x 2 days    Immunizations and preventive care screenings were discussed with patient today. Appropriate education was printed on patient's after visit summary.    Counseling:  Alcohol/drug use: discussed moderation in alcohol intake, the recommendations for healthy alcohol use, and avoidance of illicit drug use.  Dental Health: discussed importance of regular tooth brushing, flossing, and dental visits.  Injury prevention: discussed safety/seat belts, safety helmets, smoke detectors, carbon dioxide detectors, and smoking near bedding or upholstery.  Sexual health: discussed sexually transmitted diseases, partner selection, use of condoms, avoidance of unintended pregnancy, and contraceptive alternatives.  Exercise: the importance of regular exercise/physical activity was discussed. Recommend exercise 3-5 times per week for at least 30 minutes.          History of Present Illness     Adult Annual Physical:  Patient presents for annual physical.     Diet and Physical Activity:  - Diet/Nutrition: well balanced diet and consuming 3-5 servings of fruits/vegetables daily.  - Exercise: moderate cardiovascular exercise.    Depression Screening:  - PHQ-2 Score:  0    General Health:  - Sleep: sleeps well.  - Hearing: normal hearing bilateral ears.  - Dental: brushes teeth once daily.    /GYN Health:  - Follows with GYN: yes.   - Menopause: perimenopausal.   - History of STDs: no    Advanced Care Planning:  - Has an advanced directive?: no    - Has a durable medical POA?: no    - ACP document given to patient?: no      Review of Systems   Constitutional: Negative.    HENT: Negative.     Eyes: Negative.    Respiratory: Negative.     Cardiovascular: Negative.    Gastrointestinal: Negative.    Endocrine: Negative.    Genitourinary: Negative.    Musculoskeletal: Negative.    Skin: Negative.    Allergic/Immunologic: Negative.    Neurological: Negative.    Hematological: Negative.    Psychiatric/Behavioral: Negative.       Past Medical History   Past Medical History:   Diagnosis Date    Constipation     COVID-19 2022    Depression     Eczema     Epilepsy ([facility]) 11/15/2013    Description: well controlled with sleep    GERD (gastroesophageal reflux disease)     Grand mal convulsion ([facility])     X 2 last episode -Sleep deprivation-Resolved with Cpap    Hypertension     Insomnia     Lateral epicondylitis of right elbow 2018    Medial meniscus tear 2015    Migraine     PONV (postoperative nausea and vomiting)     Primary generalized epilepsy ([facility]) 2011    Psoriasis     Seizures ([facility])     Shingles     Shoulder dislocation, right, sequela 2018    Sinusitis     Sleep apnea     uses cpap    Tinnitus      Past Surgical History:   Procedure Laterality Date     SECTION   and     COLONOSCOPY      DILATION AND CURETTAGE OF UTERUS      DILATION AND CURETTAGE OF UTERUS WITH HYSTEROSOCPY N/A 2017    Procedure: D&C; IUD REMOVAL;  Surgeon: Abeba Blackburn MD;  [facility];  Service: Gynecology    EGD      EPIDURAL BLOCK INJECTION Bilateral 2024    Procedure: BILATERAL L5-S1 TRANSFORAMINAL EPIDURAL STEROID INJECTION;  Surgeon: Jeff  Hank Pettit, ;  Location: [facility] MAIN OR;  Service: Pain Management     HYSTEROSCOPY      LAPAROSCOPY      (Diagnostic)    LASIK      MO COLONOSCOPY FLX DX W/COLLJ SPEC WHEN PFRMD N/A 11/8/2018    Procedure: EGD AND COLONOSCOPY;  Surgeon: Jagdish Katz MD;  [facility]: [facility]  GI [facility];  Service: Gastroenterology    MO VERTEBRAL CORPECTOMY ANT DCMPRN CERVICAL 1 SEG Bilateral 2/2/2021    Procedure: CORPECTOMY SPINE CERVICAL ANTERIOR: C4-7 ACDF with C6 /hemicorpectomy, C4-7 instrumentation and fusion (neuromonitoring);  Surgeon: Raquel Porter MD;  [facility];  Service: Neurosurgery    REMOVAL OF INTRAUTERINE DEVICE (IUD)       Family History   Problem Relation Age of Onset    No Known Problems Mother     Diabetes Father         Diabetes Mellitus    Hyperlipidemia Father     Hypertension Father     No Known Problems Sister     No Known Problems Daughter     Brain cancer Maternal Grandmother 56    No Known Problems Son     Migraines Family     Diabetes Family         Type 2 Diabetes Mellitus    Breast cancer Neg Hx      Current Outpatient Medications on File Prior to Visit   Medication Sig Dispense Refill    acetaminophen (TYLENOL) 325 mg tablet Take 650 mg by mouth every 6 (six) hours as needed for mild pain      albuterol (ProAir HFA) 90 mcg/act inhaler Inhale 2 puffs every 6 (six) hours as needed for shortness of breath 18 g 0    cholecalciferol (VITAMIN D3) 1,000 units tablet Take 5,000 Units by mouth daily        Etonogestrel (NEXPLANON SC) Inject under the skin Located Left upper inner arm      linaCLOtide (Linzess) 145 MCG CAPS Take 1 capsule (145 mcg total) by mouth in the morning 90 capsule 3    magnesium 30 MG tablet Take 500 mg by mouth 2 (two) times a day        Multiple Vitamins-Minerals (MULTIVITAL-M) TABS Take by mouth      Omega 3-6-9 Fatty Acids (OMEGA 3-6-9 PO) Take by mouth      omeprazole (PriLOSEC) 40 MG capsule Take 1 capsule (40 mg total) by mouth daily (Patient taking differently: Take  "40 mg by mouth if needed) 90 capsule 0    QUEtiapine (SEROquel) 25 mg tablet TAKE 3 TABLETS BY MOUTH EVERY  tablet 3    TiZANidine (ZANAFLEX) 4 MG capsule Take 1 capsule (4 mg total) by mouth 3 (three) times a day as needed for muscle spasms 90 capsule 3    traZODone (DESYREL) 50 mg tablet Take 1 tablet (50 mg total) by mouth daily at bedtime as needed for sleep or depression 90 tablet 3    naproxen (EC NAPROSYN) 500 MG EC tablet Take 1 tablet (500 mg total) by mouth 3 (three) times a day with meals (Patient not taking: Reported on 2/27/2024) 30 tablet 0    NON FORMULARY daily at bedtime Takes Valarien Root that's 1 PO QHS for Sleep (Patient not taking: Reported on 2/27/2024)      [DISCONTINUED] clotrimazole-betamethasone (LOTRISONE) 1-0.05 % cream Apply topically 2 (two) times a day (Patient not taking: Reported on 1/11/2024) 30 g 0    [DISCONTINUED] hydrochlorothiazide (HYDRODIURIL) 25 mg tablet Take 1 tablet (25 mg total) by mouth daily (Patient not taking: Reported on 2/27/2024) 90 tablet 2    [DISCONTINUED] losartan (COZAAR) 50 mg tablet TAKE 1 TABLET BY MOUTH EVERY DAY (Patient not taking: Reported on 6/18/2024) 90 tablet 0    [DISCONTINUED] predniSONE 10 mg tablet 4 tabs x 2 days, 3 tabs x 2 days, 2 tabs x 2 days, 1 tab x 2 days (Patient not taking: Reported on 5/3/2024) 20 tablet 0     No current facility-administered medications on file prior to visit.     Allergies   Allergen Reactions    Gluten Meal - Food Allergy GI Intolerance     \" Highly Sensitivity\"    Lactose - Food Allergy GI Intolerance     \" Highly Sensitive\"      Current Outpatient Medications on File Prior to Visit   Medication Sig Dispense Refill    acetaminophen (TYLENOL) 325 mg tablet Take 650 mg by mouth every 6 (six) hours as needed for mild pain      albuterol (ProAir HFA) 90 mcg/act inhaler Inhale 2 puffs every 6 (six) hours as needed for shortness of breath 18 g 0    cholecalciferol (VITAMIN D3) 1,000 units tablet Take 5,000 " Units by mouth daily        Etonogestrel (NEXPLANON SC) Inject under the skin Located Left upper inner arm      linaCLOtide (Linzess) 145 MCG CAPS Take 1 capsule (145 mcg total) by mouth in the morning 90 capsule 3    magnesium 30 MG tablet Take 500 mg by mouth 2 (two) times a day        Multiple Vitamins-Minerals (MULTIVITAL-M) TABS Take by mouth      Omega 3-6-9 Fatty Acids (OMEGA 3-6-9 PO) Take by mouth      omeprazole (PriLOSEC) 40 MG capsule Take 1 capsule (40 mg total) by mouth daily (Patient taking differently: Take 40 mg by mouth if needed) 90 capsule 0    QUEtiapine (SEROquel) 25 mg tablet TAKE 3 TABLETS BY MOUTH EVERY  tablet 3    TiZANidine (ZANAFLEX) 4 MG capsule Take 1 capsule (4 mg total) by mouth 3 (three) times a day as needed for muscle spasms 90 capsule 3    traZODone (DESYREL) 50 mg tablet Take 1 tablet (50 mg total) by mouth daily at bedtime as needed for sleep or depression 90 tablet 3    naproxen (EC NAPROSYN) 500 MG EC tablet Take 1 tablet (500 mg total) by mouth 3 (three) times a day with meals (Patient not taking: Reported on 2/27/2024) 30 tablet 0    NON FORMULARY daily at bedtime Takes Valarien Root that's 1 PO QHS for Sleep (Patient not taking: Reported on 2/27/2024)      [DISCONTINUED] clotrimazole-betamethasone (LOTRISONE) 1-0.05 % cream Apply topically 2 (two) times a day (Patient not taking: Reported on 1/11/2024) 30 g 0    [DISCONTINUED] hydrochlorothiazide (HYDRODIURIL) 25 mg tablet Take 1 tablet (25 mg total) by mouth daily (Patient not taking: Reported on 2/27/2024) 90 tablet 2    [DISCONTINUED] losartan (COZAAR) 50 mg tablet TAKE 1 TABLET BY MOUTH EVERY DAY (Patient not taking: Reported on 6/18/2024) 90 tablet 0    [DISCONTINUED] predniSONE 10 mg tablet 4 tabs x 2 days, 3 tabs x 2 days, 2 tabs x 2 days, 1 tab x 2 days (Patient not taking: Reported on 5/3/2024) 20 tablet 0     No current facility-administered medications on file prior to visit.      Social History  "    Tobacco Use    Smoking status: Former     Current packs/day: 0.00     Average packs/day: 0.3 packs/day for 25.0 years (6.3 ttl pk-yrs)     Types: Cigarettes     Start date:      Quit date:      Years since quittin.4    Smokeless tobacco: Never    Tobacco comments:     Former smoker per [employer]   Vaping Use    Vaping status: Never Used   Substance and Sexual Activity    Alcohol use: Yes     Comment: occassionally    Drug use: Never    Sexual activity: Not Currently     Partners: Male     Birth control/protection: Implant     Comment: 15 - Nexplanon inserted       Objective     /78 (BP Location: Left arm, Patient Position: Sitting, Cuff Size: Standard)   Pulse 77   Temp 98.7 °F (37.1 °C) (Tympanic)   Resp 17   Ht 5' 7.4\" (1.712 m)   Wt 99.8 kg (220 lb)   SpO2 98%   BMI 34.05 kg/m²     Physical Exam  Vitals and nursing note reviewed.   Constitutional:       General: She is not in acute distress.     Appearance: Normal appearance. She is well-developed.   HENT:      Head: Normocephalic and atraumatic.      Right Ear: Tympanic membrane normal.      Left Ear: Tympanic membrane normal.      Nose: Nose normal.      Mouth/Throat:      Mouth: Mucous membranes are moist.   Eyes:      Conjunctiva/sclera: Conjunctivae normal.   Cardiovascular:      Rate and Rhythm: Normal rate and regular rhythm.      Pulses: Normal pulses.      Heart sounds: Normal heart sounds. No murmur heard.  Pulmonary:      Effort: Pulmonary effort is normal. No respiratory distress.      Breath sounds: Normal breath sounds.   Abdominal:      Palpations: Abdomen is soft.      Tenderness: There is no abdominal tenderness.   Musculoskeletal:         General: No swelling.      Cervical back: Neck supple.   Skin:     General: Skin is warm and dry.      Capillary Refill: Capillary refill takes less than 2 seconds.      Findings: Lesion and rash present.      Comments: Bilateral arms and forearm   Neurological:      " General: No focal deficit present.      Mental Status: She is alert and oriented to person, place, and time.   Psychiatric:         Mood and Affect: Mood normal.

## 2024-06-20 ENCOUNTER — HOSPITAL ENCOUNTER (OUTPATIENT)
Dept: RADIOLOGY | Facility: CLINIC | Age: 44
End: 2024-06-20
Payer: COMMERCIAL

## 2024-06-20 VITALS
DIASTOLIC BLOOD PRESSURE: 92 MMHG | TEMPERATURE: 98.6 F | OXYGEN SATURATION: 97 % | HEART RATE: 80 BPM | SYSTOLIC BLOOD PRESSURE: 145 MMHG | RESPIRATION RATE: 18 BRPM

## 2024-06-20 DIAGNOSIS — M51.16 INTERVERTEBRAL DISC DISORDER WITH RADICULOPATHY OF LUMBAR REGION: ICD-10-CM

## 2024-06-20 PROCEDURE — 64483 NJX AA&/STRD TFRM EPI L/S 1: CPT | Performed by: PHYSICAL MEDICINE & REHABILITATION

## 2024-06-20 RX ORDER — LIDOCAINE HYDROCHLORIDE 10 MG/ML
4 INJECTION, SOLUTION EPIDURAL; INFILTRATION; INTRACAUDAL; PERINEURAL ONCE
Status: COMPLETED | OUTPATIENT
Start: 2024-06-20 | End: 2024-06-20

## 2024-06-20 RX ORDER — METHYLPREDNISOLONE ACETATE 40 MG/ML
40 INJECTION, SUSPENSION INTRA-ARTICULAR; INTRALESIONAL; INTRAMUSCULAR; PARENTERAL; SOFT TISSUE ONCE
Status: COMPLETED | OUTPATIENT
Start: 2024-06-20 | End: 2024-06-20

## 2024-06-20 RX ORDER — BUPIVACAINE HCL/PF 2.5 MG/ML
2 VIAL (ML) INJECTION ONCE
Status: COMPLETED | OUTPATIENT
Start: 2024-06-20 | End: 2024-06-20

## 2024-06-20 RX ADMIN — LIDOCAINE HYDROCHLORIDE 4 ML: 10 INJECTION, SOLUTION EPIDURAL; INFILTRATION; INTRACAUDAL; PERINEURAL at 12:07

## 2024-06-20 RX ADMIN — IOHEXOL 2 ML: 300 INJECTION, SOLUTION INTRAVENOUS at 12:10

## 2024-06-20 RX ADMIN — BUPIVACAINE HYDROCHLORIDE 2 ML: 2.5 INJECTION, SOLUTION EPIDURAL; INFILTRATION; INTRACAUDAL at 12:10

## 2024-06-20 RX ADMIN — METHYLPREDNISOLONE ACETATE 40 MG: 40 INJECTION, SUSPENSION INTRA-ARTICULAR; INTRALESIONAL; INTRAMUSCULAR; PARENTERAL; SOFT TISSUE at 12:10

## 2024-06-20 NOTE — DISCHARGE INSTR - LAB
Epidural Steroid Injection   WHAT YOU NEED TO KNOW:   An epidural steroid injection (KIESHA) is a procedure to inject steroid medicine into the epidural space. The epidural space is between your spinal cord and vertebrae. Steroids reduce inflammation and fluid buildup in your spine that may be causing pain. You may be given pain medicine along with the steroids.          ACTIVITY  Do not drive or operate machinery today.  No strenuous activity today - bending, lifting, etc.  You may resume normal activites starting tomorrow - start slowly and as tolerated.  You may shower today, but no tub baths or hot tubs.  You may have numbness for several hours from the local anesthetic. Please use caution and common sense, especially with weight-bearing activities.    CARE OF THE INJECTION SITE  If you have soreness or pain, apply ice to the area today (20 minutes on/20 minutes off).  Starting tomorrow, you may use warm, moist heat or ice if needed.  You may have an increase or change in your discomfort for 36-48 hours after your treatment.  Apply ice and continue with any pain medication you have been prescribed.  Notify the [facility] if you have any of the following: redness, drainage, swelling, headache, stiff neck or fever above 100°F.    SPECIAL INSTRUCTIONS  Our office will contact you in approximately 14 days for a progress report.    MEDICATIONS  Continue to take all routine medications.  Our office may have instructed you to hold some medications.    As no general anesthesia was used in today's procedure, you should not experience any side effects related to anesthesia.     If you are diabetic, the steroids used in today's injection may temporarily increase your blood sugar levels after the first few days after your injection. Please keep a close eye on your sugars and alert the doctor who manages your diabetes if your sugars are significantly high from your baseline or you are symptomatic.     If you have a  problem specifically related to your procedure, please call our office at [phone].  Problems not related to your procedure should be directed to your primary care physician.

## 2024-06-20 NOTE — H&P
History of Present Illness: The patient is a 43 y.o. female who presents with complaints of low back pain    Past Medical History:   Diagnosis Date    Constipation     COVID-19 2022    Depression     Eczema     Epilepsy ([facility]) 11/15/2013    Description: well controlled with sleep    GERD (gastroesophageal reflux disease)     Grand mal convulsion ([facility])     X 2 last episode 2009-Sleep deprivation-Resolved with Cpap    Hypertension     Insomnia     Lateral epicondylitis of right elbow 2018    Medial meniscus tear 2015    Migraine     PONV (postoperative nausea and vomiting)     Primary generalized epilepsy ([facility]) 2011    Psoriasis     Seizures ([facility])     Shingles     Shoulder dislocation, right, sequela 2018    Sinusitis     Sleep apnea     uses cpap    Tinnitus        Past Surgical History:   Procedure Laterality Date     SECTION   and     COLONOSCOPY      DILATION AND CURETTAGE OF UTERUS      DILATION AND CURETTAGE OF UTERUS WITH HYSTEROSOCPY N/A 2017    Procedure: D&C; IUD REMOVAL;  Surgeon: Abeba Blackburn MD;  [facility];  Service: Gynecology    EGD      EPIDURAL BLOCK INJECTION Bilateral 2024    Procedure: BILATERAL L5-S1 TRANSFORAMINAL EPIDURAL STEROID INJECTION;  Surgeon: Jeff Pettit DO;  Location: [facility] MAIN OR;  Service: Pain Management     HYSTEROSCOPY      LAPAROSCOPY      (Diagnostic)    LASIK      PA COLONOSCOPY FLX DX W/COLLJ SPEC WHEN PFRMD N/A 2018    Procedure: EGD AND COLONOSCOPY;  Surgeon: Jagdish Katz MD;  [facility];  Service: Gastroenterology    PA VERTEBRAL CORPECTOMY ANT DCMPRN CERVICAL 1 SEG Bilateral 2021    Procedure: CORPECTOMY SPINE CERVICAL ANTERIOR: C4-7 ACDF with C6 /hemicorpectomy, C4-7 instrumentation and fusion (neuromonitoring);  Surgeon: Raquel Porter MD;  [facility];  Service: Neurosurgery    REMOVAL OF INTRAUTERINE DEVICE (IUD)           Current Outpatient Medications:      acetaminophen (TYLENOL) 325 mg tablet, Take 650 mg by mouth every 6 (six) hours as needed for mild pain, Disp: , Rfl:     albuterol (ProAir HFA) 90 mcg/act inhaler, Inhale 2 puffs every 6 (six) hours as needed for shortness of breath, Disp: 18 g, Rfl: 0    cholecalciferol (VITAMIN D3) 1,000 units tablet, Take 5,000 Units by mouth daily  , Disp: , Rfl:     Etonogestrel (NEXPLANON SC), Inject under the skin Located Left upper inner arm, Disp: , Rfl:     linaCLOtide (Linzess) 145 MCG CAPS, Take 1 capsule (145 mcg total) by mouth in the morning, Disp: 90 capsule, Rfl: 3    magnesium 30 MG tablet, Take 500 mg by mouth 2 (two) times a day  , Disp: , Rfl:     Multiple Vitamins-Minerals (MULTIVITAL-M) TABS, Take by mouth, Disp: , Rfl:     naproxen (EC NAPROSYN) 500 MG EC tablet, Take 1 tablet (500 mg total) by mouth 3 (three) times a day with meals (Patient not taking: Reported on 2/27/2024), Disp: 30 tablet, Rfl: 0    NON FORMULARY, daily at bedtime Takes Valarien Root that's 1 PO QHS for Sleep (Patient not taking: Reported on 2/27/2024), Disp: , Rfl:     Omega 3-6-9 Fatty Acids (OMEGA 3-6-9 PO), Take by mouth, Disp: , Rfl:     omeprazole (PriLOSEC) 40 MG capsule, Take 1 capsule (40 mg total) by mouth daily (Patient taking differently: Take 40 mg by mouth if needed), Disp: 90 capsule, Rfl: 0    predniSONE 10 mg tablet, 4 tabs x 2 days, 3 tabs x 2 days, 2 tabs x 2 days, 1 tab x 2 days (Patient not taking: Reported on 6/20/2024), Disp: 20 tablet, Rfl: 0    QUEtiapine (SEROquel) 25 mg tablet, TAKE 3 TABLETS BY MOUTH EVERY DAY, Disp: 270 tablet, Rfl: 3    TiZANidine (ZANAFLEX) 4 MG capsule, Take 1 capsule (4 mg total) by mouth 3 (three) times a day as needed for muscle spasms, Disp: 90 capsule, Rfl: 3    traZODone (DESYREL) 50 mg tablet, Take 1 tablet (50 mg total) by mouth daily at bedtime as needed for sleep or depression, Disp: 90 tablet, Rfl: 3    Current Facility-Administered Medications:     bupivacaine (PF) (MARCAINE)  "0.25 % injection 2 mL, 2 mL, Epidural, Once, Jeff Pettit DO    iohexol (OMNIPAQUE) 300 mg/mL injection 50 mL, 50 mL, Epidural, Once, Jeff Pettit DO    lidocaine (PF) (XYLOCAINE-MPF) 1 % injection 4 mL, 4 mL, Infiltration, Once, Jeff Pettit DO    methylPREDNISolone acetate (DEPO-MEDROL) injection 40 mg, 40 mg, Perineural, Once, Jeff Pettit DO    Allergies   Allergen Reactions    Gluten Meal - Food Allergy GI Intolerance     \" Highly Sensitivity\"    Lactose - Food Allergy GI Intolerance     \" Highly Sensitive\"       Physical Exam:   Vitals:    06/20/24 1157   BP: 134/89   Pulse: 93   Resp: 18   Temp: 98.6 °F (37 °C)   SpO2: 95%     General: Awake, Alert, Oriented x 3, Mood and affect appropriate  Respiratory: Respirations even and unlabored  Cardiovascular: Peripheral pulses intact; no edema  Musculoskeletal Exam: Tenderness palpation bilateral lumbar paraspinals    ASA Score: 2    Patient/Chart Verification  Patient ID Verified: Verbal  ID Band Applied: No  Consents Confirmed: Procedural, To be obtained in the Pre-Procedure area  H&P( within 30 days) Verified: To be obtained in the Pre-Procedure area  Allergies Reviewed: Yes  Anticoag/NSAID held?: No  Currently on antibiotics?: No  Pregnancy denied?: Yes    Assessment:   1. Intervertebral disc disorder with radiculopathy of lumbar region        Plan: Bilat L5-S1 TFESI   "

## 2024-07-03 ENCOUNTER — TELEPHONE (OUTPATIENT)
Dept: RADIOLOGY | Facility: MEDICAL CENTER | Age: 44
End: 2024-07-03

## 2024-07-08 ENCOUNTER — OFFICE VISIT (OUTPATIENT)
Dept: URGENT CARE | Age: 44
End: 2024-07-08
Payer: COMMERCIAL

## 2024-07-08 VITALS
WEIGHT: 213 LBS | BODY MASS INDEX: 32.28 KG/M2 | OXYGEN SATURATION: 99 % | SYSTOLIC BLOOD PRESSURE: 140 MMHG | DIASTOLIC BLOOD PRESSURE: 86 MMHG | HEIGHT: 68 IN | RESPIRATION RATE: 16 BRPM | TEMPERATURE: 98.9 F | HEART RATE: 98 BPM

## 2024-07-08 DIAGNOSIS — H60.333 ACUTE SWIMMER'S EAR OF BOTH SIDES: Primary | ICD-10-CM

## 2024-07-08 PROCEDURE — G0382 LEV 3 HOSP TYPE B ED VISIT: HCPCS

## 2024-07-08 RX ORDER — AMOXICILLIN 500 MG/1
500 CAPSULE ORAL EVERY 12 HOURS SCHEDULED
Qty: 14 CAPSULE | Refills: 0 | Status: SHIPPED | OUTPATIENT
Start: 2024-07-08 | End: 2024-07-15

## 2024-07-08 RX ORDER — OFLOXACIN 3 MG/ML
10 SOLUTION AURICULAR (OTIC) DAILY
Qty: 3.5 ML | Refills: 0 | Status: SHIPPED | OUTPATIENT
Start: 2024-07-08 | End: 2024-07-15

## 2024-07-08 NOTE — PROGRESS NOTES
[facility]        NAME: Marisel Olivas is a 43 y.o. female  : 1980    MRN: [id number]  DATE: 2024  TIME: 9:12 AM    Assessment and Plan   Acute swimmer's ear of both sides [H60.333]  1. Acute swimmer's ear of both sides  ofloxacin (FLOXIN) 0.3 % otic solution    amoxicillin (AMOXIL) 500 mg capsule      Suspect acute swimmer's ear of both ears.   Please begin topical and oral antibiotics as directed.   Follow up with PCP if no relief within one week.       Patient Instructions   Patient Education     Outer Ear Infection ED   General Information   You came to the Emergency Department (ED) for an outer ear infection. You may hear this called swimmer’s ear, but you can get it even if you are not swimming. An outer ear infection happens when the skin in the ear canal is scratched or irritated and then gets infected. You may need antibiotics to treat the infection. If you are given ear drops, it is important to take all of them, even if you start to feel better.  What care is needed at home?   Call your regular doctor to let them know you were in the ED. Make a follow-up appointment if you were told to.  Take your ear drops as ordered.  Sit or lie down with your head to the side and the ear that needs the ear drops pointing up.  Pull on your ear to straighten the ear canal.  Gently pull the ear up and toward the back of the head to straighten it. If you are giving the ear drops to a child under 3 years of age, gently pull the earlobe down and toward the back of the head.  Put the correct number of drops in your ear.  Gently press the small skin flap over the ear to help the drops run into the ear.  Continue to sit or lie with your head to the side for 5 minutes.  Your doctor may want you to put a small cotton plug in your ear to help keep the drops in place.  Keep the inside of your ear dry while it heals. You can protect your ear when you shower with a petroleum jelly-coated cotton ball. Avoid  swimming for 7 to 10 days.  Do not use in-ear head phones (ear buds) or hearing aids while your ear heals.  When do I need to call the doctor?   Your symptoms are not better within 2 days after starting treatment.  Your ear starts to bleed or drain pus.  You have a fever of 100.4°F (38°C)  You have new or worsening symptoms.  Last Reviewed Date   2020-08-03  Consumer Information Use and Disclaimer   This generalized information is a limited summary of diagnosis, treatment, and/or medication information. It is not meant to be comprehensive and should be used as a tool to help the user understand and/or assess potential diagnostic and treatment options. It does NOT include all information about conditions, treatments, medications, side effects, or risks that may apply to a specific patient. It is not intended to be medical advice or a substitute for the medical advice, diagnosis, or treatment of a health care provider based on the health care provider's examination and assessment of a patient’s specific and unique circumstances. Patients must speak with a health care provider for complete information about their health, medical questions, and treatment options, including any risks or benefits regarding use of medications. This information does not endorse any treatments or medications as safe, effective, or approved for treating a specific patient. [employer] and its affiliates disclaim any warranty or liability relating to this information or the use thereof. The use of this information is governed by the Terms of Use, available at https://www.[employer].com/en/know/clinical-effectiveness-terms   Copyright   Copyright © 2024 [employer] and its affiliates and/or licensors. All rights reserved.       Follow up with PCP in 3-5 days.  Proceed to  ER if symptoms worsen.    Chief Complaint     Chief Complaint   Patient presents with    Earache     Patient states she just traveled back from [place] and states she  has been having bilateral ear pain. States she also has eczema in both ears.          History of Present Illness       Earache   There is pain in both ears. This is a new problem. The current episode started yesterday. The problem occurs constantly. The problem has been gradually worsening. There has been no fever. Associated symptoms include hearing loss. Pertinent negatives include no abdominal pain, coughing, diarrhea, ear discharge, headaches, neck pain, rash, rhinorrhea, sore throat or vomiting. She has tried ear drops for the symptoms. The treatment provided no relief. There is no history of a chronic ear infection, hearing loss or a tympanostomy tube. Eczema of both ears       Review of Systems   Review of Systems   Constitutional:  Negative for fatigue and fever.   HENT:  Positive for ear pain and hearing loss. Negative for congestion, ear discharge, postnasal drip, rhinorrhea, sinus pressure, sinus pain, sneezing and sore throat.    Eyes: Negative.  Negative for pain, discharge, redness and itching.   Respiratory: Negative.  Negative for apnea, cough, choking, chest tightness, shortness of breath, wheezing and stridor.    Cardiovascular: Negative.  Negative for chest pain and palpitations.   Gastrointestinal: Negative.  Negative for abdominal pain, diarrhea, nausea and vomiting.   Endocrine: Negative.  Negative for polydipsia, polyphagia and polyuria.   Genitourinary: Negative.  Negative for decreased urine volume and flank pain.   Musculoskeletal: Negative.  Negative for arthralgias, back pain, myalgias, neck pain and neck stiffness.   Skin: Negative.  Negative for color change and rash.   Allergic/Immunologic: Negative.  Negative for environmental allergies.   Neurological: Negative.  Negative for dizziness, facial asymmetry, light-headedness, numbness and headaches.   Hematological: Negative.  Negative for adenopathy.   Psychiatric/Behavioral: Negative.           Current Medications       Current  Outpatient Medications:     acetaminophen (TYLENOL) 325 mg tablet, Take 650 mg by mouth every 6 (six) hours as needed for mild pain, Disp: , Rfl:     albuterol (ProAir HFA) 90 mcg/act inhaler, Inhale 2 puffs every 6 (six) hours as needed for shortness of breath, Disp: 18 g, Rfl: 0    amoxicillin (AMOXIL) 500 mg capsule, Take 1 capsule (500 mg total) by mouth every 12 (twelve) hours for 7 days, Disp: 14 capsule, Rfl: 0    cholecalciferol (VITAMIN D3) 1,000 units tablet, Take 5,000 Units by mouth daily  , Disp: , Rfl:     Etonogestrel (NEXPLANON SC), Inject under the skin Located Left upper inner arm, Disp: , Rfl:     linaCLOtide (Linzess) 145 MCG CAPS, Take 1 capsule (145 mcg total) by mouth in the morning, Disp: 90 capsule, Rfl: 3    magnesium 30 MG tablet, Take 500 mg by mouth 2 (two) times a day  , Disp: , Rfl:     Multiple Vitamins-Minerals (MULTIVITAL-M) TABS, Take by mouth, Disp: , Rfl:     ofloxacin (FLOXIN) 0.3 % otic solution, Administer 10 drops into both ears daily for 7 days, Disp: 3.5 mL, Rfl: 0    Omega 3-6-9 Fatty Acids (OMEGA 3-6-9 PO), Take by mouth, Disp: , Rfl:     omeprazole (PriLOSEC) 40 MG capsule, Take 1 capsule (40 mg total) by mouth daily (Patient taking differently: Take 40 mg by mouth if needed), Disp: 90 capsule, Rfl: 0    QUEtiapine (SEROquel) 25 mg tablet, TAKE 3 TABLETS BY MOUTH EVERY DAY, Disp: 270 tablet, Rfl: 3    TiZANidine (ZANAFLEX) 4 MG capsule, Take 1 capsule (4 mg total) by mouth 3 (three) times a day as needed for muscle spasms, Disp: 90 capsule, Rfl: 3    traZODone (DESYREL) 50 mg tablet, Take 1 tablet (50 mg total) by mouth daily at bedtime as needed for sleep or depression, Disp: 90 tablet, Rfl: 3    naproxen (EC NAPROSYN) 500 MG EC tablet, Take 1 tablet (500 mg total) by mouth 3 (three) times a day with meals (Patient not taking: Reported on 2/27/2024), Disp: 30 tablet, Rfl: 0    NON FORMULARY, daily at bedtime Takes Valarien Root that's 1 PO QHS for Sleep (Patient not  taking: Reported on 2024), Disp: , Rfl:     predniSONE 10 mg tablet, 4 tabs x 2 days, 3 tabs x 2 days, 2 tabs x 2 days, 1 tab x 2 days (Patient not taking: Reported on 2024), Disp: 20 tablet, Rfl: 0    Current Allergies     Allergies as of 2024 - Reviewed 2024   Allergen Reaction Noted    Gluten meal - food allergy GI Intolerance 2019    Lactose - food allergy GI Intolerance 2019            The following portions of the patient's history were reviewed and updated as appropriate: allergies, current medications, past family history, past medical history, past social history, past surgical history and problem list.     Past Medical History:   Diagnosis Date    Constipation     COVID-19 2022    Depression     Eczema     Epilepsy (HCC) 11/15/2013    Description: well controlled with sleep    GERD (gastroesophageal reflux disease)     Grand mal convulsion (HCC)     X 2 last episode -Sleep deprivation-Resolved with Cpap    Hypertension     Insomnia     Lateral epicondylitis of right elbow 2018    Medial meniscus tear 2015    Migraine     PONV (postoperative nausea and vomiting)     Primary generalized epilepsy (HCC) 2011    Psoriasis     Seizures ([facility])     Shingles     Shoulder dislocation, right, sequela 2018    Sinusitis     Sleep apnea     uses cpap    Tinnitus        Past Surgical History:   Procedure Laterality Date     SECTION   and     COLONOSCOPY      DILATION AND CURETTAGE OF UTERUS      DILATION AND CURETTAGE OF UTERUS WITH HYSTEROSOCPY N/A 2017    Procedure: D&C; IUD REMOVAL;  Surgeon: Abeba Blackburn MD;  [facility];  Service: Gynecology    EGD      EPIDURAL BLOCK INJECTION Bilateral 2024    Procedure: BILATERAL L5-S1 TRANSFORAMINAL EPIDURAL STEROID INJECTION;  Surgeon: Jeff Pettit DO;  Location: [facility] MAIN OR;  Service: Pain Management     HYSTEROSCOPY      LAPAROSCOPY      (Diagnostic)    LASIK       "WV COLONOSCOPY FLX DX W/COLLJ SPEC WHEN PFRMD N/A 11/8/2018    Procedure: EGD AND COLONOSCOPY;  Surgeon: Jagdish Katz MD;  [facility];  Service: Gastroenterology    WV VERTEBRAL CORPECTOMY ANT DCMPRN CERVICAL 1 SEG Bilateral 2/2/2021    Procedure: CORPECTOMY SPINE CERVICAL ANTERIOR: C4-7 ACDF with C6 /hemicorpectomy, C4-7 instrumentation and fusion (neuromonitoring);  Surgeon: Raquel Porter MD;  [facility];  Service: Neurosurgery    REMOVAL OF INTRAUTERINE DEVICE (IUD)         Family History   Problem Relation Age of Onset    No Known Problems Mother     Diabetes Father         Diabetes Mellitus    Hyperlipidemia Father     Hypertension Father     No Known Problems Sister     No Known Problems Daughter     Brain cancer Maternal Grandmother 56    No Known Problems Son     Migraines Family     Diabetes Family         Type 2 Diabetes Mellitus    Breast cancer Neg Hx          Medications have been verified.        Objective   /86   Pulse 98   Temp 98.9 °F (37.2 °C)   Resp 16   Ht 5' 8\" (1.727 m)   Wt 96.6 kg (213 lb)   SpO2 99%   BMI 32.39 kg/m²        Physical Exam     Physical Exam  Vitals and nursing note reviewed.   Constitutional:       General: She is not in acute distress.     Appearance: Normal appearance. She is not ill-appearing, toxic-appearing or diaphoretic.      Interventions: She is not intubated.  HENT:      Head: Normocephalic and atraumatic.      Right Ear: Tympanic membrane normal. Swelling and tenderness present. Tympanic membrane is not erythematous or bulging.      Left Ear: Tympanic membrane normal. Swelling and tenderness present.      Ears:        Comments: Swelling, erythema and discharge of bilateral auditory canals.   Unable to visualize left TM.      Nose: Nose normal. No congestion or rhinorrhea.      Mouth/Throat:      Mouth: Mucous membranes are dry.      Pharynx: Oropharynx is clear. Uvula midline. No pharyngeal swelling, oropharyngeal exudate, " posterior oropharyngeal erythema, uvula swelling or postnasal drip.      Tonsils: No tonsillar exudate or tonsillar abscesses. 1+ on the right. 1+ on the left.   Eyes:      Extraocular Movements: Extraocular movements intact.      Conjunctiva/sclera: Conjunctivae normal.      Pupils: Pupils are equal, round, and reactive to light.   Neck:      Thyroid: No thyroid mass, thyromegaly or thyroid tenderness.   Cardiovascular:      Rate and Rhythm: Normal rate and regular rhythm.      Pulses: Normal pulses.      Heart sounds: Normal heart sounds, S1 normal and S2 normal. Heart sounds not distant. No murmur heard.     No friction rub. No gallop.   Pulmonary:      Effort: Pulmonary effort is normal. No tachypnea, bradypnea, accessory muscle usage, prolonged expiration, respiratory distress or retractions. She is not intubated.      Breath sounds: Normal breath sounds. No stridor, decreased air movement or transmitted upper airway sounds. No decreased breath sounds, wheezing, rhonchi or rales.   Abdominal:      General: Bowel sounds are normal.      Palpations: Abdomen is soft.      Tenderness: There is no abdominal tenderness. There is no guarding or rebound.   Musculoskeletal:         General: Normal range of motion.      Cervical back: Full passive range of motion without pain, normal range of motion and neck supple. No tenderness. No spinous process tenderness or muscular tenderness. Normal range of motion.   Lymphadenopathy:      Cervical: No cervical adenopathy.      Right cervical: No superficial cervical adenopathy.     Left cervical: No superficial cervical adenopathy.   Skin:     General: Skin is warm and dry.      Capillary Refill: Capillary refill takes less than 2 seconds.   Neurological:      General: No focal deficit present.      Mental Status: She is alert and oriented to person, place, and time.      Cranial Nerves: No cranial nerve deficit.   Psychiatric:         Mood and Affect: Mood normal.          Behavior: Behavior normal.

## 2024-07-08 NOTE — PATIENT INSTRUCTIONS
Suspect acute swimmer's ear of both ears.   Please begin topical and oral antibiotics as directed.   Follow up with PCP if no relief within one week.

## 2024-07-11 ENCOUNTER — OFFICE VISIT (OUTPATIENT)
Dept: FAMILY MEDICINE CLINIC | Facility: CLINIC | Age: 44
End: 2024-07-11
Payer: COMMERCIAL

## 2024-07-11 VITALS
DIASTOLIC BLOOD PRESSURE: 86 MMHG | HEIGHT: 68 IN | HEART RATE: 90 BPM | OXYGEN SATURATION: 99 % | RESPIRATION RATE: 18 BRPM | TEMPERATURE: 97.7 F | BODY MASS INDEX: 32.39 KG/M2 | SYSTOLIC BLOOD PRESSURE: 130 MMHG

## 2024-07-11 DIAGNOSIS — H60.332 ACUTE SWIMMER'S EAR OF LEFT SIDE: Primary | ICD-10-CM

## 2024-07-11 PROCEDURE — 99213 OFFICE O/P EST LOW 20 MIN: CPT | Performed by: FAMILY MEDICINE

## 2024-07-11 RX ORDER — CIPROFLOXACIN 500 MG/1
500 TABLET, FILM COATED ORAL EVERY 12 HOURS SCHEDULED
Qty: 14 TABLET | Refills: 0 | Status: SHIPPED | OUTPATIENT
Start: 2024-07-11 | End: 2024-07-18

## 2024-07-11 NOTE — PROGRESS NOTES
Ambulatory Visit  Name: Marisel Olivas      : 1980      MRN: [id number]  Encounter Provider: Ebony Wade MD  Encounter Date: 2024   Encounter department: [facility]    Assessment & Plan   1. Acute swimmer's ear of left side  Comments:  continue ear drops as directed  ibuprofen as needed for pain  Orders:  -     ciprofloxacin (CIPRO) 500 mg tablet; Take 1 tablet (500 mg total) by mouth every 12 (twelve) hours for 7 days         History of Present Illness     Earache   There is pain in the left ear. This is a new problem. The current episode started 1 to 4 weeks ago. The problem has been unchanged. There has been no fever. Pertinent negatives include no abdominal pain, coughing, diarrhea, ear discharge, headaches, hearing loss, neck pain, rash, rhinorrhea or vomiting. She has tried ear drops for the symptoms. The treatment provided no relief. There is no history of a chronic ear infection, hearing loss or a tympanostomy tube.     Review of Systems   HENT:  Positive for ear pain. Negative for ear discharge, hearing loss and rhinorrhea.    Respiratory:  Negative for cough.    Gastrointestinal:  Negative for abdominal pain, diarrhea and vomiting.   Musculoskeletal:  Negative for neck pain.   Skin:  Negative for rash.   Neurological:  Negative for headaches.     Past Medical History:   Diagnosis Date   • Constipation    • COVID-19 2022   • Depression    • Eczema    • Epilepsy ([facility]) 11/15/2013    Description: well controlled with sleep   • GERD (gastroesophageal reflux disease)    • Grand mal convulsion (HCC)     X 2 last episode -Sleep deprivation-Resolved with Cpap   • Hypertension    • Insomnia    • Lateral epicondylitis of right elbow 2018   • Medial meniscus tear 2015   • Migraine    • PONV (postoperative nausea and vomiting)    • Primary generalized epilepsy (HCC) 2011   • Psoriasis    • Seizures ([facility])    • Shingles    • Shoulder dislocation, right,  sequela 2018   • Sinusitis    • Sleep apnea     uses cpap   • Tinnitus      Past Surgical History:   Procedure Laterality Date   •  SECTION   and    • COLONOSCOPY     • DILATION AND CURETTAGE OF UTERUS     • DILATION AND CURETTAGE OF UTERUS WITH HYSTEROSOCPY N/A 2017    Procedure: D&C; IUD REMOVAL;  Surgeon: Abeba Blackburn MD;  [facility];  Service: Gynecology   • EGD     • EPIDURAL BLOCK INJECTION Bilateral 2024    Procedure: BILATERAL L5-S1 TRANSFORAMINAL EPIDURAL STEROID INJECTION;  Surgeon: Jeff Pettit DO;  Location: [facility] MAIN OR;  Service: Pain Management    • HYSTEROSCOPY     • LAPAROSCOPY      (Diagnostic)   • LASIK     • WI COLONOSCOPY FLX DX W/COLLJ SPEC WHEN PFRMD N/A 2018    Procedure: EGD AND COLONOSCOPY;  Surgeon: Jagdish Katz MD;  [facility];  Service: Gastroenterology   • WI VERTEBRAL CORPECTOMY ANT DCMPRN CERVICAL 1 SEG Bilateral 2021    Procedure: CORPECTOMY SPINE CERVICAL ANTERIOR: C4-7 ACDF with C6 /hemicorpectomy, C4-7 instrumentation and fusion (neuromonitoring);  Surgeon: Raquel Porter MD;  [facility];  Service: Neurosurgery   • REMOVAL OF INTRAUTERINE DEVICE (IUD)       Family History   Problem Relation Age of Onset   • No Known Problems Mother    • Diabetes Father         Diabetes Mellitus   • Hyperlipidemia Father    • Hypertension Father    • No Known Problems Sister    • No Known Problems Daughter    • Brain cancer Maternal Grandmother 56   • No Known Problems Son    • Migraines Family    • Diabetes Family         Type 2 Diabetes Mellitus   • Breast cancer Neg Hx      Social History     Tobacco Use   • Smoking status: Former     Current packs/day: 0.00     Average packs/day: 0.3 packs/day for 25.0 years (6.3 ttl pk-yrs)     Types: Cigarettes     Start date:      Quit date:      Years since quittin.5   • Smokeless tobacco: Never   • Tobacco comments:     Former smoker per [employer]   Vaping Use    • Vaping status: Never Used   Substance and Sexual Activity   • Alcohol use: Yes     Comment: occassionally   • Drug use: Never   • Sexual activity: Not Currently     Partners: Male     Birth control/protection: Implant     Comment: 11/30/17 - Nexplanon inserted     Current Outpatient Medications on File Prior to Visit   Medication Sig   • acetaminophen (TYLENOL) 325 mg tablet Take 650 mg by mouth every 6 (six) hours as needed for mild pain   • albuterol (ProAir HFA) 90 mcg/act inhaler Inhale 2 puffs every 6 (six) hours as needed for shortness of breath   • amoxicillin (AMOXIL) 500 mg capsule Take 1 capsule (500 mg total) by mouth every 12 (twelve) hours for 7 days   • cholecalciferol (VITAMIN D3) 1,000 units tablet Take 5,000 Units by mouth daily     • Etonogestrel (NEXPLANON SC) Inject under the skin Located Left upper inner arm   • linaCLOtide (Linzess) 145 MCG CAPS Take 1 capsule (145 mcg total) by mouth in the morning   • magnesium 30 MG tablet Take 500 mg by mouth 2 (two) times a day     • Multiple Vitamins-Minerals (MULTIVITAL-M) TABS Take by mouth   • ofloxacin (FLOXIN) 0.3 % otic solution Administer 10 drops into both ears daily for 7 days   • Omega 3-6-9 Fatty Acids (OMEGA 3-6-9 PO) Take by mouth   • omeprazole (PriLOSEC) 40 MG capsule Take 1 capsule (40 mg total) by mouth daily (Patient taking differently: Take 40 mg by mouth if needed)   • QUEtiapine (SEROquel) 25 mg tablet TAKE 3 TABLETS BY MOUTH EVERY DAY   • TiZANidine (ZANAFLEX) 4 MG capsule Take 1 capsule (4 mg total) by mouth 3 (three) times a day as needed for muscle spasms   • traZODone (DESYREL) 50 mg tablet Take 1 tablet (50 mg total) by mouth daily at bedtime as needed for sleep or depression   • naproxen (EC NAPROSYN) 500 MG EC tablet Take 1 tablet (500 mg total) by mouth 3 (three) times a day with meals (Patient not taking: Reported on 2/27/2024)   • NON FORMULARY daily at bedtime Takes Valarien Root that's 1 PO QHS for Sleep (Patient not  "taking: Reported on 2/27/2024)   • predniSONE 10 mg tablet 4 tabs x 2 days, 3 tabs x 2 days, 2 tabs x 2 days, 1 tab x 2 days (Patient not taking: Reported on 6/20/2024)     Allergies   Allergen Reactions   • Gluten Meal - Food Allergy GI Intolerance     \" Highly Sensitivity\"   • Lactose - Food Allergy GI Intolerance     \" Highly Sensitive\"     Immunization History   Administered Date(s) Administered   • COVID-19 PFIZER VACCINE 0.3 ML IM 04/30/2021, 04/30/2021, 05/21/2021, 05/21/2021   • INFLUENZA 09/09/2021, 09/09/2021, 09/26/2022, 10/10/2023   • Influenza, injectable, quadrivalent, preservative free 0.5 mL 09/26/2022, 10/10/2023   • Tdap 03/25/2022, 03/25/2022     Objective     /86 (BP Location: Left arm, Patient Position: Sitting, Cuff Size: Standard)   Pulse 90   Temp 97.7 °F (36.5 °C) (Tympanic)   Resp 18   Ht 5' 8\" (1.727 m)   SpO2 99%   BMI 32.39 kg/m²     Physical Exam  Constitutional:       Appearance: Normal appearance.   HENT:      Left Ear: Swelling and tenderness present.  No middle ear effusion.   Cardiovascular:      Rate and Rhythm: Normal rate.      Pulses: Normal pulses.      Heart sounds: Normal heart sounds.   Pulmonary:      Effort: Pulmonary effort is normal.      Breath sounds: Normal breath sounds.   Neurological:      Mental Status: She is alert.         "

## 2024-07-31 ENCOUNTER — HOSPITAL ENCOUNTER (OUTPATIENT)
Dept: RADIOLOGY | Age: 44
Discharge: HOME | End: 2024-07-31
Payer: COMMERCIAL

## 2024-07-31 VITALS — BODY MASS INDEX: 32.28 KG/M2 | HEIGHT: 68 IN | WEIGHT: 213 LBS

## 2024-07-31 DIAGNOSIS — Z12.31 ENCOUNTER FOR SCREENING MAMMOGRAM FOR BREAST CANCER: ICD-10-CM

## 2024-07-31 PROCEDURE — 77067 SCR MAMMO BI INCL CAD: CPT

## 2024-07-31 PROCEDURE — 77063 BREAST TOMOSYNTHESIS BI: CPT

## 2024-08-06 ENCOUNTER — OFFICE VISIT (OUTPATIENT)
Dept: GASTROENTEROLOGY | Facility: AMBULARY SURGERY CENTER | Age: 44
End: 2024-08-06
Payer: COMMERCIAL

## 2024-08-06 VITALS
OXYGEN SATURATION: 97 % | BODY MASS INDEX: 32.39 KG/M2 | HEIGHT: 68 IN | HEART RATE: 88 BPM | DIASTOLIC BLOOD PRESSURE: 84 MMHG | SYSTOLIC BLOOD PRESSURE: 150 MMHG

## 2024-08-06 DIAGNOSIS — K58.1 IRRITABLE BOWEL SYNDROME WITH CONSTIPATION: ICD-10-CM

## 2024-08-06 DIAGNOSIS — K21.9 GASTROESOPHAGEAL REFLUX DISEASE WITHOUT ESOPHAGITIS: Primary | ICD-10-CM

## 2024-08-06 DIAGNOSIS — Z12.11 ENCOUNTER FOR SCREENING COLONOSCOPY: ICD-10-CM

## 2024-08-06 PROCEDURE — 99213 OFFICE O/P EST LOW 20 MIN: CPT | Performed by: PHYSICIAN ASSISTANT

## 2024-08-06 RX ORDER — LINACLOTIDE 290 UG/1
290 CAPSULE, GELATIN COATED ORAL DAILY
Qty: 90 CAPSULE | Refills: 3 | Status: SHIPPED | OUTPATIENT
Start: 2024-08-06 | End: 2025-08-01

## 2024-08-06 NOTE — LETTER
August 6, 2024     Ebony Wade MD  [address] [address]    Patient: Marisel Olivas   YOB: 1980   Date of Visit: 8/6/2024       Dear Dr. Wade:    Thank you for referring Marisel Olivas to me for evaluation. Below are my notes for this consultation.    If you have questions, please do not hesitate to call me. I look forward to following your patient along with you.         Sincerely,        Tod Soliman PA-C        CC: No Recipients    Tod Soliman PA-C  8/6/2024  1:36 PM  Incomplete  [facility] - Outpatient Progress Note  Marisel Olivas 43 y.o. female MRN: [id number]  Encounter: [id number]    Assessment and Plan    1. Gastroesophageal reflux disease without esophagitis  - Patient is currently stable on omeprazole 40 mg as needed.   No current daytime or night time break through symptoms.  No pain or difficulty swallowing.  No unintentional weight loss.  Also recommend:  - avoid NSAIDS  - avoid eating within 3 hours of sleeping  - elevated head of bed 4-6 inches while sleeping  - avoid trigger foods  - recommend daily supply of calcium and vitamin D    2. Irritable bowel syndrome with constipation  - Linzess 145mcg not quite enough  - Increase to Linzess 290mcg  - continue good daily fluids and fiber  -Denies hard stool, straining, rectal bleeding  -Recommend good daily fluids and fiber    3. Screening colonoscopy  -Last colonoscopy October 2023 with adequate bowel prep was normal  -Next colonoscopy screening October 2033    --------------------------------------------------------------------------------------------------------------------    Chief Complaint: Follow-up to GERD and constipation    HPI: Marisel Olivas is a 43 y.o. female new to me with past medical history of epilepsy, irritable bowel with constipation, eczema, GERD, hypertension, migraine who presents today for follow up for last office  visit in January 2020 for with regard to her GERD and constipation.  She is currently stable on omeprazole 40 mg as needed with regard to her reflux.  GERD is more diet controlled.  Her constipation is stable on Linzess 145 mcg daily.   Even with improved diet.   Getting good daily fruits and veggies.   Takes probiotics in AM and PM.    Constipation  Years of issues - whole life  Number of bowel movements weekly - If she eats high fiber has BM daily or every other day.  Straining - Yes  Sense of incomplete bowel movements - Yes  History of hemorrhoids - no   History of rectal bleeding - none  Current Opioid use - No  Current medications -Linzess 145 mcg daily  OTC meds in past - colace PRN  Rx meds in past - none  Last colonoscopy -October 2023 with adequate bowel prep normal      Patient denies any nausea, vomiting, abdominal pain, dysphagia, unexpected weight loss, diarrhea, constipation, blood in stool, or black tarry stools.     Endoscopy History:  EGD -October 2023 normal appearance with distal esophageal biopsy negative for Campos's, gastric biopsy negative for H. pylori and duodenal biopsies negative for celiac disease  Colonoscopy -October 2023 with adequate bowel prep was normal    Review of Systems:   General: negative for fatigue, fever, night sweats or unexpected weight loss  Psychological: negative for anxiety or depression  Ophthalmic: negative for blurry vision or scleral icterus  ENT: negative for headaches, sore throat or dysphagia  Hematological and Lymphatic: negative for pallor or swollen lymph nodes  Respiratory: negative for cough, shortness of breath or wheezing  Cardiovascular: negative for chest pain, edema or murmur  Gastrointestinal: as mentioned in HPI  Genito-Urinary: negative for dysuria or incontinence  Musculoskeletal: negative for joint pain, joint stiffness or joint swelling  Dermatological: negative for pruritus, rash, or jaundice    Current Medications  Current Outpatient  Medications   Medication Sig Dispense Refill   • acetaminophen (TYLENOL) 325 mg tablet Take 650 mg by mouth every 6 (six) hours as needed for mild pain     • albuterol (ProAir HFA) 90 mcg/act inhaler Inhale 2 puffs every 6 (six) hours as needed for shortness of breath 18 g 0   • cholecalciferol (VITAMIN D3) 1,000 units tablet Take 5,000 Units by mouth daily       • Docusate Sodium (COLACE CLEAR PO) Take by mouth     • Etonogestrel (NEXPLANON SC) Inject under the skin Located Left upper inner arm     • linaCLOtide (Linzess) 145 MCG CAPS Take 1 capsule (145 mcg total) by mouth in the morning 90 capsule 3   • magnesium 30 MG tablet Take 500 mg by mouth 2 (two) times a day       • Multiple Vitamins-Minerals (MULTIVITAL-M) TABS Take by mouth     • Omega 3-6-9 Fatty Acids (OMEGA 3-6-9 PO) Take by mouth     • omeprazole (PriLOSEC) 40 MG capsule Take 1 capsule (40 mg total) by mouth daily (Patient taking differently: Take 40 mg by mouth if needed) 90 capsule 0   • QUEtiapine (SEROquel) 25 mg tablet TAKE 3 TABLETS BY MOUTH EVERY  tablet 3   • TiZANidine (ZANAFLEX) 4 MG capsule Take 1 capsule (4 mg total) by mouth 3 (three) times a day as needed for muscle spasms 90 capsule 3   • traZODone (DESYREL) 50 mg tablet Take 1 tablet (50 mg total) by mouth daily at bedtime as needed for sleep or depression 90 tablet 3   • naproxen (EC NAPROSYN) 500 MG EC tablet Take 1 tablet (500 mg total) by mouth 3 (three) times a day with meals (Patient not taking: Reported on 2/27/2024) 30 tablet 0   • NON FORMULARY daily at bedtime Takes Valarien Root that's 1 PO QHS for Sleep (Patient not taking: Reported on 2/27/2024)     • predniSONE 10 mg tablet 4 tabs x 2 days, 3 tabs x 2 days, 2 tabs x 2 days, 1 tab x 2 days (Patient not taking: Reported on 6/20/2024) 20 tablet 0     No current facility-administered medications for this visit.       Past Medical History  Past Medical History:   Diagnosis Date   • Constipation    • COVID-19  2022   • Depression    • Eczema    • Epilepsy ([facility]) 11/15/2013    Description: well controlled with sleep   • GERD (gastroesophageal reflux disease)    • Grand mal convulsion ([facility])     X 2 last episode -Sleep deprivation-Resolved with Cpap   • Hypertension    • Insomnia    • Lateral epicondylitis of right elbow 2018   • Medial meniscus tear 2015   • Migraine    • PONV (postoperative nausea and vomiting)    • Primary generalized epilepsy ([facility]) 2011   • Psoriasis    • Seizures ([facility])    • Shingles    • Shoulder dislocation, right, sequela 2018   • Sinusitis    • Sleep apnea     uses cpap   • Tinnitus        Past Surgical History  Past Surgical History:   Procedure Laterality Date   •  SECTION   and    • COLONOSCOPY     • DILATION AND CURETTAGE OF UTERUS     • DILATION AND CURETTAGE OF UTERUS WITH HYSTEROSOCPY N/A 2017    Procedure: D&C; IUD REMOVAL;  Surgeon: Abeba Blackburn MD;  [facility];  Service: Gynecology   • EGD     • EPIDURAL BLOCK INJECTION Bilateral 2024    Procedure: BILATERAL L5-S1 TRANSFORAMINAL EPIDURAL STEROID INJECTION;  Surgeon: Jeff Pettit DO;  Location: [facility] MAIN OR;  Service: Pain Management    • HYSTEROSCOPY     • LAPAROSCOPY      (Diagnostic)   • LASIK     • RI COLONOSCOPY FLX DX W/COLLJ SPEC WHEN PFRMD N/A 2018    Procedure: EGD AND COLONOSCOPY;  Surgeon: Jagdish Katz MD;  [facility];  Service: Gastroenterology   • RI VERTEBRAL CORPECTOMY ANT DCMPRN CERVICAL 1 SEG Bilateral 2021    Procedure: CORPECTOMY SPINE CERVICAL ANTERIOR: C4-7 ACDF with C6 /hemicorpectomy, C4-7 instrumentation and fusion (neuromonitoring);  Surgeon: Raquel Porter MD;  [facility];  Service: Neurosurgery   • REMOVAL OF INTRAUTERINE DEVICE (IUD)         Past Social History   Social History     Socioeconomic History   • Marital status: /Civil Union     Spouse name: None   • Number of children: None   • Years of  "education: None   • Highest education level: None   Occupational History   • None   Tobacco Use   • Smoking status: Former     Current packs/day: 0.00     Average packs/day: 0.3 packs/day for 25.0 years (6.3 ttl pk-yrs)     Types: Cigarettes     Start date:      Quit date:      Years since quittin.6   • Smokeless tobacco: Never   • Tobacco comments:     Former smoker per [employer]   Vaping Use   • Vaping status: Never Used   Substance and Sexual Activity   • Alcohol use: Yes     Comment: occassionally   • Drug use: Never   • Sexual activity: Not Currently     Partners: Male     Birth control/protection: Implant     Comment: 15 - Nexplanon inserted   Other Topics Concern   • None   Social History Narrative    Denied:  Exercising Regularly     Social Determinants of Health     Financial Resource Strain: Not on file   Food Insecurity: Not on file   Transportation Needs: Not on file   Physical Activity: Not on file   Stress: Not on file   Social Connections: Not on file   Intimate Partner Violence: Not on file   Housing Stability: Not on file       Vital Signs  Vitals:    24 1315   BP: 150/84   BP Location: Left arm   Patient Position: Sitting   Cuff Size: Standard   Pulse: 88   SpO2: 97%   Height: 5' 8\" (1.727 m)       Physical Exam:  General appearance: alert, cooperative, no distress  HEENT: normocephalic, anicteric, no eye erythema or discharge, no oropharyngeal thrush  Neck: supple  Lungs: CTA b/l, no rales, rhonchi, or wheezing, unlabored respirations  Heart: RRR, no murmur, rubs, or gallops  Abdomen: soft, non-tender, non-distended, normal bowel sounds, no masses or organomegaly  Rectal: deferred  Extremities: no cyanosis, clubbing, or edema  Musculoskeletal: normal gait  Skin: color and texture normal, no jaundice, no rashes or lesions  Psychiatric: alert and oriented, normal affect and behavior                                                          Tod Baron " Balwinder Soliman PA-C  8/6/2024  1:28 PM  Sign when Signing Visit  [facility] - Outpatient Progress Note  Marisel Olivas 43 y.o. female MRN: [id number]  Encounter: [id number]    Assessment and Plan    1. Gastroesophageal reflux disease without esophagitis  - Patient is currently stable on omeprazole 40 mg daily.   No current daytime or night time break through symptoms.  No pain or difficulty swallowing.  No unintentional weight loss.  Also recommend:  - avoid NSAIDS  - avoid eating within 3 hours of sleeping  - elevated head of bed 4-6 inches while sleeping  - avoid trigger foods  - recommend daily supply of calcium and vitamin D      2. Irritable bowel syndrome with constipation  -Stable on Linzess 145 mcg daily  -Denies hard stool, straining, rectal bleeding  -Recommend good daily fluids and fiber    3. Screening colonoscopy  -Last colonoscopy October 2023 with adequate bowel prep was normal  -Next colonoscopy screening October 2033    --------------------------------------------------------------------------------------------------------------------    Chief Complaint: Follow-up to GERD and constipation    HPI: Marisel Olivas is a 43 y.o. female new to me with past medical history of epilepsy, irritable bowel with constipation, eczema, GERD, hypertension, migraine who presents today for follow up for last office visit in January 2020 for with regard to her GERD and constipation.  She is currently stable on omeprazole 40 mg daily with regard to her reflux.  Her constipation is stable on Linzess 145 mcg daily.    Constipation  Years of issues -   Number of bowel movements weekly -   Straining - Yes  Sense of incomplete bowel movements - Yes  History of hemorrhoids -   History of rectal bleeding -   Current Opioid use - No  Current medications -Linzess 145 mcg daily  OTC meds in past -   Rx meds in past -   Last colonoscopy -October 2023 with adequate bowel prep  normal      Patient denies any nausea, vomiting, abdominal pain, dysphagia, unexpected weight loss, diarrhea, constipation, blood in stool, or black tarry stools.     Endoscopy History:  EGD -October 2023 normal appearance with distal esophageal biopsy negative for Campos's, gastric biopsy negative for H. pylori and duodenal biopsies negative for celiac disease  Colonoscopy -October 2023 with adequate bowel prep was normal    Review of Systems:   General: negative for fatigue, fever, night sweats or unexpected weight loss  Psychological: negative for anxiety or depression  Ophthalmic: negative for blurry vision or scleral icterus  ENT: negative for headaches, sore throat or dysphagia  Hematological and Lymphatic: negative for pallor or swollen lymph nodes  Respiratory: negative for cough, shortness of breath or wheezing  Cardiovascular: negative for chest pain, edema or murmur  Gastrointestinal: as mentioned in HPI  Genito-Urinary: negative for dysuria or incontinence  Musculoskeletal: negative for joint pain, joint stiffness or joint swelling  Dermatological: negative for pruritus, rash, or jaundice    Current Medications  Current Outpatient Medications   Medication Sig Dispense Refill   • acetaminophen (TYLENOL) 325 mg tablet Take 650 mg by mouth every 6 (six) hours as needed for mild pain     • albuterol (ProAir HFA) 90 mcg/act inhaler Inhale 2 puffs every 6 (six) hours as needed for shortness of breath 18 g 0   • cholecalciferol (VITAMIN D3) 1,000 units tablet Take 5,000 Units by mouth daily       • Docusate Sodium (COLACE CLEAR PO) Take by mouth     • Etonogestrel (NEXPLANON SC) Inject under the skin Located Left upper inner arm     • linaCLOtide (Linzess) 145 MCG CAPS Take 1 capsule (145 mcg total) by mouth in the morning 90 capsule 3   • magnesium 30 MG tablet Take 500 mg by mouth 2 (two) times a day       • Multiple Vitamins-Minerals (MULTIVITAL-M) TABS Take by mouth     • Omega 3-6-9 Fatty Acids (OMEGA  3-6-9 PO) Take by mouth     • omeprazole (PriLOSEC) 40 MG capsule Take 1 capsule (40 mg total) by mouth daily (Patient taking differently: Take 40 mg by mouth if needed) 90 capsule 0   • QUEtiapine (SEROquel) 25 mg tablet TAKE 3 TABLETS BY MOUTH EVERY  tablet 3   • TiZANidine (ZANAFLEX) 4 MG capsule Take 1 capsule (4 mg total) by mouth 3 (three) times a day as needed for muscle spasms 90 capsule 3   • traZODone (DESYREL) 50 mg tablet Take 1 tablet (50 mg total) by mouth daily at bedtime as needed for sleep or depression 90 tablet 3   • naproxen (EC NAPROSYN) 500 MG EC tablet Take 1 tablet (500 mg total) by mouth 3 (three) times a day with meals (Patient not taking: Reported on 2024) 30 tablet 0   • NON FORMULARY daily at bedtime Takes Valarien Root that's 1 PO QHS for Sleep (Patient not taking: Reported on 2024)     • predniSONE 10 mg tablet 4 tabs x 2 days, 3 tabs x 2 days, 2 tabs x 2 days, 1 tab x 2 days (Patient not taking: Reported on 2024) 20 tablet 0     No current facility-administered medications for this visit.       Past Medical History  Past Medical History:   Diagnosis Date   • Constipation    • COVID-19 2022   • Depression    • Eczema    • Epilepsy ([facility]) 11/15/2013    Description: well controlled with sleep   • GERD (gastroesophageal reflux disease)    • Grand mal convulsion ([facility])     X 2 last episode -Sleep deprivation-Resolved with Cpap   • Hypertension    • Insomnia    • Lateral epicondylitis of right elbow 2018   • Medial meniscus tear 2015   • Migraine    • PONV (postoperative nausea and vomiting)    • Primary generalized epilepsy ([facility]) 2011   • Psoriasis    • Seizures ([facility])    • Shingles    • Shoulder dislocation, right, sequela 2018   • Sinusitis    • Sleep apnea     uses cpap   • Tinnitus        Past Surgical History  Past Surgical History:   Procedure Laterality Date   •  SECTION   and    • COLONOSCOPY     • DILATION AND  CURETTAGE OF UTERUS     • DILATION AND CURETTAGE OF UTERUS WITH HYSTEROSOCPY N/A 2017    Procedure: D&C; IUD REMOVAL;  Surgeon: Abeba Blackburn MD;  [facility];  Service: Gynecology   • EGD     • EPIDURAL BLOCK INJECTION Bilateral 2024    Procedure: BILATERAL L5-S1 TRANSFORAMINAL EPIDURAL STEROID INJECTION;  Surgeon: Jeff Pettit DO;  Location: [facility] MAIN OR;  Service: Pain Management    • HYSTEROSCOPY     • LAPAROSCOPY      (Diagnostic)   • LASIK     • NM COLONOSCOPY FLX DX W/COLLJ SPEC WHEN PFRMD N/A 2018    Procedure: EGD AND COLONOSCOPY;  Surgeon: Jagdish Katz MD;  [facility];  Service: Gastroenterology   • NM VERTEBRAL CORPECTOMY ANT DCMPRN CERVICAL 1 SEG Bilateral 2021    Procedure: CORPECTOMY SPINE CERVICAL ANTERIOR: C4-7 ACDF with C6 /hemicorpectomy, C4-7 instrumentation and fusion (neuromonitoring);  Surgeon: Raquel Porter MD;  [facility];  Service: Neurosurgery   • REMOVAL OF INTRAUTERINE DEVICE (IUD)         Past Social History   Social History     Socioeconomic History   • Marital status: /Civil Union     Spouse name: None   • Number of children: None   • Years of education: None   • Highest education level: None   Occupational History   • None   Tobacco Use   • Smoking status: Former     Current packs/day: 0.00     Average packs/day: 0.3 packs/day for 25.0 years (6.3 ttl pk-yrs)     Types: Cigarettes     Start date:      Quit date:      Years since quittin.6   • Smokeless tobacco: Never   • Tobacco comments:     Former smoker per [employer]   Vaping Use   • Vaping status: Never Used   Substance and Sexual Activity   • Alcohol use: Yes     Comment: occassionally   • Drug use: Never   • Sexual activity: Not Currently     Partners: Male     Birth control/protection: Implant     Comment: 15 - Nexplanon inserted   Other Topics Concern   • None   Social History Narrative    Denied:  Exercising Regularly     Social  "Determinants of Health     Financial Resource Strain: Not on file   Food Insecurity: Not on file   Transportation Needs: Not on file   Physical Activity: Not on file   Stress: Not on file   Social Connections: Not on file   Intimate Partner Violence: Not on file   Housing Stability: Not on file       Vital Signs  Vitals:    08/06/24 1315   BP: 150/84   BP Location: Left arm   Patient Position: Sitting   Cuff Size: Standard   Pulse: 88   SpO2: 97%   Height: 5' 8\" (1.727 m)       Physical Exam:  General appearance: alert, cooperative, no distress  HEENT: normocephalic, anicteric, no eye erythema or discharge, no oropharyngeal thrush  Neck: supple  Lungs: CTA b/l, no rales, rhonchi, or wheezing, unlabored respirations  Heart: RRR, no murmur, rubs, or gallops  Abdomen: soft, non-tender, non-distended, normal bowel sounds, no masses or organomegaly  Rectal: deferred  Extremities: no cyanosis, clubbing, or edema  Musculoskeletal: normal gait  Skin: color and texture normal, no jaundice, no rashes or lesions  Psychiatric: alert and oriented, normal affect and behavior                                                          Tod Soliman PA-C  "

## 2024-08-06 NOTE — PROGRESS NOTES
[facility] - Outpatient Progress Note  Marisel Olivas 43 y.o. female MRN: [id number]  Encounter: [id number]    Assessment and Plan    1. Gastroesophageal reflux disease without esophagitis  - Patient is currently stable on omeprazole 40 mg as needed.   No current daytime or night time break through symptoms.  No pain or difficulty swallowing.  No unintentional weight loss.  Also recommend:  - avoid NSAIDS  - avoid eating within 3 hours of sleeping  - elevated head of bed 4-6 inches while sleeping  - avoid trigger foods  - recommend daily supply of calcium and vitamin D    2. Irritable bowel syndrome with constipation  - Linzess 145mcg not quite enough  - Increase to Linzess 290mcg  - continue good daily fluids and fiber  -Denies hard stool, straining, rectal bleeding  -Recommend good daily fluids and fiber    3. Screening colonoscopy  -Last colonoscopy October 2023 with adequate bowel prep was normal  -Next colonoscopy screening October 2033    --------------------------------------------------------------------------------------------------------------------    Chief Complaint: Follow-up to GERD and constipation    HPI: Marisel Olivas is a 43 y.o. female new to me with past medical history of epilepsy, irritable bowel with constipation, eczema, GERD, hypertension, migraine who presents today for follow up for last office visit in January 2020 for with regard to her GERD and constipation.  She is currently stable on omeprazole 40 mg as needed with regard to her reflux.  GERD is more diet controlled.  Her constipation is stable on Linzess 145 mcg daily.   Even with improved diet.   Getting good daily fruits and veggies.   Takes probiotics in AM and PM.    Constipation  Years of issues - whole life  Number of bowel movements weekly - If she eats high fiber has BM daily or every other day.  Straining - Yes  Sense of incomplete bowel movements - Yes  History of hemorrhoids -  no   History of rectal bleeding - none  Current Opioid use - No  Current medications -Linzess 145 mcg daily  OTC meds in past - colace PRN  Rx meds in past - none  Last colonoscopy -October 2023 with adequate bowel prep normal      Patient denies any nausea, vomiting, abdominal pain, dysphagia, unexpected weight loss, diarrhea, constipation, blood in stool, or black tarry stools.     Endoscopy History:  EGD -October 2023 normal appearance with distal esophageal biopsy negative for Campos's, gastric biopsy negative for H. pylori and duodenal biopsies negative for celiac disease  Colonoscopy -October 2023 with adequate bowel prep was normal    Review of Systems:   General: negative for fatigue, fever, night sweats or unexpected weight loss  Psychological: negative for anxiety or depression  Ophthalmic: negative for blurry vision or scleral icterus  ENT: negative for headaches, sore throat or dysphagia  Hematological and Lymphatic: negative for pallor or swollen lymph nodes  Respiratory: negative for cough, shortness of breath or wheezing  Cardiovascular: negative for chest pain, edema or murmur  Gastrointestinal: as mentioned in HPI  Genito-Urinary: negative for dysuria or incontinence  Musculoskeletal: negative for joint pain, joint stiffness or joint swelling  Dermatological: negative for pruritus, rash, or jaundice    Current Medications  Current Outpatient Medications   Medication Sig Dispense Refill   • acetaminophen (TYLENOL) 325 mg tablet Take 650 mg by mouth every 6 (six) hours as needed for mild pain     • albuterol (ProAir HFA) 90 mcg/act inhaler Inhale 2 puffs every 6 (six) hours as needed for shortness of breath 18 g 0   • cholecalciferol (VITAMIN D3) 1,000 units tablet Take 5,000 Units by mouth daily       • Docusate Sodium (COLACE CLEAR PO) Take by mouth     • Etonogestrel (NEXPLANON SC) Inject under the skin Located Left upper inner arm     • linaCLOtide (Linzess) 145 MCG CAPS Take 1 capsule (145 mcg  total) by mouth in the morning 90 capsule 3   • linaCLOtide (Linzess) 290 MCG CAPS Take 1 capsule by mouth in the morning 90 capsule 3   • magnesium 30 MG tablet Take 500 mg by mouth 2 (two) times a day       • Multiple Vitamins-Minerals (MULTIVITAL-M) TABS Take by mouth     • Omega 3-6-9 Fatty Acids (OMEGA 3-6-9 PO) Take by mouth     • omeprazole (PriLOSEC) 40 MG capsule Take 1 capsule (40 mg total) by mouth daily (Patient taking differently: Take 40 mg by mouth if needed) 90 capsule 0   • QUEtiapine (SEROquel) 25 mg tablet TAKE 3 TABLETS BY MOUTH EVERY  tablet 3   • TiZANidine (ZANAFLEX) 4 MG capsule Take 1 capsule (4 mg total) by mouth 3 (three) times a day as needed for muscle spasms 90 capsule 3   • traZODone (DESYREL) 50 mg tablet Take 1 tablet (50 mg total) by mouth daily at bedtime as needed for sleep or depression 90 tablet 3   • naproxen (EC NAPROSYN) 500 MG EC tablet Take 1 tablet (500 mg total) by mouth 3 (three) times a day with meals (Patient not taking: Reported on 2/27/2024) 30 tablet 0   • NON FORMULARY daily at bedtime Takes Valarien Root that's 1 PO QHS for Sleep (Patient not taking: Reported on 2/27/2024)     • predniSONE 10 mg tablet 4 tabs x 2 days, 3 tabs x 2 days, 2 tabs x 2 days, 1 tab x 2 days (Patient not taking: Reported on 6/20/2024) 20 tablet 0     No current facility-administered medications for this visit.       Past Medical History  Past Medical History:   Diagnosis Date   • Constipation    • COVID-19 01/05/2022   • Depression    • Eczema    • Epilepsy ([facility]) 11/15/2013    Description: well controlled with sleep   • GERD (gastroesophageal reflux disease)    • Grand mal convulsion (HCC)     X 2 last episode 2009-Sleep deprivation-Resolved with Cpap   • Hypertension    • Insomnia    • Lateral epicondylitis of right elbow 05/01/2018   • Medial meniscus tear 01/12/2015   • Migraine    • PONV (postoperative nausea and vomiting)    • Primary generalized epilepsy (HCC) 12/06/2011   •  Psoriasis    • Seizures (HCC)    • Shingles    • Shoulder dislocation, right, sequela 2018   • Sinusitis    • Sleep apnea     uses cpap   • Tinnitus        Past Surgical History  Past Surgical History:   Procedure Laterality Date   •  SECTION   and    • COLONOSCOPY     • DILATION AND CURETTAGE OF UTERUS     • DILATION AND CURETTAGE OF UTERUS WITH HYSTEROSOCPY N/A 2017    Procedure: D&C; IUD REMOVAL;  Surgeon: Abeba Blackburn MD;  Location: [facility];  Service: Gynecology   • EGD     • EPIDURAL BLOCK INJECTION Bilateral 2024    Procedure: BILATERAL L5-S1 TRANSFORAMINAL EPIDURAL STEROID INJECTION;  Surgeon: Jeff Pettit DO;  Location: [facility] MAIN OR;  Service: Pain Management    • HYSTEROSCOPY     • LAPAROSCOPY      (Diagnostic)   • LASIK     • UT COLONOSCOPY FLX DX W/COLLJ SPEC WHEN PFRMD N/A 2018    Procedure: EGD AND COLONOSCOPY;  Surgeon: Jagdish Katz MD;  Location: [facility]  GI LAB;  Service: Gastroenterology   • UT VERTEBRAL CORPECTOMY ANT DCMPRN CERVICAL 1 SEG Bilateral 2021    Procedure: CORPECTOMY SPINE CERVICAL ANTERIOR: C4-7 ACDF with C6 /hemicorpectomy, C4-7 instrumentation and fusion (neuromonitoring);  Surgeon: Raquel Porter MD;  Location: [facility];  Service: Neurosurgery   • REMOVAL OF INTRAUTERINE DEVICE (IUD)         Past Social History   Social History     Socioeconomic History   • Marital status: /Civil Union     Spouse name: None   • Number of children: None   • Years of education: None   • Highest education level: None   Occupational History   • None   Tobacco Use   • Smoking status: Former     Current packs/day: 0.00     Average packs/day: 0.3 packs/day for 25.0 years (6.3 ttl pk-yrs)     Types: Cigarettes     Start date:      Quit date:      Years since quittin.6   • Smokeless tobacco: Never   • Tobacco comments:     Former smoker per [employer]   Vaping Use   • Vaping status: Never Used   Substance and Sexual Activity   •  "Alcohol use: Yes     Comment: occassionally   • Drug use: Never   • Sexual activity: Not Currently     Partners: Male     Birth control/protection: Implant     Comment: 11/30/17 - Nexplanon inserted   Other Topics Concern   • None   Social History Narrative    Denied:  Exercising Regularly     Social Determinants of Health     Financial Resource Strain: Not on file   Food Insecurity: Not on file   Transportation Needs: Not on file   Physical Activity: Not on file   Stress: Not on file   Social Connections: Not on file   Intimate Partner Violence: Not on file   Housing Stability: Not on file       Vital Signs  Vitals:    08/06/24 1315   BP: 150/84   BP Location: Left arm   Patient Position: Sitting   Cuff Size: Standard   Pulse: 88   SpO2: 97%   Height: 5' 8\" (1.727 m)       Physical Exam:  General appearance: alert, cooperative, no distress  HEENT: normocephalic, anicteric, no eye erythema or discharge, no oropharyngeal thrush  Neck: supple  Lungs: CTA b/l, no rales, rhonchi, or wheezing, unlabored respirations  Heart: RRR, no murmur, rubs, or gallops  Abdomen: soft, non-tender, non-distended, normal bowel sounds, no masses or organomegaly  Rectal: deferred  Extremities: no cyanosis, clubbing, or edema  Musculoskeletal: normal gait  Skin: color and texture normal, no jaundice, no rashes or lesions  Psychiatric: alert and oriented, normal affect and behavior                                                          Tod Soliman PA-C  "

## 2024-09-10 ENCOUNTER — TELEPHONE (OUTPATIENT)
Age: 44
End: 2024-09-10

## 2024-09-10 ENCOUNTER — OFFICE VISIT (OUTPATIENT)
Dept: FAMILY MEDICINE CLINIC | Facility: CLINIC | Age: 44
End: 2024-09-10
Payer: COMMERCIAL

## 2024-09-10 VITALS
DIASTOLIC BLOOD PRESSURE: 98 MMHG | HEIGHT: 68 IN | BODY MASS INDEX: 32.39 KG/M2 | RESPIRATION RATE: 17 BRPM | SYSTOLIC BLOOD PRESSURE: 130 MMHG | TEMPERATURE: 99 F | HEART RATE: 88 BPM | OXYGEN SATURATION: 98 %

## 2024-09-10 DIAGNOSIS — L02.818 CUTANEOUS ABSCESS OF OTHER SITE: Primary | ICD-10-CM

## 2024-09-10 PROCEDURE — 99213 OFFICE O/P EST LOW 20 MIN: CPT | Performed by: NURSE PRACTITIONER

## 2024-09-10 NOTE — TELEPHONE ENCOUNTER
Marcella from [facility] called to schedule asap appointment. Warm transferred to Shilpi for further assistance.

## 2024-09-11 ENCOUNTER — CONSULT (OUTPATIENT)
Dept: SURGERY | Facility: CLINIC | Age: 44
End: 2024-09-11
Payer: COMMERCIAL

## 2024-09-11 VITALS
SYSTOLIC BLOOD PRESSURE: 138 MMHG | HEIGHT: 68 IN | OXYGEN SATURATION: 98 % | DIASTOLIC BLOOD PRESSURE: 87 MMHG | BODY MASS INDEX: 33.34 KG/M2 | HEART RATE: 91 BPM | WEIGHT: 220 LBS

## 2024-09-11 DIAGNOSIS — D22.9 SKIN MOLE: ICD-10-CM

## 2024-09-11 DIAGNOSIS — L02.91 ABSCESS: Primary | ICD-10-CM

## 2024-09-11 PROCEDURE — 99203 OFFICE O/P NEW LOW 30 MIN: CPT | Performed by: SURGERY

## 2024-09-11 PROCEDURE — 10060 I&D ABSCESS SIMPLE/SINGLE: CPT | Performed by: SURGERY

## 2024-09-11 RX ORDER — DOXYCYCLINE 100 MG/1
100 TABLET ORAL 2 TIMES DAILY
Qty: 10 TABLET | Refills: 0 | Status: SHIPPED | OUTPATIENT
Start: 2024-09-11 | End: 2024-09-16

## 2024-09-11 NOTE — PROGRESS NOTES
Assessment/Plan:    Diagnoses and all orders for this visit:    Abscess  -     doxycycline (ADOXA) 100 MG tablet; Take 1 tablet (100 mg total) by mouth 2 (two) times a day for 5 days    Incision and drainage performed in the right suprapubic abscess region.  Packing placed.  Instructions given.  Will see back in 3 weeks to reevaluate this area as well as the mole.    Skin mole    1 cm dark skin mole right next to the area of the abscess.  No role for removal today.  Will reevaluate in 3 weeks.      Subjective:      Patient ID: Marisel Olivas is a 43 y.o. female.    Patient presents for evaluation of an abscess in her right groin.  States she has had the abscess for 2 days. The abscess is red, sore and painful.  Stated temperature of initially 99 at home.  Was seen by her family physician yesterday.  States last night she had a temp of 101.3.  Denies nausea or vomiting.  She is not diabetic.  Denies any drainage of this region.  States she had a small mole in the same vicinity but it seems of gotten larger.  She points to the right suprapubic region.      Abscess  The current episode started in the past 7 days. The problem occurs daily.       The following portions of the patient's history were reviewed and updated as appropriate:     She  has a past medical history of Constipation, COVID-19 (01/05/2022), Depression, Eczema, Epilepsy ([facility]) (11/15/2013), GERD (gastroesophageal reflux disease), Grand mal convulsion ([facility]), Hypertension, Insomnia, Lateral epicondylitis of right elbow (05/01/2018), Medial meniscus tear (01/12/2015), Migraine, PONV (postoperative nausea and vomiting), Primary generalized epilepsy (HCC) (12/06/2011), Psoriasis, Seizures ([facility]), Shingles, Shoulder dislocation, right, sequela (05/01/2018), Sinusitis, Sleep apnea, and Tinnitus.  She  has a past surgical history that includes LASIK; Hysteroscopy; DILATION AND CURETTAGE OF UTERUS WITH HYSTEROSOCPY (N/A, 12/6/2017); LAPAROSCOPY; REMOVAL OF  INTRAUTERINE DEVICE (IUD); pr colonoscopy flx dx w/collj spec when pfrmd (N/A, 2018);  section ( and ); Dilation and curettage of uterus; EGD; Colonoscopy; pr vertebral corpectomy ant dcmprn cervical 1 seg (Bilateral, 2021); and Epidural block injection (Bilateral, 2024).  Her family history includes Brain cancer (age of onset: 56) in her maternal grandmother; Diabetes in her family and father; Hyperlipidemia in her father; Hypertension in her father; Migraines in her family; No Known Problems in her daughter, mother, sister, and son.  She  reports that she quit smoking about 26 years ago. Her smoking use included cigarettes. She started smoking about 27 years ago. She has a 6.3 pack-year smoking history. She has been exposed to tobacco smoke. She has never used smokeless tobacco. She reports current alcohol use. She reports that she does not use drugs.  Current Outpatient Medications   Medication Sig Dispense Refill    doxycycline (ADOXA) 100 MG tablet Take 1 tablet (100 mg total) by mouth 2 (two) times a day for 5 days 10 tablet 0    acetaminophen (TYLENOL) 325 mg tablet Take 650 mg by mouth every 6 (six) hours as needed for mild pain      albuterol (ProAir HFA) 90 mcg/act inhaler Inhale 2 puffs every 6 (six) hours as needed for shortness of breath 18 g 0    cholecalciferol (VITAMIN D3) 1,000 units tablet Take 5,000 Units by mouth daily        Docusate Sodium (COLACE CLEAR PO) Take by mouth      Etonogestrel (NEXPLANON SC) Inject under the skin Located Left upper inner arm      fluocinolone acetonide (DERMOTIC) 0.01 % otic oil Administer 5 drops into both ears 2 (two) times a day as needed (for itchy ears) 20 mL 11    linaCLOtide (Linzess) 145 MCG CAPS Take 1 capsule (145 mcg total) by mouth in the morning 90 capsule 3    linaCLOtide (Linzess) 290 MCG CAPS Take 1 capsule by mouth in the morning 90 capsule 3    magnesium 30 MG tablet Take 500 mg by mouth 2 (two) times a day         "Multiple Vitamins-Minerals (MULTIVITAL-M) TABS Take by mouth      naproxen (EC NAPROSYN) 500 MG EC tablet Take 1 tablet (500 mg total) by mouth 3 (three) times a day with meals (Patient not taking: Reported on 2/27/2024) 30 tablet 0    NON FORMULARY daily at bedtime Takes Valarien Root that's 1 PO QHS for Sleep (Patient not taking: Reported on 2/27/2024)      Omega 3-6-9 Fatty Acids (OMEGA 3-6-9 PO) Take by mouth      omeprazole (PriLOSEC) 40 MG capsule Take 1 capsule (40 mg total) by mouth daily (Patient taking differently: Take 40 mg by mouth if needed) 90 capsule 0    predniSONE 10 mg tablet 4 tabs x 2 days, 3 tabs x 2 days, 2 tabs x 2 days, 1 tab x 2 days (Patient not taking: Reported on 6/20/2024) 20 tablet 0    QUEtiapine (SEROquel) 25 mg tablet TAKE 3 TABLETS BY MOUTH EVERY  tablet 3    TiZANidine (ZANAFLEX) 4 MG capsule Take 1 capsule (4 mg total) by mouth 3 (three) times a day as needed for muscle spasms 90 capsule 3    traZODone (DESYREL) 50 mg tablet Take 1 tablet (50 mg total) by mouth daily at bedtime as needed for sleep or depression 90 tablet 3     No current facility-administered medications for this visit.     She is allergic to gluten meal - food allergy and lactose - food allergy..    Review of Systems   Skin:         Tender erythematous right suprapubic area         Objective:      /87   Pulse 91   Ht 5' 8\" (1.727 m)   Wt 99.8 kg (220 lb)   SpO2 98%   BMI 33.45 kg/m²          Physical Exam  Skin:     General: Skin is warm and dry.      Comments: There are erythematous indurated area of the right suprapubic region.  There is evidence of an abscess underneath.  Also 1 cm mole just lateral to the area of infection.  Discussed risks and benefits of an incision and drainage procedure and she agreed to proceed       Incision and Drainage    Date/Time: 9/11/2024 10:30 AM    Performed by: Sky Ladd DO  Authorized by: Sky Ladd DO  Universal Protocol:  Consent: " "Verbal consent obtained.  Risks and benefits: risks, benefits and alternatives were discussed  Consent given by: patient  Time out: Immediately prior to procedure a \"time out\" was called to verify the correct patient, procedure, equipment, support staff and site/side marked as required.  Patient understanding: patient states understanding of the procedure being performed  Patient identity confirmed: verbally with patient    Patient location:  Clinic  Location:     Type:  Abscess    Location: Right suprapubic.  Anesthesia (see MAR for exact dosages):     Anesthesia method:  Local infiltration    Local anesthetic:  Lidocaine 1% WITH epi  Procedure details:     Complexity:  Simple    Incision types:  Cruciate    Scalpel blade:  11    Approach:  Open    Incision depth:  Subcutaneous    Wound management:  Probed and deloculated    Drainage:  Purulent    Drainage amount:  Scant    Wound treatment:  Wound left open    Packing materials:  1/4 in gauze  Post-procedure details:     Patient tolerance of procedure:  Tolerated well, no immediate complications      "

## 2024-10-02 ENCOUNTER — PROCEDURE VISIT (OUTPATIENT)
Dept: SURGERY | Facility: CLINIC | Age: 44
End: 2024-10-02
Payer: COMMERCIAL

## 2024-10-02 VITALS — HEIGHT: 68 IN | WEIGHT: 220 LBS | BODY MASS INDEX: 33.34 KG/M2

## 2024-10-02 DIAGNOSIS — D22.9 SKIN MOLE: Primary | ICD-10-CM

## 2024-10-02 PROCEDURE — 11400 EXC TR-EXT B9+MARG 0.5 CM<: CPT | Performed by: SURGERY

## 2024-10-02 PROCEDURE — 88305 TISSUE EXAM BY PATHOLOGIST: CPT | Performed by: PATHOLOGY

## 2024-10-02 PROCEDURE — 99213 OFFICE O/P EST LOW 20 MIN: CPT | Performed by: SURGERY

## 2024-10-02 NOTE — LETTER
"2024     Ebony Wade MD  [address]  [address]  [place] [address]    Patient: Marisel Olivas   YOB: 1980   Date of Visit: 10/2/2024       Dear Dr. Wade:    Thank you for referring Marisel Olivas to me for evaluation. Below are my notes for this consultation.    If you have questions, please do not hesitate to call me. I look forward to following your patient along with you.         Sincerely,        Sky Ladd DO        CC: No Recipients    Sky Ladd DO  10/2/2024  2:45 PM  Sign when Signing Visit  Ambulatory Visit  Name: Marisel Olivas      : 1980      MRN: [id number]  Encounter Provider: Sky Ladd DO  Encounter Date: 10/2/2024   Encounter department: [facility] GENERAL SURGERY [facility]    Assessment & Plan  Skin mole  Removed in the office under local with her permission.  Base was hyfrecated.  Local wound care instructions given.  Will call with final pathology         History of Present Illness    Marisel Olivas is a 43 y.o. female who presents for follow up for excision of skin lesion.   S/P I&D right suprapubic abscess 2024.  Area of abscess is completely resolved.  Remains with limited dark brown mole which has increased in size.  Desires removal      Review of Systems   Skin:         Tender erythematous right suprapubic area   All other systems reviewed and are negative.          Objective    Ht 5' 8\" (1.727 m)   Wt 99.8 kg (220 lb)   BMI 33.45 kg/m²     Physical Exam  Skin:     General: Skin is warm and dry.      Comments: Very well-healed I&D site from previous abscess  1 cm pedunculated mole on the right suprapubic region.  Discussed risks and benefits of removal and she agrees to proceed   Neurological:      Mental Status: She is alert.     Skin excision    Date/Time: 10/2/2024 2:30 PM    Performed by: Sky Ladd DO  Authorized by: Sky Ladd DO  Universal Protocol:  Consent: Verbal " "consent obtained.  Risks and benefits: risks, benefits and alternatives were discussed  Consent given by: patient  Time out: Immediately prior to procedure a \"time out\" was called to verify the correct patient, procedure, equipment, support staff and site/side marked as required.  Patient understanding: patient states understanding of the procedure being performed  Patient identity confirmed: verbally with patient    Procedure Details - Skin Excision:     Number of Lesions:  1  Lesion 1:     Body area:  Trunk    Trunk location: Right suprapubic.    Malignancy: benign lesion            Final defect size (mm):  4     Repair Comments: Verbal consent was obtained from the patient.  Alcohol was used to cleanse the area of the skin mole.  1% lidocaine with epinephrine was used to infiltrate skin and subcutaneous tissues.  Pedunculated mole was lifted from the skin and the base of it was cut with scissors sharply.  Hyfrecator was utilized for hemostasis.  Sterile bandage was applied.  She tolerated well.        "

## 2024-10-02 NOTE — ASSESSMENT & PLAN NOTE
Removed in the office under local with her permission.  Base was hyfrecated.  Local wound care instructions given.  Will call with final pathology

## 2024-10-02 NOTE — PROGRESS NOTES
"Ambulatory Visit  Name: Marisel Olivas      : 1980      MRN: [id number]  Encounter Provider: Sky Ladd DO  Encounter Date: 10/2/2024   Encounter department: [facility]    Assessment & Plan  Skin mole  Removed in the office under local with her permission.  Base was hyfrecated.  Local wound care instructions given.  Will call with final pathology         History of Present Illness     Marisel Olivas is a 43 y.o. female who presents for follow up for excision of skin lesion.   S/P I&D right suprapubic abscess 2024.  Area of abscess is completely resolved.  Remains with limited dark brown mole which has increased in size.  Desires removal      Review of Systems   Skin:         Tender erythematous right suprapubic area   All other systems reviewed and are negative.          Objective     Ht 5' 8\" (1.727 m)   Wt 99.8 kg (220 lb)   BMI 33.45 kg/m²     Physical Exam  Skin:     General: Skin is warm and dry.      Comments: Very well-healed I&D site from previous abscess  1 cm pedunculated mole on the right suprapubic region.  Discussed risks and benefits of removal and she agrees to proceed   Neurological:      Mental Status: She is alert.     Skin excision    Date/Time: 10/2/2024 2:30 PM    Performed by: Sky Ladd DO  Authorized by: Sky Ladd DO  Universal Protocol:  Consent: Verbal consent obtained.  Risks and benefits: risks, benefits and alternatives were discussed  Consent given by: patient  Time out: Immediately prior to procedure a \"time out\" was called to verify the correct patient, procedure, equipment, support staff and site/side marked as required.  Patient understanding: patient states understanding of the procedure being performed  Patient identity confirmed: verbally with patient    Procedure Details - Skin Excision:     Number of Lesions:  1  Lesion 1:     Body area:  Trunk    Trunk location: Right suprapubic.    Malignancy: " benign lesion            Final defect size (mm):  4     Repair Comments: Verbal consent was obtained from the patient.  Alcohol was used to cleanse the area of the skin mole.  1% lidocaine with epinephrine was used to infiltrate skin and subcutaneous tissues.  Pedunculated mole was lifted from the skin and the base of it was cut with scissors sharply.  Hyfrecator was utilized for hemostasis.  Sterile bandage was applied.  She tolerated well.

## 2024-10-04 NOTE — PROGRESS NOTES
FAMILY PRACTICE OFFICE VISIT       NAME: Marisel Olivas  AGE: 43 y.o. SEX: female       : 1980        MRN: [id number]    DATE: 10/4/2024  TIME: 3:37 PM    Assessment and Plan   1. Cutaneous abscess of other site   Reffer to general surgery for I&D      There are no Patient Instructions on file for this visit.        Chief Complaint     Chief Complaint   Patient presents with    Mass     Patient being seen for Abscess right groin x 1 day-red, painful       History of Present Illness   Marisel Olivas is a 43 y.o.-year-old female who is here for c/o wound right groin  Painful  No fever or chills      Review of Systems   Review of Systems   Constitutional:  Negative for fatigue and fever.   Respiratory:  Negative for cough and shortness of breath.    Cardiovascular:  Negative for chest pain and palpitations.   Gastrointestinal:  Negative for constipation, diarrhea, nausea and vomiting.   Genitourinary:  Negative for dysuria and frequency.   Musculoskeletal:  Negative for arthralgias and myalgias.   Skin:  Positive for wound.   Neurological:  Negative for dizziness, numbness and headaches.   Hematological:  Negative for adenopathy.   Psychiatric/Behavioral:  Negative for dysphoric mood and sleep disturbance. The patient is not nervous/anxious.        Active Problem List     Patient Active Problem List   Diagnosis    Essential hypertriglyceridemia    Insomnia    Obesity    Obstructive sleep apnea on CPAP    Irritable bowel syndrome with constipation    Essential hypertension    Cervical radiculopathy    S/P cervical discectomy    Muscle spasm of back    Gastroesophageal reflux disease without esophagitis    Polyarthralgia    History of seizure    Postural dizziness    Intervertebral disc disorder with radiculopathy of lumbar region    Myofascial pain syndrome    Urinary hesitancy    Pelvic floor tension    Skin mole    Abscess         Past Medical History:  Past Medical History:   Diagnosis Date     Constipation     COVID-19 2022    Depression     Eczema     Epilepsy (HCC) 11/15/2013    Description: well controlled with sleep    GERD (gastroesophageal reflux disease)     Grand mal convulsion (HCC)     X 2 last episode -Sleep deprivation-Resolved with Cpap    Hypertension     Insomnia     Lateral epicondylitis of right elbow 2018    Medial meniscus tear 2015    Migraine     PONV (postoperative nausea and vomiting)     Primary generalized epilepsy (HCC) 2011    Psoriasis     Seizures ([facility])     Shingles     Shoulder dislocation, right, sequela 2018    Sinusitis     Sleep apnea     uses cpap    Tinnitus        Past Surgical History:  Past Surgical History:   Procedure Laterality Date     SECTION   and     COLONOSCOPY      DILATION AND CURETTAGE OF UTERUS      DILATION AND CURETTAGE OF UTERUS WITH HYSTEROSOCPY N/A 2017    Procedure: D&C; IUD REMOVAL;  Surgeon: Abeba Blackburn MD;  Location: [facility];  Service: Gynecology    EGD      EPIDURAL BLOCK INJECTION Bilateral 2024    Procedure: BILATERAL L5-S1 TRANSFORAMINAL EPIDURAL STEROID INJECTION;  Surgeon: Jeff Pettit DO;  Location: [facility] MAIN OR;  Service: Pain Management     HYSTEROSCOPY      LAPAROSCOPY      (Diagnostic)    LASIK      DC COLONOSCOPY FLX DX W/COLLJ SPEC WHEN PFRMD N/A 2018    Procedure: EGD AND COLONOSCOPY;  Surgeon: Jagdish Katz MD;  Location: [facility]  GI LAB;  Service: Gastroenterology    DC VERTEBRAL CORPECTOMY ANT DCMPRN CERVICAL 1 SEG Bilateral 2021    Procedure: CORPECTOMY SPINE CERVICAL ANTERIOR: C4-7 ACDF with C6 /hemicorpectomy, C4-7 instrumentation and fusion (neuromonitoring);  Surgeon: Raquel Porter MD;  Location: [facility];  Service: Neurosurgery    REMOVAL OF INTRAUTERINE DEVICE (IUD)         Family History:  Family History   Problem Relation Age of Onset    No Known Problems Mother     Diabetes Father         Diabetes Mellitus    Hyperlipidemia Father      Hypertension Father     No Known Problems Sister     No Known Problems Daughter     Brain cancer Maternal Grandmother 56    No Known Problems Son     Migraines Family     Diabetes Family         Type 2 Diabetes Mellitus    Breast cancer Neg Hx        Social History:  Social History     Socioeconomic History    Marital status: /Civil Union     Spouse name: Not on file    Number of children: Not on file    Years of education: Not on file    Highest education level: Not on file   Occupational History    Not on file   Tobacco Use    Smoking status: Former     Current packs/day: 0.00     Average packs/day: 0.3 packs/day for 25.0 years (6.3 ttl pk-yrs)     Types: Cigarettes     Start date:      Quit date:      Years since quittin.7     Passive exposure: Past    Smokeless tobacco: Never    Tobacco comments:     Former smoker per [employer]   Vaping Use    Vaping status: Never Used   Substance and Sexual Activity    Alcohol use: Yes     Comment: occassionally    Drug use: Never    Sexual activity: Not Currently     Partners: Male     Birth control/protection: Implant     Comment: 15 - Nexplanon inserted   Other Topics Concern    Not on file   Social History Narrative    Denied:  Exercising Regularly     Social Determinants of Health     Financial Resource Strain: Not on file   Food Insecurity: Not on file   Transportation Needs: Not on file   Physical Activity: Not on file   Stress: Not on file   Social Connections: Not on file   Intimate Partner Violence: Not on file   Housing Stability: Not on file       Objective     Vitals:    09/10/24 1031   BP: 130/98   Pulse: 88   Resp: 17   Temp: 99 °F (37.2 °C)   SpO2: 98%     Wt Readings from Last 3 Encounters:   10/02/24 99.8 kg (220 lb)   24 99.8 kg (220 lb)   24 96.6 kg (213 lb)       Physical Exam  Vitals and nursing note reviewed.   Constitutional:       Appearance: Normal appearance.   HENT:      Head: Normocephalic and atraumatic.  "  Eyes:      Conjunctiva/sclera: Conjunctivae normal.   Cardiovascular:      Rate and Rhythm: Normal rate and regular rhythm.      Heart sounds: Normal heart sounds.   Pulmonary:      Effort: Pulmonary effort is normal.      Breath sounds: Normal breath sounds.   Musculoskeletal:         General: Normal range of motion.      Cervical back: Normal range of motion and neck supple.   Skin:     General: Skin is warm and dry.      Capillary Refill: Capillary refill takes less than 2 seconds.             Comments: Rifht groin abscess noted     Neurological:      Mental Status: She is alert and oriented to person, place, and time.   Psychiatric:         Mood and Affect: Mood normal.         Behavior: Behavior normal.         Pertinent Laboratory/Diagnostic Studies:  Lab Results   Component Value Date    GLUCOSE 94 10/16/2013    BUN 13 01/17/2024    CREATININE 0.71 01/17/2024    CALCIUM 9.7 01/17/2024     10/16/2013    K 3.4 (L) 01/17/2024    CO2 28 01/17/2024     01/17/2024     Lab Results   Component Value Date    ALT 19 01/17/2024    AST 16 01/17/2024    ALKPHOS 55 01/17/2024    BILITOT 0.75 10/16/2013       Lab Results   Component Value Date    WBC 7.65 01/17/2024    HGB 14.3 01/17/2024    HCT 42.1 01/17/2024    MCV 90 01/17/2024     01/17/2024       No results found for: \"TSH\"    Lab Results   Component Value Date    CHOL 164 10/16/2013     Lab Results   Component Value Date    TRIG 441 (H) 01/17/2024     Lab Results   Component Value Date    HDL 34 (L) 01/17/2024     Lab Results   Component Value Date    LDLCALC  01/17/2024      Comment:      Calculated LDL invalid, triglycerides >400 mg/dl  This screening LDL is a calculated result.   It does not have the accuracy of the Direct Measured LDL in the monitoring of patients with hyperlipidemia and/or statin therapy.   Direct Measure LDL (EJM904) must be ordered separately in these patients.     Lab Results   Component Value Date    HGBA1C 5.6 " "09/24/2021       Results for orders placed or performed in visit on 01/24/24   POCT urine dip    Collection Time: 01/24/24 10:01 AM   Result Value Ref Range    LEUKOCYTE ESTERASE,UA negative     NITRITE,UA -     SL AMB POCT UROBILINOGEN 0.2     POCT URINE PROTEIN trace      PH,UA 8.0     BLOOD,UA small     SPECIFIC GRAVITY,UA 1.025     KETONES,UA -     BILIRUBIN,UA -     GLUCOSE, UA -      COLOR,UA yellow     CLARITY,UA clear        No orders of the defined types were placed in this encounter.      ALLERGIES:  Allergies   Allergen Reactions    Gluten Meal - Food Allergy GI Intolerance     \" Highly Sensitivity\"    Lactose - Food Allergy GI Intolerance     \" Highly Sensitive\"       Current Medications     Current Outpatient Medications   Medication Sig Dispense Refill    acetaminophen (TYLENOL) 325 mg tablet Take 650 mg by mouth every 6 (six) hours as needed for mild pain      albuterol (ProAir HFA) 90 mcg/act inhaler Inhale 2 puffs every 6 (six) hours as needed for shortness of breath 18 g 0    cholecalciferol (VITAMIN D3) 1,000 units tablet Take 5,000 Units by mouth daily        Docusate Sodium (COLACE CLEAR PO) Take by mouth      Etonogestrel (NEXPLANON SC) Inject under the skin Located Left upper inner arm      fluocinolone acetonide (DERMOTIC) 0.01 % otic oil Administer 5 drops into both ears 2 (two) times a day as needed (for itchy ears) 20 mL 11    linaCLOtide (Linzess) 145 MCG CAPS Take 1 capsule (145 mcg total) by mouth in the morning 90 capsule 3    linaCLOtide (Linzess) 290 MCG CAPS Take 1 capsule by mouth in the morning 90 capsule 3    magnesium 30 MG tablet Take 500 mg by mouth 2 (two) times a day        Multiple Vitamins-Minerals (MULTIVITAL-M) TABS Take by mouth      Omega 3-6-9 Fatty Acids (OMEGA 3-6-9 PO) Take by mouth      omeprazole (PriLOSEC) 40 MG capsule Take 1 capsule (40 mg total) by mouth daily (Patient taking differently: Take 40 mg by mouth if needed) 90 capsule 0    QUEtiapine (SEROquel) 25 " mg tablet TAKE 3 TABLETS BY MOUTH EVERY  tablet 3    TiZANidine (ZANAFLEX) 4 MG capsule Take 1 capsule (4 mg total) by mouth 3 (three) times a day as needed for muscle spasms 90 capsule 3    traZODone (DESYREL) 50 mg tablet Take 1 tablet (50 mg total) by mouth daily at bedtime as needed for sleep or depression 90 tablet 3    naproxen (EC NAPROSYN) 500 MG EC tablet Take 1 tablet (500 mg total) by mouth 3 (three) times a day with meals (Patient not taking: Reported on 2/27/2024) 30 tablet 0    NON FORMULARY daily at bedtime Takes Valarien Root that's 1 PO QHS for Sleep (Patient not taking: Reported on 2/27/2024)      predniSONE 10 mg tablet 4 tabs x 2 days, 3 tabs x 2 days, 2 tabs x 2 days, 1 tab x 2 days (Patient not taking: Reported on 6/20/2024) 20 tablet 0     No current facility-administered medications for this visit.         Health Maintenance     Health Maintenance   Topic Date Due    Pneumococcal Vaccine: Pediatrics (0 to 5 Years) and At-Risk Patients (6 to 64 Years) (1 of 2 - PCV) Never done    PT PLAN OF CARE  01/03/2024    OT PLAN OF CARE  03/13/2024    Influenza Vaccine (1) 09/01/2024    COVID-19 Vaccine (5 - 2023-24 season) 09/01/2024    Depression Screening  06/18/2025    Annual Physical  06/18/2025    Breast Cancer Screening: Mammogram  07/31/2025    Cervical Cancer Screening  01/10/2028    Zoster Vaccine (1 of 2) 11/12/2028    DTaP,Tdap,and Td Vaccines (3 - Td or Tdap) 03/25/2032    Colorectal Cancer Screening  10/15/2033    RSV Vaccine Age 60+ Years (1 - 1-dose 60+ series) 11/12/2038    HIV Screening  Completed    Hepatitis C Screening  Completed    RSV Vaccine age 0-20 Months  Aged Out    HIB Vaccine  Aged Out    IPV Vaccine  Aged Out    Hepatitis A Vaccine  Aged Out    Meningococcal ACWY Vaccine  Aged Out    HPV Vaccine  Aged Out     Immunization History   Administered Date(s) Administered    COVID-19 PFIZER VACCINE 0.3 ML IM 04/30/2021, 04/30/2021, 05/21/2021, 05/21/2021    INFLUENZA  09/09/2021, 09/09/2021, 09/26/2022, 10/10/2023    Influenza, injectable, quadrivalent, preservative free 0.5 mL 09/26/2022, 10/10/2023    Tdap 03/25/2022, 03/25/2022          ADA Butler

## 2024-11-21 ENCOUNTER — TELEPHONE (OUTPATIENT)
Dept: GASTROENTEROLOGY | Facility: CLINIC | Age: 44
End: 2024-11-21

## 2024-11-21 NOTE — TELEPHONE ENCOUNTER
Pts 1/16/2025 appt with Dr. Nava needs to be rescheduled due to construction.  I called the pt but had to leave a vm to return the call.

## 2024-12-04 ENCOUNTER — APPOINTMENT (OUTPATIENT)
Dept: LAB | Facility: CLINIC | Age: 44
End: 2024-12-04
Payer: COMMERCIAL

## 2024-12-04 DIAGNOSIS — I10 ESSENTIAL HYPERTENSION: ICD-10-CM

## 2024-12-04 DIAGNOSIS — E78.1 ESSENTIAL HYPERTRIGLYCERIDEMIA: ICD-10-CM

## 2024-12-04 DIAGNOSIS — M54.12 RADICULOPATHY, CERVICAL: ICD-10-CM

## 2024-12-04 LAB
ALBUMIN SERPL BCG-MCNC: 4.4 G/DL (ref 3.5–5)
ALP SERPL-CCNC: 62 U/L (ref 34–104)
ALT SERPL W P-5'-P-CCNC: 16 U/L (ref 7–52)
ANION GAP SERPL CALCULATED.3IONS-SCNC: 11 MMOL/L (ref 4–13)
AST SERPL W P-5'-P-CCNC: 13 U/L (ref 13–39)
BASOPHILS # BLD AUTO: 0.09 THOUSANDS/ÂΜL (ref 0–0.1)
BASOPHILS NFR BLD AUTO: 1 % (ref 0–1)
BILIRUB SERPL-MCNC: 1.11 MG/DL (ref 0.2–1)
BUN SERPL-MCNC: 11 MG/DL (ref 5–25)
CALCIUM SERPL-MCNC: 9.1 MG/DL (ref 8.4–10.2)
CHLORIDE SERPL-SCNC: 99 MMOL/L (ref 96–108)
CHOLEST SERPL-MCNC: 211 MG/DL (ref ?–200)
CO2 SERPL-SCNC: 30 MMOL/L (ref 21–32)
CREAT SERPL-MCNC: 0.69 MG/DL (ref 0.6–1.3)
EOSINOPHIL # BLD AUTO: 0.26 THOUSAND/ÂΜL (ref 0–0.61)
EOSINOPHIL NFR BLD AUTO: 3 % (ref 0–6)
ERYTHROCYTE [DISTWIDTH] IN BLOOD BY AUTOMATED COUNT: 12.9 % (ref 11.6–15.1)
GFR SERPL CREATININE-BSD FRML MDRD: 104 ML/MIN/1.73SQ M
GLUCOSE P FAST SERPL-MCNC: 87 MG/DL (ref 65–99)
HCT VFR BLD AUTO: 44.5 % (ref 34.8–46.1)
HDLC SERPL-MCNC: 36 MG/DL
HGB BLD-MCNC: 14.8 G/DL (ref 11.5–15.4)
IMM GRANULOCYTES # BLD AUTO: 0.04 THOUSAND/UL (ref 0–0.2)
IMM GRANULOCYTES NFR BLD AUTO: 0 % (ref 0–2)
LDLC SERPL CALC-MCNC: 103 MG/DL (ref 0–100)
LYMPHOCYTES # BLD AUTO: 2.43 THOUSANDS/ÂΜL (ref 0.6–4.47)
LYMPHOCYTES NFR BLD AUTO: 26 % (ref 14–44)
MCH RBC QN AUTO: 30 PG (ref 26.8–34.3)
MCHC RBC AUTO-ENTMCNC: 33.3 G/DL (ref 31.4–37.4)
MCV RBC AUTO: 90 FL (ref 82–98)
MONOCYTES # BLD AUTO: 0.54 THOUSAND/ÂΜL (ref 0.17–1.22)
MONOCYTES NFR BLD AUTO: 6 % (ref 4–12)
NEUTROPHILS # BLD AUTO: 5.86 THOUSANDS/ÂΜL (ref 1.85–7.62)
NEUTS SEG NFR BLD AUTO: 64 % (ref 43–75)
NONHDLC SERPL-MCNC: 175 MG/DL
NRBC BLD AUTO-RTO: 0 /100 WBCS
PLATELET # BLD AUTO: 311 THOUSANDS/UL (ref 149–390)
PMV BLD AUTO: 9.6 FL (ref 8.9–12.7)
POTASSIUM SERPL-SCNC: 4.4 MMOL/L (ref 3.5–5.3)
PROT SERPL-MCNC: 6.9 G/DL (ref 6.4–8.4)
RBC # BLD AUTO: 4.93 MILLION/UL (ref 3.81–5.12)
SODIUM SERPL-SCNC: 140 MMOL/L (ref 135–147)
TRIGL SERPL-MCNC: 359 MG/DL (ref ?–150)
TSH SERPL DL<=0.05 MIU/L-ACNC: 1.08 UIU/ML (ref 0.45–4.5)
WBC # BLD AUTO: 9.22 THOUSAND/UL (ref 4.31–10.16)

## 2024-12-04 PROCEDURE — 80053 COMPREHEN METABOLIC PANEL: CPT

## 2024-12-04 PROCEDURE — 85025 COMPLETE CBC W/AUTO DIFF WBC: CPT

## 2024-12-04 PROCEDURE — 80061 LIPID PANEL: CPT

## 2024-12-04 PROCEDURE — 84443 ASSAY THYROID STIM HORMONE: CPT

## 2024-12-04 PROCEDURE — 36415 COLL VENOUS BLD VENIPUNCTURE: CPT

## 2024-12-05 ENCOUNTER — RESULTS FOLLOW-UP (OUTPATIENT)
Dept: FAMILY MEDICINE CLINIC | Facility: CLINIC | Age: 44
End: 2024-12-05

## 2024-12-17 ENCOUNTER — OFFICE VISIT (OUTPATIENT)
Dept: FAMILY MEDICINE CLINIC | Facility: CLINIC | Age: 44
End: 2024-12-17
Payer: COMMERCIAL

## 2024-12-17 VITALS
WEIGHT: 216 LBS | DIASTOLIC BLOOD PRESSURE: 98 MMHG | SYSTOLIC BLOOD PRESSURE: 140 MMHG | HEIGHT: 68 IN | BODY MASS INDEX: 32.74 KG/M2 | OXYGEN SATURATION: 99 % | TEMPERATURE: 98.1 F | HEART RATE: 88 BPM | RESPIRATION RATE: 17 BRPM

## 2024-12-17 DIAGNOSIS — E66.09 OBESITY DUE TO EXCESS CALORIES WITH SERIOUS COMORBIDITY, UNSPECIFIED CLASS: ICD-10-CM

## 2024-12-17 DIAGNOSIS — K21.9 GASTROESOPHAGEAL REFLUX DISEASE WITHOUT ESOPHAGITIS: ICD-10-CM

## 2024-12-17 DIAGNOSIS — G47.33 OBSTRUCTIVE SLEEP APNEA ON CPAP: ICD-10-CM

## 2024-12-17 DIAGNOSIS — M51.16 INTERVERTEBRAL DISC DISORDER WITH RADICULOPATHY OF LUMBAR REGION: ICD-10-CM

## 2024-12-17 DIAGNOSIS — I10 ESSENTIAL HYPERTENSION: Primary | ICD-10-CM

## 2024-12-17 DIAGNOSIS — E78.1 ESSENTIAL HYPERTRIGLYCERIDEMIA: ICD-10-CM

## 2024-12-17 DIAGNOSIS — F51.01 PRIMARY INSOMNIA: ICD-10-CM

## 2024-12-17 PROBLEM — R42 POSTURAL DIZZINESS: Status: RESOLVED | Noted: 2023-11-08 | Resolved: 2024-12-17

## 2024-12-17 PROBLEM — L02.91 ABSCESS: Status: RESOLVED | Noted: 2024-09-11 | Resolved: 2024-12-17

## 2024-12-17 PROCEDURE — 99214 OFFICE O/P EST MOD 30 MIN: CPT | Performed by: FAMILY MEDICINE

## 2024-12-17 NOTE — ASSESSMENT & PLAN NOTE
Not controlled  She doesn't want to take any BP medications at this time   She wants to continue life style modifications  To cut back on salty snacks   Recheck in 6 months    Orders:  •  Comprehensive metabolic panel  •  CBC and differential  •  Lipid panel

## 2024-12-17 NOTE — PROGRESS NOTES
"Name: Marisel Olivas      : 1980      MRN: [id number]  Encounter Provider: Ebony Wade MD  Encounter Date: 2024   Encounter department: [facility]  :  Assessment & Plan  Essential hypertension  Not controlled  She doesn't want to take any BP medications at this time   She wants to continue life style modifications  To cut back on salty snacks   Recheck in 6 months    Orders:  •  Comprehensive metabolic panel  •  CBC and differential  •  Lipid panel    Gastroesophageal reflux disease without esophagitis  Not needing to take omeprazole as she is eating better        Obstructive sleep apnea on CPAP  On CPAP machine at bedtime       Essential hypertriglyceridemia  Still elevated  To cut back on processed/refined carbs  To take omega 3 fatty acid 2 grams 2 x a day    Orders:  •  Comprehensive metabolic panel  •  Lipid panel    Intervertebral disc disorder with radiculopathy of lumbar region  Stable   Continue to monitor  Using hot tub at home        Obesity due to excess calories with serious comorbidity, unspecified class  Diet, exercise and portion control  To track calories        Primary insomnia  Stable on current meds              History of Present Illness     HPI  Here for follow up  Doing energy grounding exercise which she thinks its helping with her blood pressure   Struggling to lose weight   Less pain overall since she is eating better    Review of Systems   Constitutional: Negative.    HENT: Negative.     Eyes: Negative.    Respiratory: Negative.     Endocrine: Negative.    Genitourinary: Negative.    Musculoskeletal: Negative.    Allergic/Immunologic: Negative.    Neurological: Negative.    Hematological: Negative.        Objective   /98 (BP Location: Left arm, Patient Position: Sitting, Cuff Size: Standard)   Pulse 88   Temp 98.1 °F (36.7 °C) (Tympanic)   Resp 17   Ht 5' 8\" (1.727 m)   Wt 98 kg (216 lb)   SpO2 99%   BMI 32.84 kg/m²      Physical " Exam  Vitals and nursing note reviewed.   Constitutional:       Appearance: Normal appearance.   HENT:      Head: Normocephalic and atraumatic.      Mouth/Throat:      Mouth: Mucous membranes are moist.   Eyes:      Extraocular Movements: Extraocular movements intact.      Pupils: Pupils are equal, round, and reactive to light.   Cardiovascular:      Pulses: Normal pulses.      Heart sounds: Normal heart sounds.   Pulmonary:      Effort: Pulmonary effort is normal.      Breath sounds: Normal breath sounds.   Neurological:      General: No focal deficit present.      Mental Status: She is alert and oriented to person, place, and time.   Psychiatric:         Mood and Affect: Mood normal.         Behavior: Behavior normal.       Administrative Statements   I have spent a total time of 30 minutes in caring for this patient on the day of the visit/encounter including Instructions for management, Patient and family education, Importance of tx compliance, Counseling / Coordination of care, and Reviewing / ordering tests, medicine, procedures  .

## 2024-12-17 NOTE — ASSESSMENT & PLAN NOTE
Still elevated  To cut back on processed/refined carbs  To take omega 3 fatty acid 2 grams 2 x a day    Orders:  •  Comprehensive metabolic panel  •  Lipid panel

## 2025-01-16 ENCOUNTER — TELEPHONE (OUTPATIENT)
Age: 45
End: 2025-01-16

## 2025-01-16 NOTE — TELEPHONE ENCOUNTER
Patients GI provider:  NAYE Soliman    Number to return call: [phone]    Reason for call: Patient states medication linzess is working great and has no complaints. Patient cancelled appt.    Scheduled procedure/appointment date if applicable: Apt/procedure n/a

## 2025-02-12 ENCOUNTER — TELEPHONE (OUTPATIENT)
Dept: FAMILY MEDICINE CLINIC | Facility: CLINIC | Age: 45
End: 2025-02-12

## 2025-02-12 DIAGNOSIS — I10 ESSENTIAL HYPERTENSION: Primary | ICD-10-CM

## 2025-02-12 RX ORDER — LOSARTAN POTASSIUM AND HYDROCHLOROTHIAZIDE 12.5; 5 MG/1; MG/1
1 TABLET ORAL DAILY
Qty: 30 TABLET | Refills: 5 | Status: SHIPPED | OUTPATIENT
Start: 2025-02-12

## 2025-02-12 NOTE — TELEPHONE ENCOUNTER
Patient called the RX Refill Line. Message is being forwarded to the office.     Patient called stating that she would like to go back to taking blood pressure medications HCTZ and losartan. Patient stated that at her last appt with Ebony Wade MD she discussed making some lifestyle changes and going off the medications. That does not seem to be working as her blood pressure readings are still high. Please review. Patient uses [id number] - [place], [place] - [address] Any further questions please contact the patient    Please contact patient at [phone]

## 2025-03-04 ENCOUNTER — TELEPHONE (OUTPATIENT)
Age: 45
End: 2025-03-04

## 2025-03-04 NOTE — TELEPHONE ENCOUNTER
S/w Pt. Pt requested repeat injections   Injection types:   B/L L5-S1 TFESI 06/20/24  Right cervical thoracic paraspinal muscle trigger point injection(s) 02/15/24  Last office visit:  02/07/24    Where is pain located:  8/10 LB, 7/10 Rt shoulder - burning pain  Is the pain the same area as before: Yes  How much % of relief did the last injection provide: 80% relief  Duration of relief from last injection: 1 year cervical, 7 months lumbar  When did pain return: last 2 weeks  Is the pain effecting your ADLs:  Yes    Blood thinners/NSAIDS? No  Diabetes? No  Please advise if able to repeat B/L L5 TFESI and cervical/thoracic TPI.

## 2025-03-04 NOTE — TELEPHONE ENCOUNTER
Caller: Marisel     Doctor: Dr. Pettit     Reason for call: Patient calling stating she would like to schedule procedure stating pain coming back low back and hip please advise     Call back#: [phone]

## 2025-03-04 NOTE — TELEPHONE ENCOUNTER
Okay to schedule repeat bilateral L5 TFESI  Cannot schedule repeat TPI's as these were done just several weeks ago  Thank you

## 2025-03-04 NOTE — TELEPHONE ENCOUNTER
Caller: Patient    Doctor: Dr. PERALTA / [facility]    Reason for call:     Patient transferred to [facility]    Call back#: n/a

## 2025-03-12 ENCOUNTER — OFFICE VISIT (OUTPATIENT)
Dept: FAMILY MEDICINE CLINIC | Facility: CLINIC | Age: 45
End: 2025-03-12
Payer: COMMERCIAL

## 2025-03-12 VITALS
OXYGEN SATURATION: 98 % | HEART RATE: 102 BPM | WEIGHT: 217 LBS | SYSTOLIC BLOOD PRESSURE: 118 MMHG | TEMPERATURE: 98.7 F | BODY MASS INDEX: 32.89 KG/M2 | RESPIRATION RATE: 16 BRPM | HEIGHT: 68 IN | DIASTOLIC BLOOD PRESSURE: 80 MMHG

## 2025-03-12 DIAGNOSIS — I10 ESSENTIAL HYPERTENSION: Primary | ICD-10-CM

## 2025-03-12 DIAGNOSIS — K58.1 IRRITABLE BOWEL SYNDROME WITH CONSTIPATION: ICD-10-CM

## 2025-03-12 DIAGNOSIS — E66.811 CLASS 1 OBESITY DUE TO EXCESS CALORIES WITH SERIOUS COMORBIDITY AND BODY MASS INDEX (BMI) OF 32.0 TO 32.9 IN ADULT: ICD-10-CM

## 2025-03-12 DIAGNOSIS — E78.1 ESSENTIAL HYPERTRIGLYCERIDEMIA: ICD-10-CM

## 2025-03-12 DIAGNOSIS — E66.09 CLASS 1 OBESITY DUE TO EXCESS CALORIES WITH SERIOUS COMORBIDITY AND BODY MASS INDEX (BMI) OF 32.0 TO 32.9 IN ADULT: ICD-10-CM

## 2025-03-12 DIAGNOSIS — F51.01 PRIMARY INSOMNIA: ICD-10-CM

## 2025-03-12 DIAGNOSIS — M51.16 INTERVERTEBRAL DISC DISORDER WITH RADICULOPATHY OF LUMBAR REGION: ICD-10-CM

## 2025-03-12 DIAGNOSIS — G47.33 OBSTRUCTIVE SLEEP APNEA ON CPAP: ICD-10-CM

## 2025-03-12 DIAGNOSIS — J45.909 BRONCHITIS, ALLERGIC, UNSPECIFIED ASTHMA SEVERITY, UNCOMPLICATED: ICD-10-CM

## 2025-03-12 PROCEDURE — 99214 OFFICE O/P EST MOD 30 MIN: CPT | Performed by: FAMILY MEDICINE

## 2025-03-12 NOTE — TELEPHONE ENCOUNTER
I sent [facility] EMERGENCY DEPT  EMERGENCY DEPARTMENT HISTORY AND PHYSICAL EXAM      Date: 3/12/2025  Patient Name: Laura James  MRN: [id number]  Birthdate 2007  Date of evaluation: 3/12/2025  Provider: Michael Domínguez MD   Note Started: 1:11 PM EDT 3/12/25    HISTORY OF PRESENT ILLNESS     Chief Complaint   Patient presents with   • Abdominal Pain   • Back Pain   • Diarrhea       History Provided By: Patient    HPI: Laura James is a 17 y.o. female who comes to the ED for evaluation of vaginal bleeding, thigh pain, and back pain.  Patient report that she started having menstrual cycle on 2/3/2025.  Since then she has been having bleeding and discomfort in the right lower quadrant.  She was able to get her appointment with gynecology.  However, this is not till 5/8/2025.  Her pain is in the right lower quadrant.  She has been having similar pains for a year but has worsened recently.  Of note, patient said experiencing diarrhea for the last 2 days in which she goes to the bathroom roughly 5-6 times.  She is feeling nauseous but has not had vomiting.  She is not around anyone sick.    PAST MEDICAL HISTORY   Past Medical History:  History reviewed. No pertinent past medical history.    Past Surgical History:  History reviewed. No pertinent surgical history.    Family History:  History reviewed. No pertinent family history.    Social History:  Social History     Tobacco Use   • Smoking status: Former     Types: Cigarettes   • Smokeless tobacco: Never   Substance Use Topics   • Alcohol use: Never   • Drug use: Never       Allergies:  No Known Allergies    PCP: Danny Drew MD    Current Meds:   No current facility-administered medications for this encounter.     Current Outpatient Medications   Medication Sig Dispense Refill   • medroxyPROGESTERone (PROVERA) 2.5 MG tablet Take 4 tablets by mouth daily for 10 days 40 tablet 0       Social Determinants of Health:   Social Drivers of Health     Tobacco Use:  Below    Interpretation per the Radiologist below, if available at the time of this note:  US NON OB TRANSVAGINAL   Final Result   No significant abnormalities.      Electronically signed by HÉCTOR Doan      US PELVIS COMPLETE   Final Result   No significant abnormalities.      Electronically signed by HÉCTOR Doan           ED COURSE and DIFFERENTIAL DIAGNOSIS/MDM   5:22 PM Differential and Considerations: Patient is a pleasant 17-year-old female comes ED for evaluation of vaginal bleeding.  This could be secondary to dysfunctional uterine bleeding.  Less likely to be secondary to pregnancy related bleeding given the lengthy duration of her bleeding.  Patient had mild suprapubic discomfort on assessment but there is no signs of peritoneal irritation.  In the setting of her bleeding concern for possibly anemia and thus obtain labs CBC, chemistry, pregnancy testing, screening.      Records Reviewed (source and summary of external notes): Prior medical records and Nursing notes.    Vitals:    Vitals:    03/12/25 1303   BP: 123/81   Pulse: (!) 102   Resp: 18   Temp: 97.9 °F (36.6 °C)   SpO2: 100%   Weight: 63.5 kg (140 lb)   Height: 1.524 m (5')        ED COURSE         I independently reviewed and interpreted the patient workup.  CBC without Exeltis anemia.  Chemistry without ENEDELIA or electrolyte derangement.  Pregnancy test is negative.  Her ultrasound did not show evidence of acute pelvic pathologies.  I discussed the patient with the hospitalist on-call recommended starting the patient on Provera and close follow-up with the OB/GYN.      Patient was given the following medications:  Medications   sodium chloride 0.9 % bolus 1,000 mL (0 mLs IntraVENous Stopped 3/12/25 1545)   ondansetron (ZOFRAN) injection 4 mg (4 mg IntraVENous Given 3/12/25 1332)   morphine (PF) injection 4 mg (4 mg IntraVENous Given 3/12/25 1529)       ED IMPRESSION     1. Dysfunctional uterine bleeding          DISPOSITION/PLAN   DISPOSITION Decision

## 2025-03-12 NOTE — ASSESSMENT & PLAN NOTE
Ongoing issues  She will restart gabapentin   Seeing pain management for injection on 3/24/2025

## 2025-03-12 NOTE — PROGRESS NOTES
"Name: Marisel Olivas      : 1980      MRN: [id number]  Encounter Provider: Ebony Wade MD  Encounter Date: 3/12/2025   Encounter department: [facility]  :  Assessment & Plan  Essential hypertension  Doing well on current meds       Bronchitis, allergic, unspecified asthma severity, uncomplicated  Stable on albuterol prn           Obstructive sleep apnea on CPAP  Stable on CPAP machine        Irritable bowel syndrome with constipation  Doing well on linzess       Essential hypertriglyceridemia  Due for labs   She is on Krill oil daily          Primary insomnia  On seroquel and trazodone       Class 1 obesity due to excess calories with serious comorbidity and body mass index (BMI) of 32.0 to 32.9 in adult  Diet, exercise and portion control discussed   Doesn't want any weight loss meds            Intervertebral disc disorder with radiculopathy of lumbar region  Ongoing issues  She will restart gabapentin   Seeing pain management for injection on 3/24/2025                History of Present Illness   HPI  Here for follow up  Feels well overall   Fitting better with clothing but losing pounds   On and off low back pain    Review of Systems   Constitutional: Negative.    HENT: Negative.     Eyes: Negative.    Respiratory: Negative.     Cardiovascular: Negative.    Gastrointestinal: Negative.    Genitourinary: Negative.    Musculoskeletal: Negative.    Hematological: Negative.    Psychiatric/Behavioral: Negative.         Objective   /80 (BP Location: Left arm, Patient Position: Sitting, Cuff Size: Standard)   Pulse 102   Temp 98.7 °F (37.1 °C) (Tympanic)   Resp 16   Ht 5' 8\" (1.727 m)   Wt 98.4 kg (217 lb)   SpO2 98%   BMI 32.99 kg/m²      Physical Exam  Vitals and nursing note reviewed.   Constitutional:       Appearance: Normal appearance.   Eyes:      Extraocular Movements: Extraocular movements intact.      Pupils: Pupils are equal, round, and reactive to light. "   Cardiovascular:      Rate and Rhythm: Normal rate and regular rhythm.      Pulses: Normal pulses.      Heart sounds: Normal heart sounds.   Pulmonary:      Effort: Pulmonary effort is normal.      Breath sounds: Normal breath sounds.   Neurological:      General: No focal deficit present.      Mental Status: She is alert and oriented to person, place, and time.   Psychiatric:         Mood and Affect: Mood normal.         Behavior: Behavior normal.       BMI Counseling: Body mass index is 32.99 kg/m². The BMI is above normal. Nutrition recommendations include decreasing overall calorie intake, reducing fast food intake, decreasing soda and/or juice intake, and moderation in carbohydrate intake. Exercise recommendations include moderate aerobic physical activity for 150 minutes/week.

## 2025-03-24 ENCOUNTER — TELEPHONE (OUTPATIENT)
Dept: PAIN MEDICINE | Facility: CLINIC | Age: 45
End: 2025-03-24

## 2025-03-24 ENCOUNTER — HOSPITAL ENCOUNTER (OUTPATIENT)
Dept: RADIOLOGY | Facility: CLINIC | Age: 45
Discharge: HOME | End: 2025-03-24
Payer: COMMERCIAL

## 2025-03-24 VITALS
HEART RATE: 89 BPM | OXYGEN SATURATION: 96 % | TEMPERATURE: 98.8 F | RESPIRATION RATE: 20 BRPM | SYSTOLIC BLOOD PRESSURE: 121 MMHG | DIASTOLIC BLOOD PRESSURE: 75 MMHG

## 2025-03-24 DIAGNOSIS — M51.16 INTERVERTEBRAL DISC DISORDER WITH RADICULOPATHY OF LUMBAR REGION: ICD-10-CM

## 2025-03-24 PROCEDURE — 64483 NJX AA&/STRD TFRM EPI L/S 1: CPT | Performed by: PHYSICAL MEDICINE & REHABILITATION

## 2025-03-24 RX ORDER — METHYLPREDNISOLONE ACETATE 40 MG/ML
40 INJECTION, SUSPENSION INTRA-ARTICULAR; INTRALESIONAL; INTRAMUSCULAR; PARENTERAL; SOFT TISSUE ONCE
Status: COMPLETED | OUTPATIENT
Start: 2025-03-24 | End: 2025-03-24

## 2025-03-24 RX ORDER — BUPIVACAINE HCL/PF 2.5 MG/ML
2 VIAL (ML) INJECTION ONCE
Status: COMPLETED | OUTPATIENT
Start: 2025-03-24 | End: 2025-03-24

## 2025-03-24 RX ORDER — LIDOCAINE HYDROCHLORIDE 10 MG/ML
4 INJECTION, SOLUTION EPIDURAL; INFILTRATION; INTRACAUDAL; PERINEURAL ONCE
Status: COMPLETED | OUTPATIENT
Start: 2025-03-24 | End: 2025-03-24

## 2025-03-24 RX ADMIN — METHYLPREDNISOLONE ACETATE 40 MG: 40 INJECTION, SUSPENSION INTRA-ARTICULAR; INTRALESIONAL; INTRAMUSCULAR; SOFT TISSUE at 09:23

## 2025-03-24 RX ADMIN — BUPIVACAINE HYDROCHLORIDE 2 ML: 2.5 INJECTION, SOLUTION EPIDURAL; INFILTRATION; INTRACAUDAL at 09:23

## 2025-03-24 RX ADMIN — IOHEXOL 1 ML: 300 INJECTION, SOLUTION INTRAVENOUS at 09:22

## 2025-03-24 RX ADMIN — LIDOCAINE HYDROCHLORIDE 4 ML: 10 INJECTION, SOLUTION EPIDURAL; INFILTRATION; INTRACAUDAL; PERINEURAL at 09:18

## 2025-03-24 NOTE — H&P
History of Present Illness: The patient is a 44 y.o. female who presents with complaints of low back pain    Past Medical History:   Diagnosis Date    Constipation     COVID-19 2022    Depression     Eczema     Epilepsy (HCC) 11/15/2013    Description: well controlled with sleep    GERD (gastroesophageal reflux disease)     Grand mal convulsion (HCC)     X 2 last episode 2009-Sleep deprivation-Resolved with Cpap    Hypertension     Insomnia     Lateral epicondylitis of right elbow 2018    Medial meniscus tear 2015    Migraine     PONV (postoperative nausea and vomiting)     Primary generalized epilepsy (HCC) 2011    Psoriasis     Seizures ([facility])     Shingles     Shoulder dislocation, right, sequela 2018    Sinusitis     Sleep apnea     uses cpap    Tinnitus        Past Surgical History:   Procedure Laterality Date     SECTION   and     COLONOSCOPY      DILATION AND CURETTAGE OF UTERUS      DILATION AND CURETTAGE OF UTERUS WITH HYSTEROSOCPY N/A 2017    Procedure: D&C; IUD REMOVAL;  Surgeon: Abeba Blackburn MD;  Location: [facility];  Service: Gynecology    EGD      EPIDURAL BLOCK INJECTION Bilateral 2024    Procedure: BILATERAL L5-S1 TRANSFORAMINAL EPIDURAL STEROID INJECTION;  Surgeon: Jeff Pettit DO;  Location: [facility] MAIN OR;  Service: Pain Management     HYSTEROSCOPY      LAPAROSCOPY      (Diagnostic)    LASIK      WV COLONOSCOPY FLX DX W/COLLJ SPEC WHEN PFRMD N/A 2018    Procedure: EGD AND COLONOSCOPY;  Surgeon: Jagdish Katz MD;  Location: [facility]  GI [facility];  Service: Gastroenterology    WV VERTEBRAL CORPECTOMY ANT DCMPRN CERVICAL 1 SEG Bilateral 2021    Procedure: CORPECTOMY SPINE CERVICAL ANTERIOR: C4-7 ACDF with C6 /hemicorpectomy, C4-7 instrumentation and fusion (neuromonitoring);  Surgeon: Raquel Porter MD;  Location: [facility];  Service: Neurosurgery    REMOVAL OF INTRAUTERINE DEVICE (IUD)           Current Outpatient Medications:      acetaminophen (TYLENOL) 325 mg tablet, Take 650 mg by mouth every 6 (six) hours as needed for mild pain, Disp: , Rfl:     albuterol (ProAir HFA) 90 mcg/act inhaler, Inhale 2 puffs every 6 (six) hours as needed for shortness of breath, Disp: 18 g, Rfl: 0    Etonogestrel (NEXPLANON SC), Inject under the skin Located Left upper inner arm, Disp: , Rfl:     fluocinolone acetonide (DERMOTIC) 0.01 % otic oil, Administer 5 drops into both ears 2 (two) times a day as needed (for itchy ears), Disp: 20 mL, Rfl: 11    linaCLOtide (Linzess) 145 MCG CAPS, Take 1 capsule (145 mcg total) by mouth in the morning, Disp: 90 capsule, Rfl: 3    linaCLOtide (Linzess) 290 MCG CAPS, Take 1 capsule by mouth in the morning, Disp: 90 capsule, Rfl: 3    losartan-hydrochlorothiazide (HYZAAR) 50-12.5 mg per tablet, Take 1 tablet by mouth daily, Disp: 30 tablet, Rfl: 5    magnesium 30 MG tablet, Take 500 mg by mouth 2 (two) times a day  , Disp: , Rfl:     Multiple Vitamins-Minerals (MULTIVITAL-M) TABS, Take by mouth, Disp: , Rfl:     Omega 3-6-9 Fatty Acids (OMEGA 3-6-9 PO), Take by mouth, Disp: , Rfl:     omeprazole (PriLOSEC) 40 MG capsule, Take 1 capsule (40 mg total) by mouth daily, Disp: 90 capsule, Rfl: 0    QUEtiapine (SEROquel) 25 mg tablet, TAKE 3 TABLETS BY MOUTH EVERY DAY, Disp: 270 tablet, Rfl: 3    TiZANidine (ZANAFLEX) 4 MG capsule, Take 1 capsule (4 mg total) by mouth 3 (three) times a day as needed for muscle spasms, Disp: 90 capsule, Rfl: 3    traZODone (DESYREL) 50 mg tablet, Take 1 tablet (50 mg total) by mouth daily at bedtime as needed for sleep or depression, Disp: 90 tablet, Rfl: 3    Current Facility-Administered Medications:     bupivacaine (PF) (MARCAINE) 0.25 % injection 2 mL, 2 mL, Epidural, Once, Jeff Pettit, DO    iohexol (OMNIPAQUE) 300 mg/mL injection 50 mL, 50 mL, Epidural, Once, Jeff Pettit,     lidocaine (PF) (XYLOCAINE-MPF) 1 % injection 4 mL, 4 mL, Infiltration, Once, Jeff Pettit, DO     "methylPREDNISolone acetate (DEPO-MEDROL) injection 40 mg, 40 mg, Perineural, Once, Jeff Pettit, DO    Allergies   Allergen Reactions    Gluten Meal - Food Allergy GI Intolerance     \" Highly Sensitivity\"    Lactose - Food Allergy GI Intolerance     \" Highly Sensitive\"       Physical Exam: There were no vitals filed for this visit.  General: Awake, Alert, Oriented x 3, Mood and affect appropriate  Respiratory: Respirations even and unlabored  Cardiovascular: Peripheral pulses intact; no edema  Musculoskeletal Exam: Tenderness palpation bilateral lumbar paraspinals    ASA Score: 2    Patient/Chart Verification  Patient ID Verified: Verbal  ID Band Applied: No  Consents Confirmed: To be obtained in the Procedural area  Interval H&P(within 24 hr) Complete (required for Outpatients and Surgery Admit only): To be obtained in the Procedural area    Assessment:   1. Intervertebral disc disorder with radiculopathy of lumbar region        Plan: bilateral L5 TFESI   "

## 2025-03-24 NOTE — TELEPHONE ENCOUNTER
S/w Pt. Pt requested repeat injections   Injection types:   Right cervical thoracic paraspinal muscle trigger point injection(s) 02/15/24  Last office visit:  02/07/24     Where is pain located:  7/10 Rt shoulder - burning pain  Is the pain the same area as before: Yes  How much % of relief did the last injection provide: 80% relief  Duration of relief from last injection: 1 year   Is the pain effecting your ADLs:  Yes     Blood thinners/NSAIDS? No  Diabetes? No  Please advise if able to repeat cervical/thoracic TPI.

## 2025-03-24 NOTE — DISCHARGE INSTR - LAB
Epidural Steroid Injection   WHAT YOU NEED TO KNOW:   An epidural steroid injection (KIESHA) is a procedure to inject steroid medicine into the epidural space. The epidural space is between your spinal cord and vertebrae. Steroids reduce inflammation and fluid buildup in your spine that may be causing pain. You may be given pain medicine along with the steroids.          ACTIVITY  Do not drive or operate machinery today.  No strenuous activity today - bending, lifting, etc.  You may resume normal activites starting tomorrow - start slowly and as tolerated.  You may shower today, but no tub baths or hot tubs.  You may have numbness for several hours from the local anesthetic. Please use caution and common sense, especially with weight-bearing activities.    CARE OF THE INJECTION SITE  If you have soreness or pain, apply ice to the area today (20 minutes on/20 minutes off).  Starting tomorrow, you may use warm, moist heat or ice if needed.  You may have an increase or change in your discomfort for 36-48 hours after your treatment.  Apply ice and continue with any pain medication you have been prescribed.  Notify the [facility] if you have any of the following: redness, drainage, swelling, headache, stiff neck or fever above 100°F.    SPECIAL INSTRUCTIONS  Our office will contact you in approximately 14 days for a progress report.    MEDICATIONS  Continue to take all routine medications.  Our office may have instructed you to hold some medications.    As no general anesthesia was used in today's procedure, you should not experience any side effects related to anesthesia.     If you are diabetic, the steroids used in today's injection may temporarily increase your blood sugar levels after the first few days after your injection. Please keep a close eye on your sugars and alert the doctor who manages your diabetes if your sugars are significantly high from your baseline or you are symptomatic.     If you have a  problem specifically related to your procedure, please call our office at [phone].  Problems not related to your procedure should be directed to your primary care physician.

## 2025-04-07 ENCOUNTER — TELEPHONE (OUTPATIENT)
Dept: PAIN MEDICINE | Facility: CLINIC | Age: 45
End: 2025-04-07

## 2025-04-10 ENCOUNTER — PROCEDURE VISIT (OUTPATIENT)
Dept: PAIN MEDICINE | Facility: CLINIC | Age: 45
End: 2025-04-10
Payer: COMMERCIAL

## 2025-04-10 VITALS
SYSTOLIC BLOOD PRESSURE: 136 MMHG | DIASTOLIC BLOOD PRESSURE: 90 MMHG | WEIGHT: 217 LBS | HEIGHT: 68 IN | HEART RATE: 83 BPM | BODY MASS INDEX: 32.89 KG/M2

## 2025-04-10 DIAGNOSIS — M79.18 MYOFASCIAL PAIN SYNDROME: Primary | ICD-10-CM

## 2025-04-10 PROCEDURE — 76942 ECHO GUIDE FOR BIOPSY: CPT | Performed by: PHYSICAL MEDICINE & REHABILITATION

## 2025-04-10 PROCEDURE — 20552 NJX 1/MLT TRIGGER POINT 1/2: CPT | Performed by: PHYSICAL MEDICINE & REHABILITATION

## 2025-04-10 RX ORDER — LIDOCAINE HYDROCHLORIDE 10 MG/ML
2 INJECTION, SOLUTION INFILTRATION; PERINEURAL ONCE
Status: COMPLETED | OUTPATIENT
Start: 2025-04-10 | End: 2025-04-10

## 2025-04-10 RX ORDER — METHYLPREDNISOLONE ACETATE 40 MG/ML
40 INJECTION, SUSPENSION INTRA-ARTICULAR; INTRALESIONAL; INTRAMUSCULAR; SOFT TISSUE ONCE
Status: COMPLETED | OUTPATIENT
Start: 2025-04-10 | End: 2025-04-10

## 2025-04-10 RX ADMIN — LIDOCAINE HYDROCHLORIDE 2 ML: 10 INJECTION, SOLUTION INFILTRATION; PERINEURAL at 14:15

## 2025-04-10 RX ADMIN — METHYLPREDNISOLONE ACETATE 40 MG: 40 INJECTION, SUSPENSION INTRA-ARTICULAR; INTRALESIONAL; INTRAMUSCULAR; SOFT TISSUE at 14:16

## 2025-04-10 NOTE — PROGRESS NOTES
Indication:  Muscular pain  Preprocedure diagnosis:  1.  Myofascial pain syndrome  Postprocedure diagnosis:  1.  Myofascial pain syndrome     Procedure:  Ultrasound-guided bilateral cervical thoracic paraspinal muscle trigger point injection(s).     After discussing the risks, benefits, and alternatives to the procedure, the patient expressed understanding and wished to proceed.  The patient was brought to the procedure suite and placed in the seated position.  A procedural pause was conducted to verify:  correct patient identity, procedure to be performed and as applicable, correct side and site, correct patient position, and availability of implants, special equipment or special requirements.  A simple surgical tray was used.  Prior to the procedure, the bilateral cervical thoracic paraspinal musculature was examined with a 12 MHz linear transducer to visualize the target trigger points and determine the optimal needle path. Following this, the area was prepared with a ChloraPrep scrub, then re-examined using the same transducer, a sterile ultrasound transducer cover, and sterile ultrasound transducer gel.  Thereafter, using ultrasound guidance, a 2.5-inch 25-gauge needle was advanced into the target trigger points. After visualization of the tip and negative aspiration for blood, a mixture of 1% lidocaine with 40 mg/min of Depo-Medrol was injected into the target trigger points. Following the injection, the needle was withdrawn.  The patient tolerated the procedure well and there were no apparent complications.  After an appropriate amount of observation, the patient was dismissed from the clinic in good condition under their own power.

## 2025-04-24 ENCOUNTER — TELEPHONE (OUTPATIENT)
Dept: PAIN MEDICINE | Facility: CLINIC | Age: 45
End: 2025-04-24

## 2025-05-12 DIAGNOSIS — F51.01 PRIMARY INSOMNIA: ICD-10-CM

## 2025-05-12 RX ORDER — QUETIAPINE FUMARATE 25 MG/1
75 TABLET, FILM COATED ORAL DAILY
Qty: 270 TABLET | Refills: 3 | Status: SHIPPED | OUTPATIENT
Start: 2025-05-12

## 2025-06-10 ENCOUNTER — TELEPHONE (OUTPATIENT)
Age: 45
End: 2025-06-10

## 2025-06-11 ENCOUNTER — OFFICE VISIT (OUTPATIENT)
Dept: FAMILY MEDICINE CLINIC | Facility: CLINIC | Age: 45
End: 2025-06-11
Payer: COMMERCIAL

## 2025-06-11 VITALS
BODY MASS INDEX: 33.13 KG/M2 | WEIGHT: 218.6 LBS | OXYGEN SATURATION: 99 % | TEMPERATURE: 99.7 F | RESPIRATION RATE: 16 BRPM | HEART RATE: 100 BPM | DIASTOLIC BLOOD PRESSURE: 80 MMHG | SYSTOLIC BLOOD PRESSURE: 130 MMHG | HEIGHT: 68 IN

## 2025-06-11 DIAGNOSIS — K59.00 CONSTIPATION, UNSPECIFIED CONSTIPATION TYPE: ICD-10-CM

## 2025-06-11 DIAGNOSIS — R22.32 SKIN LUMP OF ARM, LEFT: Primary | ICD-10-CM

## 2025-06-11 DIAGNOSIS — M25.521 CHRONIC ELBOW PAIN, RIGHT: ICD-10-CM

## 2025-06-11 DIAGNOSIS — R10.31 RIGHT LOWER QUADRANT ABDOMINAL PAIN: ICD-10-CM

## 2025-06-11 DIAGNOSIS — K58.1 IRRITABLE BOWEL SYNDROME WITH CONSTIPATION: ICD-10-CM

## 2025-06-11 DIAGNOSIS — L29.9 EAR ITCHING: ICD-10-CM

## 2025-06-11 DIAGNOSIS — M79.671 RIGHT FOOT PAIN: ICD-10-CM

## 2025-06-11 DIAGNOSIS — G89.29 CHRONIC ELBOW PAIN, RIGHT: ICD-10-CM

## 2025-06-11 PROCEDURE — 99214 OFFICE O/P EST MOD 30 MIN: CPT | Performed by: FAMILY MEDICINE

## 2025-06-11 RX ORDER — LINACLOTIDE 290 UG/1
290 CAPSULE, GELATIN COATED ORAL DAILY
Qty: 90 CAPSULE | Refills: 3 | Status: SHIPPED | OUTPATIENT
Start: 2025-06-11 | End: 2026-06-06

## 2025-06-11 RX ORDER — LINACLOTIDE 145 UG/1
145 CAPSULE, GELATIN COATED ORAL DAILY
Qty: 90 CAPSULE | Refills: 3 | Status: SHIPPED | OUTPATIENT
Start: 2025-06-11

## 2025-06-11 RX ORDER — FLUOCINOLONE ACETONIDE 0.11 MG/ML
5 OIL AURICULAR (OTIC) 2 TIMES DAILY PRN
Qty: 20 ML | Refills: 0 | Status: SHIPPED | OUTPATIENT
Start: 2025-06-11

## 2025-06-11 NOTE — PROGRESS NOTES
"Name: Marisel Olivas      : 1980      MRN: [id number]  Encounter Provider: Jina Hardwick DO  Encounter Date: 2025   Encounter department: [facility]    :  Assessment & Plan  Constipation, unspecified constipation type    Orders:  •  linaCLOtide (Linzess) 145 MCG CAPS; Take 1 capsule (145 mcg total) by mouth in the morning    Right lower quadrant abdominal pain    Orders:  •  linaCLOtide (Linzess) 145 MCG CAPS; Take 1 capsule (145 mcg total) by mouth in the morning    Irritable bowel syndrome with constipation    Orders:  •  linaCLOtide (Linzess) 290 MCG CAPS; Take 1 capsule by mouth in the morning    Skin lump of arm, left  Check US  Orders:  •  US extremity soft tissue; Future    Chronic elbow pain, right    Orders:  •  Ambulatory Referral to Orthopedic Surgery; Future    Ear itching  Refilled drops  Orders:  •  fluocinolone acetonide (DERMOTIC) 0.01 % otic oil; Administer 5 drops into both ears 2 (two) times a day as needed (for itchy ears)    Right foot pain  Did cartwheel in OCT  Has had pain since then  Orders:  •  Ambulatory Referral to Podiatry; Future      Assessment & Plan             History of Present Illness     History of Present Illness  Lump in left arm-started 2 days ago  Woke up Monday  Sore on   No trauma  No bruise    Right achielles  Right elbow- rotational movement is painful       Review of Systems  Objective   /80 (BP Location: Right arm, Patient Position: Sitting, Cuff Size: Large)   Pulse 100   Temp 99.7 °F (37.6 °C) (Tympanic)   Resp 16   Ht 5' 8\" (1.727 m)   Wt 99.2 kg (218 lb 9.6 oz)   SpO2 99%   BMI 33.24 kg/m²     Physical Exam    Physical Exam  Vitals and nursing note reviewed.   Constitutional:       Appearance: Normal appearance.   HENT:      Head: Normocephalic and atraumatic.     Musculoskeletal:         General: Tenderness and deformity present.     Neurological:      Mental Status: She is alert.     Psychiatric:         " Mood and Affect: Mood normal.         Behavior: Behavior normal.         Thought Content: Thought content normal.         Judgment: Judgment normal.

## 2025-06-11 NOTE — ASSESSMENT & PLAN NOTE
Orders:  •  linaCLOtide (Linzess) 145 MCG CAPS; Take 1 capsule (145 mcg total) by mouth in the morning

## 2025-06-12 ENCOUNTER — HOSPITAL ENCOUNTER (OUTPATIENT)
Dept: ULTRASOUND IMAGING | Facility: HOSPITAL | Age: 45
Discharge: HOME | End: 2025-06-12
Attending: FAMILY MEDICINE
Payer: COMMERCIAL

## 2025-06-12 ENCOUNTER — RESULTS FOLLOW-UP (OUTPATIENT)
Dept: FAMILY MEDICINE CLINIC | Facility: CLINIC | Age: 45
End: 2025-06-12

## 2025-06-12 DIAGNOSIS — R22.32 SKIN LUMP OF ARM, LEFT: ICD-10-CM

## 2025-06-12 PROCEDURE — 76882 US LMTD JT/FCL EVL NVASC XTR: CPT

## 2025-06-16 ENCOUNTER — OFFICE VISIT (OUTPATIENT)
Dept: OBGYN CLINIC | Facility: CLINIC | Age: 45
End: 2025-06-16
Payer: COMMERCIAL

## 2025-06-16 ENCOUNTER — HOSPITAL ENCOUNTER (OUTPATIENT)
Dept: RADIOLOGY | Facility: HOSPITAL | Age: 45
Discharge: HOME | End: 2025-06-16
Attending: ORTHOPAEDIC SURGERY
Payer: COMMERCIAL

## 2025-06-16 DIAGNOSIS — M77.11 LATERAL EPICONDYLITIS OF RIGHT ELBOW: ICD-10-CM

## 2025-06-16 DIAGNOSIS — M77.01 MEDIAL EPICONDYLITIS OF RIGHT ELBOW: ICD-10-CM

## 2025-06-16 DIAGNOSIS — M25.521 BILATERAL ELBOW JOINT PAIN: Primary | ICD-10-CM

## 2025-06-16 DIAGNOSIS — M77.02 MEDIAL EPICONDYLITIS OF LEFT ELBOW: ICD-10-CM

## 2025-06-16 DIAGNOSIS — M25.521 BILATERAL ELBOW JOINT PAIN: ICD-10-CM

## 2025-06-16 DIAGNOSIS — M25.522 BILATERAL ELBOW JOINT PAIN: Primary | ICD-10-CM

## 2025-06-16 DIAGNOSIS — M25.522 BILATERAL ELBOW JOINT PAIN: ICD-10-CM

## 2025-06-16 DIAGNOSIS — M54.12 CERVICAL RADICULOPATHY: ICD-10-CM

## 2025-06-16 PROCEDURE — 99214 OFFICE O/P EST MOD 30 MIN: CPT | Performed by: ORTHOPAEDIC SURGERY

## 2025-06-16 PROCEDURE — 73080 X-RAY EXAM OF ELBOW: CPT

## 2025-06-16 NOTE — ASSESSMENT & PLAN NOTE
Orders:    Diclofenac Sodium (VOLTAREN) 1 %; Apply 2 g topically 4 (four) times a day    Ambulatory Referral to Physical Therapy; Future    Brace

## 2025-06-16 NOTE — PROGRESS NOTES
[facility] [address] [address] [phone]       Marisel Olivas  [id number]  1980    ORTHOPAEDIC SURGERY OUTPATIENT NOTE  6/16/2025    :  Assessment & Plan  Bilateral elbow joint pain    Orders:    XR elbow 3+ vw right; Future    Lateral epicondylitis of right elbow    Orders:    Diclofenac Sodium (VOLTAREN) 1 %; Apply 2 g topically 4 (four) times a day    Ambulatory Referral to Physical Therapy; Future    Brace    Medial epicondylitis of right elbow    Orders:    Diclofenac Sodium (VOLTAREN) 1 %; Apply 2 g topically 4 (four) times a day    Ambulatory Referral to Physical Therapy; Future    Brace    Medial epicondylitis of left elbow  Findings today are consistent with right medial and lateral epicondylitis and left elbow medial epicondylitis . Imaging and prognosis was reviewed with the patient today. Discussed treatment options including continued observation, home exercises, bracing, anti-inflammatories, physical therapy. Will plan for formal PT, counter force braces fitted in the office, and voltaren gel. She can follow up in 8 weeks.     Orders:    Diclofenac Sodium (VOLTAREN) 1 %; Apply 2 g topically 4 (four) times a day    Ambulatory Referral to Physical Therapy; Future    Brace           HISTORY:  44 y.o. female  RHD with bilateral elbow pain (L>R). Doesn't recall any specific injury. She noticed her left elbow was swollen last week so PCP got an U/S which showed a normal sized lymph node with surrounding edematous tissue. She's had elbow pain for several years without any treatment. She states her right arm occasionally goes numb. She drops things when she has pain.  Pain is sharp in character, Located medially on the left and medial and lateral on the right, insidious in onset, constant in duration, severe in intensity, Exacerbating factors wrist motions and gripping, Remitting factors include rest,  nonradiating, no numbness or tingling, no open wounds noted  Occupation: business owner, .   [facility] ACDF C4-7 in .     Past Medical History[1]    Past Surgical History[2]    Social History     Socioeconomic History    Marital status: /Civil Union     Spouse name: Not on file    Number of children: Not on file    Years of education: Not on file    Highest education level: Not on file   Occupational History    Not on file   Tobacco Use    Smoking status: Former     Current packs/day: 0.00     Average packs/day: 0.3 packs/day for 25.0 years (6.3 ttl pk-yrs)     Types: Cigarettes     Start date:      Quit date:      Years since quittin.4     Passive exposure: Past    Smokeless tobacco: Never    Tobacco comments:     Former smoker per [employer]   Vaping Use    Vaping status: Never Used   Substance and Sexual Activity    Alcohol use: Yes     Comment: occassionally    Drug use: Never    Sexual activity: Not Currently     Partners: Male     Birth control/protection: Implant     Comment: 15 - Nexplanon inserted   Other Topics Concern    Not on file   Social History Narrative    Denied:  Exercising Regularly     Social Drivers of Health     Financial Resource Strain: Not on file   Food Insecurity: Not on file   Transportation Needs: Not on file   Physical Activity: Not on file   Stress: Not on file   Social Connections: Not on file   Intimate Partner Violence: Not on file   Housing Stability: Not on file       Family History[3]     Patient's Medications   New Prescriptions    No medications on file   Previous Medications    ACETAMINOPHEN (TYLENOL) 325 MG TABLET    Take 650 mg by mouth every 6 (six) hours as needed for mild pain    ALBUTEROL (PROAIR HFA) 90 MCG/ACT INHALER    Inhale 2 puffs every 6 (six) hours as needed for shortness of breath    ETONOGESTREL (NEXPLANON SC)    Inject under the skin Located Right upper inner arm    FLUOCINOLONE ACETONIDE (DERMOTIC) 0.01 % OTIC OIL     Administer 5 drops into both ears 2 (two) times a day as needed (for itchy ears)    LINACLOTIDE (LINZESS) 145 MCG CAPS    Take 1 capsule (145 mcg total) by mouth in the morning    LINACLOTIDE (LINZESS) 290 MCG CAPS    Take 1 capsule by mouth in the morning    LOSARTAN-HYDROCHLOROTHIAZIDE (HYZAAR) 50-12.5 MG PER TABLET    Take 1 tablet by mouth daily    MAGNESIUM 30 MG TABLET    Take 500 mg by mouth in the morning and 500 mg in the evening.    MULTIPLE VITAMINS-MINERALS (MULTIVITAL-M) TABS    Take by mouth    OMEGA-3 FATTY ACIDS (OMEGA 3 PO)    Take by mouth daily    OMEPRAZOLE (PRILOSEC) 40 MG CAPSULE    Take 1 capsule (40 mg total) by mouth daily    QUETIAPINE (SEROQUEL) 25 MG TABLET    TAKE 3 TABLETS BY MOUTH EVERY DAY    TIZANIDINE (ZANAFLEX) 4 MG CAPSULE    Take 1 capsule (4 mg total) by mouth 3 (three) times a day as needed for muscle spasms    TRAZODONE (DESYREL) 50 MG TABLET    Take 1 tablet (50 mg total) by mouth daily at bedtime as needed for sleep or depression   Modified Medications    No medications on file   Discontinued Medications    No medications on file       Allergies[4]     There were no vitals taken for this visit.     REVIEW OF SYSTEMS:  Constitutional: Negative.    HEENT: Negative.    Respiratory: Negative.    Skin: Negative.    Neurological: Negative.    Psychiatric/Behavioral: Negative.  Musculoskeletal: Negative except for that mentioned in the HPI.    Gen: No acute distress, resting comfortably in bed  HEENT: Eyes clear, moist mucus membranes, hearing intact  Respiratory: No audible wheezing or stridor  Cardiovascular: Well Perfused peripherally, 2+ distal pulse  Abdomen: nondistended, no peritoneal signs     PHYSICAL EXAM:    LEFT ELBOW:    Appearance: no erythema or swelling.     Flexion: 140 degrees  Extension: 0 degrees  Pronation: 80 degrees  Supination: 80 degrees    TTP Lateral Epicondyle: negative  TTP Medial Epicondyle: positive   TTP Olecranon: negative  TTP Radial Head:  negative  TTP Biceps Tendon: negative    Strength:  Flexion: 5/5  Extension: 5/5  Pronation: 5/5  Supination: 5/5    Pain with resisted wrist extension: negative  Pain with resisted 3rd finger extension: negative  Pain with resisted wrist flexion: negative    Varus laxity: negative  Valgus laxity: negative  Milking maneuver: negative  Moving valgus stress test: negative    Cubital tunnel Tinel's: negative    Radial/median/ulnar nerve intact    <2 sec cap refill    RIGHT ELBOW:    Appearance: skin intact, no swelling or erythema    Flexion: 140 degrees  Extension: 0 degrees  Pronation: 80 degrees  Supination: 80 degrees    TTP Lateral Epicondyle: positive  TTP Medial Epicondyle: positive  TTP Olecranon: negative  TTP Radial Head: negative  TTP Biceps Tendon: negative    Strength:  Flexion: 5/5  Extension: 5/5  Pronation: 5/5  Supination: 5/5    Pain with resisted wrist extension: negative  Pain with resisted 3rd finger extension: negative  Pain with resisted wrist flexion: negative    Varus laxity: negative  Valgus laxity: negative  Milking maneuver: negative  Moving valgus stress test: negative    Cubital tunnel Tinel's: negative  Ulnar nerve subluxation with flexion    Radial/median/ulnar nerve intact    <2 sec cap refill        IMAGING:  xrays (AP, lateral) of b/l elbows demonstrate no fracture, dislocation, or significant degenerative changes. There is calcifications over lateral and medial epicondyles as well as left medial epicondyle.            [1]   Past Medical History:  Diagnosis Date    Constipation     COVID-19 01/05/2022    Depression     Eczema     Epilepsy (HCC) 11/15/2013    Description: well controlled with sleep    GERD (gastroesophageal reflux disease)     Grand mal convulsion (HCC)     X 2 last episode 2009-Sleep deprivation-Resolved with Cpap    Hypertension     Insomnia     Lateral epicondylitis of right elbow 05/01/2018    Medial meniscus tear 01/12/2015    Migraine     PONV (postoperative  "nausea and vomiting)     Primary generalized epilepsy (HCC) 2011    Psoriasis     Seizures (HCC)     Shingles     Shoulder dislocation, right, sequela 2018    Sinusitis     Sleep apnea     uses cpap    Tinnitus    [2]   Past Surgical History:  Procedure Laterality Date     SECTION   and     COLONOSCOPY      DILATION AND CURETTAGE OF UTERUS      DILATION AND CURETTAGE OF UTERUS WITH HYSTEROSOCPY N/A 2017    Procedure: D&C; IUD REMOVAL;  Surgeon: Abeba Blackburn MD;  [facility];  Service: Gynecology    EGD      EPIDURAL BLOCK INJECTION Bilateral 2024    Procedure: BILATERAL L5-S1 TRANSFORAMINAL EPIDURAL STEROID INJECTION;  Surgeon: Jeff Pettit DO;  Location: [facility] MAIN OR;  Service: Pain Management     HYSTEROSCOPY      LAPAROSCOPY      (Diagnostic)    LASIK      NECK SURGERY  2021    C4-C7 fusion    DC COLONOSCOPY FLX DX W/COLLJ SPEC WHEN PFRMD N/A 2018    Procedure: EGD AND COLONOSCOPY;  Surgeon: Jagdish Katz MD;  [facility];  Service: Gastroenterology    DC VERTEBRAL CORPECTOMY ANT DCMPRN CERVICAL 1 SEG Bilateral 2021    Procedure: CORPECTOMY SPINE CERVICAL ANTERIOR: C4-7 ACDF with C6 /hemicorpectomy, C4-7 instrumentation and fusion (neuromonitoring);  Surgeon: Raquel Porter MD;  [facility];  Service: Neurosurgery    REMOVAL OF INTRAUTERINE DEVICE (IUD)     [3]   Family History  Problem Relation Name Age of Onset    No Known Problems Mother      Diabetes Father Pop         Diabetes Mellitus    Hyperlipidemia Father Pop     Hypertension Father Pop     No Known Problems Sister      No Known Problems Daughter      Brain cancer Maternal Grandmother  56    No Known Problems Son ilia     Migraines Family FH     Diabetes Family FH         Type 2 Diabetes Mellitus    Breast cancer Neg Hx     [4]   Allergies  Allergen Reactions    Gluten Meal - Food Allergy GI Intolerance     \" Highly Sensitivity\"    Lactose - Food Allergy GI " "Intolerance     \" Highly Sensitive\"     "

## 2025-06-16 NOTE — ASSESSMENT & PLAN NOTE
Findings today are consistent with right medial and lateral epicondylitis and left elbow medial epicondylitis . Imaging and prognosis was reviewed with the patient today. Discussed treatment options including continued observation, home exercises, bracing, anti-inflammatories, physical therapy. Will plan for formal PT, counter force braces fitted in the office, and voltaren gel. She can follow up in 8 weeks.     Orders:    Diclofenac Sodium (VOLTAREN) 1 %; Apply 2 g topically 4 (four) times a day    Ambulatory Referral to Physical Therapy; Future    Brace

## 2025-06-17 ENCOUNTER — RESULTS FOLLOW-UP (OUTPATIENT)
Dept: FAMILY MEDICINE CLINIC | Facility: CLINIC | Age: 45
End: 2025-06-17

## 2025-06-17 ENCOUNTER — APPOINTMENT (OUTPATIENT)
Dept: LAB | Facility: CLINIC | Age: 45
End: 2025-06-17
Payer: COMMERCIAL

## 2025-06-17 DIAGNOSIS — E83.52 HYPERCALCEMIA: Primary | ICD-10-CM

## 2025-06-17 LAB
ALBUMIN SERPL BCG-MCNC: 4.6 G/DL (ref 3.5–5)
ALP SERPL-CCNC: 54 U/L (ref 34–104)
ALT SERPL W P-5'-P-CCNC: 18 U/L (ref 7–52)
ANION GAP SERPL CALCULATED.3IONS-SCNC: 7 MMOL/L (ref 4–13)
AST SERPL W P-5'-P-CCNC: 16 U/L (ref 13–39)
BASOPHILS # BLD AUTO: 0.09 THOUSANDS/ÂΜL (ref 0–0.1)
BASOPHILS NFR BLD AUTO: 1 % (ref 0–1)
BILIRUB SERPL-MCNC: 0.76 MG/DL (ref 0.2–1)
BUN SERPL-MCNC: 17 MG/DL (ref 5–25)
CALCIUM SERPL-MCNC: 10.4 MG/DL (ref 8.4–10.2)
CHLORIDE SERPL-SCNC: 100 MMOL/L (ref 96–108)
CHOLEST SERPL-MCNC: 203 MG/DL (ref ?–200)
CO2 SERPL-SCNC: 29 MMOL/L (ref 21–32)
CREAT SERPL-MCNC: 0.78 MG/DL (ref 0.6–1.3)
EOSINOPHIL # BLD AUTO: 0.26 THOUSAND/ÂΜL (ref 0–0.61)
EOSINOPHIL NFR BLD AUTO: 3 % (ref 0–6)
ERYTHROCYTE [DISTWIDTH] IN BLOOD BY AUTOMATED COUNT: 13.2 % (ref 11.6–15.1)
GFR SERPL CREATININE-BSD FRML MDRD: 91 ML/MIN/1.73SQ M
GLUCOSE P FAST SERPL-MCNC: 97 MG/DL (ref 65–99)
HCT VFR BLD AUTO: 46.5 % (ref 34.8–46.1)
HDLC SERPL-MCNC: 36 MG/DL
HGB BLD-MCNC: 15 G/DL (ref 11.5–15.4)
IMM GRANULOCYTES # BLD AUTO: 0.02 THOUSAND/UL (ref 0–0.2)
IMM GRANULOCYTES NFR BLD AUTO: 0 % (ref 0–2)
LDLC SERPL CALC-MCNC: 101 MG/DL (ref 0–100)
LYMPHOCYTES # BLD AUTO: 2 THOUSANDS/ÂΜL (ref 0.6–4.47)
LYMPHOCYTES NFR BLD AUTO: 24 % (ref 14–44)
MCH RBC QN AUTO: 30.6 PG (ref 26.8–34.3)
MCHC RBC AUTO-ENTMCNC: 32.3 G/DL (ref 31.4–37.4)
MCV RBC AUTO: 95 FL (ref 82–98)
MONOCYTES # BLD AUTO: 0.59 THOUSAND/ÂΜL (ref 0.17–1.22)
MONOCYTES NFR BLD AUTO: 7 % (ref 4–12)
NEUTROPHILS # BLD AUTO: 5.38 THOUSANDS/ÂΜL (ref 1.85–7.62)
NEUTS SEG NFR BLD AUTO: 65 % (ref 43–75)
NONHDLC SERPL-MCNC: 167 MG/DL
NRBC BLD AUTO-RTO: 0 /100 WBCS
PLATELET # BLD AUTO: 361 THOUSANDS/UL (ref 149–390)
PMV BLD AUTO: 9.6 FL (ref 8.9–12.7)
POTASSIUM SERPL-SCNC: 4.4 MMOL/L (ref 3.5–5.3)
PROT SERPL-MCNC: 7.7 G/DL (ref 6.4–8.4)
RBC # BLD AUTO: 4.9 MILLION/UL (ref 3.81–5.12)
SODIUM SERPL-SCNC: 136 MMOL/L (ref 135–147)
TRIGL SERPL-MCNC: 329 MG/DL (ref ?–150)
WBC # BLD AUTO: 8.34 THOUSAND/UL (ref 4.31–10.16)

## 2025-06-19 ENCOUNTER — OFFICE VISIT (OUTPATIENT)
Dept: FAMILY MEDICINE CLINIC | Facility: CLINIC | Age: 45
End: 2025-06-19
Payer: COMMERCIAL

## 2025-06-19 VITALS
OXYGEN SATURATION: 98 % | TEMPERATURE: 98.7 F | DIASTOLIC BLOOD PRESSURE: 86 MMHG | HEART RATE: 84 BPM | BODY MASS INDEX: 34.06 KG/M2 | RESPIRATION RATE: 17 BRPM | SYSTOLIC BLOOD PRESSURE: 120 MMHG | WEIGHT: 217 LBS | HEIGHT: 67 IN

## 2025-06-19 DIAGNOSIS — E78.1 ESSENTIAL HYPERTRIGLYCERIDEMIA: ICD-10-CM

## 2025-06-19 DIAGNOSIS — E66.811 CLASS 1 OBESITY WITHOUT SERIOUS COMORBIDITY WITH BODY MASS INDEX (BMI) OF 34.0 TO 34.9 IN ADULT, UNSPECIFIED OBESITY TYPE: ICD-10-CM

## 2025-06-19 DIAGNOSIS — E66.9 OBESITY (BMI 30-39.9): ICD-10-CM

## 2025-06-19 DIAGNOSIS — M51.16 INTERVERTEBRAL DISC DISORDER WITH RADICULOPATHY OF LUMBAR REGION: ICD-10-CM

## 2025-06-19 DIAGNOSIS — G47.33 OBSTRUCTIVE SLEEP APNEA ON CPAP: ICD-10-CM

## 2025-06-19 DIAGNOSIS — N95.1 PERIMENOPAUSAL SYMPTOMS: ICD-10-CM

## 2025-06-19 DIAGNOSIS — I10 ESSENTIAL HYPERTENSION: ICD-10-CM

## 2025-06-19 DIAGNOSIS — Z00.00 ANNUAL PHYSICAL EXAM: Primary | ICD-10-CM

## 2025-06-19 PROCEDURE — 99214 OFFICE O/P EST MOD 30 MIN: CPT | Performed by: FAMILY MEDICINE

## 2025-06-19 PROCEDURE — 99396 PREV VISIT EST AGE 40-64: CPT | Performed by: FAMILY MEDICINE

## 2025-06-19 RX ORDER — TIRZEPATIDE 2.5 MG/.5ML
2.5 INJECTION, SOLUTION SUBCUTANEOUS WEEKLY
Qty: 2 ML | Refills: 0 | Status: SHIPPED | OUTPATIENT
Start: 2025-06-19 | End: 2025-07-17

## 2025-06-19 RX ORDER — OMEGA-3-ACID ETHYL ESTERS 1 G/1
2 CAPSULE, LIQUID FILLED ORAL 2 TIMES DAILY
Qty: 180 CAPSULE | Refills: 5 | Status: SHIPPED | OUTPATIENT
Start: 2025-06-19

## 2025-06-19 NOTE — ASSESSMENT & PLAN NOTE
Still elevated even with dietary changes   Trial of lovaza    Orders:  •  omega-3-acid ethyl esters (LOVAZA) 1 g capsule; Take 2 capsules (2 g total) by mouth 2 (two) times a day  •  Comprehensive metabolic panel  •  Lipid Panel With Direct LDL; Future

## 2025-06-19 NOTE — PROGRESS NOTES
Adult Annual Physical  Name: Marisel Olivas      : 1980      MRN: [id number]  Encounter Provider: Ebony Wade MD  Encounter Date: 2025   Encounter department: [facility]    :  Assessment & Plan  Annual physical exam         Essential hypertension  Doing well on current meds       Essential hypertriglyceridemia  Still elevated even with dietary changes   Trial of lovaza    Orders:  •  omega-3-acid ethyl esters (LOVAZA) 1 g capsule; Take 2 capsules (2 g total) by mouth 2 (two) times a day  •  Comprehensive metabolic panel  •  Lipid Panel With Direct LDL; Future    Obstructive sleep apnea on CPAP  Doing well on cpap machine       Obesity (BMI 30-39.9)  Prior Authorization Clinical Questions for Weight Management Pharmacotherapy    1. Does the patient have a contrainidcation to medication prescribed for weight management?: No  2. Does the patient have a diagnosis of obesity, confirmed by a BMI greater than or equal to 30 kg/m^2?: Yes  3. Does the patient have a BMI of greater than or equal to 27 kg/m^2 with at least one weight-related comorbidity/risk factor/complication (e.g. diabetes, dyslipidemia, coronary artery disease)?: Yes  5. WEGOVY CVA Indication: Does patient have established documented cardiovascular disease (history of a prior heart attack (myocardial infarction), stroke, or symptomatic peripheral arterial disease (PAD)?: N/A  6. ZEPBOUND KHADAR Indication: Does patient have documented KHADAR diagnosed via sleep study (insurance will require copy of sleep study results for approval)?: Yes  7. Has the patient been on a weight loss regimen of low-calorie diet, increased physical activity, and lifestyle modifications for a minimum of 6 months?: Yes  8. Has the patient completed a comprehensive weight loss program (ie, Weight Watchers, Noom, Bariatrics, other wood on phone)? If so, what?: Yes   -- Q8 Further Explanation: low calories, high protein diet   9. Does the patient have  a history of type 2 diabetes?: No  10. Has the member tried and failed other weight loss medication within the past 12 months?: No  11. Will the member use requested medication in combination with another GLP agonist or weight loss drug?: No  12. Is the medication a controlled substance?: No     Baseline weight (in pounds): 217 lbs  Current weight (in pounds): 217 lbs  Weight loss percentage: 0%     She will look into weight loss injections and let me know if her insurance covers  Orders:  •  Comprehensive metabolic panel  •  Lipid Panel With Direct LDL; Future    Perimenopausal symptoms    Orders:  •  Ambulatory Referral to Obstetrics / Gynecology; Future    Intervertebral disc disorder with radiculopathy of lumbar region  Improved after back injections       Class 1 obesity without serious comorbidity with body mass index (BMI) of 34.0 to 34.9 in adult, unspecified obesity type      Orders:  •  tirzepatide (Zepbound) 2.5 mg/0.5 mL auto-injector; Inject 0.5 mL (2.5 mg total) under the skin once a week for 28 days        Preventive Screenings:  - Diabetes Screening: screening up-to-date  - Cholesterol Screening: screening not indicated and has hyperlipidemia   - Hepatitis C screening: screening up-to-date   - HIV screening: screening up-to-date   - Cervical cancer screening: screening up-to-date   - Breast cancer screening: screening up-to-date   - Colon cancer screening: screening up-to-date   - Lung cancer screening: screening not indicated     Immunizations:  - Immunizations due: Prevnar 20         History of Present Illness     Adult Annual Physical:  Patient presents for annual physical.     Diet and Physical Activity:  - Diet/Nutrition: consuming 3-5 servings of fruits/vegetables daily and limited junk food.  - Exercise: walking.    General Health:  - Sleep: sleeps well.  - Hearing: normal hearing bilateral ears.  - Vision: most recent eye exam < 1 year ago.  - Dental: regular dental visits and brushes teeth  twice daily.    /GYN Health:  - Follows with GYN: yes.   - Menopause: perimenopausal.   - History of STDs: no    Advanced Care Planning:  - Has an advanced directive?: no    - Has a durable medical POA?: no    - ACP document given to patient?: no      Review of Systems   Constitutional: Negative.  Negative for chills and fever.   HENT:  Negative for ear pain and sore throat.    Eyes: Negative.  Negative for pain and visual disturbance.   Respiratory:  Negative for cough and shortness of breath.    Cardiovascular:  Negative for chest pain and palpitations.   Gastrointestinal:  Negative for abdominal pain and vomiting.   Genitourinary:  Negative for dysuria and hematuria.   Musculoskeletal:  Negative for arthralgias and back pain.   Skin:  Negative for color change and rash.   Allergic/Immunologic: Negative.    Neurological:  Negative for seizures and syncope.   Hematological: Negative.    Psychiatric/Behavioral: Negative.     All other systems reviewed and are negative.    Medical History Reviewed by provider this encounter:  Tobacco  Allergies  Meds  Problems  Med Hx  Surg Hx  Fam Hx     .  Past Medical History   Past Medical History[1]  Past Surgical History[2]  Family History[3]   reports that she quit smoking about 27 years ago. Her smoking use included cigarettes. She started smoking about 28 years ago. She has a 6.3 pack-year smoking history. She has been exposed to tobacco smoke. She has never used smokeless tobacco. She reports current alcohol use. She reports that she does not use drugs.  Current Outpatient Medications   Medication Instructions   • acetaminophen (TYLENOL) 650 mg, Every 6 hours PRN   • albuterol (ProAir HFA) 90 mcg/act inhaler 2 puffs, Inhalation, Every 6 hours PRN   • Diclofenac Sodium (VOLTAREN) 2 g, Topical, 4 times daily   • Etonogestrel (NEXPLANON SC) Inject under the skin Located Right upper inner arm   • fluocinolone acetonide (DERMOTIC) 0.01 % otic oil 5 drops, Both Ears, 2  "times daily PRN   • linaCLOtide (Linzess) 290 MCG CAPS 1 capsule, Oral, Daily   • Linzess 145 mcg, Oral, Daily   • losartan-hydrochlorothiazide (HYZAAR) 50-12.5 mg per tablet 1 tablet, Oral, Daily   • magnesium 500 mg, 2 times daily   • Multiple Vitamins-Minerals (MULTIVITAL-M) TABS Take by mouth   • omega-3-acid ethyl esters (LOVAZA) 2 g, Oral, 2 times daily   • omeprazole (PRILOSEC) 40 mg, Oral, Daily   • QUEtiapine (SEROQUEL) 75 mg, Oral, Daily   • TiZANidine (ZANAFLEX) 4 mg, Oral, 3 times daily PRN   • traZODone (DESYREL) 50 mg, Oral, Daily at bedtime PRN   • Zepbound 2.5 mg, Subcutaneous, Weekly   Allergies[4]   Medications Ordered Prior to Encounter[5]   Social History[6]    Objective   /86 (BP Location: Left arm, Patient Position: Sitting, Cuff Size: Standard)   Pulse 84   Temp 98.7 °F (37.1 °C) (Tympanic)   Resp 17   Ht 5' 6.97\" (1.701 m)   Wt 98.4 kg (217 lb)   SpO2 98%   BMI 34.02 kg/m²     Physical Exam  Vitals and nursing note reviewed.   Constitutional:       Appearance: Normal appearance.   HENT:      Head: Normocephalic and atraumatic.      Right Ear: Tympanic membrane normal.      Left Ear: Tympanic membrane normal.      Nose: Nose normal.      Mouth/Throat:      Mouth: Mucous membranes are moist.     Eyes:      Pupils: Pupils are equal, round, and reactive to light.       Cardiovascular:      Rate and Rhythm: Normal rate and regular rhythm.   Pulmonary:      Effort: Pulmonary effort is normal.      Breath sounds: Normal breath sounds.   Abdominal:      Palpations: Abdomen is soft.     Musculoskeletal:         General: Normal range of motion.      Cervical back: Normal range of motion.     Skin:     General: Skin is warm.      Capillary Refill: Capillary refill takes less than 2 seconds.     Neurological:      General: No focal deficit present.      Mental Status: She is alert and oriented to person, place, and time.     Psychiatric:         Mood and Affect: Mood normal.         " Behavior: Behavior normal.                [1]  Past Medical History:  Diagnosis Date   • Constipation    • COVID-19 2022   • Depression    • Eczema    • Epilepsy ([facility]) 11/15/2013    Description: well controlled with sleep   • GERD (gastroesophageal reflux disease)    • Grand mal convulsion (HCC)     X 2 last episode 2009-Sleep deprivation-Resolved with Cpap   • Hypertension    • Insomnia    • Lateral epicondylitis of right elbow 2018   • Medial meniscus tear 2015   • Migraine    • PONV (postoperative nausea and vomiting)    • Primary generalized epilepsy (HCC) 2011   • Psoriasis    • Seizures ([facility])    • Shingles    • Shoulder dislocation, right, sequela 2018   • Sinusitis    • Sleep apnea     uses cpap   • Tinnitus    [2]  Past Surgical History:  Procedure Laterality Date   •  SECTION   and    • COLONOSCOPY     • DILATION AND CURETTAGE OF UTERUS     • DILATION AND CURETTAGE OF UTERUS WITH HYSTEROSOCPY N/A 2017    Procedure: D&C; IUD REMOVAL;  Surgeon: Abeba Blackburn MD;  [facility];  Service: Gynecology   • EGD     • EPIDURAL BLOCK INJECTION Bilateral 2024    Procedure: BILATERAL L5-S1 TRANSFORAMINAL EPIDURAL STEROID INJECTION;  Surgeon: Jeff Pettit DO;  Location: [facility] MAIN OR;  Service: Pain Management    • HYSTEROSCOPY     • LAPAROSCOPY      (Diagnostic)   • LASIK     • NECK SURGERY  2021    C4-C7 fusion   • MO COLONOSCOPY FLX DX W/COLLJ SPEC WHEN PFRMD N/A 2018    Procedure: EGD AND COLONOSCOPY;  Surgeon: Jagdish Katz MD;  [facility];  Service: Gastroenterology   • MO VERTEBRAL CORPECTOMY ANT DCMPRN CERVICAL 1 SEG Bilateral 2021    Procedure: CORPECTOMY SPINE CERVICAL ANTERIOR: C4-7 ACDF with C6 /hemicorpectomy, C4-7 instrumentation and fusion (neuromonitoring);  Surgeon: Raquel Porter MD;  [facility];  Service: Neurosurgery   • REMOVAL OF INTRAUTERINE DEVICE (IUD)     [3]  Family History  Problem  "Relation Name Age of Onset   • No Known Problems Mother     • Diabetes Father Pop         Diabetes Mellitus   • Hyperlipidemia Father Pop    • Hypertension Father Pop    • No Known Problems Sister     • No Known Problems Daughter     • Brain cancer Maternal Grandmother  56   • No Known Problems Son ilia    • Migraines Family FH    • Diabetes Family FH         Type 2 Diabetes Mellitus   • Breast cancer Neg Hx     [4]  Allergies  Allergen Reactions   • Gluten Meal - Food Allergy GI Intolerance     \" Highly Sensitivity\"   • Lactose - Food Allergy GI Intolerance     \" Highly Sensitive\"   [5]  Current Outpatient Medications on File Prior to Visit   Medication Sig Dispense Refill   • acetaminophen (TYLENOL) 325 mg tablet Take 650 mg by mouth every 6 (six) hours as needed for mild pain     • albuterol (ProAir HFA) 90 mcg/act inhaler Inhale 2 puffs every 6 (six) hours as needed for shortness of breath 18 g 0   • Diclofenac Sodium (VOLTAREN) 1 % Apply 2 g topically 4 (four) times a day 2 g 3   • Etonogestrel (NEXPLANON SC) Inject under the skin Located Right upper inner arm     • fluocinolone acetonide (DERMOTIC) 0.01 % otic oil Administer 5 drops into both ears 2 (two) times a day as needed (for itchy ears) 20 mL 0   • linaCLOtide (Linzess) 145 MCG CAPS Take 1 capsule (145 mcg total) by mouth in the morning 90 capsule 3   • linaCLOtide (Linzess) 290 MCG CAPS Take 1 capsule by mouth in the morning 90 capsule 3   • losartan-hydrochlorothiazide (HYZAAR) 50-12.5 mg per tablet Take 1 tablet by mouth daily 30 tablet 5   • magnesium 30 MG tablet Take 500 mg by mouth in the morning and 500 mg in the evening.     • Multiple Vitamins-Minerals (MULTIVITAL-M) TABS Take by mouth     • omeprazole (PriLOSEC) 40 MG capsule Take 1 capsule (40 mg total) by mouth daily 90 capsule 0   • QUEtiapine (SEROquel) 25 mg tablet TAKE 3 TABLETS BY MOUTH EVERY  tablet 3   • TiZANidine (ZANAFLEX) 4 MG capsule Take 1 capsule (4 mg total) by " mouth 3 (three) times a day as needed for muscle spasms 90 capsule 3   • traZODone (DESYREL) 50 mg tablet Take 1 tablet (50 mg total) by mouth daily at bedtime as needed for sleep or depression 90 tablet 3   • [DISCONTINUED] Omega-3 Fatty Acids (OMEGA 3 PO) Take by mouth daily       No current facility-administered medications on file prior to visit.   [6]  Social History  Tobacco Use   • Smoking status: Former     Current packs/day: 0.00     Average packs/day: 0.3 packs/day for 25.0 years (6.3 ttl pk-yrs)     Types: Cigarettes     Start date:      Quit date:      Years since quittin.4     Passive exposure: Past   • Smokeless tobacco: Never   • Tobacco comments:     Former smoker per [employer]   Vaping Use   • Vaping status: Never Used   Substance and Sexual Activity   • Alcohol use: Yes     Comment: occassionally   • Drug use: Never   • Sexual activity: Not Currently     Partners: Male     Birth control/protection: Implant     Comment: 15 - Nexplanon inserted

## 2025-06-19 NOTE — ASSESSMENT & PLAN NOTE
Called and informed that order was faxed to DME:  Viji Gilbert ph [phone] / tessa [phone].   Patient will call and scheduled 3 month follow up, and will cancel 11/06/2020 appointment.      Improved after back injections

## 2025-06-19 NOTE — ASSESSMENT & PLAN NOTE
Prior Authorization Clinical Questions for Weight Management Pharmacotherapy    1. Does the patient have a contrainidcation to medication prescribed for weight management?: No  2. Does the patient have a diagnosis of obesity, confirmed by a BMI greater than or equal to 30 kg/m^2?: Yes  3. Does the patient have a BMI of greater than or equal to 27 kg/m^2 with at least one weight-related comorbidity/risk factor/complication (e.g. diabetes, dyslipidemia, coronary artery disease)?: Yes  5. WEGOVY CVA Indication: Does patient have established documented cardiovascular disease (history of a prior heart attack (myocardial infarction), stroke, or symptomatic peripheral arterial disease (PAD)?: N/A  6. ZEPBOUND KHADAR Indication: Does patient have documented KHADAR diagnosed via sleep study (insurance will require copy of sleep study results for approval)?: Yes  7. Has the patient been on a weight loss regimen of low-calorie diet, increased physical activity, and lifestyle modifications for a minimum of 6 months?: Yes  8. Has the patient completed a comprehensive weight loss program (ie, Weight Watchers, Noom, Bariatrics, other wood on phone)? If so, what?: Yes   -- Q8 Further Explanation: low calories, high protein diet   9. Does the patient have a history of type 2 diabetes?: No  10. Has the member tried and failed other weight loss medication within the past 12 months?: No  11. Will the member use requested medication in combination with another GLP agonist or weight loss drug?: No  12. Is the medication a controlled substance?: No     Baseline weight (in pounds): 217 lbs  Current weight (in pounds): 217 lbs  Weight loss percentage: 0%     She will look into weight loss injections and let me know if her insurance covers  Orders:  •  Comprehensive metabolic panel  •  Lipid Panel With Direct LDL; Future

## 2025-06-20 ENCOUNTER — TELEPHONE (OUTPATIENT)
Age: 45
End: 2025-06-20

## 2025-06-20 NOTE — TELEPHONE ENCOUNTER
PA Zepbound 2.5 MG/0.5ML SUBMITTED    to [employer]     via    []CMM-KEY:    [x]Surescripts-Case ID # [id number]  []Availity-Auth ID #  NDC #    []Faxed to plan    []Other website    []Phone call Case ID #      []PA sent as URGENT    All office notes, labs and other pertaining documents and studies sent. Clinical questions answered. Awaiting determination from insurance company.     Turnaround time for your insurance to make a decision on your Prior Authorization can take 7-21 business days.

## 2025-06-23 ENCOUNTER — APPOINTMENT (OUTPATIENT)
Dept: RADIOLOGY | Age: 45
End: 2025-06-23
Attending: PODIATRIST
Payer: COMMERCIAL

## 2025-06-23 ENCOUNTER — APPOINTMENT (OUTPATIENT)
Dept: RADIOLOGY | Age: 45
End: 2025-06-23
Payer: COMMERCIAL

## 2025-06-23 ENCOUNTER — OFFICE VISIT (OUTPATIENT)
Dept: PODIATRY | Facility: CLINIC | Age: 45
End: 2025-06-23
Attending: FAMILY MEDICINE
Payer: COMMERCIAL

## 2025-06-23 VITALS — WEIGHT: 217 LBS | HEIGHT: 66 IN | BODY MASS INDEX: 34.87 KG/M2

## 2025-06-23 DIAGNOSIS — M25.572 CHRONIC PAIN OF LEFT ANKLE: ICD-10-CM

## 2025-06-23 DIAGNOSIS — M79.671 RIGHT FOOT PAIN: ICD-10-CM

## 2025-06-23 DIAGNOSIS — M76.61 TENDONITIS, ACHILLES, RIGHT: ICD-10-CM

## 2025-06-23 DIAGNOSIS — G89.29 CHRONIC PAIN OF LEFT ANKLE: ICD-10-CM

## 2025-06-23 DIAGNOSIS — Q68.8 OS TRIGONUM SYNDROME: ICD-10-CM

## 2025-06-23 DIAGNOSIS — M25.571 CHRONIC PAIN OF RIGHT ANKLE: Primary | ICD-10-CM

## 2025-06-23 DIAGNOSIS — G89.29 CHRONIC PAIN OF RIGHT ANKLE: Primary | ICD-10-CM

## 2025-06-23 PROCEDURE — 99204 OFFICE O/P NEW MOD 45 MIN: CPT | Performed by: PODIATRIST

## 2025-06-23 PROCEDURE — 73610 X-RAY EXAM OF ANKLE: CPT

## 2025-06-23 NOTE — ASSESSMENT & PLAN NOTE
We will immobilize patient in cam boot for the right side.    Will check her back for reevaluation in 3 to 4 weeks.  Pending how she is doing we will consider physical therapy at that time if no improvement could consider MRI imaging of the right ankle.      Orders:  •  Ambulatory Referral to Podiatry  •  Cam Boot

## 2025-06-23 NOTE — PROGRESS NOTES
"Name: Marisel Olivas      : 1980      MRN: [id number]  Encounter Provider: Kenny Angela DPM  Encounter Date: 2025   Encounter department: [facility]  :  Assessment & Plan  Right foot pain  We will immobilize patient in cam boot for the right side.    Will check her back for reevaluation in 3 to 4 weeks.  Pending how she is doing we will consider physical therapy at that time if no improvement could consider MRI imaging of the right ankle.      Orders:  •  Ambulatory Referral to Podiatry  •  Cam Boot    Chronic pain of right ankle    Orders:  •  XR ankle 3+ vw right  •  Cam Boot    Chronic pain of left ankle    Orders:  •  XR ankle 3+ vw left    Os trigonum syndrome    Orders:  •  Cam Boot    Tendonitis, Achilles, right    Orders:  •  Cam Boot      My personal interpretation today of the x-rays that were taken show X-rays reviewed of the left ankle show some mild osteoarthritic changes noted at the level of the ankle joint.  Posterior and plantar calcaneal spurring noted.  Os trigonum noted posterior aspect of the talus with some narrowing noted subtalar joint.  Right ankle x-ray shows some mild narrowing of the joint space at the medial aspect of the ankle os trigonum noted plantar and posterior calcaneal spurring noted.          History of Present Illness   HPI  Marisel Olivas is a 44 y.o. female who presents with pain bilateral ankles.  She has pain to the right heel at the Achilles tendon.  She has a previous history of injury by doing a cart wheel.  Since then in October she is have pain and discomfort that has not improved.  She also has some pain and discomfort to the left ankle.  Some also discomfort and cramping to the third toe on the left side.      Review of Systems       Objective   Ht 5' 6\" (1.676 m)   Wt 98.4 kg (217 lb)   BMI 35.02 kg/m²      Physical Exam    Vascular: Intact pedal pulses bilateral DP and PT.  Neurological: Gross protective " sensation intact bilateral  Musculoskeletal: Muscle strength bilateral intact with dorsiflexion, inversion, eversion and plantarflexion.  Right foot pain at insertion of achilles tendon and posterior talus on palpation. Some pain posterior STJ left.  No discomfort with ROM of ankle.    Dermatological: No open lesions or ulcerations noted bilateral.

## 2025-06-23 NOTE — PROGRESS NOTES
PT Evaluation     Today's date: 2025  Patient name: Marisel Olivas  : 1980  MRN: [id number]  Referring provider: Blanca Addison DO  Dx:   Encounter Diagnosis     ICD-10-CM    1. Lateral epicondylitis of right elbow  M77.11 Ambulatory Referral to Physical Therapy     PT plan of care cert/re-cert      2. Medial epicondylitis of right elbow  M77.01 Ambulatory Referral to Physical Therapy     PT plan of care cert/re-cert      3. Medial epicondylitis of left elbow  M77.02 Ambulatory Referral to Physical Therapy     PT plan of care cert/re-cert          Start Time: 1210  Stop Time: 1302  Total time in clinic (min): 52 minutes    Assessment  Impairments: abnormal or restricted ROM, activity intolerance, impaired physical strength, lacks appropriate home exercise program, pain with function, unable to perform ADL, participation limitations and activity limitations  Symptom irritability: moderate    Assessment details: Problem List:  1) UE and  strength  2) pain with ER and IR    Marisel Olivas is a pleasant 44 y.o. female who presents with bilateral medial and lateral elbow pain. Deficits include UE strength, ROM, palpation,  strength, and pain resulting in difficulty in activity participation, ADLs, and work.  No further referral appears necessary at this time based upon examination results.  I expect she will benefit from PT to allow her to perform ADLs, activities, and work without limitations or pain.        Comparable signs:  1) MMT and  test   2) Putting dishes in the   Understanding of Dx/Px/POC: good     Prognosis: fair    Goals  Patient will be independent with home exercise program.   Patient will be able to manage symptoms independently.   Short team goals (4 weeks):  1. Pt will be independent with basic HEP  2. Pain will be a 3/10 at worst to be able to perform loading of the  with minimal discomfort  3. UE ROM will increase by 5-10 degrees in deficient planes  to make ADLs and work participation easier  4. UE strength will increase by 1/2 MMT grade in deficient planes to promote greater activity tolerance  Long Term Goals (end of discharge)  1. Pt will be independent with comprehensive HEP  2. FOTO score will be greater than or equal to projected goal  3. Pt will be able to perform ADLs at PLOF with minimal pain restriction  4. All UE strength increase to 5/5 MMT score to promote maximum strength when performing activities and work.   5. Pt will be able to tolerate >60 mins of activity like gardening without having to stop due to discomfort    Plan  Patient would benefit from: skilled physical therapy, PT eval and home program  Planned modality interventions: low level laser therapy    Planned therapy interventions: neuromuscular re-education, therapeutic activities, therapeutic exercise, home exercise program and manual therapy    Frequency: 1x month  Duration in weeks: 2  Treatment plan discussed with: patient  Plan details: Pt would like to try HEP at home for about 2-3 weeks then reschedule a follow up to progress HEP.      Subjective Evaluation    History of Present Illness  Mechanism of injury: Marisel Olivas presents with c/c of bilateral elbow pain BELLA: chronic   -wants to find out proper way to heal elbow pain and get exercises to do at home and will call when feeling need to progress  - pain started affecting ADLs within last year, not stopping but having to do things in awkward motions   -windshield wiper movement hurts and dropping item when pain comes on  - pain centralized on lateral and medial epicondyles of both elbows   - Got to extremely bad point about 2 months ago which resulted in seeking treatment  - affecting gardening ability this year and gets tenisha horses in [place] forearms with burning pain happening prior to this cramping beginning in May,   - pain goes away after a couple of seconds, pain starts after 20 mins of grabbing and pulling motion  "  Pain location: medial and lateral epicondyle of both elbows: starts with burn then goes into tenisha horse; move in wrong way is stabbing sharp pain.  -R hand dominant  Aggravating factors: grabbing and pulling in certain directions (ER and IR), repetitive movements  Relieving factors: rest, adding magnesium to diet, compensation with movements, Ibprofen and Tylenol    24hr pain pattern: 0/10 (current), 0/10 (best), 8/10 (worst)   Imaging: X-ray on 6/16 with findings of:  -R elbow: \"There is a small enthesophyte at the insertion of the triceps tendon. There is a small calcification adjacent to the lateral epicondyle, likely degenerative\"  -L elbow:\"There are small osteophytes/calcifications at the medial greater than lateral epicondyles.\"  -\"xrays (AP, lateral) of b/l elbows demonstrate no fracture, dislocation, or significant degenerative changes. There is calcifications over lateral and medial epicondyles as well as left medial epicondyle.\"   Previous treatments: none  Occupation/recreation: , fixing and repairing of investment properties   Sleeping: takes Seroquel to sleep but wakes up in middle of the night due to elbow burning, uses meditation apps to fall back asleep takes a few mins   Patient concerns: learn proper exercises to rehabilitate elbow to add to home program  Special Questions: (-) Red flag screening       Objective     Postural Observations  Seated posture: fair  Standing posture: fair      Palpation   Left   No palpable tenderness to the wrist extensors and wrist flexors.     Right   No palpable tenderness to the wrist extensors and wrist flexors.     Tenderness     Left Elbow   Tenderness in the lateral epicondyle, medial epicondyle and radial head. No tenderness in the cubital tunnel and olecranon process.     Right Elbow   Tenderness in the lateral epicondyle, medial epicondyle and radial head. No tenderness in the cubital tunnel and olecranon process.     Left Wrist/Hand "   Tenderness in the lateral epicondyle and medial epicondyle. No tenderness in the olecranon process.     Right Wrist/Hand   Tenderness in the lateral epicondyle and medial epicondyle. No tenderness in the olecranon process.     Active Range of Motion   Cervical/Thoracic Spine       Cervical    Flexion:  Restriction level: moderate  Extension:  Restriction level: moderate  Left lateral flexion:  Restriction level: moderate  Right lateral flexion:  Restriction level maximal    Left Elbow   Flexion: 50 degrees   Extension: 4 degrees   Forearm supination: 75 degrees   Forearm pronation: 70 degrees with pain    Right Elbow   Flexion: 55 degrees   Extension: 2 degrees   Forearm supination: 75 degrees   Forearm pronation: 70 degrees with pain    Left Wrist   Wrist flexion: 39 degrees   Wrist extension: 60 degrees   Radial deviation: 3 (with pain) degrees with pain  Ulnar deviation: 40 degrees     Right Wrist   Wrist flexion: 40 degrees   Wrist extension: 62 degrees   Radial deviation: 18 degrees   Ulnar deviation: 35 degrees     Additional Active Range of Motion Details  Has very stiff neck due to prior cervical surgical procedure.     Joint Play     Left Elbow   Joints within functional limits are the humeroulnar joint.     Right Elbow   Joints within functional limits are the humeroulnar joint.     Strength/Myotome Testing   Cervical Spine     Left   Levator scapulae (C4): 5    Right   Levator scapulae (C4): 5    Left Shoulder     Planes of Motion   Abduction: 5     Isolated Muscles   Levator scapulae: 5     Right Shoulder     Planes of Motion   Abduction: 5     Isolated Muscles   Levator scapulae: 5     Left Elbow   Flexion: 4+  Extension: 5  Forearm supination: 4- (with pain)  Forearm pronation: 4+    Right Elbow   Flexion: 4+  Extension: 5  Forearm supination: 4- (with pain)  Forearm pronation: 4+    Left Wrist/Hand   Wrist extension: 4  Wrist flexion: 4+     (2nd hand position)     Trial 1: 40    Trial 2: 41     Trial 3: 38    Average: 39.67    Right Wrist/Hand   Wrist extension: 4  Wrist flexion: 4+     (2nd hand position)     Trial 1: 50    Trial 2: 48    Trial 3: 45    Average: 47.67    Tests     Left Shoulder   Positive ULTT1 and ULTT4.   Negative ULTT3.     Right Shoulder   Positive ULTT1 and ULTT4.   Negative ULTT3.     Left Elbow   Positive Maudsley's and Tinel's sign (cubital tunnel).   Negative Cozen's, Mill's, valgus stress at 0 degrees and varus stress at 0 degrees.     Right Elbow   Positive Maudsley's and Tinel's sign (cubital tunnel).   Negative Cozen's, Mill's, valgus stress at 0 degrees and varus stress at 0 degrees.     Left Wrist/Hand   Positive resisted middle finger.   Negative Froment's sign.     Right Wrist/Hand   Positive resisted middle finger.   Negative Froment's sign.     Ambulation     Comments   Has CAM boot on for an achilles injury.       Flowsheet Rows      Flowsheet Row Most Recent Value   PT/OT G-Codes    Current Score 50   Projected Score 64               Precautions: obesity, HTN , GERD, hx seizures, myofascial pain syndrome      Date 6/24            Visit 1            Manuals                                                                 Neuro Re-Ed                                                                                                        Ther Ex                          Wrist ext eccentric 5# HEP            Wrist flex eccentric 5# HEP            Wrist pronation eccentric 5# HEP            Wrist pronation hold with 5# weight HEP                                                   Ther Activity                                       Gait Training                                       Self Care             Education POC, HEP, objective findings                            Treatment was performed by PERLA Ramey under the direct supervision of Ninfa Cordova DPT.

## 2025-06-24 ENCOUNTER — EVALUATION (OUTPATIENT)
Dept: PHYSICAL THERAPY | Facility: CLINIC | Age: 45
End: 2025-06-24
Attending: PHYSICIAN ASSISTANT
Payer: COMMERCIAL

## 2025-06-24 DIAGNOSIS — M77.11 LATERAL EPICONDYLITIS OF RIGHT ELBOW: Primary | ICD-10-CM

## 2025-06-24 DIAGNOSIS — M77.02 MEDIAL EPICONDYLITIS OF LEFT ELBOW: ICD-10-CM

## 2025-06-24 DIAGNOSIS — M77.01 MEDIAL EPICONDYLITIS OF RIGHT ELBOW: ICD-10-CM

## 2025-06-24 PROCEDURE — 97535 SELF CARE MNGMENT TRAINING: CPT

## 2025-06-24 PROCEDURE — 97110 THERAPEUTIC EXERCISES: CPT

## 2025-06-24 PROCEDURE — 97161 PT EVAL LOW COMPLEX 20 MIN: CPT

## 2025-06-24 NOTE — PROGRESS NOTES
Annual Exam & consult on menopause    Last Annual Exam: 01/10/2023    Last PAP/HPV Test and Result: 01/10/2023, PAP/HPV neg    Last Mammo Screenin2024    Last STD Culture Screening: unknown    Current BC Method: nexplanon

## 2025-06-25 ENCOUNTER — OFFICE VISIT (OUTPATIENT)
Dept: OBGYN CLINIC | Facility: CLINIC | Age: 45
End: 2025-06-25
Attending: FAMILY MEDICINE
Payer: COMMERCIAL

## 2025-06-25 VITALS — SYSTOLIC BLOOD PRESSURE: 124 MMHG | BODY MASS INDEX: 35.02 KG/M2 | HEIGHT: 66 IN | DIASTOLIC BLOOD PRESSURE: 82 MMHG

## 2025-06-25 DIAGNOSIS — Z01.419 WOMEN'S ANNUAL ROUTINE GYNECOLOGICAL EXAMINATION: Primary | ICD-10-CM

## 2025-06-25 DIAGNOSIS — Z12.4 CERVICAL CANCER SCREENING: ICD-10-CM

## 2025-06-25 DIAGNOSIS — N95.1 PERIMENOPAUSAL SYMPTOMS: ICD-10-CM

## 2025-06-25 DIAGNOSIS — Z01.419 ENCOUNTER FOR GYNECOLOGICAL EXAMINATION WITHOUT ABNORMAL FINDING: ICD-10-CM

## 2025-06-25 PROCEDURE — G0145 SCR C/V CYTO,THINLAYER,RESCR: HCPCS | Performed by: OBSTETRICS & GYNECOLOGY

## 2025-06-25 PROCEDURE — 99396 PREV VISIT EST AGE 40-64: CPT | Performed by: OBSTETRICS & GYNECOLOGY

## 2025-06-25 PROCEDURE — G0476 HPV COMBO ASSAY CA SCREEN: HCPCS | Performed by: OBSTETRICS & GYNECOLOGY

## 2025-06-25 NOTE — PROGRESS NOTES
"Subjective      Marisel Olivas is a 44 y.o. female who presents for annual well woman exam. Periods are rare, lasting a few days. No intermenstrual bleeding, spotting, or discharge.     (Pt here for yearly exam. Pt has heavy periods. Last period was 6 weeks long and was on and off extremely heavy and had bad cramps. Pt states she had problems losing weight. Pt has pain during sex.)     Current contraception: nexplanon   Patient is due for Nexplanon  Replacement  Menstrual History:  OB History          5    Para   2    Term   2            AB   3    Living   2         SAB   3    IAB        Ectopic        Multiple        Live Births   2           Obstetric Comments   : SABs x 3;  C/S M;  C/S F                No LMP recorded.       The following portions of the patient's history were reviewed and updated as appropriate: allergies, current medications, past family history, past medical history, past social history, past surgical history, and problem list.    Review of Systems  Review of Systems   Constitutional:  Negative for activity change, appetite change, chills, fatigue and fever.   Respiratory:  Negative for apnea, cough, chest tightness and shortness of breath.    Cardiovascular:  Negative for chest pain, palpitations and leg swelling.   Gastrointestinal:  Negative for abdominal pain, constipation, diarrhea, nausea and vomiting.   Genitourinary:  Negative for difficulty urinating, dysuria, flank pain, frequency, hematuria and urgency.   Neurological:  Negative for dizziness, seizures, syncope, light-headedness, numbness and headaches.   Psychiatric/Behavioral:  Negative for agitation and confusion.           Objective      /82 (BP Location: Left arm, Patient Position: Sitting, Cuff Size: Adult)   Ht 5' 6\" (1.676 m)   BMI 35.02 kg/m²     Physical Exam  OBGyn Exam     General:   alert and oriented, in no acute distress, alert, appears stated age, and cooperative   Heart: regular " rate and rhythm, S1, S2 normal, no murmur, click, rub or gallop   Lungs: clear to auscultation bilaterally   Abdomen: soft, non-tender, without masses or organomegaly   Vulva: normal, Bartholin's, Urethra, [place]'s normal   Vagina: normal mucosa, normal discharge   Cervix: no cervical motion tenderness and no lesions   Uterus: normal size   Adnexa:  Breast Exam:  normal adnexa  breasts appear normal, no suspicious masses, no skin or nipple changes or axillary nodes.                Assessment      @well woman@ .  44-year-old female  Annual exam  Perimenopausal  Desires replacement  Has emergency  in her first pregnancy  History of retained/embedded IUD removal surgically history of seizure  History of chronic hypertension  Dyspareunia upon entrance  Plan   Pap/HPV  Diet/exercise  Calcium/vitamin D  Vaginal dilator/always moisturizer  Return to office for Nexplanon replacement  Patient request hormonal test lab given to patient   All questions answered.     There are no Patient Instructions on file for this visit.

## 2025-06-27 ENCOUNTER — APPOINTMENT (OUTPATIENT)
Dept: LAB | Facility: CLINIC | Age: 45
End: 2025-06-27
Attending: OBSTETRICS & GYNECOLOGY
Payer: COMMERCIAL

## 2025-06-27 ENCOUNTER — PROCEDURE VISIT (OUTPATIENT)
Dept: OBGYN CLINIC | Facility: CLINIC | Age: 45
End: 2025-06-27
Attending: FAMILY MEDICINE
Payer: COMMERCIAL

## 2025-06-27 VITALS — DIASTOLIC BLOOD PRESSURE: 84 MMHG | BODY MASS INDEX: 35.02 KG/M2 | SYSTOLIC BLOOD PRESSURE: 132 MMHG | HEIGHT: 66 IN

## 2025-06-27 DIAGNOSIS — N95.1 PERIMENOPAUSAL SYMPTOMS: ICD-10-CM

## 2025-06-27 DIAGNOSIS — Z30.017 INSERTION OF NEXPLANON: Primary | ICD-10-CM

## 2025-06-27 DIAGNOSIS — Z30.46 ENCOUNTER FOR REMOVAL AND REINSERTION OF NEXPLANON: ICD-10-CM

## 2025-06-27 LAB
ESTRADIOL SERPL-MCNC: 47 PG/ML
FSH SERPL-ACNC: 4.9 MIU/ML
LH SERPL-ACNC: 4.8 MIU/ML
PROGEST SERPL-MCNC: 1.53 NG/ML

## 2025-06-27 PROCEDURE — 82670 ASSAY OF TOTAL ESTRADIOL: CPT

## 2025-06-27 PROCEDURE — 84144 ASSAY OF PROGESTERONE: CPT

## 2025-06-27 PROCEDURE — 36415 COLL VENOUS BLD VENIPUNCTURE: CPT

## 2025-06-27 PROCEDURE — 83002 ASSAY OF GONADOTROPIN (LH): CPT

## 2025-06-27 PROCEDURE — 11983 REMOVE/INSERT DRUG IMPLANT: CPT | Performed by: OBSTETRICS & GYNECOLOGY

## 2025-06-27 PROCEDURE — 83001 ASSAY OF GONADOTROPIN (FSH): CPT

## 2025-06-27 NOTE — PROGRESS NOTES
"Assessment/Plan:     Diagnoses and all orders for this visit:    Insertion of Nexplanon    Encounter for removal and reinsertion of Nexplanon        .  44-year-old female  Perimenopausal  Nexplanon desires replacement  Has emergency  in her first pregnancy  History of retained/embedded IUD removal surgically history of seizure  History of chronic hypertension  Dyspareunia upon entrance  Plan   Nexplanon replaced today  Diet/exercise  Calcium/vitamin D  Vaginal dilator/always moisturizer  Patient request hormonal test lab given to patient        Subjective:      Patient ID: Marisel Olivas is a 44 y.o. female.    HPI  Patient seen evaluated presents to the office today desiring Nexplanon replacement  Patient has  Nexplanon and satisfied with the contraception method desires to continue    The following portions of the patient's history were reviewed and updated as appropriate: allergies, current medications, past family history, past medical history, past social history, past surgical history and problem list.    Review of Systems      Objective:      /84 (BP Location: Left arm, Patient Position: Sitting, Cuff Size: Adult)   Ht 5' 6\" (1.676 m)   BMI 35.02 kg/m²          Physical Exam      Remove and insert drug implant    Date/Time: 2025 10:15 AM    Performed by: Ofelai Pradhan MD  Authorized by: Ofelia Pradhan MD    Consent:       Consent obtained:  Verbal and written      Consent given by:  Patient      Procedural risks discussed:  Infection, failure rate and repeat procedure      Patient questions answered:  yes       Patient agrees, verbalizes understanding, and wants to proceed:  yes       Educational handouts given:  yes    Indication:       Indication:  presence of non-biodegradable drug delivery implant       Indication comment:  Replacement   Pre-procedure:       Pre-procedure timeout performed:  yes       Local anesthetic:  Lidocaine with epinephrine      The site was cleaned and " prepped in a sterile fashion:  yes    Procedure:       Procedure:  Removal with reinsertion      Laterality: Removal was done from the right arm and insertion was done on the left arm patient request different arm.      The patient was position surpine with their arm externally rotated and flexed at the elbow with their hand behind their head. The insertion site was chosen 8-10 cm medial to the medical epicondyle, and 3-5 cm posterior the sulcus between the bicep and tricep.:  Yes       Small stab incision was made in arm:  yes       The tip of the implant emerged from the skin incision:  Yes       Adherent tissue was removed with blunt dissection:  Yes       The implant was grasped with forceps:  Yes       The implant was removed  :  Yes       Preloaded contraceptive capsule trocar was placed subdermally:  yes       Contraceptive capsule was inserted and trocar removed:  yes       Palpation confirms placement by provider and patient:  yes       Site was closed and/or dressed with:  Steri-strips and ace bandage      Patient tolerated procedure well:  Patient tolerated procedure well    Comments:       Incision was done in a similar fashion of the left arm where patient requested insertion to be done in the opposite arm

## 2025-06-30 DIAGNOSIS — L29.9 EAR ITCHING: ICD-10-CM

## 2025-06-30 LAB
LAB AP GYN PRIMARY INTERPRETATION: NORMAL
Lab: NORMAL

## 2025-07-02 RX ORDER — FLUOCINOLONE ACETONIDE 0.11 MG/ML
OIL AURICULAR (OTIC)
Qty: 20 ML | Refills: 0 | OUTPATIENT
Start: 2025-07-02

## 2025-07-07 DIAGNOSIS — E66.811 CLASS 1 OBESITY WITHOUT SERIOUS COMORBIDITY WITH BODY MASS INDEX (BMI) OF 34.0 TO 34.9 IN ADULT, UNSPECIFIED OBESITY TYPE: ICD-10-CM

## 2025-07-10 RX ORDER — TIRZEPATIDE 2.5 MG/.5ML
INJECTION, SOLUTION SUBCUTANEOUS
Qty: 2 ML | Refills: 0 | Status: SHIPPED | OUTPATIENT
Start: 2025-07-10

## 2025-07-29 DIAGNOSIS — E66.811 CLASS 1 OBESITY WITHOUT SERIOUS COMORBIDITY WITH BODY MASS INDEX (BMI) OF 34.0 TO 34.9 IN ADULT, UNSPECIFIED OBESITY TYPE: ICD-10-CM

## 2025-07-29 DIAGNOSIS — I10 ESSENTIAL HYPERTENSION: ICD-10-CM

## 2025-07-29 DIAGNOSIS — E66.9 OBESITY (BMI 30-39.9): Primary | ICD-10-CM

## 2025-07-29 RX ORDER — TIRZEPATIDE 2.5 MG/.5ML
INJECTION, SOLUTION SUBCUTANEOUS
Qty: 2 ML | Refills: 0 | OUTPATIENT
Start: 2025-07-29

## 2025-07-29 RX ORDER — TIRZEPATIDE 5 MG/.5ML
5 INJECTION, SOLUTION SUBCUTANEOUS WEEKLY
Qty: 2 ML | Refills: 0 | Status: SHIPPED | OUTPATIENT
Start: 2025-07-29

## 2025-07-30 RX ORDER — LOSARTAN POTASSIUM AND HYDROCHLOROTHIAZIDE 12.5; 5 MG/1; MG/1
1 TABLET ORAL DAILY
Qty: 30 TABLET | Refills: 5 | Status: SHIPPED | OUTPATIENT
Start: 2025-07-30

## 2025-08-04 ENCOUNTER — HOSPITAL ENCOUNTER (OUTPATIENT)
Dept: RADIOLOGY | Age: 45
Discharge: HOME | End: 2025-08-04
Payer: COMMERCIAL

## 2025-08-04 VITALS — HEIGHT: 68 IN | WEIGHT: 204 LBS | BODY MASS INDEX: 30.92 KG/M2

## 2025-08-04 DIAGNOSIS — Z12.31 ENCOUNTER FOR SCREENING MAMMOGRAM FOR BREAST CANCER: ICD-10-CM

## 2025-08-04 PROCEDURE — 77063 BREAST TOMOSYNTHESIS BI: CPT

## 2025-08-04 PROCEDURE — 77067 SCR MAMMO BI INCL CAD: CPT

## (undated) DEVICE — CHLORHEXIDINE 4PCT 4 OZ

## (undated) DEVICE — 3000CC GUARDIAN II: Brand: GUARDIAN

## (undated) DEVICE — SPECIMEN CONTAINER STERILE PEEL PACK

## (undated) DEVICE — NEEDLE SPINAL 22G X 5IN QUINCKE

## (undated) DEVICE — PLASTIC ADHESIVE BANDAGE: Brand: CURITY

## (undated) DEVICE — GLOVE SRG BIOGEL 8

## (undated) DEVICE — MAGNETIC INSTRUMENT PAD 16" X 20"; LARGE; DISPOSABLE: Brand: CARDINAL HEALTH

## (undated) DEVICE — TOWEL SET X-RAY

## (undated) DEVICE — STRL ALLENTOWN HYSTEROSCOPY PK: Brand: CARDINAL HEALTH

## (undated) DEVICE — PVC URETHRAL CATHETER: Brand: DOVER

## (undated) DEVICE — TOOL T12MH25 LEGEND 12CM 2.5MM MH: Brand: MIDAS REX ™

## (undated) DEVICE — NEEDLE SPINAL18G X 3.5 IN QUINCKE

## (undated) DEVICE — LIGHT HANDLE COVER SLEEVE DISP BLUE STELLAR

## (undated) DEVICE — GLOVE INDICATOR PI UNDERGLOVE SZ 6.5 BLUE

## (undated) DEVICE — WIPES BABY PAMPERS SENSITIVE 36/PK

## (undated) DEVICE — 3M™ TEGADERM™ TRANSPARENT FILM DRESSING FRAME STYLE, 1626W, 4 IN X 4-3/4 IN (10 CM X 12 CM), 50/CT 4CT/CASE: Brand: 3M™ TEGADERM™

## (undated) DEVICE — IV CATH 14 G X 3 1/4 IN

## (undated) DEVICE — DRESSING TELFA 2 X 3 IN STRL

## (undated) DEVICE — INTENDED FOR TISSUE SEPARATION, AND OTHER PROCEDURES THAT REQUIRE A SHARP SURGICAL BLADE TO PUNCTURE OR CUT.: Brand: BARD-PARKER SAFETY BLADES SIZE 15, STERILE

## (undated) DEVICE — DRAPE SHEET X-LG

## (undated) DEVICE — SNAP KOVER: Brand: UNBRANDED

## (undated) DEVICE — DISTRACTION SCREW 12MM DISP

## (undated) DEVICE — PREP SURGICAL PURPREP 26ML

## (undated) DEVICE — UMBILICAL TAPE: Brand: DEROYAL

## (undated) DEVICE — GLOVE INDICATOR PI UNDERGLOVE SZ 8 BLUE

## (undated) DEVICE — 3M™ STERI-STRIP™ REINFORCED ADHESIVE SKIN CLOSURES, R1547, 1/2 IN X 4 IN (12 MM X 100 MM), 6 STRIPS/ENVELOPE: Brand: 3M™ STERI-STRIP™

## (undated) DEVICE — SKIN MARKER DUAL TIP WITH RULER CAP, FLEXIBLE RULER AND LABELS: Brand: DEVON

## (undated) DEVICE — STERILE SURGICAL LUBRICANT,  TUBE: Brand: SURGILUBE

## (undated) DEVICE — TELFA NON-ADHERENT ABSORBENT DRESSING: Brand: TELFA

## (undated) DEVICE — SYRINGE 3ML LL

## (undated) DEVICE — GLOVE SRG BIOGEL 6.5

## (undated) DEVICE — NEEDLE SPINAL 22G X 3.5 IN PLST HUB

## (undated) DEVICE — MINOR PROCEDURE DRAPE: Brand: CONVERTORS

## (undated) DEVICE — SPONGE PVP SCRUB WING STERILE

## (undated) DEVICE — GAUZE SPONGES,16 PLY: Brand: CURITY

## (undated) DEVICE — JACKSON-PRATT 100CC BULB RESERVOIR: Brand: CARDINAL HEALTH

## (undated) DEVICE — INTENDED FOR TISSUE SEPARATION, AND OTHER PROCEDURES THAT REQUIRE A SHARP SURGICAL BLADE TO PUNCTURE OR CUT.: Brand: BARD-PARKER ® CARBON RIB-BACK BLADES

## (undated) DEVICE — SUT VICRYL PLUS 3-0 RB-1 CR/8 18 IN VCP713D

## (undated) DEVICE — CYSTO TUBING SINGLE IRRIGATION

## (undated) DEVICE — BETHLEHEM UNIVERSAL SPINE, KIT: Brand: CARDINAL HEALTH

## (undated) DEVICE — MAYO STAND COVER: Brand: CONVERTORS

## (undated) DEVICE — ELECTRODE BLADE MOD E-Z CLEAN  2.75IN 7CM -0012AM

## (undated) DEVICE — DISPOSABLE EQUIPMENT COVER: Brand: SMALL TOWEL DRAPE

## (undated) DEVICE — PREMIUM DRY TRAY LF: Brand: MEDLINE INDUSTRIES, INC.

## (undated) DEVICE — SYRINGE 5ML LL

## (undated) DEVICE — PROXIMATE SKIN STAPLERS (35 WIDE) CONTAINS 35 STAINLESS STEEL STAPLES (FIXED HEAD): Brand: PROXIMATE

## (undated) DEVICE — PENCIL ELECTROSURG E-Z CLEAN -0035H

## (undated) DEVICE — CHLORAPREP APPLICATOR TINTED 10.5ML ONE-STEP

## (undated) DEVICE — GLOVE SRG BIOGEL 7.5

## (undated) DEVICE — 3M™ STERI-STRIP™ COMPOUND BENZOIN TINCTURE 40 BAGS/CARTON 4 CARTONS/CASE C1544: Brand: 3M™ STERI-STRIP™

## (undated) DEVICE — HEMOSTATIC MATRIX SURGIFLO 8ML W/THROMBIN

## (undated) DEVICE — RADIOLOGY STERILE LABELS: Brand: CENTURION

## (undated) DEVICE — ROSEBUD DISSECTORS: Brand: DEROYAL

## (undated) DEVICE — DRAPE EQUIPMENT RF WAND

## (undated) DEVICE — DRAPE MICROSCOPE ARMATEC 46 X 120IN ANGL SLIM LENS F/LEICA

## (undated) DEVICE — CERVICAL COLLAR ASPEN REG

## (undated) DEVICE — JP PERF DRN SIL FLT 7MM FULL: Brand: CARDINAL HEALTH

## (undated) DEVICE — IV SET EXT SM BORE CARESITE 8IN

## (undated) DEVICE — SYRINGE 10ML LL